# Patient Record
Sex: FEMALE | Race: WHITE | NOT HISPANIC OR LATINO | Employment: FULL TIME
[De-identification: names, ages, dates, MRNs, and addresses within clinical notes are randomized per-mention and may not be internally consistent; named-entity substitution may affect disease eponyms.]

---

## 2017-09-10 ENCOUNTER — HEALTH MAINTENANCE LETTER (OUTPATIENT)
Age: 37
End: 2017-09-10

## 2018-01-26 ENCOUNTER — TELEPHONE (OUTPATIENT)
Dept: FAMILY MEDICINE | Facility: CLINIC | Age: 38
End: 2018-01-26

## 2018-01-26 NOTE — TELEPHONE ENCOUNTER
Pt is past due for f/u pap smear.  Reminder letter was sent 06/20/17.  LMTC and schedule at [facility].  Left this writer's number in case of questions ([phone]).  If no reply and/or appt within 2 weeks (02/09/18) pt will be considered lost to pap tracking f/u.  Sharon Cummings,    Pap Tracking

## 2018-03-11 ENCOUNTER — HEALTH MAINTENANCE LETTER (OUTPATIENT)
Age: 38
End: 2018-03-11

## 2018-04-27 ENCOUNTER — OFFICE VISIT (OUTPATIENT)
Dept: FAMILY MEDICINE | Facility: CLINIC | Age: 38
End: 2018-04-27
Payer: COMMERCIAL

## 2018-04-27 VITALS
TEMPERATURE: 98.1 F | DIASTOLIC BLOOD PRESSURE: 84 MMHG | BODY MASS INDEX: 44.41 KG/M2 | OXYGEN SATURATION: 98 % | RESPIRATION RATE: 16 BRPM | HEART RATE: 78 BPM | SYSTOLIC BLOOD PRESSURE: 118 MMHG | HEIGHT: 68 IN | WEIGHT: 293 LBS

## 2018-04-27 DIAGNOSIS — E66.01 MORBID OBESITY DUE TO EXCESS CALORIES (H): Primary | ICD-10-CM

## 2018-04-27 DIAGNOSIS — M22.2X2 PATELLOFEMORAL PAIN SYNDROME OF LEFT KNEE: ICD-10-CM

## 2018-04-27 DIAGNOSIS — E28.2 PCOS (POLYCYSTIC OVARIAN SYNDROME): ICD-10-CM

## 2018-04-27 PROCEDURE — 99214 OFFICE O/P EST MOD 30 MIN: CPT | Performed by: FAMILY MEDICINE

## 2018-04-27 ASSESSMENT — ENCOUNTER SYMPTOMS
CONSTITUTIONAL NEGATIVE: 1
FALLS: 0
FOCAL WEAKNESS: 0
SENSORY CHANGE: 0

## 2018-04-27 NOTE — MR AVS SNAPSHOT
After Visit Summary   4/27/2018    Erika Reina    MRN: [id number]           Patient Information     Date Of Birth          1980        Visit Information        Provider Department      4/27/2018 7:40 AM Robert Garza MD [facility]        Today's Diagnoses     Morbid obesity due to excess calories (H)    -  1    PCOS (polycystic ovarian syndrome)        Patellofemoral pain syndrome of left knee           Follow-ups after your visit        Additional Services     BARIATRIC ADULT REFERRAL       Your provider has referred you to: [facility] [[email or web address]]: [facility] [phone]. [email or web address]    Please be aware that coverage of these services is subject to the terms and limitations of your health insurance plan.  Call member services at your health plan with any benefit or coverage questions.      Please bring the following with you to your appointment:      (1) List of current medications   (2) This referral request   (3) Any documents/labs given to you for this referral            [facility] PT, HAND, AND CHIROPRACTIC REFERRAL       **This order will print in the [facility] Scheduling Office**    Physical Therapy, Hand Therapy and Chiropractic Care are available through:    *[facility]  *[facility]  *[facility]    Call one number to schedule at any of the above locations: [phone].    Your provider has referred you to: Physical Therapy at [facility] or [facility]    Indication/Reason for Referral: Knee Pain  Onset of Illness: months  Therapy Orders: Evaluate and Treat  Special Programs: None  Special Request: None    Vandana Ross      Additional Comments for the Therapist or Chiropractor:     Please be aware that coverage of these services is subject to the terms and limitations of your health insurance plan.  Call member services at  "your health plan with any benefit or coverage questions.      Please bring the following to your appointment:    *Your personal calendar for scheduling future appointments  *Comfortable clothing                  Follow-up notes from your care team     Return in about 1 month (around 5/27/2018), or if symptoms worsen or fail to improve.      Who to contact     If you have questions or need follow up information about today's clinic visit or your schedule please contact [facility] directly at [phone].  Normal or non-critical lab and imaging results will be communicated to you by "[employer]hart, letter or phone within 4 business days after the clinic has received the results. If you do not hear from us within 7 days, please contact the clinic through [employer] or phone. If you have a critical or abnormal lab result, we will notify you by phone as soon as possible.  Submit refill requests through [employer] or call your pharmacy and they will forward the refill request to us. Please allow 3 business days for your refill to be completed.          Additional Information About Your Visit        [employer] Information     [employer] gives you secure access to your electronic health record. If you see a primary care provider, you can also send messages to your care team and make appointments. If you have questions, please call your primary care clinic.  If you do not have a primary care provider, please call [phone] and they will assist you.        Care EveryWhere ID     This is your Care EveryWhere ID. This could be used by other organizations to access your [place] medical records  [id number]        Your Vitals Were     Pulse Temperature Respirations Height Last Period Pulse Oximetry    78 98.1  F (36.7  C) (Oral) 16 5' 7.75\" (1.721 m) 04/25/2018 98%    Breastfeeding? BMI (Body Mass Index)                No 49.83 kg/m2           Blood Pressure from Last 3 Encounters:   04/27/18 118/84   12/19/16 112/80 "   11/21/16 116/64    Weight from Last 3 Encounters:   04/27/18 325 lb 4.8 oz (147.6 kg)   12/19/16 (!) 350 lb (158.8 kg)   11/21/16 (!) 345 lb (156.5 kg)              We Performed the Following     BARIATRIC ADULT REFERRAL     [facility] PT, HAND, AND CHIROPRACTIC REFERRAL          Today's Medication Changes          These changes are accurate as of 4/27/18  8:12 AM.  If you have any questions, ask your nurse or doctor.               Start taking these medicines.        Dose/Directions    diclofenac 1 % Gel topical gel   Commonly known as:  VOLTAREN   Used for:  Patellofemoral pain syndrome of left knee   Started by:  Robert Garza MD        Apply 4 grams to knees or 2 grams to hands four times daily using enclosed dosing card.   Quantity:  100 g   Refills:  1       metFORMIN 500 MG tablet   Commonly known as:  GLUCOPHAGE   Used for:  PCOS (polycystic ovarian syndrome)   Started by:  Robert Garza MD        Dose:  500 mg   Take 1 tablet (500 mg) by mouth 2 times daily (with meals)   Quantity:  180 tablet   Refills:  1            Where to get your medicines      These medications were sent to [facility]/pharmacy #[id number] - [place], [place] - [address]  [address] [facility] [address]     Phone:  [phone]     diclofenac 1 % Gel topical gel    metFORMIN 500 MG tablet                Primary Care Provider Office Phone # Fax #    Robert Garza [phone]       [address] [facility] [address]        Equal Access to Services     GODFREY GONZALEZ AH: Hadii christine ku hadasho Soomaali, waaxda luqadaha, qaybta kaalmada adeegyada, waxay jonathan pete. So [facility] [phone].    ATENCIÓN: Si habla edith, tiene a dodge disposición servicios gratuitos de asistencia lingüística. Llame al [phone].    We comply with applicable federal civil rights laws and [place] laws. We do not discriminate on the basis of race, color, national origin, age, disability, sex,  sexual orientation, or gender identity.            Thank you!     Thank you for choosing [facility]  for your care. Our goal is always to provide you with excellent care. Hearing back from our patients is one way we can continue to improve our services. Please take a few minutes to complete the written survey that you may receive in the mail after your visit with us. Thank you!             Your Updated Medication List - Protect others around you: Learn how to safely use, store and throw away your medicines at www.disposemymeds.org.          This list is accurate as of 4/27/18  8:12 AM.  Always use your most recent med list.                   Brand Name Dispense Instructions for use Diagnosis    diclofenac 1 % Gel topical gel    VOLTAREN    100 g    Apply 4 grams to knees or 2 grams to hands four times daily using enclosed dosing card.    Patellofemoral pain syndrome of left knee       metFORMIN 500 MG tablet    GLUCOPHAGE    180 tablet    Take 1 tablet (500 mg) by mouth 2 times daily (with meals)    PCOS (polycystic ovarian syndrome)

## 2018-04-27 NOTE — PROGRESS NOTES
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  Similar but not as bad as previous meniscus injury to R knee about 15 years ago (did wind up having surgery).  Pain keeps her awake at night, walking.  Worse after being in her feet, can throb when she sits down to rest.  Fully extended is uncomfortable, as is fully flexed.  Driving is very uncomfortable.  Maybe a bit of swelling - feels tight when she bends it.  No catching/locking.  No bruising.  \"Kind of\" feels unstable.  Did find Voltaren helpful.    Would like to restart metformin.  Never did follow up with weight clinic.    Review of Systems   Constitutional: Negative.    Musculoskeletal: Positive for joint pain. Negative for falls.   Neurological: Negative for sensory change and focal weakness.         Physical Exam   Constitutional: She is well-developed, well-nourished, and in no distress.   Musculoskeletal:        Left knee: She exhibits abnormal patellar mobility. She exhibits normal range of motion, no swelling, no LCL laxity, normal meniscus and no MCL laxity. No tenderness found. No medial joint line, no lateral joint line, no MCL and no LCL tenderness noted.   Skin: Skin is warm and dry.   Vitals " reviewed.    (E66.01) Morbid obesity due to excess calories (H)  (primary encounter diagnosis)  Comment: will re-refer  Plan: BARIATRIC ADULT REFERRAL            (E28.2) PCOS (polycystic ovarian syndrome)  Comment: renewing, can also help with weight loss  Plan: metFORMIN (GLUCOPHAGE) 500 MG tablet            (M22.2X2) Patellofemoral pain syndrome of left knee  Comment: lots of patella noise on exam, can also add ice  Plan: [facility] PT, HAND, AND CHIROPRACTIC REFERRAL,         diclofenac (VOLTAREN) 1 % GEL topical gel              RTC in 1m    Robert Garza MD

## 2018-04-30 ENCOUNTER — TELEPHONE (OUTPATIENT)
Dept: FAMILY MEDICINE | Facility: CLINIC | Age: 38
End: 2018-04-30

## 2018-04-30 NOTE — LETTER
May 4, 2018      Erika Reina  [address] [facility] [address]        To Whom It May Concern,      RE:  Erika Reina ( 1980)    BCBS ID: [id number]  Fax: [phone]    We are writing to appeal the decision on the prior authorization denial for the medication diclofenac (VOLTAREN) 1 % GEL topical gel.  Patient is unable to take any NSAIDS due to an allergic reaction.  She is being prescribed this medication for pain related to the diagnosis of Patellofemoral pain syndrome of left knee: (ICD-10 M22.2X2)  Patient has tried and failed other modalities such as ice, heat, tylenol and rest with no relief.  Please reconsider prior authorization approval.        Sincerely,        Robert Garza MD

## 2018-04-30 NOTE — TELEPHONE ENCOUNTER
Prior Authorization Retail Medication Request    Medication/Dose: diclofenac (VOLTAREN) 1 % GEL topical gel  ICD code (if different than what is on RX):    Previously Tried and Failed:  Ice, ibuprofen, Tylenol, naproxen   Rationale:  At exam, there was lots of patella noise.  They are going to try some PT as well.    Insurance Name:  unknown  Insurance ID:  [place]... Unknown  Phone: [phone]      Pharmacy Information (if different than what is on RX)  Name:    Phone:

## 2018-05-01 NOTE — TELEPHONE ENCOUNTER
PA Initiation    Medication: VOLTAREN 1% GEL  Insurance Company: [employer] - Phone [phone] Fax [phone]  Pharm[facility] the [id number] [place], [place] - [address]  Filling Pharmacy Phone: [phone]  Filling Pharmacy Fax:    Start Date: 5/1/2018

## 2018-05-01 NOTE — TELEPHONE ENCOUNTER
Request has been submitted via Count includes the [facility]. Waiting for questions to generate before completing PA.

## 2018-05-02 NOTE — TELEPHONE ENCOUNTER
diclofenac (VOLTAREN) 1 % GEL topical gel    Denied    Please send alternative.       BRAYAN Marie  May 2, 2018  10:02 AM

## 2018-05-02 NOTE — TELEPHONE ENCOUNTER
PA - pt has allergic reaction to oral NSAIDs, has been able to tolerate topical NSAIDs.    Robert Garza MD

## 2018-05-04 NOTE — TELEPHONE ENCOUNTER
Medication Appeal Initiation    We have initiated an appeal for the requested medication:  Medication: VOLTAREN 1% GEL - DENIED  Appeal Start Date:  5/4/2018  Insurance Company: [employer] - Phone [phone] Fax [phone]  Comments:  Appeal has been faxed to [facility] along with appeal letter.

## 2018-05-09 NOTE — TELEPHONE ENCOUNTER
Received fax from [facility]  Appeal for Diclofenac Sodium Topical Gel 1% was determined as not medically necessary  Coverage for gel is only covered for OA pain in joints susceptible to topical treatment such as feet, ankles, knees, hands, wrist, or elbows    Request reviewed by MD Board Certified in Emergency Medicine     Route to provider to review and advise    Tori Richardson RN Nurse Triage

## 2018-08-07 ENCOUNTER — OFFICE VISIT (OUTPATIENT)
Dept: FAMILY MEDICINE | Facility: CLINIC | Age: 38
End: 2018-08-07
Payer: COMMERCIAL

## 2018-08-07 VITALS
OXYGEN SATURATION: 97 % | TEMPERATURE: 98.7 F | HEIGHT: 68 IN | DIASTOLIC BLOOD PRESSURE: 72 MMHG | SYSTOLIC BLOOD PRESSURE: 122 MMHG | HEART RATE: 102 BPM | BODY MASS INDEX: 44.41 KG/M2 | WEIGHT: 293 LBS

## 2018-08-07 DIAGNOSIS — E66.01 MORBID OBESITY DUE TO EXCESS CALORIES (H): ICD-10-CM

## 2018-08-07 DIAGNOSIS — Z13.1 SCREENING FOR DIABETES MELLITUS: ICD-10-CM
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
## 2018-08-07 DIAGNOSIS — N92.0 EXCESSIVE AND FREQUENT MENSTRUATION: ICD-10-CM

## 2018-08-07 DIAGNOSIS — Z13.6 CARDIOVASCULAR SCREENING; LDL GOAL LESS THAN 160: ICD-10-CM

## 2018-08-07 DIAGNOSIS — S40.861A INSECT BITE OF RIGHT UPPER EXTREMITY, INITIAL ENCOUNTER: ICD-10-CM

## 2018-08-07 DIAGNOSIS — Z00.00 ROUTINE GENERAL MEDICAL EXAMINATION AT A HEALTH CARE FACILITY: Primary | ICD-10-CM

## 2018-08-07 DIAGNOSIS — D64.9 ANEMIA, UNSPECIFIED TYPE: ICD-10-CM

## 2018-08-07 DIAGNOSIS — Z12.4 SCREENING FOR MALIGNANT NEOPLASM OF CERVIX: ICD-10-CM

## 2018-08-07 LAB
ERYTHROCYTE [DISTWIDTH] IN BLOOD BY AUTOMATED COUNT: 14.1 % (ref 10–15)
HCT VFR BLD AUTO: 38.5 % (ref 35–47)
HGB BLD-MCNC: 12.8 G/DL (ref 11.7–15.7)
MCH RBC QN AUTO: 29.2 PG (ref 26.5–33)
MCHC RBC AUTO-ENTMCNC: 33.2 G/DL (ref 31.5–36.5)
MCV RBC AUTO: 88 FL (ref 78–100)
PLATELET # BLD AUTO: 355 10E9/L (ref 150–450)
RBC # BLD AUTO: 4.39 10E12/L (ref 3.8–5.2)
WBC # BLD AUTO: 9.8 10E9/L (ref 4–11)

## 2018-08-07 PROCEDURE — 85027 COMPLETE CBC AUTOMATED: CPT | Performed by: NURSE PRACTITIONER

## 2018-08-07 PROCEDURE — 36415 COLL VENOUS BLD VENIPUNCTURE: CPT | Performed by: NURSE PRACTITIONER

## 2018-08-07 PROCEDURE — 99395 PREV VISIT EST AGE 18-39: CPT | Performed by: NURSE PRACTITIONER

## 2018-08-07 RX ORDER — MULTIPLE VITAMINS W/ MINERALS TAB 9MG-400MCG
1 TAB ORAL DAILY
COMMUNITY
End: 2020-10-20

## 2018-08-07 NOTE — PATIENT INSTRUCTIONS
Follow up with GYN next week.      Preventive Health Recommendations  Female Ages 26 - 39  Yearly exam:   See your health care provider every year in order to    Review health changes.     Discuss preventive care.      Review your medicines if you your doctor has prescribed any.    Until age 30: Get a Pap test every three years (more often if you have had an abnormal result).    After age 30: Talk to your doctor about whether you should have a Pap test every 3 years or have a Pap test with HPV screening every 5 years.   You do not need a Pap test if your uterus was removed (hysterectomy) and you have not had cancer.  You should be tested each year for STDs (sexually transmitted diseases), if you're at risk.   Talk to your provider about how often to have your cholesterol checked.  If you are at risk for diabetes, you should have a diabetes test (fasting glucose).  Shots: Get a flu shot each year. Get a tetanus shot every 10 years.   Nutrition:     Eat at least 5 servings of fruits and vegetables each day.    Eat whole-grain bread, whole-wheat pasta and brown rice instead of white grains and rice.    Get adequate Calcium and Vitamin D.     Lifestyle    Exercise at least 150 minutes a week (30 minutes a day, 5 days of the week). This will help you control your weight and prevent disease.    Limit alcohol to one drink per day.    No smoking.     Wear sunscreen to prevent skin cancer.    See your dentist every six months for an exam and cleaning.

## 2018-08-07 NOTE — MR AVS SNAPSHOT
After Visit Summary   8/7/2018    Erika Reina    MRN: [id number]           Patient Information     Date Of Birth          1980        Visit Information        Provider Department      8/7/2018 1:20 PM Glenna Ham APRN [facility]        Today's Diagnoses     Excessive and frequent menstruation    -  1    Screening for malignant neoplasm of cervix        Anemia, unspecified type        Screening for diabetes mellitus        CARDIOVASCULAR SCREENING; LDL GOAL LESS THAN 160        Routine general medical examination at a health care facility          Care Instructions    Follow up with GYN next week.      Preventive Health Recommendations  Female Ages 26 - 39  Yearly exam:   See your health care provider every year in order to    Review health changes.     Discuss preventive care.      Review your medicines if you your doctor has prescribed any.    Until age 30: Get a Pap test every three years (more often if you have had an abnormal result).    After age 30: Talk to your doctor about whether you should have a Pap test every 3 years or have a Pap test with HPV screening every 5 years.   You do not need a Pap test if your uterus was removed (hysterectomy) and you have not had cancer.  You should be tested each year for STDs (sexually transmitted diseases), if you're at risk.   Talk to your provider about how often to have your cholesterol checked.  If you are at risk for diabetes, you should have a diabetes test (fasting glucose).  Shots: Get a flu shot each year. Get a tetanus shot every 10 years.   Nutrition:     Eat at least 5 servings of fruits and vegetables each day.    Eat whole-grain bread, whole-wheat pasta and brown rice instead of white grains and rice.    Get adequate Calcium and Vitamin D.     Lifestyle    Exercise at least 150 minutes a week (30 minutes a day, 5 days of the week). This will help you control your weight and prevent disease.    Limit  alcohol to one drink per day.    No smoking.     Wear sunscreen to prevent skin cancer.    See your dentist every six months for an exam and cleaning.            Follow-ups after your visit        Additional Services     OB/GYN REFERRAL       Your provider has referred you to:  [facility]: [facility] [phone]   [email or web address]      Please be aware that coverage of these services is subject to the terms and limitations of your health insurance plan.  Call member services at your health plan with any benefit or coverage questions.      Please bring the following with you to your appointment:    (1) Any X-Rays, CTs or MRIs which have been performed.  Contact the facility where they were done to arrange for  prior to your scheduled appointment.   (2) List of current medications   (3) This referral request   (4) Any documents/labs given to you for this referral                  Follow-up notes from your care team     Return in about 1 year (around 8/7/2019) for Physical Exam.      Your next 10 appointments already scheduled     Aug 16, 2018  3:00 PM CDT   [facility]    [address] [place] [address] [phone]           Bring a current list of meds and any records pertaining to this visit. For Physicals, please bring immunization records and any forms needing to be filled out. Please arrive 10 minutes early to complete paperwork.              Future tests that were ordered for you today     Open Future Orders        Priority Expected Expires Ordered    Lipid panel reflex to direct LDL Fasting Routine  8/7/2019 8/7/2018    Glucose Routine  8/7/2019 8/7/2018    CBC with platelets Routine  8/7/2019 8/7/2018            Who to contact     If you have questions or need follow up information about today's clinic visit or your schedule please contact  "[facility] directly at [phone].  Normal or non-critical lab and imaging results will be communicated to you by MyChart, letter or phone within 4 business days after the clinic has received the results. If you do not hear from us within 7 days, please contact the clinic through MyChart or phone. If you have a critical or abnormal lab result, we will notify you by phone as soon as possible.  Submit refill requests through [employer] or call your pharmacy and they will forward the refill request to us. Please allow 3 business days for your refill to be completed.          Additional Information About Your Visit        [employer] Information     [employer] gives you secure access to your electronic health record. If you see a primary care provider, you can also send messages to your care team and make appointments. If you have questions, please call your primary care clinic.  If you do not have a primary care provider, please call [phone] and they will assist you.        Care EveryWhere ID     This is your Care EveryWhere ID. This could be used by other organizations to access your [place] medical records  [id number]        Your Vitals Were     Pulse Temperature Height Last Period Pulse Oximetry BMI (Body Mass Index)    102 98.7  F (37.1  C) (Oral) 5' 8\" (1.727 m) 07/01/2018 97% 50.18 kg/m2       Blood Pressure from Last 3 Encounters:   08/07/18 122/72   04/27/18 118/84   12/19/16 112/80    Weight from Last 3 Encounters:   08/07/18 330 lb (149.7 kg)   04/27/18 325 lb 4.8 oz (147.6 kg)   12/19/16 (!) 350 lb (158.8 kg)              We Performed the Following     OB/GYN REFERRAL        Primary Care Provider Office Phone # Fax #    Robert Garza [phone]       [address]  [facility] [address]        Equal Access to Services     GODFREY GONZALEZ AH: Hadii aad ku hadasho Soomaali, waaxda luqadaha, qaybta kaalmada juliethyaantolin, rito malik . So [facility] " [phone].    ATENCIÓN: Si rickie sharma, tiene a dodge disposición servicios gratuitos de asistencia lingüística. Leann golden [phone].    We comply with applicable federal civil rights laws and [place] laws. We do not discriminate on the basis of race, color, national origin, age, disability, sex, sexual orientation, or gender identity.            Thank you!     Thank you for choosing [facility]  for your care. Our goal is always to provide you with excellent care. Hearing back from our patients is one way we can continue to improve our services. Please take a few minutes to complete the written survey that you may receive in the mail after your visit with us. Thank you!             Your Updated Medication List - Protect others around you: Learn how to safely use, store and throw away your medicines at www.disposemymeds.org.          This list is accurate as of 8/7/18  2:21 PM.  Always use your most recent med list.                   Brand Name Dispense Instructions for use Diagnosis    metFORMIN 500 MG tablet    GLUCOPHAGE    180 tablet    Take 1 tablet (500 mg) by mouth 2 times daily (with meals)    PCOS (polycystic ovarian syndrome)       Multi-vitamin Tabs tablet      Take 1 tablet by mouth daily        VITAMIN D (CHOLECALCIFEROL) PO      Take by mouth daily

## 2018-08-07 NOTE — PROGRESS NOTES
SUBJECTIVE:   CC: Erika Reina is an 37 year old woman who presents for preventive health visit.     Physical   Annual:     Getting at least 3 servings of Calcium per day:  Yes    Bi-annual eye exam:  Yes    Dental care twice a year:  Yes    Sleep apnea or symptoms of sleep apnea:  Daytime drowsiness    Diet:  Regular (no restrictions)    Frequency of exercise:  2-3 days/week    Duration of exercise:  15-30 minutes    Taking medications regularly:  Yes    Medication side effects:  Other    Additional concerns today:  YES (abdominal cramping x3 months )    Menstrual cycle: for the past month has had bleeding more days than not; almost every day.  Reports prior to the last month had some spotting almost every day.  She has noticed cramps daily for the past few months.  Hx of anemia in the past.  Still taking OTC iron daily.  Has been on metformin for PCOS.          Today's PHQ-2 Score:   PHQ-2 ( 1999 Pfizer) 8/7/2018   Q1: Little interest or pleasure in doing things 1   Q2: Feeling down, depressed or hopeless 1   PHQ-2 Score 2   Q1: Little interest or pleasure in doing things Several days   Q2: Feeling down, depressed or hopeless Several days   PHQ-2 Score 2     Abuse: Current or Past(Physical, Sexual or Emotional)- No  Do you feel safe in your environment - Yes    Social History   Substance Use Topics     Smoking status: Never Smoker     Smokeless tobacco: Never Used     Alcohol use 0.0 oz/week     0 Standard drinks or equivalent per week      Comment: rarely     Alcohol Use 8/7/2018   If you drink alcohol do you typically have greater than 3 drinks per day OR greater than 7 drinks per week? No   No flowsheet data found.    Reviewed orders with patient.  Reviewed health maintenance and updated orders accordingly - Yes  BP Readings from Last 3 Encounters:   08/07/18 122/72   04/27/18 118/84   12/19/16 112/80    Wt Readings from Last 3 Encounters:   08/07/18 330 lb (149.7 kg)   04/27/18 325 lb 4.8 oz (147.6  kg)   12/19/16 (!) 350 lb (158.8 kg)                  Current Outpatient Prescriptions   Medication Sig Dispense Refill     multivitamin, therapeutic with minerals (MULTI-VITAMIN) TABS tablet Take 1 tablet by mouth daily       VITAMIN D, CHOLECALCIFEROL, PO Take by mouth daily       metFORMIN (GLUCOPHAGE) 500 MG tablet Take 1 tablet (500 mg) by mouth 2 times daily (with meals) (Patient not taking: Reported on 8/7/2018) 180 tablet 1     Allergies   Allergen Reactions     Ibuprofen Swelling     Swelling of legs       Mammogram not appropriate for this patient based on age.    Pertinent mammograms are reviewed under the imaging tab.  History of abnormal Pap smear: YES - updated in Problem List and Health Maintenance accordingly  PAP / HPV Latest Ref Rng & Units 10/18/2016   PAP - LSIL(A)   HPV 16 DNA NEG Negative   HPV 18 DNA NEG Negative   OTHER HR HPV NEG Positive(A)     Reviewed and updated as needed this visit by clinical staff  Tobacco  Allergies  Meds  Med Hx  Surg Hx  Fam Hx  Soc Hx        Reviewed and updated as needed this visit by Provider        Past Medical History:   Diagnosis Date     Hyperlipidemia LDL goal <160 9/15/2011     Papanicolaou smear of cervix with low grade squamous intraepithelial lesion (LGSIL) 10/18/16    +HR HPV.      History reviewed. No pertinent surgical history.    Review of Systems  CONSTITUTIONAL: NEGATIVE for fever, chills, change in weight  INTEGUMENTARU/SKIN: NEGATIVE for worrisome rashes, moles or lesions  EYES: NEGATIVE for vision changes or irritation  ENT: NEGATIVE for ear, mouth and throat problems  RESP: NEGATIVE for significant cough or SOB  BREAST: NEGATIVE for masses, tenderness or discharge  CV: NEGATIVE for chest pain, palpitations or peripheral edema  GI: NEGATIVE for nausea, abdominal pain, heartburn, or change in bowel habits  : NEGATIVE for unusual urinary or vaginal symptoms. Periods are irregular.  MUSCULOSKELETAL: NEGATIVE for significant arthralgias or  "myalgia  NEURO: NEGATIVE for weakness, dizziness or paresthesias  ENDOCRINE: NEGATIVE for temperature intolerance, skin/hair changes  PSYCHIATRIC: NEGATIVE for changes in mood or affect     OBJECTIVE:   /72  Pulse 102  Temp 98.7  F (37.1  C) (Oral)  Ht 5' 8\" (1.727 m)  Wt 330 lb (149.7 kg)  LMP 07/01/2018  SpO2 97%  BMI 50.18 kg/m2  Physical Exam  GENERAL: healthy, alert and no distress  EYES: Eyes grossly normal to inspection, PERRL and conjunctivae and sclerae normal  HENT: ear canals and TM's normal, nose and mouth without ulcers or lesions  NECK: no adenopathy, no asymmetry, masses, or scars and thyroid normal to palpation  RESP: lungs clear to auscultation - no rales, rhonchi or wheezes  CV: regular rate and rhythm, normal S1 S2, no S3 or S4, no murmur, click or rub, no peripheral edema and peripheral pulses strong  ABDOMEN: soft, nontender, no hepatosplenomegaly, no masses and bowel sounds normal   (female): normal female external genitalia, normal urethral meatus , vaginal mucosa pink, moist, unable to visualize cervix/complete pap due to bleeding  MS: no gross musculoskeletal defects noted, no edema  SKIN: no suspicious lesions or rashes, 4 small erythematous wheals on R upper arm   NEURO: Normal strength and tone, mentation intact and speech normal  PSYCH: mentation appears normal, affect normal/bright    Diagnostic Test Results:  Results for orders placed or performed in visit on 08/07/18   CBC with platelets   Result Value Ref Range    WBC 9.8 4.0 - 11.0 10e9/L    RBC Count 4.39 3.8 - 5.2 10e12/L    Hemoglobin 12.8 11.7 - 15.7 g/dL    Hematocrit 38.5 35.0 - 47.0 %    MCV 88 78 - 100 fl    MCH 29.2 26.5 - 33.0 pg    MCHC 33.2 31.5 - 36.5 g/dL    RDW 14.1 10.0 - 15.0 %    Platelet Count 355 150 - 450 10e9/L         ASSESSMENT/PLAN:   1. Routine general medical examination at a health care facility  Normal exam.  Will have her see GYN for menorrhagia.      2. Excessive and frequent " "menstruation  - OB/GYN REFERRAL  - CBC with platelets; Future  - CBC with platelets    3. Screening for malignant neoplasm of cervix  Unable to do pap due to bleeding today.  Will refer to GYN.   - OB/GYN REFERRAL    4. Anemia, unspecified type  Hx of anemia.  Reports frequent menstrual bleeding.  CBC normal.    - CBC with platelets; Future  - CBC with platelets    5. Screening for diabetes mellitus  Not fasting today.   - Glucose; Future    6. CARDIOVASCULAR SCREENING; LDL GOAL LESS THAN 160  Not fasting today.   - Lipid panel reflex to direct LDL Fasting; Future    7. Morbid obesity due to excess calories (H)    8. Insect bite of right upper extremity, initial encounter  Appear to be bug bites.  OTC anti itch cream as needed.      COUNSELING:  Reviewed preventive health counseling, as reflected in patient instructions       Regular exercise       Healthy diet/nutrition    BP Readings from Last 1 Encounters:   08/07/18 122/72     Estimated body mass index is 50.18 kg/(m^2) as calculated from the following:    Height as of this encounter: 5' 8\" (1.727 m).    Weight as of this encounter: 330 lb (149.7 kg).    BP Screening:   Last 3 BP Readings:    BP Readings from Last 3 Encounters:   08/07/18 122/72   04/27/18 118/84   12/19/16 112/80       The following was recommended to the patient:  Re-screen BP within a year and recommended lifestyle modifications  Weight management plan: Discussed healthy diet and exercise guidelines and patient will follow up in 6 months in clinic to re-evaluate.     reports that she has never smoked. She has never used smokeless tobacco.      Counseling Resources:  ATP IV Guidelines  Pooled Cohorts Equation Calculator  Breast Cancer Risk Calculator  FRAX Risk Assessment  ICSI Preventive Guidelines  Dietary Guidelines for Americans, 2010  USDA's MyPlate  ASA Prophylaxis  Lung CA Screening    LEEROY Pal [facility]  Answers for HPI/ROS submitted by the patient " on 8/7/2018   PHQ-2 Score: 2

## 2018-08-16 ENCOUNTER — OFFICE VISIT (OUTPATIENT)
Dept: OBGYN | Facility: CLINIC | Age: 38
End: 2018-08-16
Payer: COMMERCIAL

## 2018-08-16 VITALS — BODY MASS INDEX: 50.18 KG/M2 | WEIGHT: 293 LBS | DIASTOLIC BLOOD PRESSURE: 82 MMHG | SYSTOLIC BLOOD PRESSURE: 122 MMHG

## 2018-08-16 DIAGNOSIS — Z12.4 SCREENING FOR CERVICAL CANCER: Primary | ICD-10-CM

## 2018-08-16 DIAGNOSIS — Z13.6 CARDIOVASCULAR SCREENING; LDL GOAL LESS THAN 160: ICD-10-CM

## 2018-08-16 DIAGNOSIS — Z11.51 SCREENING FOR HUMAN PAPILLOMAVIRUS: ICD-10-CM

## 2018-08-16 DIAGNOSIS — E28.2 PCOS (POLYCYSTIC OVARIAN SYNDROME): ICD-10-CM

## 2018-08-16 DIAGNOSIS — Z13.1 SCREENING FOR DIABETES MELLITUS: ICD-10-CM

## 2018-08-16 PROCEDURE — 80061 LIPID PANEL: CPT | Performed by: ADVANCED PRACTICE MIDWIFE

## 2018-08-16 PROCEDURE — 82947 ASSAY GLUCOSE BLOOD QUANT: CPT | Performed by: ADVANCED PRACTICE MIDWIFE

## 2018-08-16 PROCEDURE — G0145 SCR C/V CYTO,THINLAYER,RESCR: HCPCS | Performed by: ADVANCED PRACTICE MIDWIFE

## 2018-08-16 PROCEDURE — 87624 HPV HI-RISK TYP POOLED RSLT: CPT | Performed by: ADVANCED PRACTICE MIDWIFE

## 2018-08-16 PROCEDURE — 36415 COLL VENOUS BLD VENIPUNCTURE: CPT | Performed by: ADVANCED PRACTICE MIDWIFE

## 2018-08-16 PROCEDURE — 99203 OFFICE O/P NEW LOW 30 MIN: CPT | Performed by: ADVANCED PRACTICE MIDWIFE

## 2018-08-16 RX ORDER — DESOGESTREL AND ETHINYL ESTRADIOL 0.15-0.03
1 KIT ORAL DAILY
Qty: 28 TABLET | Refills: 11 | Status: SHIPPED | OUTPATIENT
Start: 2018-08-16 | End: 2018-11-20

## 2018-08-16 NOTE — NURSING NOTE
"Chief Complaint   Patient presents with     Abnormal Bleeding Problem     Heavy abnormal periods with spotting        Initial /82 (BP Location: Right arm, Patient Position: Sitting, Cuff Size: Adult Large)  Wt 330 lb (149.7 kg)  LMP  (LMP Unknown)  BMI 50.18 kg/m2 Estimated body mass index is 50.18 kg/(m^2) as calculated from the following:    Height as of 8/7/18: 5' 8\" (1.727 m).    Weight as of this encounter: 330 lb (149.7 kg).  BP completed using cuff size: large    No obstetric history on file.    The following HM Due: mammogram  pap smear      The following patient reported/Care Every where data was sent to:  P ABSTRACT QUALITY INITIATIVES [00110]      patient has appointment for today.  Ricco Knight CMA                 "

## 2018-08-16 NOTE — MR AVS SNAPSHOT
After Visit Summary   8/16/2018    Erika Reina    MRN: [id number]           Patient Information     Date Of Birth          1980        Visit Information        Provider Department      8/16/2018 3:00 PM Sandra Pineda APRN [facility]        Today's Diagnoses     Screening for cervical cancer    -  1    Screening for human papillomavirus        PCOS (polycystic ovarian syndrome)        CARDIOVASCULAR SCREENING; LDL GOAL LESS THAN 160        Screening for diabetes mellitus           Follow-ups after your visit        Follow-up notes from your care team     Return if symptoms worsen or fail to improve.      Future tests that were ordered for you today     Open Future Orders        Priority Expected Expires Ordered    US Pelvic Complete w Transvaginal Routine  8/16/2019 8/16/2018            Who to contact     If you have questions or need follow up information about today's clinic visit or your schedule please contact [facility] directly at [phone].  Normal or non-critical lab and imaging results will be communicated to you by [employer], letter or phone within 4 business days after the clinic has received the results. If you do not hear from us within 7 days, please contact the clinic through Acer[employer] or phone. If you have a critical or abnormal lab result, we will notify you by phone as soon as possible.  Submit refill requests through [employer] or call your pharmacy and they will forward the refill request to us. Please allow 3 business days for your refill to be completed.          Additional Information About Your Visit        MyChart Information     [employer] gives you secure access to your electronic health record. If you see a primary care provider, you can also send messages to your care team and make appointments. If you have questions, please call your primary care clinic.  If you do not have a primary care provider, please call  [phone] and they will assist you.        Care EveryWhere ID     This is your Care EveryWhere ID. This could be used by other organizations to access your [place] medical records  [id number]        Your Vitals Were     Last Period BMI (Body Mass Index)                (LMP Unknown) 50.18 kg/m2           Blood Pressure from Last 3 Encounters:   08/16/18 122/82   08/07/18 122/72   04/27/18 118/84    Weight from Last 3 Encounters:   08/16/18 330 lb (149.7 kg)   08/07/18 330 lb (149.7 kg)   04/27/18 325 lb 4.8 oz (147.6 kg)              We Performed the Following     Glucose     HPV High Risk Types DNA Cervical     Lipid panel reflex to direct LDL Fasting     PAP imaged thin layer screen          Today's Medication Changes          These changes are accurate as of 8/16/18  3:53 PM.  If you have any questions, ask your nurse or doctor.               Start taking these medicines.        Dose/Directions    desogestrel-ethinyl estradiol 0.15-30 MG-MCG per tablet   Commonly known as:  APRI   Used for:  PCOS (polycystic ovarian syndrome)   Started by:  Sandra Pineda APRN CNM        Dose:  1 tablet   Take 1 tablet by mouth daily   Quantity:  28 tablet   Refills:  11            Where to get your medicines      These medications were sent to [facility]/pharmacy #[id number] - [place], [place] - [address]  [facility]  [address] [address]     Phone:  [phone]     desogestrel-ethinyl estradiol 0.15-30 MG-MCG per tablet                Primary Care Provider Office Phone # Fax #    LEEROY [place] [phone]       [facility] [address] [facility] [address]        Equal Access to Services     SHANNAN GONZALEZ AH: Hadii christine bartletto Sogeo, waaxda luqadaha, qaybta kaalmada adeegyada, rito pete. So [facility] [phone].    ATENCIÓN: Si habla español, tiene a dodge disposición servicios gratuitos de asistencia lingüística. Llame al  [phone].    We comply with applicable federal civil rights laws and [place] laws. We do not discriminate on the basis of race, color, national origin, age, disability, sex, sexual orientation, or gender identity.            Thank you!     Thank you for choosing [facility]  for your care. Our goal is always to provide you with excellent care. Hearing back from our patients is one way we can continue to improve our services. Please take a few minutes to complete the written survey that you may receive in the mail after your visit with us. Thank you!             Your Updated Medication List - Protect others around you: Learn how to safely use, store and throw away your medicines at www.disposemymeds.org.          This list is accurate as of 8/16/18  3:53 PM.  Always use your most recent med list.                   Brand Name Dispense Instructions for use Diagnosis    desogestrel-ethinyl estradiol 0.15-30 MG-MCG per tablet    APRI    28 tablet    Take 1 tablet by mouth daily    PCOS (polycystic ovarian syndrome)       metFORMIN 500 MG tablet    GLUCOPHAGE    180 tablet    Take 1 tablet (500 mg) by mouth 2 times daily (with meals)    PCOS (polycystic ovarian syndrome)       Multi-vitamin Tabs tablet      Take 1 tablet by mouth daily        VITAMIN D (CHOLECALCIFEROL) PO      Take by mouth daily

## 2018-08-17 LAB
CHOLEST SERPL-MCNC: 165 MG/DL
GLUCOSE SERPL-MCNC: 77 MG/DL (ref 70–99)
HDLC SERPL-MCNC: 69 MG/DL
LDLC SERPL CALC-MCNC: 79 MG/DL
NONHDLC SERPL-MCNC: 96 MG/DL
TRIGL SERPL-MCNC: 84 MG/DL

## 2018-08-21 LAB
COPATH REPORT: NORMAL
PAP: NORMAL

## 2018-08-22 LAB
FINAL DIAGNOSIS: NORMAL
HPV HR 12 DNA CVX QL NAA+PROBE: NEGATIVE
HPV16 DNA SPEC QL NAA+PROBE: NEGATIVE
HPV18 DNA SPEC QL NAA+PROBE: NEGATIVE
SPECIMEN DESCRIPTION: NORMAL
SPECIMEN SOURCE CVX/VAG CYTO: NORMAL

## 2018-09-07 ENCOUNTER — HOSPITAL ENCOUNTER (OUTPATIENT)
Dept: ULTRASOUND IMAGING | Facility: CLINIC | Age: 38
Discharge: HOME | End: 2018-09-07
Attending: SURGERY | Admitting: SURGERY
Payer: COMMERCIAL

## 2018-09-07 ENCOUNTER — APPOINTMENT (OUTPATIENT)
Dept: VASCULAR SURGERY | Facility: CLINIC | Age: 38
End: 2018-09-07
Payer: COMMERCIAL

## 2018-09-07 DIAGNOSIS — I82.409 DVT (DEEP VENOUS THROMBOSIS) (H): ICD-10-CM

## 2018-09-07 DIAGNOSIS — I80.01 SUPERFICIAL PHLEBITIS OF LEG, RIGHT: ICD-10-CM

## 2018-09-07 PROCEDURE — 99207 ZZC VEINSOLUTIONS FREE SCREENING: CPT | Performed by: SURGERY

## 2018-09-07 PROCEDURE — 93970 EXTREMITY STUDY: CPT

## 2018-09-13 ENCOUNTER — RADIANT APPOINTMENT (OUTPATIENT)
Dept: ULTRASOUND IMAGING | Facility: CLINIC | Age: 38
End: 2018-09-13
Attending: ADVANCED PRACTICE MIDWIFE
Payer: COMMERCIAL

## 2018-09-13 DIAGNOSIS — E28.2 PCOS (POLYCYSTIC OVARIAN SYNDROME): ICD-10-CM

## 2018-09-13 PROCEDURE — 76830 TRANSVAGINAL US NON-OB: CPT | Performed by: FAMILY MEDICINE

## 2018-09-13 PROCEDURE — 76856 US EXAM PELVIC COMPLETE: CPT | Performed by: FAMILY MEDICINE

## 2018-09-28 ENCOUNTER — HOSPITAL ENCOUNTER (EMERGENCY)
Facility: CLINIC | Age: 38
Discharge: HOME | End: 2018-09-28
Attending: PHYSICIAN ASSISTANT | Admitting: PHYSICIAN ASSISTANT
Payer: COMMERCIAL

## 2018-09-28 ENCOUNTER — NURSE TRIAGE (OUTPATIENT)
Dept: NURSING | Facility: CLINIC | Age: 38
End: 2018-09-28

## 2018-09-28 ENCOUNTER — APPOINTMENT (OUTPATIENT)
Dept: CT IMAGING | Facility: CLINIC | Age: 38
End: 2018-09-28
Attending: PHYSICIAN ASSISTANT
Payer: COMMERCIAL

## 2018-09-28 VITALS
DIASTOLIC BLOOD PRESSURE: 77 MMHG | OXYGEN SATURATION: 98 % | BODY MASS INDEX: 44.41 KG/M2 | WEIGHT: 293 LBS | RESPIRATION RATE: 18 BRPM | HEART RATE: 99 BPM | HEIGHT: 68 IN | SYSTOLIC BLOOD PRESSURE: 127 MMHG | TEMPERATURE: 97.3 F

## 2018-09-28 DIAGNOSIS — R10.84 ABDOMINAL PAIN, GENERALIZED: ICD-10-CM

## 2018-09-28 LAB
ALBUMIN SERPL-MCNC: 3.2 G/DL (ref 3.4–5)
ALBUMIN UR-MCNC: 30 MG/DL
ALP SERPL-CCNC: 65 U/L (ref 40–150)
ALT SERPL W P-5'-P-CCNC: 24 U/L (ref 0–50)
ANION GAP SERPL CALCULATED.3IONS-SCNC: 9 MMOL/L (ref 3–14)
APPEARANCE UR: ABNORMAL
AST SERPL W P-5'-P-CCNC: 15 U/L (ref 0–45)
BACTERIA #/AREA URNS HPF: ABNORMAL /HPF
BASOPHILS # BLD AUTO: 0.1 10E9/L (ref 0–0.2)
BASOPHILS NFR BLD AUTO: 0.4 %
BILIRUB SERPL-MCNC: 1.1 MG/DL (ref 0.2–1.3)
BILIRUB UR QL STRIP: NEGATIVE
BUN SERPL-MCNC: 10 MG/DL (ref 7–30)
CALCIUM SERPL-MCNC: 9.2 MG/DL (ref 8.5–10.1)
CHLORIDE SERPL-SCNC: 103 MMOL/L (ref 94–109)
CO2 SERPL-SCNC: 27 MMOL/L (ref 20–32)
COLOR UR AUTO: ABNORMAL
CREAT SERPL-MCNC: 0.78 MG/DL (ref 0.52–1.04)
DIFFERENTIAL METHOD BLD: ABNORMAL
EOSINOPHIL # BLD AUTO: 0.2 10E9/L (ref 0–0.7)
EOSINOPHIL NFR BLD AUTO: 1.2 %
ERYTHROCYTE [DISTWIDTH] IN BLOOD BY AUTOMATED COUNT: 14.4 % (ref 10–15)
GFR SERPL CREATININE-BSD FRML MDRD: 84 ML/MIN/1.7M2
GLUCOSE SERPL-MCNC: 84 MG/DL (ref 70–99)
GLUCOSE UR STRIP-MCNC: NEGATIVE MG/DL
HCG UR QL: NEGATIVE
HCT VFR BLD AUTO: 43.1 % (ref 35–47)
HGB BLD-MCNC: 13.7 G/DL (ref 11.7–15.7)
HGB UR QL STRIP: ABNORMAL
IMM GRANULOCYTES # BLD: 0.1 10E9/L (ref 0–0.4)
IMM GRANULOCYTES NFR BLD: 0.5 %
KETONES UR STRIP-MCNC: NEGATIVE MG/DL
LEUKOCYTE ESTERASE UR QL STRIP: ABNORMAL
LIPASE SERPL-CCNC: 69 U/L (ref 73–393)
LYMPHOCYTES # BLD AUTO: 1.6 10E9/L (ref 0.8–5.3)
LYMPHOCYTES NFR BLD AUTO: 10.5 %
MCH RBC QN AUTO: 28.7 PG (ref 26.5–33)
MCHC RBC AUTO-ENTMCNC: 31.8 G/DL (ref 31.5–36.5)
MCV RBC AUTO: 90 FL (ref 78–100)
MONOCYTES # BLD AUTO: 1 10E9/L (ref 0–1.3)
MONOCYTES NFR BLD AUTO: 6.3 %
MUCOUS THREADS #/AREA URNS LPF: PRESENT /LPF
NEUTROPHILS # BLD AUTO: 12.4 10E9/L (ref 1.6–8.3)
NEUTROPHILS NFR BLD AUTO: 81.1 %
NITRATE UR QL: NEGATIVE
NRBC # BLD AUTO: 0 10*3/UL
NRBC BLD AUTO-RTO: 0 /100
PH UR STRIP: 5 PH (ref 5–7)
PLATELET # BLD AUTO: 317 10E9/L (ref 150–450)
POTASSIUM SERPL-SCNC: 3.6 MMOL/L (ref 3.4–5.3)
PROT SERPL-MCNC: 7.5 G/DL (ref 6.8–8.8)
RBC # BLD AUTO: 4.78 10E12/L (ref 3.8–5.2)
RBC #/AREA URNS AUTO: 2 /HPF (ref 0–2)
SODIUM SERPL-SCNC: 139 MMOL/L (ref 133–144)
SOURCE: ABNORMAL
SP GR UR STRIP: 1.03 (ref 1–1.03)
SQUAMOUS #/AREA URNS AUTO: 9 /HPF (ref 0–1)
UROBILINOGEN UR STRIP-MCNC: 0 MG/DL (ref 0–2)
WBC # BLD AUTO: 15.3 10E9/L (ref 4–11)
WBC #/AREA URNS AUTO: 18 /HPF (ref 0–5)

## 2018-09-28 PROCEDURE — 96374 THER/PROPH/DIAG INJ IV PUSH: CPT | Mod: 59

## 2018-09-28 PROCEDURE — 25000128 H RX IP 250 OP 636: Performed by: PHYSICIAN ASSISTANT

## 2018-09-28 PROCEDURE — 99285 EMERGENCY DEPT VISIT HI MDM: CPT | Mod: 25

## 2018-09-28 PROCEDURE — 80053 COMPREHEN METABOLIC PANEL: CPT | Performed by: PHYSICIAN ASSISTANT

## 2018-09-28 PROCEDURE — 83690 ASSAY OF LIPASE: CPT | Performed by: PHYSICIAN ASSISTANT

## 2018-09-28 PROCEDURE — 85025 COMPLETE CBC W/AUTO DIFF WBC: CPT | Performed by: PHYSICIAN ASSISTANT

## 2018-09-28 PROCEDURE — 81001 URINALYSIS AUTO W/SCOPE: CPT | Performed by: PHYSICIAN ASSISTANT

## 2018-09-28 PROCEDURE — 81025 URINE PREGNANCY TEST: CPT | Performed by: PHYSICIAN ASSISTANT

## 2018-09-28 PROCEDURE — 74177 CT ABD & PELVIS W/CONTRAST: CPT

## 2018-09-28 RX ORDER — HYDROMORPHONE HYDROCHLORIDE 1 MG/ML
0.5 INJECTION, SOLUTION INTRAMUSCULAR; INTRAVENOUS; SUBCUTANEOUS ONCE
Status: COMPLETED | OUTPATIENT
Start: 2018-09-28 | End: 2018-09-28

## 2018-09-28 RX ORDER — IOPAMIDOL 755 MG/ML
500 INJECTION, SOLUTION INTRAVASCULAR ONCE
Status: COMPLETED | OUTPATIENT
Start: 2018-09-28 | End: 2018-09-28

## 2018-09-28 RX ADMIN — IOPAMIDOL 100 ML: 755 INJECTION, SOLUTION INTRAVENOUS at 16:05

## 2018-09-28 RX ADMIN — SODIUM CHLORIDE 65 ML: 9 INJECTION, SOLUTION INTRAVENOUS at 16:05

## 2018-09-28 RX ADMIN — Medication 0.5 MG: at 15:22

## 2018-09-28 ASSESSMENT — ENCOUNTER SYMPTOMS
CHILLS: 0
VOMITING: 0
ABDOMINAL PAIN: 1
NAUSEA: 0
APPETITE CHANGE: 1
DIARRHEA: 0
DIFFICULTY URINATING: 0
FEVER: 0
CONSTIPATION: 0
FLANK PAIN: 0
HEMATURIA: 0
DYSURIA: 0

## 2018-09-28 NOTE — TELEPHONE ENCOUNTER
Erika is having diffuse abdominal pain.  It started last night. She thinks she may have had a fever last night. I instructed she be seen in an ER now.  She stated understanding and agreement. She'll find someone to drive her.    Reason for Disposition    [1] SEVERE pain (e.g., excruciating) AND [2] present > 1 hour    Additional Information    Negative: Shock suspected (e.g., cold/pale/clammy skin, too weak to stand, low BP, rapid pulse)    Negative: Difficult to awaken or acting confused  (e.g., disoriented, slurred speech)    Negative: Passed out (i.e., lost consciousness, collapsed and was not responding)    Negative: Sounds like a life-threatening emergency to the triager    Protocols used: ABDOMINAL PAIN - FEMALE-ADULT-  Julianna CLARKE RN [facility]

## 2018-09-28 NOTE — ED NOTES
Pt provided with discharge paperwork and educated on recommended follow-up with PCP. Pt educated on how to manage pain at home and when to seek medical attention. Pt voiced understanding and denied any questions at discharge. Ambulated to lobby with family member

## 2018-09-28 NOTE — ED TRIAGE NOTES
38-year-old female presents to the ER with complaints of abd pain. Pt states it started again yesterday and got bad throughout the night. Pt states she has been working with her regular MD and has been having testing done but the pain has never been this bad.

## 2018-09-28 NOTE — ED AVS SNAPSHOT
[facility] Emergency Department    [address]    [facility] [address]    Phone:  [phone]    Fax:  [phone]                                       Erika Reina   MRN: [id number]    Department:  [facility] Emergency Department   Date of Visit:  9/28/2018           Patient Information     Date Of Birth          1980        Your diagnoses for this visit were:     Abdominal pain, generalized        You were seen by Sulema Obrien PA-C.      Follow-up Information     Schedule an appointment as soon as possible for a visit with Glenna Ham APRN CNP.    Specialty:  Nurse Practitioner - Family    Contact information:    [facility]  [id number]  [facility] [address]  [phone]          Follow up with [facility] Emergency Department.    Specialty:  EMERGENCY MEDICINE    Why:  If symptoms worsen    Contact information:    [address]  [facility] [id number] [phone]        Discharge Instructions       Discharge Instructions  Abdominal Pain    Abdominal pain (belly pain) can be caused by many things. Your evaluation today does not show the exact cause for your pain. Your provider today has decided that it is unlikely your pain is due to a life threatening problem, or a problem requiring surgery or hospital admission. Sometimes those problems cannot be found right away, so it is very important that you follow up as directed.  Sometimes only the changes which occur over time allow the cause of your pain to be found.    Generally, every Emergency Department visit should have a follow-up clinic visit with either a primary or a specialty clinic/provider. Please follow-up as instructed by your emergency provider today. With abdominal pain, we often recommend very close follow-up, such as the following day.    ADULTS:  Return to the Emergency Department right away if:      You get an oral temperature  above 102oF or as directed by your provider.    You have blood in your stools. This may be bright red or appear as black, tarry stools.      You keep vomiting (throwing up) or cannot drink liquids.    You see blood when you vomit.     You cannot have a bowel movement or you cannot pass gas.    Your stomach gets bloated or bigger.    Your skin or the whites of your eyes look yellow.    You faint.    You have bloody, frequent or painful urination (peeing).    You have new symptoms or anything that worries you.    CHILDREN:  Return to the Emergency Department right away if your child has any of the above-listed symptoms or the following:      Pushes your hand away or screams/cries when his/her belly is touched.    You notice your child is very fussy or weak.    Your child is very tired and is too tired to eat or drink.    Your child is dehydrated.  Signs of dehydration can be:  o Significant change in the amount of wet diapers/urine.  o Your infant or child starts to have dry mouth and lips, or no saliva (spit) or tears.    PREGNANT WOMEN:  Return to the Emergency Department right away if you have any of the above-listed symptoms or the following:      You have bleeding, leaking fluid or passing tissue from the vagina.    You have worse pain or cramping, or pain in your shoulder or back.    You have vomiting that will not stop.    You have a temperature of 100oF or more.    Your baby is not moving as much as usual.    You faint.    You get a bad headache with or without eye problems and abdominal pain.    You have a seizure.    You have unusual discharge from your vagina and abdominal pain.    Abdominal pain is pretty common during pregnancy.  Your pain may or may not be related to your pregnancy. You should follow-up closely with your OB provider so they can evaluate you and your baby.  Until you follow-up with your regular provider, do the following:       Avoid sex and do not put anything in your vagina.    Drink  "clear fluids.    Only take medications approved by your provider.    MORE INFORMATION:    Appendicitis:  A possible cause of abdominal pain in any person who still has their appendix is acute appendicitis. Appendicitis is often hard to diagnose.  Testing does not always rule out early appendicitis or other causes of abdominal pain. Close follow-up with your provider and re-evaluations may be needed to figure out the reason for your abdominal pain.    Follow-up:  It is very important that you make an appointment with your clinic and go to the appointment.  If you do not follow-up with your primary provider, it may result in missing an important development which could result in permanent injury or disability and/or lasting pain.  If there is any problem keeping your appointment, call your provider or return to the Emergency Department.    Medications:  Take your medications as directed by your provider today.  Before using over-the-counter medications, ask your provider and make sure to take the medications as directed.  If you have any questions about medications, ask your provider.    Diet:  Resume your normal diet as much as possible, but do not eat fried, fatty or spicy foods while you have pain.  Do not drink alcohol or have caffeine.  Do not smoke tobacco.    Probiotics: If you have been given an antibiotic, you may want to also take a probiotic pill or eat yogurt with live cultures. Probiotics have \"good bacteria\" to help your intestines stay healthy. Studies have shown that probiotics help prevent diarrhea (loose stools) and other intestine problems (including C. diff infection) when you take antibiotics. You can buy these without a prescription in the pharmacy section of the store.     If you were given a prescription for medicine here today, be sure to read all of the information (including the package insert) that comes with your prescription.  This will include important information about the medicine, " its side effects, and any warnings that you need to know about.  The pharmacist who fills the prescription can provide more information and answer questions you may have about the medicine.  If you have questions or concerns that the pharmacist cannot address, please call or return to the Emergency Department.       Remember that you can always come back to the Emergency Department if you are not able to see your regular provider in the amount of time listed above, if you get any new symptoms, or if there is anything that worries you.      24 Hour Appointment Hotline       To make an appointment at any [facility], call [phone] ([phone]). If you don't have a family doctor or clinic, we will help you find one. [facility] clinics are conveniently located to serve the needs of you and your family.             Review of your medicines      Our records show that you are taking the medicines listed below. If these are incorrect, please call your family doctor or clinic.        Dose / Directions Last dose taken    desogestrel-ethinyl estradiol 0.15-30 MG-MCG per tablet   Commonly known as:  APRI   Dose:  1 tablet   Quantity:  28 tablet        Take 1 tablet by mouth daily   Refills:  11        metFORMIN 500 MG tablet   Commonly known as:  GLUCOPHAGE   Dose:  500 mg   Quantity:  180 tablet        Take 1 tablet (500 mg) by mouth 2 times daily (with meals)   Refills:  1        Multi-vitamin Tabs tablet   Dose:  1 tablet        Take 1 tablet by mouth daily   Refills:  0        VITAMIN D (CHOLECALCIFEROL) PO        Take by mouth daily   Refills:  0                Procedures and tests performed during your visit     Abd/pelvis CT,  IV  contrast only TRAUMA / AAA    CBC with platelets differential    Comprehensive metabolic panel    HCG qualitative urine (UPT)    Lipase    UA with Microscopic      Orders Needing Specimen Collection     None      Pending Results     No orders found from 9/26/2018 to 9/29/2018.             Pending Culture Results     No orders found from 9/26/2018 to 9/29/2018.            Pending Results Instructions     If you had any lab results that were not finalized at the time of your Discharge, you can call the ED Lab Result RN at [phone]. You will be contacted by this team for any positive Lab results or changes in treatment. The nurses are available 7 days a week from 10A to 6:30P.  You can leave a message 24 hours per day and they will return your call.        Test Results From Your Hospital Stay        9/28/2018  2:50 PM      Component Results     Component Value Ref Range & Units Status    Color Urine Joseline  Final    Appearance Urine Slightly Cloudy  Final    Glucose Urine Negative NEG^Negative mg/dL Final    Bilirubin Urine Negative NEG^Negative Final    Ketones Urine Negative NEG^Negative mg/dL Final    Specific Gravity Urine 1.027 1.003 - 1.035 Final    Blood Urine Moderate (A) NEG^Negative Final    pH Urine 5.0 5.0 - 7.0 pH Final    Protein Albumin Urine 30 (A) NEG^Negative mg/dL Final    Urobilinogen mg/dL 0.0 0.0 - 2.0 mg/dL Final    Nitrite Urine Negative NEG^Negative Final    Leukocyte Esterase Urine Moderate (A) NEG^Negative Final    Source Midstream Urine  Final    WBC Urine 18 (H) 0 - 5 /HPF Final    RBC Urine 2 0 - 2 /HPF Final    Bacteria Urine Few (A) NEG^Negative /HPF Final    Squamous Epithelial /HPF Urine 9 (H) 0 - 1 /HPF Final    Mucous Urine Present (A) NEG^Negative /LPF Final         9/28/2018  3:29 PM      Component Results     Component Value Ref Range & Units Status    HCG Qual Urine Negative NEG^Negative Final    This test is for screening purposes.  Results should be interpreted along with   the clinical picture.  Confirmation testing is available if warranted by   ordering NIF181, HCG Quantitative Pregnancy.           9/28/2018  2:26 PM      Component Results     Component Value Ref Range & Units Status    WBC 15.3 (H) 4.0 - 11.0 10e9/L Final    RBC Count 4.78 3.8 - 5.2  10e12/L Final    Hemoglobin 13.7 11.7 - 15.7 g/dL Final    Hematocrit 43.1 35.0 - 47.0 % Final    MCV 90 78 - 100 fl Final    MCH 28.7 26.5 - 33.0 pg Final    MCHC 31.8 31.5 - 36.5 g/dL Final    RDW 14.4 10.0 - 15.0 % Final    Platelet Count 317 150 - 450 10e9/L Final    Diff Method Automated Method  Final    % Neutrophils 81.1 % Final    % Lymphocytes 10.5 % Final    % Monocytes 6.3 % Final    % Eosinophils 1.2 % Final    % Basophils 0.4 % Final    % Immature Granulocytes 0.5 % Final    Nucleated RBCs 0 0 /100 Final    Absolute Neutrophil 12.4 (H) 1.6 - 8.3 10e9/L Final    Absolute Lymphocytes 1.6 0.8 - 5.3 10e9/L Final    Absolute Monocytes 1.0 0.0 - 1.3 10e9/L Final    Absolute Eosinophils 0.2 0.0 - 0.7 10e9/L Final    Absolute Basophils 0.1 0.0 - 0.2 10e9/L Final    Abs Immature Granulocytes 0.1 0 - 0.4 10e9/L Final    Absolute Nucleated RBC 0.0  Final         9/28/2018  2:52 PM      Component Results     Component Value Ref Range & Units Status    Sodium 139 133 - 144 mmol/L Final    Potassium 3.6 3.4 - 5.3 mmol/L Final    Chloride 103 94 - 109 mmol/L Final    Carbon Dioxide 27 20 - 32 mmol/L Final    Anion Gap 9 3 - 14 mmol/L Final    Glucose 84 70 - 99 mg/dL Final    Urea Nitrogen 10 7 - 30 mg/dL Final    Creatinine 0.78 0.52 - 1.04 mg/dL Final    GFR Estimate 84 >60 mL/min/1.7m2 Final    Non  GFR Calc    GFR Estimate If Black >90 >60 mL/min/1.7m2 Final    African American GFR Calc    Calcium 9.2 8.5 - 10.1 mg/dL Final    Bilirubin Total 1.1 0.2 - 1.3 mg/dL Final    Albumin 3.2 (L) 3.4 - 5.0 g/dL Final    Protein Total 7.5 6.8 - 8.8 g/dL Final    Alkaline Phosphatase 65 40 - 150 U/L Final    ALT 24 0 - 50 U/L Final    AST 15 0 - 45 U/L Final         9/28/2018  2:52 PM      Component Results     Component Value Ref Range & Units Status    Lipase 69 (L) 73 - 393 U/L Final         9/28/2018  4:25 PM      Narrative     CT ABDOMEN AND PELVIS WITH CONTRAST 9/28/2018 4:12 PM     HISTORY: Abdominal  pain, leukocytosis.     CONTRAST DOSE:  100mL Isovue-370.    Radiation dose for this scan was reduced using automated exposure  control, adjustment of the mA and/or kV according to patient size, or  iterative reconstruction technique.    FINDINGS:  The liver, spleen, adrenal glands, pancreas, and  gallbladder appear within normal limits. Bilateral renal calyces  appear prominent suggesting possible bilateral ureteropelvic junction  obstruction. There is no evidence of bowel obstruction. No pericolonic  inflammatory stranding. No free peritoneal fluid or air. Pelvic  contents appear within normal limits.        Impression     IMPRESSION:  1. Bilateral renal calyceal prominence or hydronephrosis raising the  possibility of bilateral ureteropelvic junction obstruction. The  ureters appear of normal caliber and no calcified stones are visible.  2. No other CT evidence of an acute inflammatory process in the  abdomen or pelvis.    STACY GUEVARA MD                Clinical Quality Measure: Blood Pressure Screening     Your blood pressure was checked while you were in the emergency department today. The last reading we obtained was  BP: 127/77 . Please read the guidelines below about what these numbers mean and what you should do about them.  If your systolic blood pressure (the top number) is less than 120 and your diastolic blood pressure (the bottom number) is less than 80, then your blood pressure is normal. There is nothing more that you need to do about it.  If your systolic blood pressure (the top number) is 120-139 or your diastolic blood pressure (the bottom number) is 80-89, your blood pressure may be higher than it should be. You should have your blood pressure rechecked within a year by a primary care provider.  If your systolic blood pressure (the top number) is 140 or greater or your diastolic blood pressure (the bottom number) is 90 or greater, you may have high blood pressure. High blood pressure is  treatable, but if left untreated over time it can put you at risk for heart attack, stroke, or kidney failure. You should have your blood pressure rechecked by a primary care provider within the next 4 weeks.  If your provider in the emergency department today gave you specific instructions to follow-up with your doctor or provider even sooner than that, you should follow that instruction and not wait for up to 4 weeks for your follow-up visit.        Thank you for choosing [facility]       Thank you for choosing [facility] for your care. Our goal is always to provide you with excellent care. Hearing back from our patients is one way we can continue to improve our services. Please take a few minutes to complete the written survey that you may receive in the mail after you visit with us. Thank you!        [facility] Information     [facility] gives you secure access to your electronic health record. If you see a primary care provider, you can also send messages to your care team and make appointments. If you have questions, please call your primary care clinic.  If you do not have a primary care provider, please call [phone] and they will assist you.        Care EveryWhere ID     This is your Care EveryWhere ID. This could be used by other organizations to access your [facility] medical records  [id number]        Equal Access to Services     GODFREY GONZALEZ : Milady Rosales, sacha fry, mikaela jay, rito pete. So [facility] [phone].    ATENCIÓN: Si habla español, tiene a dodge disposición servicios gratuitos de asistencia lingüística. Leann al [phone].    We comply with applicable federal civil rights laws and [place] laws. We do not discriminate on the basis of race, color, national origin, age, disability, sex, sexual orientation, or gender identity.            After Visit Summary       This is your record. Keep this with you and show to your community  pharmacist(s) and doctor(s) at your next visit.

## 2018-09-28 NOTE — ED AVS SNAPSHOT
[facility] Emergency Department    [address]    [facility] [address]    Phone:  [phone]    Fax:  [phone]                                       Erika Reina   MRN: [id number]    Department:  [facility] Emergency Department   Date of Visit:  9/28/2018           After Visit Summary Signature Page     I have received my discharge instructions, and my questions have been answered. I have discussed any challenges I see with this plan with the nurse or doctor.    ..........................................................................................................................................  Patient/Patient Representative Signature      ..........................................................................................................................................  Patient Representative Print Name and Relationship to Patient    ..................................................               ................................................  Date                                   Time    ..........................................................................................................................................  Reviewed by Signature/Title    ...................................................              ..............................................  Date                                               Time          22EPIC Rev 08/18

## 2018-09-28 NOTE — ED PROVIDER NOTES
History     Chief Complaint:  Abdominal Pain      HPI   Erika Reina is a 38 year old female who presents with abdominal pain. Patient reports that yesterday she developed lower abdominal pain that initially felt like bad period cramps. She took a nap and woke up a few hours later and noted the abdominal pain had spread and her entire abdomen was painful. This has continued since and has progressively worsened. She reports that she has irregular periods and is being followed by ob/gyn. Had a pelvic ultrasound recently that showed a thickened endometrium and she is scheduling an endometrial biopsy in the near future. She reports that she has never had pain this severe or diffuse in the past. She reports decreased appetite, but no nausea or vomiting. She reports normal bowel movements. No fevers. Denies any urinary symptoms.   Of note, she was recently diagnosed with superficial thrombophlebitis and started on aspirin.    Denies any prior abdominal surgeries.     Allergies:  Ibuprofen    Medications:      desogestrel-ethinyl estradiol (APRI) 0.15-30 MG-MCG per tablet   metFORMIN (GLUCOPHAGE) 500 MG tablet   multivitamin, therapeutic with minerals (MULTI-VITAMIN) TABS tablet   VITAMIN D, CHOLECALCIFEROL, PO       Past Medical History:    PCOS    Past Surgical History:    History reviewed. No pertinent surgical history.    Family History:    Family History   Problem Relation Age of Onset     Diabetes Mother      Diabetes Father      Cancer - colorectal Maternal Grandmother      HEART DISEASE Paternal Grandmother        Social History:  Marital Status:  Single [1]  Social History   Substance Use Topics     Smoking status: Never Smoker     Smokeless tobacco: Never Used     Alcohol use 0.0 oz/week     0 Standard drinks or equivalent per week      Comment: rarely   PCP: Glenna Ham  Presents with mother.     Review of Systems   Constitutional: Positive for appetite change. Negative for chills and fever.  "  Gastrointestinal: Positive for abdominal pain. Negative for constipation, diarrhea, nausea and vomiting.   Genitourinary: Positive for enuresis and vaginal bleeding. Negative for difficulty urinating, dysuria, flank pain, hematuria and urgency.   All other systems reviewed and are negative.        Physical Exam   First Vitals:  BP: 140/70  Pulse: 99  Temp: 97.3  F (36.3  C)  Resp: 18  Height: 172.7 cm (5' 8\")  Weight: 145.2 kg (320 lb)  SpO2: 96 %      Physical Exam  Constitutional: appears uncomfortable. Non-toxic appearing.  Head: No external signs of trauma noted to head or face.   Eyes: Pupils are equal, round, and reactive to light. Conjunctiva normal.  ENT: MMM.  Normal voice.   Cardiovascular: Normal rate, regular rhythm, and intact distal pulses.    Respiratory: Effort normal. No respiratory distress. Lungs clear to auscultation bilaterally.   GI: Soft. Diffusely tender, no focal areas of more severe tenderness. There is no rebound or guarding. No CVA tenderness.    Musculoskeletal: No deformities appreciated. Normal ROM. No edema noted.  Neurological: Alert and Oriented x 3. Speech normal. Moves all extremities equally.   Psychiatric: Appropriate mood, affect, and behavior.   Skin: Skin is warm and dry. no rash.        Emergency Department Course     Imaging:  Abd/pelvis CT,  IV  contrast only TRAUMA / AAA   Final Result   IMPRESSION:   1. Bilateral renal calyceal prominence or hydronephrosis raising the   possibility of bilateral ureteropelvic junction obstruction. The   ureters appear of normal caliber and no calcified stones are visible.   2. No other CT evidence of an acute inflammatory process in the   abdomen or pelvis.      STACY GUEVARA MD          Laboratory:  Labs Ordered and Resulted from Time of ED Arrival Up to the Time of Departure from the ED   ROUTINE UA WITH MICROSCOPIC - Abnormal; Notable for the following:        Result Value    Blood Urine Moderate (*)     Protein Albumin Urine 30 (*) "     Leukocyte Esterase Urine Moderate (*)     WBC Urine 18 (*)     Bacteria Urine Few (*)     Squamous Epithelial /HPF Urine 9 (*)     Mucous Urine Present (*)     All other components within normal limits   CBC WITH PLATELETS DIFFERENTIAL - Abnormal; Notable for the following:     WBC 15.3 (*)     Absolute Neutrophil 12.4 (*)     All other components within normal limits   COMPREHENSIVE METABOLIC PANEL - Abnormal; Notable for the following:     Albumin 3.2 (*)     All other components within normal limits   LIPASE - Abnormal; Notable for the following:     Lipase 69 (*)     All other components within normal limits   HCG QUALITATIVE URINE     Interventions:  Medications   HYDROmorphone (PF) (DILAUDID) injection 0.5 mg (0.5 mg Intravenous Given 9/28/18 1522)   0.9% sodium chloride BOLUS (0 mLs Intravenous Stopped 9/28/18 1610)   iopamidol (ISOVUE-370) solution 500 mL (100 mLs Intravenous Given 9/28/18 1605)       Emergency Department Course:  Past medical records, nursing notes, and vitals reviewed.  I performed an exam of the patient and obtained history, as documented above.  IV access obtained. Labs and UA sent.      The patient was sent for a CT abd/pelvis while in the ED with results above.    I rechecked the patient. Findings and plan explained to the patient and mother.   Patient was discharged home.      Impression & Plan      Medical Decision Making:  Erika Reina presents with abdominal pain.  The differential diagnosis is broad and includes:  Appendicitis, cholecystitis, peptic ulcer disease, diverticulitis, bowel obstruction, ischemia, pancreatitis, UTI, enteritis/colitis, amongst others. The clinical exam today is non-specific. The laboratory testing has returned normal. UA is not convincing of infection given no urinary symptoms. She had a recent pelvic ultrasound that showed prominent endometrium, but was otherwise unremarkable. CT abd/pelvis is noted to be normal other than bilateral renal  calyceal prominence, which is nonspecific as there is no evidence of stone or obstruction and she has normal renal function. History is not consistent with stone or urinary obstruction either.  The pain has improved some with interventions in the ED.  The exact etiology of the pain is not clear at this time, but no life threatening cause has been identified today. It could be related to her PCOS and abnormal vaginal bleeding for which she is already followed by OB/gyn and has follow up for endometrial biopsy. She will be discharged, and was warned that persistent or worsening symptoms should prompt re-examination (ED if necessary) in 8-12 hours.      Diagnosis:    ICD-10-CM    1. Abdominal pain, generalized R10.84        Disposition:  discharged to home    Sulema Obrien  9/28/2018   [facility] EMERGENCY DEPARTMENT       Sulema Obrien PA-C  09/28/18 1748

## 2018-10-01 ENCOUNTER — MYC MEDICAL ADVICE (OUTPATIENT)
Dept: FAMILY MEDICINE | Facility: CLINIC | Age: 38
End: 2018-10-01

## 2018-11-19 ENCOUNTER — TELEPHONE (OUTPATIENT)
Dept: OBGYN | Facility: CLINIC | Age: 38
End: 2018-11-19

## 2018-11-19 NOTE — TELEPHONE ENCOUNTER
Pt called back. She says she wont do the colp if it's not needed. She will still meet with Dr Wilde to discuss some general pelvic pain she has been having. Looks like she had an US on 9/13 and an EMB was recommended after that. Told her it was a possibility Dr Wilde would want to do that tomorrow.      Chuyita Gonzalez RN

## 2018-11-19 NOTE — TELEPHONE ENCOUNTER
Patient is on Dr. Mclain schedule 11/20 for a colposcopy. In reviewing patients chart, she does not need a colp. Her pap in August was NIL with neg HPV. Left Message for patient to call clinic back to discuss. Does she have other concerns she would like to address with AB? If not she can cancel the appointment.     Jaye eRdd, CMA

## 2018-11-20 ENCOUNTER — OFFICE VISIT (OUTPATIENT)
Dept: OBGYN | Facility: CLINIC | Age: 38
End: 2018-11-20
Payer: COMMERCIAL

## 2018-11-20 VITALS — DIASTOLIC BLOOD PRESSURE: 78 MMHG | BODY MASS INDEX: 51.94 KG/M2 | WEIGHT: 293 LBS | SYSTOLIC BLOOD PRESSURE: 124 MMHG

## 2018-11-20 DIAGNOSIS — R93.89 THICKENED ENDOMETRIUM: ICD-10-CM

## 2018-11-20 DIAGNOSIS — R10.2 PELVIC PAIN IN FEMALE: ICD-10-CM

## 2018-11-20 DIAGNOSIS — C54.1 ENDOMETRIAL ADENOCARCINOMA (H): ICD-10-CM

## 2018-11-20 DIAGNOSIS — N91.4 SECONDARY OLIGOMENORRHEA: Primary | ICD-10-CM

## 2018-11-20 PROCEDURE — 58100 BIOPSY OF UTERUS LINING: CPT | Performed by: OBSTETRICS & GYNECOLOGY

## 2018-11-20 PROCEDURE — 87591 N.GONORRHOEAE DNA AMP PROB: CPT | Performed by: OBSTETRICS & GYNECOLOGY

## 2018-11-20 PROCEDURE — 87491 CHLMYD TRACH DNA AMP PROBE: CPT | Performed by: OBSTETRICS & GYNECOLOGY

## 2018-11-20 PROCEDURE — 88305 TISSUE EXAM BY PATHOLOGIST: CPT | Performed by: OBSTETRICS & GYNECOLOGY

## 2018-11-20 PROCEDURE — 99214 OFFICE O/P EST MOD 30 MIN: CPT | Mod: 25 | Performed by: OBSTETRICS & GYNECOLOGY

## 2018-11-20 PROCEDURE — 88342 IMHCHEM/IMCYTCHM 1ST ANTB: CPT | Performed by: OBSTETRICS & GYNECOLOGY

## 2018-11-20 RX ORDER — MEDROXYPROGESTERONE ACETATE 10 MG
10 TABLET ORAL DAILY
Qty: 10 TABLET | Refills: 0 | Status: SHIPPED | OUTPATIENT
Start: 2018-11-20 | End: 2018-11-30

## 2018-11-20 NOTE — PROGRESS NOTES
SUBJECTIVE:                                                   Erika Reina is a 38 year old  female who presents to clinic today for evaluation of abdominal pain.  Pain has been fairly constant for the past 2 months but is significantly better now with expectant management. She has additional pain when her bladder is quite full. Voids every 2-3 hours throughout the day. Patient's last menstrual period was 2018. Menses are irregular, pain does not seem to increase with menses. She describes mild menstrual cramps different from her ongoing pain. No associated with eating, positions, activity.  + scant coarse hair growth on chin, she does remove this.   No acne.     Meeting with bariatric surgeons in the near future for weight loss management.     Recent normal fasting glucose and lipid panel.     Endometrial Cancer Risk Factors:  HRT: no  Obesity: Body mass index is 51.94 kg/(m^2).   Nulliparity:    Family history: negative   Personal history of breast cancer: negative     ROS:  Const: ongoing weight gain, no fever, chills  : no dysuria, hematuria, urinary frequency, urgency, hesitancy, +pelvic pain with full bladder.  GI: no constipation, diarrhea, abdominal pain  Breast: no nipple discharge, skin changes, lumps    History of abnormal Pap smear: Yes: see below  Lab Results   Component Value Date    PAP NIL, HPV negative 2018    PAP LSIL 10/18/2016     TVUS 18      Indications for ultrasound: Hx - PCOS      LMP: 01 Aug 2018    Hormones: none      Measurements:  Uterus: 7.3 x 4.9 x 5.5 cm.     Position is anteverted.  Contour is smth/reg.      Endo cav: 26.3 mm         Irregular and Prominent  Cervix: Wnl      Right ovary: 2.5 x 2.0 x 1.9 cm.   Wnl  Left ovary: 2.4 x 1.9 x 1.6 cm.  Wnl      Cul de sac: no free fluid      ===================================  Complete pelvic ultrasound using realtime   transabdominal and transvaginal scanning  Bladder appears normal         Normal  uterus     Normal bilateral ovaries      Endometrium noted to be prominent   Recommend endometrial sampling to rule out endometrial neoplasm, hyperplasia, or polyp    Patient Active Problem List   Diagnosis     Hyperlipidemia LDL goal <160     Plantar fasciitis     Oligomenorrhea     Morbid obesity due to excess calories (H)     PCOS (polycystic ovarian syndrome)     Papanicolaou smear of cervix with low grade squamous intraepithelial lesion (LGSIL)     Microcytic anemia     History reviewed. No pertinent surgical history.   Social History   Substance Use Topics     Smoking status: Never Smoker     Smokeless tobacco: Never Used     Alcohol use 0.0 oz/week     0 Standard drinks or equivalent per week      Comment: rarely      Problem (# of Occurrences) Relation (Name,Age of Onset)    Cancer - colorectal (1) Maternal Grandmother    Diabetes (2) Mother, Father    HEART DISEASE (1) Paternal Grandmother              Current Outpatient Prescriptions on File Prior to Visit:  metFORMIN (GLUCOPHAGE) 500 MG tablet TAKE 1 TABLET (500 MG) BY MOUTH 2 TIMES DAILY (WITH MEALS)   multivitamin, therapeutic with minerals (MULTI-VITAMIN) TABS tablet Take 1 tablet by mouth daily   VITAMIN D, CHOLECALCIFEROL, PO Take by mouth daily     No current facility-administered medications on file prior to visit.   Allergies   Allergen Reactions     Ibuprofen Swelling     Swelling of legs       Problem list and histories reviewed and adjusted as indicated.     OBJECTIVE:   /78 (BP Location: Right arm, Patient Position: Chair, Cuff Size: Adult Large)  Wt (!) 341 lb 9.6 oz (154.9 kg)  LMP 08/01/2018  BMI 51.94 kg/m2  Const: Healthy appearing female, no acute distress, comfortable  Breast Exam: No visible masses or suspicious skin changes.  No discrete or dominant masses to palpation.  No axillary lymphadenopathy.  Abd: soft, diffuse mild tenderness in epigastric region and throughout upper abdomen. No masses, organomegaly, no tenderness  over the bladder  Skin: No suspicious lesions or rashes  Psychiatric: mentation appears normal and affect bright  : External genitalia normal well-estrogenized, healthy tissue.  No obvious excoriations, lesions, or rashes. Bartholins, urethra, skeins normal.  Normal pink vaginal mucosa.  SSE: Normal cervix, normal physiologic discharge.   Bimanual: No CMT, small mobile uterus, exam limited by body habitus. No adnexal masses or tenderness appreciated.     Procedure:      Endometrial biopsy:  Consent was obtained. A speculum was inserted into the vagina and the cervix visualized. The cervix was swabbed with betadine x3. An endometrial biopsy pipelle was then introduced through the cervical os into the uterus, the uterus sounded to 7cm. The plunger was then deployed with good suction. The pipelle was rotated to collect a thorough sample. Sample was moderate. The cervix was found to be hemostatic and the speculum was removed. The patient tolerated the procedure without difficulty.        ASSESSMENT/PLAN:                                                    Erika Reina is a 38 year old female  with abdominal pain, oligomenorrhea, obesity, and thickened and irregular endometrial stripe. Concern for PCOS vs structural abnormality vs hyperplasia or malignancy. Abdominal pain does not appear to be gyn related as it is not cyclic in nature nor located in the pelvis/lower abdomen.     1. Secondary oligomenorrhea  Reviewed uterine protection with provoked withdrawal bleed every 3 months vs mirena IUD or other form of hormonal contraception. Patient will consider IUD and discuss at follow up.   - medroxyPROGESTERone (PROVERA) 10 MG tablet; Take 1 tablet (10 mg) by mouth daily for 10 days Expect a period 4-5 days after stopping the pills  Dispense: 10 tablet; Refill: 0    2. Thickened endometrium  - reviewed risk factors for hyperplasia and malignancy. Plan endometrial biopsy and endometrial protection as above.   -  Surgical pathology exam    3. Pelvic pain in female  - concern for interstitial cystitis given pain with full bladder. Plan 1 month of Loratadine.   - Chlamydia trachomatis PCR  - Neisseria gonorrhoeae PCR     Return to clinic following provera withdrawal for ongoing evaluation of AUB and pelvic pain.     Alla Wilde MD   Obstetrics and Gynecology

## 2018-11-20 NOTE — MR AVS SNAPSHOT
After Visit Summary   11/20/2018    Erika Reina    MRN: [id number]           Patient Information     Date Of Birth          1980        Visit Information        Provider Department      11/20/2018 1:30 PM Alla Wilde MD [facility]        Care Instructions    Endometrial biopsy results in 5 days    Consider mirena IUD to manage periods, pain, and protect uterine lining.    Daily claritin (loratidine) for bladder pain          Follow-ups after your visit        Who to contact     If you have questions or need follow up information about today's clinic visit or your schedule please contact [facility] directly at [phone].  Normal or non-critical lab and imaging results will be communicated to you by MyChart, letter or phone within 4 business days after the clinic has received the results. If you do not hear from us within 7 days, please contact the clinic through Zanghart or phone. If you have a critical or abnormal lab result, we will notify you by phone as soon as possible.  Submit refill requests through 3Touch or call your pharmacy and they will forward the refill request to us. Please allow 3 business days for your refill to be completed.          Additional Information About Your Visit        MyChart Information     3Touch gives you secure access to your electronic health record. If you see a primary care provider, you can also send messages to your care team and make appointments. If you have questions, please call your primary care clinic.  If you do not have a primary care provider, please call [phone] and they will assist you.        Care EveryWhere ID     This is your Care EveryWhere ID. This could be used by other organizations to access your [place] medical records  [id number]        Your Vitals Were     Last Period BMI (Body Mass Index)                08/01/2018 51.94 kg/m2           Blood Pressure from Last 3 Encounters:    11/20/18 124/78   09/28/18 127/77   08/16/18 122/82    Weight from Last 3 Encounters:   11/20/18 (!) 341 lb 9.6 oz (154.9 kg)   09/28/18 320 lb (145.2 kg)   08/16/18 330 lb (149.7 kg)              Today, you had the following     No orders found for display       Primary Care Provider Office Phone # Fax #    Glenna Nascimento LEEROY [facility] [phone]       [facility] [address]  [facility] [address]        Equal Access to Services     SHANNAN GONZALEZ : Hadii aad ku hadasho Soomaali, waaxda luqadaha, qaybta kaalmada adeegyada, rito castillo hayzayra malik . So [facility] [phone].    ATENCIÓN: Si habla español, tiene a dodge disposición servicios gratuitos de asistencia lingüística. Llame al [phone].    We comply with applicable federal civil rights laws and [place] laws. We do not discriminate on the basis of race, color, national origin, age, disability, sex, sexual orientation, or gender identity.            Thank you!     Thank you for choosing [facility]  for your care. Our goal is always to provide you with excellent care. Hearing back from our patients is one way we can continue to improve our services. Please take a few minutes to complete the written survey that you may receive in the mail after your visit with us. Thank you!             Your Updated Medication List - Protect others around you: Learn how to safely use, store and throw away your medicines at www.disposemymeds.org.          This list is accurate as of 11/20/18  2:03 PM.  Always use your most recent med list.                   Brand Name Dispense Instructions for use Diagnosis    metFORMIN 500 MG tablet    GLUCOPHAGE    180 tablet    TAKE 1 TABLET (500 MG) BY MOUTH 2 TIMES DAILY (WITH MEALS)    PCOS (polycystic ovarian syndrome)       Multi-vitamin Tabs tablet      Take 1 tablet by mouth daily        VITAMIN D (CHOLECALCIFEROL) PO      Take by mouth daily

## 2018-11-20 NOTE — NURSING NOTE
"Chief Complaint   Patient presents with     Consult     pelvic pain and cramping. Patient reports pain off and on for the past several months.        Initial /78 (BP Location: Right arm, Patient Position: Chair, Cuff Size: Adult Large)  Wt (!) 341 lb 9.6 oz (154.9 kg)  LMP 2018  BMI 51.94 kg/m2 Estimated body mass index is 51.94 kg/(m^2) as calculated from the following:    Height as of 18: 5' 8\" (1.727 m).    Weight as of this encounter: 341 lb 9.6 oz (154.9 kg).  BP completed using cuff size: large    Questioned patient about current smoking habits.  Pt. has never smoked.          Jaye Redd CMA             "

## 2018-11-20 NOTE — PATIENT INSTRUCTIONS
Endometrial biopsy results in 5 days    Consider mirena IUD to manage periods, pain, and protect uterine lining.    Daily claritin (loratidine) for bladder pain

## 2018-11-21 LAB
C TRACH DNA SPEC QL NAA+PROBE: NEGATIVE
N GONORRHOEA DNA SPEC QL NAA+PROBE: NEGATIVE
SPECIMEN SOURCE: NORMAL
SPECIMEN SOURCE: NORMAL

## 2018-11-23 NOTE — PROGRESS NOTES
Left VM to return call to discuss results. I have not conveyed these notes to the patient. Please page me if the patient calls back.     Alla Wilde MD    Addendum:    Spoke with patient this afternoon and did share her diagnosis of grade I endometrial carcinoma with her. She is understandably shocked and upset. She would like a referral placed for Gyn/Oncology consultation. I did tell her the recommendation will likely be total hysterectomy with possible lymph node sampling.     Referral placed.     Alla Wilde MD

## 2018-11-26 ENCOUNTER — MYC MEDICAL ADVICE (OUTPATIENT)
Dept: OBGYN | Facility: CLINIC | Age: 38
End: 2018-11-26

## 2018-11-26 LAB — COPATH REPORT: NORMAL

## 2018-11-28 NOTE — TELEPHONE ENCOUNTER
RECORDS STATUS - ALL OTHER DIAGNOSIS      RECORDS RECEIVED FROM: [facility]   DATE RECEIVED: 11/28/18   NOTES STATUS DETAILS   OFFICE NOTE from referring provider Complete Epic   OFFICE NOTE from medical oncologist     DISCHARGE SUMMARY from hospital     DISCHARGE REPORT from the ER     OPERATIVE REPORT     MEDICATION LIST     CLINICAL TRIAL TREATMENTS TO DATE     LABS     PATHOLOGY REPORTS MSI testing was done Epic   ANYTHING RELATED TO DIAGNOSIS     GENONOMIC TESTING     TYPE:     IMAGING (NEED IMAGES & REPORT)     CT SCANS Complete PACS   MRI     MAMMO     ULTRASOUND Complete PACS   PET

## 2018-12-04 ENCOUNTER — PRE VISIT (OUTPATIENT)
Dept: ONCOLOGY | Facility: CLINIC | Age: 38
End: 2018-12-04

## 2018-12-04 ENCOUNTER — HOSPITAL ENCOUNTER (OUTPATIENT)
Facility: CLINIC | Age: 38
Setting detail: SPECIMEN
Discharge: HOME | End: 2018-12-04
Attending: OBSTETRICS & GYNECOLOGY | Admitting: OBSTETRICS & GYNECOLOGY
Payer: COMMERCIAL

## 2018-12-04 ENCOUNTER — ONCOLOGY VISIT (OUTPATIENT)
Dept: ONCOLOGY | Facility: CLINIC | Age: 38
End: 2018-12-04
Attending: OBSTETRICS & GYNECOLOGY
Payer: COMMERCIAL

## 2018-12-04 VITALS
RESPIRATION RATE: 16 BRPM | SYSTOLIC BLOOD PRESSURE: 142 MMHG | BODY MASS INDEX: 44.41 KG/M2 | WEIGHT: 293 LBS | DIASTOLIC BLOOD PRESSURE: 97 MMHG | OXYGEN SATURATION: 98 % | HEART RATE: 97 BPM | HEIGHT: 68 IN | TEMPERATURE: 99.6 F

## 2018-12-04 DIAGNOSIS — C54.1 ENDOMETRIAL CANCER (H): Primary | ICD-10-CM

## 2018-12-04 LAB
ABO + RH BLD: NORMAL
ABO + RH BLD: NORMAL
ALBUMIN SERPL-MCNC: 3.6 G/DL (ref 3.4–5)
ALP SERPL-CCNC: 69 U/L (ref 40–150)
ALT SERPL W P-5'-P-CCNC: 22 U/L (ref 0–50)
ANION GAP SERPL CALCULATED.3IONS-SCNC: 4 MMOL/L (ref 3–14)
AST SERPL W P-5'-P-CCNC: 11 U/L (ref 0–45)
BASOPHILS # BLD AUTO: 0.1 10E9/L (ref 0–0.2)
BASOPHILS NFR BLD AUTO: 0.9 %
BILIRUB SERPL-MCNC: 0.2 MG/DL (ref 0.2–1.3)
BLD GP AB SCN SERPL QL: NORMAL
BLOOD BANK CMNT PATIENT-IMP: NORMAL
BUN SERPL-MCNC: 12 MG/DL (ref 7–30)
CALCIUM SERPL-MCNC: 9.2 MG/DL (ref 8.5–10.1)
CHLORIDE SERPL-SCNC: 106 MMOL/L (ref 94–109)
CO2 SERPL-SCNC: 30 MMOL/L (ref 20–32)
CREAT SERPL-MCNC: 0.67 MG/DL (ref 0.52–1.04)
DIFFERENTIAL METHOD BLD: NORMAL
EOSINOPHIL # BLD AUTO: 0.3 10E9/L (ref 0–0.7)
EOSINOPHIL NFR BLD AUTO: 3.1 %
ERYTHROCYTE [DISTWIDTH] IN BLOOD BY AUTOMATED COUNT: 14.6 % (ref 10–15)
GFR SERPL CREATININE-BSD FRML MDRD: >90 ML/MIN/1.7M2
GLUCOSE SERPL-MCNC: 83 MG/DL (ref 70–99)
HCT VFR BLD AUTO: 42.3 % (ref 35–47)
HGB BLD-MCNC: 13.5 G/DL (ref 11.7–15.7)
IMM GRANULOCYTES # BLD: 0.1 10E9/L (ref 0–0.4)
IMM GRANULOCYTES NFR BLD: 0.5 %
LYMPHOCYTES # BLD AUTO: 2 10E9/L (ref 0.8–5.3)
LYMPHOCYTES NFR BLD AUTO: 18.4 %
MCH RBC QN AUTO: 29.2 PG (ref 26.5–33)
MCHC RBC AUTO-ENTMCNC: 31.9 G/DL (ref 31.5–36.5)
MCV RBC AUTO: 92 FL (ref 78–100)
MONOCYTES # BLD AUTO: 0.6 10E9/L (ref 0–1.3)
MONOCYTES NFR BLD AUTO: 5.4 %
NEUTROPHILS # BLD AUTO: 7.7 10E9/L (ref 1.6–8.3)
NEUTROPHILS NFR BLD AUTO: 71.7 %
NRBC # BLD AUTO: 0 10*3/UL
NRBC BLD AUTO-RTO: 0 /100
PLATELET # BLD AUTO: 379 10E9/L (ref 150–450)
POTASSIUM SERPL-SCNC: 4 MMOL/L (ref 3.4–5.3)
PROT SERPL-MCNC: 7.9 G/DL (ref 6.8–8.8)
RBC # BLD AUTO: 4.62 10E12/L (ref 3.8–5.2)
SODIUM SERPL-SCNC: 140 MMOL/L (ref 133–144)
SPECIMEN EXP DATE BLD: NORMAL
WBC # BLD AUTO: 10.8 10E9/L (ref 4–11)

## 2018-12-04 PROCEDURE — 85025 COMPLETE CBC W/AUTO DIFF WBC: CPT | Performed by: OBSTETRICS & GYNECOLOGY

## 2018-12-04 PROCEDURE — 80053 COMPREHEN METABOLIC PANEL: CPT | Performed by: OBSTETRICS & GYNECOLOGY

## 2018-12-04 PROCEDURE — 86900 BLOOD TYPING SEROLOGIC ABO: CPT | Performed by: OBSTETRICS & GYNECOLOGY

## 2018-12-04 PROCEDURE — G0463 HOSPITAL OUTPT CLINIC VISIT: HCPCS

## 2018-12-04 PROCEDURE — 86901 BLOOD TYPING SEROLOGIC RH(D): CPT | Performed by: OBSTETRICS & GYNECOLOGY

## 2018-12-04 PROCEDURE — 86850 RBC ANTIBODY SCREEN: CPT | Performed by: OBSTETRICS & GYNECOLOGY

## 2018-12-04 PROCEDURE — 99205 OFFICE O/P NEW HI 60 MIN: CPT | Performed by: OBSTETRICS & GYNECOLOGY

## 2018-12-04 RX ORDER — CEFAZOLIN SODIUM 1 G
1 VIAL (EA) INJECTION SEE ADMIN INSTRUCTIONS
Status: CANCELLED | OUTPATIENT
Start: 2018-12-04

## 2018-12-04 RX ORDER — PHENAZOPYRIDINE HYDROCHLORIDE 100 MG/1
200 TABLET, FILM COATED ORAL ONCE
Status: CANCELLED | OUTPATIENT
Start: 2018-12-04 | End: 2018-12-04

## 2018-12-04 RX ORDER — HEPARIN SODIUM 10000 [USP'U]/ML
5000 INJECTION, SOLUTION INTRAVENOUS; SUBCUTANEOUS
Status: CANCELLED | OUTPATIENT
Start: 2018-12-04

## 2018-12-04 ASSESSMENT — PAIN SCALES - GENERAL: PAINLEVEL: NO PAIN (0)

## 2018-12-04 NOTE — PATIENT INSTRUCTIONS
You have been scheduled for surgery on: 12/13    Diagnosis:  Endometrial cancer    The surgical procedure is: Robotic removal of uterus, cervix, both ovaries and fallopian tubes, sentinel lymph node dissection, possible lymph node dissection, possible open, cystoscopy    Anticipated length of surgery: 1-3 hrs.  You will be in the recovery room for approximately 2-3 hrs after surgery.  Your family will not be able to see you until after you leave the recovery room.    Length of hospital stay:  This is an outpatient, extended stay surgery.  You will be discharged same day if you meet criteria for discharge.  If you have a larger surgical incision, you will need to be in the hospital 2-3 days.  ______________________________________________________________________    Preparation for Surgery:    To Schedule   1.  Labs:  CBC, CMP, T/S, orders placed, please perform today  - done SBP     2.  Post-operative exam with me 1-2 weeks following surgery      Postoperative Restrictions:  No heavy lifting >20lbs for six weeks, nothing in the vagina (no tampons, no intercourse, no douching) for eight weeks.     Postoperative visit:  Return to clinic 1-2 weeks after surgery for post operative visit.-Ok'd per Dr. Evans to go 3-4 wks. Scheduled for 1/2/19. Angeles BENOIT  AVS printed and given to PtEvaristo OH.      Please contact the clinic with any questions or concerns.    Cleopatra Evans MD  Gynecologic Oncology  [facility]

## 2018-12-04 NOTE — NURSING NOTE
"Oncology Rooming Note    December 4, 2018 1:03 PM   Erika Reina is a 38 year old female who presents for:    Chief Complaint   Patient presents with     Oncology Clinic Visit     New patient Consult     Initial Vitals: BP (!) 142/97  Pulse 97  Temp 99.6  F (37.6  C) (Tympanic)  Resp 16  Ht 1.727 m (5' 8\")  Wt (!) 154 kg (339 lb 9.6 oz)  SpO2 98%  BMI 51.64 kg/m2 Estimated body mass index is 51.64 kg/(m^2) as calculated from the following:    Height as of this encounter: 1.727 m (5' 8\").    Weight as of this encounter: 154 kg (339 lb 9.6 oz). Body surface area is 2.72 meters squared.  No Pain (0) Comment: Data Unavailable   No LMP recorded.  Allergies reviewed: Yes  Medications reviewed: Yes    Medications: Medication refills not needed today.  Pharmacy name entered into Brain[facility]: [id number] - [place], [place] - [address]  [address]    Clinical concerns: New Patient Consult     8 minutes for nursing intake (face to face time)     Dulce Garrett CMA            DISCHARGE PLAN:  Next appointments: See patient instruction section  Departure Mode: Ambulatory  Accompanied by: sister and mother  0 minutes for nursing discharge (face to face time)   Dulce Garrett CMA      "

## 2018-12-04 NOTE — PROGRESS NOTES
Consult Notes on Referred Patient        Dr. Alla Wilde MD  [address] [place] [address]       RE: Erika Reina  : 1980  OBEY: 2018    Dear Dr. Alla Wilde:    I had the pleasure of seeing your patient Erika Reina here at the Gynecologic Cancer Clinic at the [facility] on 2018.  As you know she is a very pleasant 38 year old woman with a recent diagnosis of grade 1 endometrial adenocarcinoma.  Given these findings she was subsequently sent to the Gynecologic Cancer Clinic for new patient consultation.  She is accompanied today by her mother and sister.    She notes she has had abnormal bleeding since her very first menses with very irregular cycles.  Recently, however she has noted a major change in the amount of bleeding with a significant increase.  Due to this she was placed on Metformin without much improvement in her bleeding and thus had the following work up.    18:  US Pelvis:  Uterus measures 7.3 x 4.9 x 5.5 cm, EMS 26.3 mm, normal ovaries    18:  EMB:  Grade 1 endometrial adenocarcinoma, MMR intact      Review of Systems:  Systemic           no weight changes; no fever; no chills; no night sweats; no appetite changes  Skin           no rashes, or lesions  Eye           no irritation; no changes in vision  Dilcia-Laryngeal           no dysphagia; no hoarseness   Pulmonary    no cough; no shortness of breath  Cardiovascular    no chest pain; no palpitations  Gastrointestinal    no diarrhea; no constipation; no abdominal pain; no changes in bowel  habits; no blood in stool  Genitourinary   no urinary frequency; no urinary urgency; no dysuria; no pain; no abnormal vaginal discharge; + abnormal vaginal bleeding  Breast    no breast discharge; no breast changes; no breast pain  Musculoskeletal    no myalgias; no arthralgias; no back pain  Psychiatric           no depressed mood; no anxiety    Hematologic            no tender lymph nodes; no  noticeable swellings or lumps   Endocrine    no hot flashes; no heat/cold intolerance         Neurological   no tremor; no numbness and tingling; no headaches; no difficulty sleeping    Obstetrics and Gynecology History:  G0  Had always thought she would have children but this diagnosis has made her consider that in depth.  She currently has no partner.      Past Medical History:  Past Medical History:   Diagnosis Date     Endometrial adenocarcinoma (H)      Hyperlipidemia LDL goal <160 9/15/2011       Past Surgical History:  Past Surgical History:   Procedure Laterality Date     ARTHROPLASTY KNEE         Health Maintenance:  Last Pap Smear: 8/16/18              Result: negative, HPV negative  She has not had a history of abnormal Pap smears.    Last Mammogram: N/A    Last Colonoscopy: N/A      Current Medications:   has a current medication list which includes the following prescription(s): metformin, multivitamin w/minerals, and cholecalciferol.     Allergies:   Ibuprofen      Social History:  Social History     Social History     Marital status: Single     Spouse name: N/A     Number of children: N/A     Years of education: N/A     Occupational History     Not on file.     Social History Main Topics     Smoking status: Never Smoker     Smokeless tobacco: Never Used     Alcohol use 0.0 oz/week     0 Standard drinks or equivalent per week      Comment: rarely     Drug use: No     Sexual activity: Yes     Partners: Male     Birth control/ protection: Condom     Other Topics Concern     Parent/Sibling W/ Cabg, Mi Or Angioplasty Before 65f 55m? No     Social History Narrative       Lives with mother, father and sister, feels safe at home.  Works as .  Enjoys crafts, spending time with friends.  Does not have an advanced directive on file and would like her mother to be her POA.  Full code.    Family History:   The patient's family history is significant for.  Family History   Problem Relation Age of  "Onset     Diabetes Mother      Diabetes Father      Cancer - colorectal Maternal Grandmother      Heart Disease Paternal Grandmother          Physical Exam:   BP (!) 142/97  Pulse 97  Temp 99.6  F (37.6  C) (Tympanic)  Resp 16  Ht 1.727 m (5' 8\")  Wt (!) 154 kg (339 lb 9.6 oz)  SpO2 98%  BMI 51.64 kg/m2  Body mass index is 51.64 kg/(m^2).    General Appearance: healthy and alert, no distress     HEENT:  no thyromegaly, no palpable nodules or masses        Cardiovascular: regular rate and rhythm, no gallops, rubs or murmurs     Respiratory: lungs clear, no rales, rhonchi or wheezes, normal diaphragmatic excursion    Musculoskeletal: extremities non tender and without edema    Skin: no lesions or rashes     Neurological: normal gait, no gross defects     Psychiatric: appropriate mood and affect                               Hematological: normal cervical, supraclavicular and inguinal lymph nodes     Gastrointestinal:       abdomen soft, non-tender, non-distended, no organomegaly or masses    Genitourinary: External genitalia and urethral meatus appears normal.  Vagina is smooth without nodularity or masses.  Cervix appears normal and without lesions.  Bimanual exam reveal no masses, nodularity or fullness.  Recto-vaginal exam confirms these findings.      Assessment:    Erika Reina is a 38 year old woman with a new diagnosis of grade 1 endometrial adenocarcinoma.     A total of 60 minutes was spent with the patient, >50% of which were spent in counseling the patient and/or treatment planning.      Plan:     1.)    Grade 1 endometrial adenocarcinoma.  Discussed natural history and progression of disease.  Discussed standard surgical staging procedure.  We also discussed in depth fertility sparing options given her age and nulliparity including different hormonal options of PO progesterone or Mirena IUD.  We discussed the role of MRI in evaluating for myometrial invasion prior to proceeding with a " fertility sparing approach.  We discussed that while this is not standard of care, it has been shown to be successful in several studies but that there is a potential risk for progression of disease while she is attempting progesterone therapy.  We also discussed the need for close follow up and repeat biopsies to ensure she is responding appropriately to the progestins.  We also discussed the eventual recommendation would be for hysterectomy following completion of childbearing.  We discussed the possible role of egg harvesting for potential use in the future with a surrogate.  Discussed the small possibility of adjuvant post-operative therapy.  She and her family took some time to discuss these options and decided that she would like to proceed with definitive surgical management.  We thus discussed the role of sentinel lymph nodes and that if a sentinel lymph node could not be identified that frozen section results would be used to guide need for lymphadenectomy.  Risks, benefits, indications and alternatives discussed with the patient.  All her questions were answered and consents were signed for robotic removal of uterus, cervix, both ovaries and fallopian tubes, sentinel lymph node dissection, possible lymph node dissection, possible open, cystoscopy on 12/13.       2.) Genetic risk factors were assessed and the patient does not meet the qualifications for a referral.      3.) Labs and/or tests ordered include:  CBC, CMP, T/S.     4.) Health maintenance issues addressed today include pt is up to date.    5.) Pre-op teaching was completed today.        Thank you for allowing us to participate in the care of your patient.         Sincerely,    Cleopatra Evans MD  Gynecologic Oncology  [facility]       ZULMA CARBAJAL

## 2018-12-04 NOTE — MR AVS SNAPSHOT
After Visit Summary   12/4/2018    Erika Reina    MRN: [id number]           Patient Information     Date Of Birth          1980        Visit Information        Provider Department      12/4/2018 1:00 PM Cleopatra Altamirano MD [facility]        Today's Diagnoses     Endometrial cancer (H)    -  1      Care Instructions    You have been scheduled for surgery on: 12/13    Diagnosis:  Endometrial cancer    The surgical procedure is: Robotic removal of uterus, cervix, both ovaries and fallopian tubes, sentinel lymph node dissection, possible lymph node dissection, possible open, cystoscopy    Anticipated length of surgery: 1-3 hrs.  You will be in the recovery room for approximately 2-3 hrs after surgery.  Your family will not be able to see you until after you leave the recovery room.    Length of hospital stay:  This is an outpatient, extended stay surgery.  You will be discharged same day if you meet criteria for discharge.  If you have a larger surgical incision, you will need to be in the hospital 2-3 days.  ______________________________________________________________________    Preparation for Surgery:    To Schedule   1.  Labs:  CBC, CMP, T/S, orders placed, please perform today  - done SBP     2.  Post-operative exam with me 1-2 weeks following surgery      Postoperative Restrictions:  No heavy lifting >20lbs for six weeks, nothing in the vagina (no tampons, no intercourse, no douching) for eight weeks.     Postoperative visit:  Return to clinic 1-2 weeks after surgery for post operative visit.      Please contact the clinic with any questions or concerns.    Cleopatra Evans MD  Gynecologic Oncology  [facility]               Follow-ups after your visit        Your next 10 appointments already scheduled     Jan 02, 2019 10:00 AM CST   Return Visit with Cleopatra Giles MD   [facility]  "Hospital)    [facility] [place]  [address]  [place]  [facility] [id number] [phone]              Who to contact     If you have questions or need follow up information about today's clinic visit or your schedule please contact [facility] directly at [phone].  Normal or non-critical lab and imaging results will be communicated to you by MyChart, letter or phone within 4 business days after the clinic has received the results. If you do not hear from us within 7 days, please contact the clinic through [employer] or phone. If you have a critical or abnormal lab result, we will notify you by phone as soon as possible.  Submit refill requests through [employer] or call your pharmacy and they will forward the refill request to us. Please allow 3 business days for your refill to be completed.          Additional Information About Your Visit        [employer] Information     [employer] gives you secure access to your electronic health record. If you see a primary care provider, you can also send messages to your care team and make appointments. If you have questions, please call your primary care clinic.  If you do not have a primary care provider, please call [phone] and they will assist you.        Care EveryWhere ID     This is your Care EveryWhere ID. This could be used by other organizations to access your [place] medical records  [id number]        Your Vitals Were     Pulse Temperature Respirations Height Pulse Oximetry BMI (Body Mass Index)    97 99.6  F (37.6  C) (Tympanic) 16 1.727 m (5' 8\") 98% 51.64 kg/m2       Blood Pressure from Last 3 Encounters:   12/04/18 (!) 142/97   11/20/18 124/78   09/28/18 127/77    Weight from Last 3 Encounters:   12/04/18 (!) 154 kg (339 lb 9.6 oz)   11/20/18 (!) 154.9 kg (341 lb 9.6 oz)   09/28/18 145.2 kg (320 lb)              We Performed the Following     ABO/Rh Type and Screen     CBC with platelets differential     " Comprehensive metabolic panel     Anita-Operative Worksheet (Blank)        Primary Care Provider Office Phone # Fax #    Glenna Ham, LEEROY ELIZABETH [phone]       [facility] [address]  [facility] [address]        Equal Access to Services     GODFREY GONZALEZ : Hadii aad ku hadasho Soomaali, waaxda luqadaha, qaybta kaalmada adeegyada, waxay idiin hayaan adeeg kharash lawagner pete. So [facility] [phone].    ATENCIÓN: Si habla español, tiene a dodge disposición servicios gratuitos de asistencia lingüística. Llame al [phone].    We comply with applicable federal civil rights laws and [place] laws. We do not discriminate on the basis of race, color, national origin, age, disability, sex, sexual orientation, or gender identity.            Thank you!     Thank you for choosing [facility]  for your care. Our goal is always to provide you with excellent care. Hearing back from our patients is one way we can continue to improve our services. Please take a few minutes to complete the written survey that you may receive in the mail after your visit with us. Thank you!             Your Updated Medication List - Protect others around you: Learn how to safely use, store and throw away your medicines at www.disposemymeds.org.          This list is accurate as of 12/4/18  3:38 PM.  Always use your most recent med list.                   Brand Name Dispense Instructions for use Diagnosis    metFORMIN 500 MG tablet    GLUCOPHAGE    180 tablet    TAKE 1 TABLET (500 MG) BY MOUTH 2 TIMES DAILY (WITH MEALS)    PCOS (polycystic ovarian syndrome)       Multi-vitamin tablet      Take 1 tablet by mouth daily        VITAMIN D (CHOLECALCIFEROL) PO      Take by mouth daily

## 2018-12-04 NOTE — LETTER
2018         RE: Erika Reina  [address]  [facility] [address]        Dear Colleague,    Thank you for referring your patient, Erika Reina, to the [facility]. Please see a copy of my visit note below.    Consult Notes on Referred Patient        Dr. Alla Wilde MD  [address] [place] [address]       RE: Erika Buckll  : 1980  OBEY: 2018    Dear Dr. Alla Wilde:    I had the pleasure of seeing your patient Erika Reina here at the Gynecologic Cancer Clinic at the [facility] on 2018.  As you know she is a very pleasant 38 year old woman with a recent diagnosis of grade 1 endometrial adenocarcinoma.  Given these findings she was subsequently sent to the Gynecologic Cancer Clinic for new patient consultation.  She is accompanied today by her mother and sister.    She notes she has had abnormal bleeding since her very first menses with very irregular cycles.  Recently, however she has noted a major change in the amount of bleeding with a significant increase.  Due to this she was placed on Metformin without much improvement in her bleeding and thus had the following work up.    18:  US Pelvis:  Uterus measures 7.3 x 4.9 x 5.5 cm, EMS 26.3 mm, normal ovaries    18:  EMB:  Grade 1 endometrial adenocarcinoma, MMR intact      Review of Systems:  Systemic           no weight changes; no fever; no chills; no night sweats; no appetite changes  Skin           no rashes, or lesions  Eye           no irritation; no changes in vision  Dilcia-Laryngeal           no dysphagia; no hoarseness   Pulmonary    no cough; no shortness of breath  Cardiovascular    no chest pain; no palpitations  Gastrointestinal    no diarrhea; no constipation; no abdominal pain; no changes in bowel  habits; no blood in stool  Genitourinary   no urinary frequency; no urinary urgency; no dysuria; no pain; no abnormal vaginal  discharge; + abnormal vaginal bleeding  Breast    no breast discharge; no breast changes; no breast pain  Musculoskeletal    no myalgias; no arthralgias; no back pain  Psychiatric           no depressed mood; no anxiety    Hematologic            no tender lymph nodes; no noticeable swellings or lumps   Endocrine    no hot flashes; no heat/cold intolerance         Neurological   no tremor; no numbness and tingling; no headaches; no difficulty sleeping    Obstetrics and Gynecology History:  G0  Had always thought she would have children but this diagnosis has made her consider that in depth.  She currently has no partner.      Past Medical History:  Past Medical History:   Diagnosis Date     Endometrial adenocarcinoma (H)      Hyperlipidemia LDL goal <160 9/15/2011       Past Surgical History:  Past Surgical History:   Procedure Laterality Date     ARTHROPLASTY KNEE         Health Maintenance:  Last Pap Smear: 8/16/18              Result: negative, HPV negative  She has not had a history of abnormal Pap smears.    Last Mammogram: N/A    Last Colonoscopy: N/A      Current Medications:   has a current medication list which includes the following prescription(s): metformin, multivitamin w/minerals, and cholecalciferol.     Allergies:   Ibuprofen      Social History:  Social History     Social History     Marital status: Single     Spouse name: N/A     Number of children: N/A     Years of education: N/A     Occupational History     Not on file.     Social History Main Topics     Smoking status: Never Smoker     Smokeless tobacco: Never Used     Alcohol use 0.0 oz/week     0 Standard drinks or equivalent per week      Comment: rarely     Drug use: No     Sexual activity: Yes     Partners: Male     Birth control/ protection: Condom     Other Topics Concern     Parent/Sibling W/ Cabg, Mi Or Angioplasty Before 65f 55m? No     Social History Narrative       Lives with mother, father and sister, feels safe at home.  Works as  ".  Enjoys crafts, spending time with friends.  Does not have an advanced directive on file and would like her mother to be her POA.  Full code.    Family History:   The patient's family history is significant for.  Family History   Problem Relation Age of Onset     Diabetes Mother      Diabetes Father      Cancer - colorectal Maternal Grandmother      Heart Disease Paternal Grandmother          Physical Exam:   BP (!) 142/97  Pulse 97  Temp 99.6  F (37.6  C) (Tympanic)  Resp 16  Ht 1.727 m (5' 8\")  Wt (!) 154 kg (339 lb 9.6 oz)  SpO2 98%  BMI 51.64 kg/m2  Body mass index is 51.64 kg/(m^2).    General Appearance: healthy and alert, no distress     HEENT:  no thyromegaly, no palpable nodules or masses        Cardiovascular: regular rate and rhythm, no gallops, rubs or murmurs     Respiratory: lungs clear, no rales, rhonchi or wheezes, normal diaphragmatic excursion    Musculoskeletal: extremities non tender and without edema    Skin: no lesions or rashes     Neurological: normal gait, no gross defects     Psychiatric: appropriate mood and affect                               Hematological: normal cervical, supraclavicular and inguinal lymph nodes     Gastrointestinal:       abdomen soft, non-tender, non-distended, no organomegaly or masses    Genitourinary: External genitalia and urethral meatus appears normal.  Vagina is smooth without nodularity or masses.  Cervix appears normal and without lesions.  Bimanual exam reveal no masses, nodularity or fullness.  Recto-vaginal exam confirms these findings.      Assessment:    Erika Reina is a 38 year old woman with a new diagnosis of grade 1 endometrial adenocarcinoma.     A total of 60 minutes was spent with the patient, >50% of which were spent in counseling the patient and/or treatment planning.      Plan:     1.)    Grade 1 endometrial adenocarcinoma.  Discussed natural history and progression of disease.  Discussed standard surgical staging " procedure.   We also discussed in depth fertility sparing options given her age and nulliparity including different hormonal options of PO progesterone or Mirena IUD.  We discussed the role of MRI in evaluating for myometrial invasion prior to proceeding with a fertility sparing approach.  We discussed that while this is not standard of care, it has been shown to be successful in several studies but that there is a potential risk for progression of disease while she is attempting progesterone therapy.  We also discussed the need for close follow up and repeat biopsies to ensure she is responding appropriately to the progestins.  We also discussed the eventual recommendation would be for hysterectomy following completion of childbearing.  We discussed the possible role of egg harvesting for potential use in the future with a surrogate.  Discussed the small possibility of adjuvant post-operative therapy.   She and her family took some time to discuss these options and decided that she would like to proceed with definitive surgical management.  We thus discussed the role of sentinel lymph nodes and that if a sentinel lymph node could not be identified that frozen section results would be used to guide need for lymphadenectomy.  Risks, benefits, indications and alternatives discussed with the patient.  All her questions were answered and consents were signed for robotic removal of uterus, cervix, both ovaries and fallopian tubes, sentinel lymph node dissection, possible lymph node dissection, possible open, cystoscopy on 12/13.       2.) Genetic risk factors were assessed and the patient does not meet the qualifications for a referral.      3.) Labs and/or tests ordered include:  CBC, CMP, T/S.     4.) Health maintenance issues addressed today include pt is up to date.    5.) Pre-op teaching was completed today.        Thank you for allowing us to participate in the care of your patient.         Sincerely,    Cleopatra  MD Cristina  Gynecologic Oncology  [facility]       ZULMA CARBAJAL         Again, thank you for allowing me to participate in the care of your patient.        Sincerely,        Chan Evans MD

## 2018-12-05 ENCOUNTER — TELEPHONE (OUTPATIENT)
Dept: ONCOLOGY | Facility: CLINIC | Age: 38
End: 2018-12-05

## 2018-12-05 NOTE — TELEPHONE ENCOUNTER
Patient is scheduled for surgery with       Spoke or left message with:  Left message for patient of time and date of procedure as well as 5:30 a.m. Arrival time and where to go. Left name and number for return call.    Date of Surgery: 12/13/18 @ 11:50 a.m.    Location: [facility]    Informed patient they will need an adult      Pre-op with surgeon (if applicable):     H&P: Scheduled with     Additional imaging/appointments:     Surgery packet:     Additional comments:

## 2018-12-12 ENCOUNTER — ANESTHESIA EVENT (OUTPATIENT)
Dept: SURGERY | Facility: CLINIC | Age: 38
End: 2018-12-12
Payer: COMMERCIAL

## 2018-12-13 ENCOUNTER — HOSPITAL ENCOUNTER (OUTPATIENT)
Facility: CLINIC | Age: 38
Discharge: HOME | End: 2018-12-13
Attending: OBSTETRICS & GYNECOLOGY | Admitting: OBSTETRICS & GYNECOLOGY
Payer: COMMERCIAL

## 2018-12-13 ENCOUNTER — SURGERY (OUTPATIENT)
Age: 38
End: 2018-12-13
Payer: COMMERCIAL

## 2018-12-13 ENCOUNTER — ANESTHESIA (OUTPATIENT)
Dept: SURGERY | Facility: CLINIC | Age: 38
End: 2018-12-13
Payer: COMMERCIAL

## 2018-12-13 VITALS
HEIGHT: 69 IN | SYSTOLIC BLOOD PRESSURE: 117 MMHG | OXYGEN SATURATION: 100 % | DIASTOLIC BLOOD PRESSURE: 77 MMHG | TEMPERATURE: 98 F | WEIGHT: 293 LBS | RESPIRATION RATE: 12 BRPM | HEART RATE: 97 BPM | BODY MASS INDEX: 43.4 KG/M2

## 2018-12-13 DIAGNOSIS — Z90.710 S/P HYSTERECTOMY: Primary | ICD-10-CM

## 2018-12-13 DIAGNOSIS — C54.1 ENDOMETRIAL CANCER (H): ICD-10-CM

## 2018-12-13 LAB
GLUCOSE BLDC GLUCOMTR-MCNC: 84 MG/DL (ref 70–99)
HCG UR QL: NEGATIVE

## 2018-12-13 PROCEDURE — 37000009 ZZH ANESTHESIA TECHNICAL FEE, EACH ADDTL 15 MIN: Performed by: OBSTETRICS & GYNECOLOGY

## 2018-12-13 PROCEDURE — 88307 TISSUE EXAM BY PATHOLOGIST: CPT | Performed by: OBSTETRICS & GYNECOLOGY

## 2018-12-13 PROCEDURE — 71000013 ZZH RECOVERY PHASE 1 LEVEL 1 EA ADDTL HR: Performed by: OBSTETRICS & GYNECOLOGY

## 2018-12-13 PROCEDURE — 25000125 ZZHC RX 250: Performed by: OBSTETRICS & GYNECOLOGY

## 2018-12-13 PROCEDURE — 25000128 H RX IP 250 OP 636: Performed by: NURSE ANESTHETIST, CERTIFIED REGISTERED

## 2018-12-13 PROCEDURE — 00000155 ZZHCL STATISTIC H-CELL BLOCK W/STAIN: Performed by: OBSTETRICS & GYNECOLOGY

## 2018-12-13 PROCEDURE — 38571 LAPAROSCOPY LYMPHADENECTOMY: CPT | Mod: GC | Performed by: OBSTETRICS & GYNECOLOGY

## 2018-12-13 PROCEDURE — 25000125 ZZHC RX 250: Performed by: NURSE ANESTHETIST, CERTIFIED REGISTERED

## 2018-12-13 PROCEDURE — 58571 TLH W/T/O 250 G OR LESS: CPT | Mod: GC | Performed by: OBSTETRICS & GYNECOLOGY

## 2018-12-13 PROCEDURE — 40000170 ZZH STATISTIC PRE-PROCEDURE ASSESSMENT II: Performed by: OBSTETRICS & GYNECOLOGY

## 2018-12-13 PROCEDURE — 25000566 ZZH SEVOFLURANE, EA 15 MIN: Performed by: OBSTETRICS & GYNECOLOGY

## 2018-12-13 PROCEDURE — 71000027 ZZH RECOVERY PHASE 2 EACH 15 MINS: Performed by: OBSTETRICS & GYNECOLOGY

## 2018-12-13 PROCEDURE — 88112 CYTOPATH CELL ENHANCE TECH: CPT | Performed by: OBSTETRICS & GYNECOLOGY

## 2018-12-13 PROCEDURE — 82962 GLUCOSE BLOOD TEST: CPT

## 2018-12-13 PROCEDURE — 27210794 ZZH OR GENERAL SUPPLY STERILE: Performed by: OBSTETRICS & GYNECOLOGY

## 2018-12-13 PROCEDURE — 36000086 ZZH SURGERY LEVEL 8 1ST 30 MIN UMMC: Performed by: OBSTETRICS & GYNECOLOGY

## 2018-12-13 PROCEDURE — 88309 TISSUE EXAM BY PATHOLOGIST: CPT | Performed by: OBSTETRICS & GYNECOLOGY

## 2018-12-13 PROCEDURE — 25000128 H RX IP 250 OP 636: Performed by: ANESTHESIOLOGY

## 2018-12-13 PROCEDURE — 88305 TISSUE EXAM BY PATHOLOGIST: CPT | Performed by: OBSTETRICS & GYNECOLOGY

## 2018-12-13 PROCEDURE — 81025 URINE PREGNANCY TEST: CPT | Performed by: OBSTETRICS & GYNECOLOGY

## 2018-12-13 PROCEDURE — 25000132 ZZH RX MED GY IP 250 OP 250 PS 637: Performed by: STUDENT IN AN ORGANIZED HEALTH CARE EDUCATION/TRAINING PROGRAM

## 2018-12-13 PROCEDURE — 71000012 ZZH RECOVERY PHASE 1 LEVEL 1 FIRST HR: Performed by: OBSTETRICS & GYNECOLOGY

## 2018-12-13 PROCEDURE — 37000008 ZZH ANESTHESIA TECHNICAL FEE, 1ST 30 MIN: Performed by: OBSTETRICS & GYNECOLOGY

## 2018-12-13 PROCEDURE — 25000132 ZZH RX MED GY IP 250 OP 250 PS 637: Performed by: OBSTETRICS & GYNECOLOGY

## 2018-12-13 PROCEDURE — 25000128 H RX IP 250 OP 636: Performed by: OBSTETRICS & GYNECOLOGY

## 2018-12-13 PROCEDURE — 36000088 ZZH SURGERY LEVEL 8 EA 15 ADDTL MIN - UMMC: Performed by: OBSTETRICS & GYNECOLOGY

## 2018-12-13 RX ORDER — HEPARIN SODIUM 5000 [USP'U]/.5ML
5000 INJECTION, SOLUTION INTRAVENOUS; SUBCUTANEOUS
Status: COMPLETED | OUTPATIENT
Start: 2018-12-13 | End: 2018-12-13

## 2018-12-13 RX ORDER — SODIUM CHLORIDE, SODIUM LACTATE, POTASSIUM CHLORIDE, CALCIUM CHLORIDE 600; 310; 30; 20 MG/100ML; MG/100ML; MG/100ML; MG/100ML
INJECTION, SOLUTION INTRAVENOUS CONTINUOUS
Status: DISCONTINUED | OUTPATIENT
Start: 2018-12-13 | End: 2018-12-13

## 2018-12-13 RX ORDER — FENTANYL CITRATE 50 UG/ML
25-50 INJECTION, SOLUTION INTRAMUSCULAR; INTRAVENOUS
Status: DISCONTINUED | OUTPATIENT
Start: 2018-12-13 | End: 2018-12-13

## 2018-12-13 RX ORDER — ALBUTEROL SULFATE 0.83 MG/ML
2.5 SOLUTION RESPIRATORY (INHALATION) EVERY 4 HOURS PRN
Status: DISCONTINUED | OUTPATIENT
Start: 2018-12-13 | End: 2018-12-13 | Stop reason: HOSPADM

## 2018-12-13 RX ORDER — ONDANSETRON 2 MG/ML
INJECTION INTRAMUSCULAR; INTRAVENOUS PRN
Status: DISCONTINUED | OUTPATIENT
Start: 2018-12-13 | End: 2018-12-13

## 2018-12-13 RX ORDER — FENTANYL CITRATE 50 UG/ML
25-50 INJECTION, SOLUTION INTRAMUSCULAR; INTRAVENOUS
Status: DISCONTINUED | OUTPATIENT
Start: 2018-12-13 | End: 2018-12-13 | Stop reason: HOSPADM

## 2018-12-13 RX ORDER — HYDRALAZINE HYDROCHLORIDE 20 MG/ML
2.5-5 INJECTION INTRAMUSCULAR; INTRAVENOUS EVERY 10 MIN PRN
Status: DISCONTINUED | OUTPATIENT
Start: 2018-12-13 | End: 2018-12-13 | Stop reason: HOSPADM

## 2018-12-13 RX ORDER — ONDANSETRON 2 MG/ML
4 INJECTION INTRAMUSCULAR; INTRAVENOUS EVERY 30 MIN PRN
Status: DISCONTINUED | OUTPATIENT
Start: 2018-12-13 | End: 2018-12-13 | Stop reason: HOSPADM

## 2018-12-13 RX ORDER — OXYCODONE HYDROCHLORIDE 5 MG/1
5 TABLET ORAL
Status: COMPLETED | OUTPATIENT
Start: 2018-12-13 | End: 2018-12-13

## 2018-12-13 RX ORDER — NALOXONE HYDROCHLORIDE 0.4 MG/ML
.1-.4 INJECTION, SOLUTION INTRAMUSCULAR; INTRAVENOUS; SUBCUTANEOUS
Status: CANCELLED | OUTPATIENT
Start: 2018-12-13 | End: 2018-12-14

## 2018-12-13 RX ORDER — METOPROLOL TARTRATE 1 MG/ML
1-2 INJECTION, SOLUTION INTRAVENOUS EVERY 5 MIN PRN
Status: DISCONTINUED | OUTPATIENT
Start: 2018-12-13 | End: 2018-12-13

## 2018-12-13 RX ORDER — ONDANSETRON 2 MG/ML
4 INJECTION INTRAMUSCULAR; INTRAVENOUS EVERY 30 MIN PRN
Status: DISCONTINUED | OUTPATIENT
Start: 2018-12-13 | End: 2018-12-13

## 2018-12-13 RX ORDER — GLYCOPYRROLATE 0.2 MG/ML
INJECTION, SOLUTION INTRAMUSCULAR; INTRAVENOUS PRN
Status: DISCONTINUED | OUTPATIENT
Start: 2018-12-13 | End: 2018-12-13

## 2018-12-13 RX ORDER — CEFAZOLIN SODIUM IN 0.9 % NACL 3 G/100 ML
3 INTRAVENOUS SOLUTION, PIGGYBACK (ML) INTRAVENOUS
Status: COMPLETED | OUTPATIENT
Start: 2018-12-13 | End: 2018-12-13

## 2018-12-13 RX ORDER — HYDROMORPHONE HYDROCHLORIDE 1 MG/ML
.3-.5 INJECTION, SOLUTION INTRAMUSCULAR; INTRAVENOUS; SUBCUTANEOUS EVERY 5 MIN PRN
Status: DISCONTINUED | OUTPATIENT
Start: 2018-12-13 | End: 2018-12-13

## 2018-12-13 RX ORDER — FENTANYL CITRATE 50 UG/ML
INJECTION, SOLUTION INTRAMUSCULAR; INTRAVENOUS PRN
Status: DISCONTINUED | OUTPATIENT
Start: 2018-12-13 | End: 2018-12-13

## 2018-12-13 RX ORDER — PROPOFOL 10 MG/ML
INJECTION, EMULSION INTRAVENOUS PRN
Status: DISCONTINUED | OUTPATIENT
Start: 2018-12-13 | End: 2018-12-13

## 2018-12-13 RX ORDER — HYDRALAZINE HYDROCHLORIDE 20 MG/ML
2.5-5 INJECTION INTRAMUSCULAR; INTRAVENOUS EVERY 10 MIN PRN
Status: DISCONTINUED | OUTPATIENT
Start: 2018-12-13 | End: 2018-12-13

## 2018-12-13 RX ORDER — ONDANSETRON 4 MG/1
4 TABLET, ORALLY DISINTEGRATING ORAL EVERY 30 MIN PRN
Status: DISCONTINUED | OUTPATIENT
Start: 2018-12-13 | End: 2018-12-13 | Stop reason: HOSPADM

## 2018-12-13 RX ORDER — ACETAMINOPHEN 325 MG/1
650 TABLET ORAL EVERY 4 HOURS PRN
Qty: 50 TABLET | Refills: 0 | Status: SHIPPED | OUTPATIENT
Start: 2018-12-13 | End: 2019-01-12

## 2018-12-13 RX ORDER — INDOCYANINE GREEN AND WATER 25 MG
KIT INJECTION PRN
Status: DISCONTINUED | OUTPATIENT
Start: 2018-12-13 | End: 2018-12-13 | Stop reason: HOSPADM

## 2018-12-13 RX ORDER — SODIUM CHLORIDE, SODIUM LACTATE, POTASSIUM CHLORIDE, CALCIUM CHLORIDE 600; 310; 30; 20 MG/100ML; MG/100ML; MG/100ML; MG/100ML
INJECTION, SOLUTION INTRAVENOUS CONTINUOUS
Status: DISCONTINUED | OUTPATIENT
Start: 2018-12-13 | End: 2018-12-13 | Stop reason: HOSPADM

## 2018-12-13 RX ORDER — LIDOCAINE HYDROCHLORIDE 20 MG/ML
INJECTION, SOLUTION INFILTRATION; PERINEURAL PRN
Status: DISCONTINUED | OUTPATIENT
Start: 2018-12-13 | End: 2018-12-13

## 2018-12-13 RX ORDER — SODIUM CHLORIDE, SODIUM LACTATE, POTASSIUM CHLORIDE, CALCIUM CHLORIDE 600; 310; 30; 20 MG/100ML; MG/100ML; MG/100ML; MG/100ML
INJECTION, SOLUTION INTRAVENOUS CONTINUOUS PRN
Status: DISCONTINUED | OUTPATIENT
Start: 2018-12-13 | End: 2018-12-13

## 2018-12-13 RX ORDER — DEXAMETHASONE SODIUM PHOSPHATE 10 MG/ML
INJECTION, SOLUTION INTRAMUSCULAR; INTRAVENOUS PRN
Status: DISCONTINUED | OUTPATIENT
Start: 2018-12-13 | End: 2018-12-13

## 2018-12-13 RX ORDER — EPHEDRINE SULFATE 50 MG/ML
INJECTION, SOLUTION INTRAMUSCULAR; INTRAVENOUS; SUBCUTANEOUS PRN
Status: DISCONTINUED | OUTPATIENT
Start: 2018-12-13 | End: 2018-12-13

## 2018-12-13 RX ORDER — ONDANSETRON 4 MG/1
4 TABLET, ORALLY DISINTEGRATING ORAL EVERY 30 MIN PRN
Status: DISCONTINUED | OUTPATIENT
Start: 2018-12-13 | End: 2018-12-13

## 2018-12-13 RX ORDER — OXYCODONE HYDROCHLORIDE 5 MG/1
5-10 TABLET ORAL EVERY 4 HOURS PRN
Qty: 16 TABLET | Refills: 0 | Status: SHIPPED | OUTPATIENT
Start: 2018-12-13 | End: 2018-12-17

## 2018-12-13 RX ORDER — KETAMINE HYDROCHLORIDE 10 MG/ML
10 INJECTION, SOLUTION INTRAMUSCULAR; INTRAVENOUS ONCE
Status: DISCONTINUED | OUTPATIENT
Start: 2018-12-13 | End: 2018-12-13 | Stop reason: HOSPADM

## 2018-12-13 RX ORDER — METOPROLOL TARTRATE 1 MG/ML
1-2 INJECTION, SOLUTION INTRAVENOUS EVERY 5 MIN PRN
Status: DISCONTINUED | OUTPATIENT
Start: 2018-12-13 | End: 2018-12-13 | Stop reason: HOSPADM

## 2018-12-13 RX ORDER — CEFAZOLIN SODIUM 1 G/3ML
1 INJECTION, POWDER, FOR SOLUTION INTRAMUSCULAR; INTRAVENOUS SEE ADMIN INSTRUCTIONS
Status: DISCONTINUED | OUTPATIENT
Start: 2018-12-13 | End: 2018-12-13 | Stop reason: HOSPADM

## 2018-12-13 RX ORDER — AMOXICILLIN 250 MG
1-2 CAPSULE ORAL 2 TIMES DAILY
Qty: 120 TABLET | Refills: 0 | Status: SHIPPED | OUTPATIENT
Start: 2018-12-13 | End: 2019-01-02

## 2018-12-13 RX ORDER — NALOXONE HYDROCHLORIDE 0.4 MG/ML
.1-.4 INJECTION, SOLUTION INTRAMUSCULAR; INTRAVENOUS; SUBCUTANEOUS
Status: DISCONTINUED | OUTPATIENT
Start: 2018-12-13 | End: 2018-12-13 | Stop reason: HOSPADM

## 2018-12-13 RX ORDER — HYDROMORPHONE HYDROCHLORIDE 1 MG/ML
.3-.5 INJECTION, SOLUTION INTRAMUSCULAR; INTRAVENOUS; SUBCUTANEOUS EVERY 5 MIN PRN
Status: DISCONTINUED | OUTPATIENT
Start: 2018-12-13 | End: 2018-12-13 | Stop reason: HOSPADM

## 2018-12-13 RX ORDER — ACETAMINOPHEN 325 MG/1
650 TABLET ORAL
Status: DISCONTINUED | OUTPATIENT
Start: 2018-12-13 | End: 2018-12-13 | Stop reason: HOSPADM

## 2018-12-13 RX ORDER — PHENAZOPYRIDINE HYDROCHLORIDE 200 MG/1
200 TABLET, FILM COATED ORAL ONCE
Status: COMPLETED | OUTPATIENT
Start: 2018-12-13 | End: 2018-12-13

## 2018-12-13 RX ADMIN — INDOCYANINE GREEN 7.5 MG: KIT INTRAVENOUS at 12:31

## 2018-12-13 RX ADMIN — PHENAZOPYRIDINE HYDROCHLORIDE 200 MG: 200 TABLET, FILM COATED ORAL at 10:21

## 2018-12-13 RX ADMIN — Medication 0.3 MG: at 14:10

## 2018-12-13 RX ADMIN — Medication 0.4 MG: at 14:18

## 2018-12-13 RX ADMIN — FENTANYL CITRATE 50 MCG: 50 INJECTION, SOLUTION INTRAMUSCULAR; INTRAVENOUS at 13:31

## 2018-12-13 RX ADMIN — FENTANYL CITRATE 50 MCG: 50 INJECTION INTRAMUSCULAR; INTRAVENOUS at 13:52

## 2018-12-13 RX ADMIN — HEPARIN SODIUM 5000 UNITS: 1000 INJECTION, SOLUTION INTRAVENOUS; SUBCUTANEOUS at 10:21

## 2018-12-13 RX ADMIN — SODIUM CHLORIDE, POTASSIUM CHLORIDE, SODIUM LACTATE AND CALCIUM CHLORIDE: 600; 310; 30; 20 INJECTION, SOLUTION INTRAVENOUS at 11:22

## 2018-12-13 RX ADMIN — ACETAMINOPHEN 650 MG: 325 TABLET, FILM COATED ORAL at 16:23

## 2018-12-13 RX ADMIN — Medication 3 G: at 11:45

## 2018-12-13 RX ADMIN — GLYCOPYRROLATE 0.2 MG: 0.2 INJECTION, SOLUTION INTRAMUSCULAR; INTRAVENOUS at 12:01

## 2018-12-13 RX ADMIN — FENTANYL CITRATE 100 MCG: 50 INJECTION, SOLUTION INTRAMUSCULAR; INTRAVENOUS at 11:32

## 2018-12-13 RX ADMIN — Medication 5 MG: at 12:14

## 2018-12-13 RX ADMIN — DEXAMETHASONE SODIUM PHOSPHATE 8 MG: 10 INJECTION, SOLUTION INTRAMUSCULAR; INTRAVENOUS at 12:30

## 2018-12-13 RX ADMIN — Medication 0.5 MG: at 15:13

## 2018-12-13 RX ADMIN — LIDOCAINE HYDROCHLORIDE 80 MG: 20 INJECTION, SOLUTION INFILTRATION; PERINEURAL at 11:32

## 2018-12-13 RX ADMIN — SUGAMMADEX 200 MG: 100 INJECTION, SOLUTION INTRAVENOUS at 13:21

## 2018-12-13 RX ADMIN — ONDANSETRON 4 MG: 2 INJECTION INTRAMUSCULAR; INTRAVENOUS at 13:18

## 2018-12-13 RX ADMIN — ROCURONIUM BROMIDE 70 MG: 10 INJECTION INTRAVENOUS at 11:34

## 2018-12-13 RX ADMIN — MIDAZOLAM 2 MG: 1 INJECTION INTRAMUSCULAR; INTRAVENOUS at 11:22

## 2018-12-13 RX ADMIN — Medication 0.5 MG: at 14:46

## 2018-12-13 RX ADMIN — FENTANYL CITRATE 50 MCG: 50 INJECTION, SOLUTION INTRAMUSCULAR; INTRAVENOUS at 13:35

## 2018-12-13 RX ADMIN — ROCURONIUM BROMIDE 20 MG: 10 INJECTION INTRAVENOUS at 12:35

## 2018-12-13 RX ADMIN — FENTANYL CITRATE 50 MCG: 50 INJECTION INTRAMUSCULAR; INTRAVENOUS at 14:04

## 2018-12-13 RX ADMIN — SODIUM CHLORIDE, POTASSIUM CHLORIDE, SODIUM LACTATE AND CALCIUM CHLORIDE: 600; 310; 30; 20 INJECTION, SOLUTION INTRAVENOUS at 13:31

## 2018-12-13 RX ADMIN — ROCURONIUM BROMIDE 20 MG: 10 INJECTION INTRAVENOUS at 13:09

## 2018-12-13 RX ADMIN — OXYCODONE HYDROCHLORIDE 5 MG: 5 TABLET ORAL at 16:24

## 2018-12-13 RX ADMIN — PROPOFOL 160 MG: 10 INJECTION, EMULSION INTRAVENOUS at 11:32

## 2018-12-13 RX ADMIN — ONDANSETRON HYDROCHLORIDE 4 MG: 2 INJECTION, SOLUTION INTRAMUSCULAR; INTRAVENOUS at 13:56

## 2018-12-13 ASSESSMENT — MIFFLIN-ST. JEOR: SCORE: 2273.38

## 2018-12-13 NOTE — OP NOTE
Gynecologic Oncology Operative Report    12/13/2018  Erika Reina  [id number]    PREOPERATIVE DIAGNOSIS: grade 1 endometrial adenocarcinoma    POSTOPERATIVE DIAGNOSIS: Same, final pathology pending    PROCEDURES: Robotic hysterectomy, bilateral salpingo-oophorectomy, bilateral pelvic sentinel lymph node dissection, cystoscopy    SURGEON: Cleopatra Evans MD     ASSISTANTS:  Tamie Cabral MD, PGY-3.     ANESTHESIA: General endotracheal    ESTIMATED BLOOD LOSS: 50 cc     IV FLUIDS: 1000 ml crystalloid    URINE OUTPUT: 60 ml clear urine     INDICATIONS: Erika Reina is a 38 year old who presented with abnormal uterine bleeding.  She underwent a pelvic US which revealed a dramatically thickened EMS.  She thus underwent an EMB which revealed a grade 1 endometrial adenocarcinoma with MMR intact.  We discussed fertility sparing options, however she wanted definitive therapy.  Following a thorough discussion of the risks, benefits, indications and alternative she consented to the above procedure.    FINDINGS: On EUA the patient had normal external genitalia and a normal sized, mobile uterus.  On laparoscopy there were filmy adhesions from the bilateral adnexa and uterus to the cul-de-sac.  The uterus was grossly normal in appearance.  The adnexa were grossly normal in appearance with the exception of excess adipose tissue.  There were sentinel lymph nodes identified bilaterally.  On the right the sentinel was in the obturator space just at the bifurcation of the iliac veins.  On the left the sentinel was in the obturator space at the mid-portion of the iliac vein.  The remainder of the abdominal and pelvic surveys was unremarkable.  On cystoscopy there was no evidence of bladder injury or foreign body and there was brisk efflux noted from the bilateral ureteral orifices.    SPECIMENS:   1.  Pelvic washings  2.  Uterus, cervix, bilateral ovaries and fallopian tubes  3.  Bilateral pelvic sentinel lymph  nodes    COMPLICATIONS: None.     CONDITION: Stable to PACU.     PROCEDURE:   Consent was reviewed with the patient in the preoperative setting and confirmed. She received prophylactic antibiotics. In addition, she received heparin for venous thrombosis prevention. The patient was transferred to the operating room and placed in dorsal supine position. General anesthetic was obtained in the usual manner without noted difficulties. The patient was then positioned onto Will stirrups and an exam under anesthesia was performed with findings as described above.  The patient was prepped and draped for the above-mentioned procedure and Barraza catheter was then placed under sterile techniques.  Timeout was called at which point the patient's name, procedure and operative site was confirmed by the operative team. Initially, the instruments for the vaginal manipulator were positioned, a speculum was placed in the vagina. The anterior lip of the cervix was grasped, the uterus sounded to 6 cm and cervix was serially dilated. The cervix was then injected with 1 ml of ICG dye at both the 3 and 9 o'clock positions.  The [employer] vaginal manipulator was then inserted and the vaginal balloon was then inserted to obtain intraabdominal pressure.     Attention was then turned to the upper abdomen. Initially, an incision was made approximately 3 cm above the umbilicus and the Veress needle introduced through this stab incision. The abdomen was insufflated with an opening pressure of 4 mmHg. The Veress needle was removed. Sites for the da Aldo laparoscopic ports were demarcated, total of 5, initiating with this midline incision above the umbilicus and positioned in a semicircular fashion around the upper abdomen. The initial midline 8 mm port was inserted without difficulties, the left 2 lateral 8 mm da Aldo ports and the right 5 mm and an 8 mm all under direct visualization without any injury noted to underlying structures. At this point,  the patient was placed into steep Trendelenburg. The pelvis was inspected as well as the upper abdomen with findings as noted above. Pelvic washings were obtained and sent for cytology. Bowels were packed up into the upper abdomen with gentle traction. At this point, the da Aldo was docked onto the ports, all appropriate instruments were inserted.     Next, we performed the sentinel pelvic lymph node dissection. At this point, peritoneum overlying the right external iliac artery was incised. The borders of the pelvic lymph node dissection included cephalad bifurcation of the common iliac artery, caudad to the circumflex iliac vein, medially the ureter and lateral to genitofemoral nerve and these spaces were opened.  Green lymph channels were easily identified leading to the sentinel lymph node which was carefully dissected out ensuring to preserve the underlying obturator nerve as well as the ureter.  The sentinel lymph node was placed into an EndoCatch bag and marked for retrieval later.  The same was carried out on the left.  The spaces of the pelvic lymph node dissection were opened and identified.  Again, green lymph channels were easily identified leading to the sentinel lymph node which was again carefully dissected out while ensuring the safety of the underlying ureter and obturator nerve.  This was placed into a separate EndoCatch bag and marked for retrieval later.      Attention was then turned to the pelvis. The round ligaments were identified bilaterally. They were divided using cautery and the retroperitoneal space was entered by dissecting the peritoneum lateral and cephalad to the IP ligaments. The ureters were identified and a defect was made underneath the IP ligament and above the ureter. The IP ligament was serially cauterized and then divided sharply. The rest of the peritoneum was skeletonized down to the level of the uterine arteries which were then cauterized and divided.  The bladder flap  was created using cautery down to just below the level of the uterine manipulator cup. The rest of the parametrial tissue was dissected off of the uterus serially cauterized and divided sharply. A colpotomy was made on the anterior side and carried around to the posterior side of the uterine manipulator cup using the electrocautery. The uterus was removed through the vagina and sent for pathology followed by the two EndoCatch bags with the sentinel lymph nodes which were also removed and sent for pathology.      The vaginal cuff was then closed using a V-Lock suture with excellent reapproximation and good hemostasis.   Next, we performed cystoscopy using 30-degree angled scope and noted normal bladder mucosa, no evidence of foreign body and brisk efflux from the bilateral ureteral orifices. At this point, the vaginal balloon was removed from the vagina.  All laparoscopic instruments and ports were now removed and CO2 allowed to escape from the abdomen. Skin was reapproximated with 4-0 Vicryl sutures and Dermabond applied. The patient tolerated the procedure well and was taken to the recovery room in stable condition.    Sponge, lap and needle counts were reported as correct x2 and instrument count was correct x1.     Cleopatra Evans MD  Gynecologic Oncology  [facility]

## 2018-12-13 NOTE — OR NURSING
Writer noted that there is a discharge Rx for oxycodone (per electronic AVS). Andrewr has not seen Rx in PACU. writer spoke with gyn onc resident, who stated she will find the Rx and will let writer know where it is. Await call back from gyn onc.

## 2018-12-13 NOTE — PROGRESS NOTES
Gynecologic Oncology Postoperative Check Note  12/13/2018    S: Ambulating without dizziness. Eating and drinking without nausea/vomiting. Voided without difficulty. Feeling ready for discharge.     O:  Vitals:    12/13/18 1515 12/13/18 1530 12/13/18 1545 12/13/18 1601   BP: 117/75 120/76 124/82 121/75   BP Location:    Left arm   Pulse:       Resp: 14 14 14    Temp:  98.1  F (36.7  C)  98  F (36.7  C)   TempSrc:  Temporal  Oral   SpO2: 99% 97% 96% 100%   Weight:       Height:         Gen: alert and oriented, resting in a chair   Cardio: RRR  Resp: Breathing comfortably on room air  Abdomen: soft, nontender, obese, nondistended  Incision: 5 port sites covered with dermabond, right mid incision with some dried blood     A/P: 38 year old POD#0 s/p DA TLH, BSO, sentinel LND, cysto. Doing well. Ok to discharge to home. Discussed post-op cares and restrictions. Follow-up scheduled.    Tamie Cabral MD  Ob/Gyn, PGY3  Pager [phone]

## 2018-12-13 NOTE — ANESTHESIA POSTPROCEDURE EVALUATION
Anesthesia POST Procedure Evaluation    Patient: Erika Reina   MRN:     [id number] Gender:   female   Age:    38 year old :      1980        Preoperative Diagnosis: Endometrial Cancer    Procedure(s):  DaVinci Assisted Removal Of Uterus, Cervix, Both Ovaries And Fallopian Tubes, Bilateral [place] Lymph Node Dissection  Cystoscopy   Postop Comments: No value filed.       Anesthesia Type:  General    Reportable Event: NO     PAIN: Uncomplicated   Sign Out status: Comfortable, Well controlled pain     PONV: No PONV   Sign Out status:  No Nausea or Vomiting     Neuro/Psych: Uneventful perioperative course   Sign Out Status: Preoperative baseline; Age appropriate mentation     Airway/Resp.: Uneventful perioperative course   Sign Out Status: Non labored breathing, age appropriate RR; Resp. Status within EXPECTED Parameters     CV: Uneventful perioperative course   Sign Out status: Appropriate BP and perfusion indices; Appropriate HR/Rhythm     Disposition:   Sign Out in:  PACU  Disposition:  Floor  Recovery Course: Uneventful  Follow-Up: Not required           Last Anesthesia Record Vitals:  CRNA VITALS  2018 1301 - 2018 1401      2018             NIBP:  121/62    Pulse:  77    SpO2:  100 %    EKG:  Sinus rhythm          Last PACU/Preop Vitals:  Vitals:    18 0932 18 1345 18 1400   BP: 133/83 123/72    Pulse: 97     Resp:  16   Temp: 37.1  C (98.7  F) 36.9  C (98.4  F) 36.1  C (97  F)   SpO2: 97% 100% 99%         Electronically Signed By: Jean-Paul Vasquez MD, 2018, 2:41 PM

## 2018-12-13 NOTE — DISCHARGE INSTRUCTIONS
Same-Day Surgery   Adult Discharge Orders & Instructions     For 24 hours after surgery:  1. Get plenty of rest.  A responsible adult must stay with you for at least 24 hours after you leave the hospital.   2. Pain medication can slow your reflexes. Do not drive or use heavy equipment.  If you have weakness or tingling, don't drive or use heavy equipment until this feeling goes away.  3. Mixing alcohol and pain medication can cause dizziness and slow your breathing. It can even be fatal. Do not drink alcohol while taking pain medication.  4. Avoid strenuous or risky activities.  Ask for help when climbing stairs.   5. You may feel lightheaded.  If so, sit for a few minutes before standing.  Have someone help you get up.   6. If you have nausea (feel sick to your stomach), drink only clear liquids such as apple juice, ginger ale, broth or 7-Up.  Rest may also help.  Be sure to drink enough fluids.  Move to a regular diet as you feel able. Take pain medications with a small amount of solid food, such as toast or crackers, to avoid nausea.   7. A slight fever is normal. Call the doctor if your fever is over 100 F (37.7 C) (taken under the tongue) or lasts longer than 24 hours.  8. You may have a dry mouth, muscle aches, trouble sleeping or a sore throat.  These symptoms should go away after 24 hours.  9. Do not make important or legal decisions.   Pain Management:      1. Take pain medication (if prescribed) for pain as directed by your physician.        2. WARNING: If the pain medication you have been prescribed contains Tylenol  (acetaminophen), DO NOT take additional doses of Tylenol (acetaminophen).     Call your doctor for any of the followin.  Signs of infection (fever, growing tenderness at the surgery site, severe pain, a large amount of drainage or bleeding, foul-smelling drainage, redness, swelling).    2.  It has been over 8 to 10 hours since surgery and you are still not able to urinate (pee).    3.   Headache for over 24 hours.      To contact a doctor, call __Dr. Fragoso @ [phone] (answering service clinic)_ or:      [phone] and ask for the Resident On Call for:          _________OB/GYN_________________________________ (answered 24 hours a day)      Emergency Department:  [facility]: [phone]  [facility]: [phone]               Rev. 10/2014

## 2018-12-13 NOTE — ANESTHESIA PREPROCEDURE EVALUATION
Anesthesia Pre-Procedure Evaluation    Patient: Erika Reina   MRN:     [id number] Gender:   female   Age:    38 year old :      1980        Preoperative Diagnosis: Endometrial Cancer    Procedure(s):  DaVinci Assisted Removal Of Uterus, Cervix, Both Ovaries And Fallopian Tubes, [place] Lymph Node Dissection, Possible Lymph Node Dissection  Possible Open Removal Of Uterus, Cervix, Both Ovaries And Fallopian Tubes, [place] Lymph Node Dissection, Possible Lymph Node Dissection  Cystoscopy     Past Medical History:   Diagnosis Date     Endometrial adenocarcinoma (H)      Hyperlipidemia LDL goal <160 9/15/2011      Past Surgical History:   Procedure Laterality Date     ARTHROPLASTY KNEE                     PHYSICAL EXAM:   Mental Status/Neuro: A/A/O   Airway: Facies: Feasible  Mallampati: Not Assessed  Mouth/Opening: Not Assessed  TM distance: Not Assessed  Neck ROM: Not Assessed   Respiratory: Auscultation: CTAB     Resp. Rate: Normal     Resp. Effort: Normal      CV: Rhythm: Regular  Rate: Age appropriate  Heart: Normal Sounds   Comments:      Dental: Normal                  Lab Results   Component Value Date    WBC 10.8 2018    HGB 13.5 2018    HCT 42.3 2018     2018     2018    POTASSIUM 4.0 2018    CHLORIDE 106 2018    CO2 30 2018    BUN 12 2018    CR 0.67 2018    GLC 83 2018    JESSE 9.2 2018    ALBUMIN 3.6 2018    PROTTOTAL 7.9 2018    ALT 22 2018    AST 11 2018    ALKPHOS 69 2018    BILITOTAL 0.2 2018    LIPASE 69 (L) 2018    TSH 3.02 10/18/2016    HCG Negative 2018       Preop Vitals  BP Readings from Last 3 Encounters:   18 133/83   18 (!) 142/97   18 124/78    Pulse Readings from Last 3 Encounters:   18 97   18 97   18 99      Resp Readings from Last 3 Encounters:   18 12   18 16   18 18    SpO2 Readings from  "Last 3 Encounters:   12/13/18 97%   12/04/18 98%   09/28/18 98%      Temp Readings from Last 1 Encounters:   12/13/18 37.1  C (98.7  F) (Oral)    Ht Readings from Last 1 Encounters:   12/13/18 1.753 m (5' 9\")      Wt Readings from Last 1 Encounters:   12/13/18 (!) 152.4 kg (335 lb 15.7 oz)    Estimated body mass index is 49.62 kg/m  as calculated from the following:    Height as of this encounter: 1.753 m (5' 9\").    Weight as of this encounter: 152.4 kg (335 lb 15.7 oz).     LDA:  Peripheral IV 12/13/18 Right Hand (Active)   Site Assessment WDL 12/13/2018 10:11 AM   Line Status Saline locked 12/13/2018 10:11 AM   Phlebitis Scale 0-->no symptoms 12/13/2018 10:11 AM   Infiltration Scale 0 12/13/2018 10:11 AM   Number of days: 0            Assessment:   ASA SCORE: 2    NPO Status: > 6 hours since completed Solid Foods   Documentation: H&P complete; Preop Testing complete; Consents complete   Proceeding: Proceed without further delay  Tobacco Use:  Active user of Tobacco     Plan:   Anes. Type:  General   Pre-Induction: Midazolam IV; Acetaminophen PO   Induction:  IV (Standard); Propofol; Rocuronium   Airway: Oral ETT   Access/Monitoring: PIV; 2nd PIV   Maintenance: Balanced   Emergence: Procedure Site   Logistics: Same Day Surgery     Postop Pain/Sedation Strategy:  Standard-Options: Opioids PRN     PONV Management:  Adult Risk Factors: Female, Postop Opioids  Prevention: Ondansetron; Dexamethasone     CONSENT: Direct conversation   Plan and risks discussed with: Patient   Blood Products: Consented (ALL Blood Products)                         Jean-Paul Vasquez MD  "

## 2018-12-13 NOTE — BRIEF OP NOTE
[facility] Gyn Onc  Brief Operative note     Patient Name: Erika Reina  MRN: [id number]  Surgery date: 12/13/2018     Surgeon: Cleopatra Evans MD  Assistant: Tamie Cabral MD, PGY3    Pre operative diagnosis:   - Grade 1 endometrial adenocarcinoma  - Morbid obesity    Post operative diagnosis:   - Same as above    Procedure: Da Aldo assisted total laparoscopic hysterectomy, bilateral salpingo-oophorectomy, bilateral sentinel pelvic lymph node dissection, cystoscopy    Anesthesia: GETA  IVF: 1000 mL crystalloid  UOP: 60 mL, clear   EBL: 50 mL   Drains: none    Indications: Erika Reina is a 38 year old who was found to have grade 1 endometrial adenocarcinoma on recent endometrial biopsy. Fertility sparing options including progesterone treatment, egg banking, and hysterectomy with or without bilateral salpingo-oophorectomy were discussed. The patient opted for surgical management with bilateral salpingo-oophorectomy. Discussed risks of the procedure including bleeding, infection, injury to surrounding organs, anesthesia.  She agreed and consented to the procedure.     Complications: none apparent     Findings: Small, mobile uterus on EUA. On laparoscopy, no injury from entry noted. No evidence of metastatic disease in survey of the abdomen. Filmy adhesions of the bowel to uterus. Otherwise normal appearing uterus, fallopian tubes, and ovaries. [place] lymph nodes identified and removed bilaterally. Cystoscopy with bilateral ureteral efflux and no injury to the bladder.     Specimen:    ID Type Source Tests Collected by Time Destination   1 : pelvic washings Washings Pelvis CYTOLOGY NON GYN Cleopatra Altamirano MD 12/13/2018 12:17 PM    A : right pelvic sentinel lymph nodes Tissue Lymph Node, [place] SURGICAL PATHOLOGY EXAM Cleopatra Altamirano MD 12/13/2018 12:26 PM    B : left pelvic sentinal lymph nodes Tissue Lymph Node, [place] SURGICAL PATHOLOGY EXAM Cleopatra Altamirano MD 12/13/2018  12:41 PM    C : uterus, cervix, bilateral ovaries and fallopian tubes Tissue Uterus, Cervix and Bilateral Fallopian Tubes SURGICAL PATHOLOGY EXAM Cleopatra Altamirano MD 12/13/2018 12:45 PM      Tamie Cabral MD  Ob/Gyn, PGY3  Pager [phone]

## 2018-12-14 LAB — COPATH REPORT: NORMAL

## 2018-12-17 ENCOUNTER — TELEPHONE (OUTPATIENT)
Dept: ONCOLOGY | Facility: CLINIC | Age: 38
End: 2018-12-17

## 2018-12-17 DIAGNOSIS — Z90.710 S/P HYSTERECTOMY: ICD-10-CM

## 2018-12-17 RX ORDER — OXYCODONE HYDROCHLORIDE 5 MG/1
5-10 TABLET ORAL EVERY 4 HOURS PRN
Qty: 20 TABLET | Refills: 0 | Status: SHIPPED | OUTPATIENT
Start: 2018-12-17 | End: 2019-01-02

## 2018-12-17 NOTE — TELEPHONE ENCOUNTER
Pending Prescriptions:                       Disp   Refills    oxyCODONE (ROXICODONE) 5 MG tablet        20 tab*0            Sig: Take 1-2 tablets (5-10 mg) by mouth every 4 hours           as needed for moderate to severe pain          Last Written Prescription Date:  12/13/18  Last Fill Quantity: 16,   # refills: 0  Last Office Visit: 12/4/18  Future Office visit:    Next 5 appointments (look out 90 days)    Jan 02, 2019 10:00 AM CST  Return Visit with Cleopatra Giles MD  [facility] ([facility]  [address]  [facility] [address]  [phone]           Routing refill request to provider for review.  Mary Henley RN, BSN, OCN

## 2018-12-17 NOTE — TELEPHONE ENCOUNTER
Received message back from Dr Evans and pt may have refill for oxycodone as written on 12/13/18 for 20 tabs.  ANTHONY Franklin will sign prescription in absence of Dr Evans in the clinic.  Pt called and instructed as noted and asked to send prescription to [facility] pharmacy in our clinic building.  Pt instructed to call back with further questions or concerns and pt verbalized understanding.    Mary Henley RN, BSN, OCN

## 2018-12-17 NOTE — TELEPHONE ENCOUNTER
Received call from pt requesting refill of oxycodone post op.  S/p Robotic hysterectomy, BSO, bilateral pelvic sentinel LND on 12/13/18 with Dr Evans.  Pt states overall she is doing well but still notes constant abdominal achiness and asking if she can have another prescription of oxycodone.  Pt states she takes about 3 tabs/day and alternates with tylenol but tylenol does not give much relief and pt allergic to ibuprofen.  Eating and drinking well and had normal BM.  Pt denies s/s infection or fevers.  Pt informed that writer will notify Dr Evans regarding refill and further recommendations and pt aware of plan.    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  Pt may return to work sooner if able to receive lighter duty.    Mary Henley RN, BSN, OCN

## 2018-12-19 LAB — COPATH REPORT: NORMAL

## 2018-12-21 ENCOUNTER — TELEPHONE (OUTPATIENT)
Dept: ONCOLOGY | Facility: CLINIC | Age: 38
End: 2018-12-21

## 2018-12-21 NOTE — TELEPHONE ENCOUNTER
Received call from pt who states she has small amount of yellow, thick, odorous drainage from umbilicus.  Pt denies fevers and states drainage does not even require use of band aid.    Pt s/p Robotic hysterectomy, bso on 12/13/18 with Dr Evans.  Dr Evans notified and recommended pt keep area clean and may use neosporin to area 2-3 times/day prn.  Pt called back and instructed as noted, may clean area with mild soap and water, apply neosporin and cover with band aid.  Pt verbalized understanding of plan and instructed to call back with further quesitons or worsening symptoms.    Mary Henley, RN, BSN, OCN

## 2018-12-24 ENCOUNTER — CARE COORDINATION (OUTPATIENT)
Dept: ONCOLOGY | Facility: CLINIC | Age: 38
End: 2018-12-24

## 2018-12-24 NOTE — PROGRESS NOTES
FMLA forms completed and signed per Dr Evans.  Faxed to [employer] at [phone].  Short term disability form also completed and faxed to [email or web address] at [phone].    Mary Henley RN, BSN, OCN

## 2019-01-02 ENCOUNTER — ONCOLOGY VISIT (OUTPATIENT)
Dept: ONCOLOGY | Facility: CLINIC | Age: 39
End: 2019-01-02
Attending: OBSTETRICS & GYNECOLOGY
Payer: COMMERCIAL

## 2019-01-02 VITALS
DIASTOLIC BLOOD PRESSURE: 83 MMHG | HEART RATE: 99 BPM | RESPIRATION RATE: 18 BRPM | BODY MASS INDEX: 43.4 KG/M2 | SYSTOLIC BLOOD PRESSURE: 145 MMHG | OXYGEN SATURATION: 98 % | TEMPERATURE: 97.5 F | HEIGHT: 69 IN | WEIGHT: 293 LBS

## 2019-01-02 DIAGNOSIS — C54.1 ENDOMETRIAL CANCER (H): Primary | ICD-10-CM

## 2019-01-02 PROCEDURE — 99024 POSTOP FOLLOW-UP VISIT: CPT | Performed by: OBSTETRICS & GYNECOLOGY

## 2019-01-02 PROCEDURE — G0463 HOSPITAL OUTPT CLINIC VISIT: HCPCS

## 2019-01-02 ASSESSMENT — MIFFLIN-ST. JEOR: SCORE: 2291.61

## 2019-01-02 ASSESSMENT — PAIN SCALES - GENERAL: PAINLEVEL: NO PAIN (1)

## 2019-01-02 NOTE — LETTER
2019         RE: Erika Reina  [address]  [facility] [address]        Dear Colleague,    Thank you for referring your patient, Erika Reina, to the [facility]. Please see a copy of my visit note below.    Consult Notes on Referred Patient        Dr. Alla Wilde MD  [address] [place] [address]       RE: Erika Reina  : 1980  OBEY: 20    HPI:  Erika Reina is 38 year old with stage 1A, grade 1 endometrial adenocarcinoma. She is accompanied today by her mother and sister.  She is doing well post-operatively.  She is eating and drinking well.  Normal bowel and bladder function.  Minimal pain.  She has had some mild vaginal bleeding.  Denies issues with her ports with the exception that the left sided one is slightly more painful.  She has not noted any menopausal symptoms of hot flashes or night sweats.    Cancer Course:    She notes she has had abnormal bleeding since her very first menses with very irregular cycles.  Recently, however she has noted a major change in the amount of bleeding with a significant increase.  Due to this she was placed on Metformin without much improvement in her bleeding and thus had the following work up.    18:  US Pelvis:  Uterus measures 7.3 x 4.9 x 5.5 cm, EMS 26.3 mm, normal ovaries    18:  EMB:  Grade 1 endometrial adenocarcinoma, MMR intact    18:  Robotic hysterectomy, bilateral salpingo-oophorectomy, bilateral pelvic sentinel lymph node dissection, cystoscopy   Pathology:  Stage 1A, grade 1 endometrial adenocarcinoma, tumor size 4.9 cm, no myometrial invasion, no LVSI, 0/11 sentinel LN    Review of Systems:  Systemic           no weight changes; no fever; no chills; no night sweats; no appetite changes; + fatigue  Skin           no rashes, or lesions  Eye           no irritation; no changes in vision  Dilcia-Laryngeal           no dysphagia; no hoarseness   Pulmonary     no cough; no shortness of breath  Cardiovascular    no chest pain; no palpitations  Gastrointestinal    no diarrhea; no constipation; + mild abdominal pain; no changes in bowel  habits; no blood in stool  Genitourinary   no urinary frequency; no urinary urgency; no dysuria; no pain; + abnormal vaginal discharge; no abnormal vaginal bleeding  Breast    no breast discharge; no breast changes; no breast pain  Musculoskeletal    no myalgias; no arthralgias; no back pain  Psychiatric           no depressed mood; no anxiety    Hematologic            no tender lymph nodes; no noticeable swellings or lumps   Endocrine    no hot flashes; no heat/cold intolerance         Neurological   no tremor; + numbness and tingling in right thigh-improving; no headaches; no difficulty sleeping    Obstetrics and Gynecology History:  G0  Had always thought she would have children but this diagnosis has made her consider that in depth.  She currently has no partner.      Past Medical History:  Past Medical History:   Diagnosis Date     Endometrial cancer (H) 12/19/2018     Hyperlipidemia LDL goal <160 9/15/2011       Past Surgical History:  Past Surgical History:   Procedure Laterality Date     ARTHROPLASTY KNEE       CYSTOSCOPY N/A 12/13/2018    Procedure: Cystoscopy;  Surgeon: Cleopatra Altamirano MD;  Location:  OR     DAVINCI HYSTERECTOMY TOTAL, BILATERAL SALPINGO-OOPHORECTOMY, NODE DISSECTION, COMBINED N/A 12/13/2018    Procedure: DaVinci Assisted Removal Of Uterus, Cervix, Both Ovaries And Fallopian Tubes, Bilateral [place] Lymph Node Dissection;  Surgeon: Cleopatra Altamirano MD;  Location:  OR       Health Maintenance:  Last Pap Smear: No need for further pap smear exams  She has not had a history of abnormal Pap smears.    Last Mammogram: N/A    Last Colonoscopy: N/A      Current Medications:   has a current medication list which includes the following prescription(s): acetaminophen, multivitamin w/minerals, and  "cholecalciferol.     Allergies:   Ibuprofen      Social History:  Social History     Socioeconomic History     Marital status: Single     Spouse name: Not on file     Number of children: Not on file     Years of education: Not on file     Highest education level: Not on file   Social Needs     Financial resource strain: Not on file     Food insecurity - worry: Not on file     Food insecurity - inability: Not on file     Transportation needs - medical: Not on file     Transportation needs - non-medical: Not on file   Occupational History     Not on file   Tobacco Use     Smoking status: Never Smoker     Smokeless tobacco: Never Used   Substance and Sexual Activity     Alcohol use: Yes     Alcohol/week: 0.0 oz     Comment: rarely     Drug use: No     Sexual activity: Yes     Partners: Male     Birth control/protection: Condom   Other Topics Concern     Parent/sibling w/ CABG, MI or angioplasty before 65F 55M? No   Social History Narrative     Not on file       Lives with mother, father and sister, feels safe at home.  Works as .  Enjoys crafts, spending time with friends.  Does not have an advanced directive on file and would like her mother to be her POA.  Full code.    Family History:   The patient's family history is significant for.  Family History   Problem Relation Age of Onset     Diabetes Mother      Diabetes Father      Cancer - colorectal Maternal Grandmother      Heart Disease Paternal Grandmother          Physical Exam:   /83   Pulse 99   Temp 97.5  F (36.4  C) (Oral)   Resp 18   Ht 1.753 m (5' 9\")   Wt (!) 154.2 kg (340 lb)   SpO2 98%   BMI 50.21 kg/m     Body mass index is 50.21 kg/m .    General Appearance: healthy and alert, no distress     Gastrointestinal:       abdomen soft, non-tender, non-distended, no organomegaly or masses, port sites without erythema/induration or drainage    Genitourinary: External genitalia and urethral meatus appears normal.  Vagina with a well " healing vaginal cuff      Assessment:    Erika Reina is a 38 year old woman with a diagnosis of stage 1A, grade 1 endometrial adenocarcinoma.     A total of 30 minutes was spent with the patient, >50% of which were spent in counseling the patient and/or treatment planning, this was separate from the 5 minutes spent on post-operative cares.      Plan:     1.)    Stage 1A, grade 1 endometrial adenocarcinoma.  Pathology was reviewed and she was provided with a copy of her results.  Discussed no need for further therapy.  Discussed signs and symptoms of recurrence and to call if these should occur.  Discussed role of surveillance with history and physical exam and that labs/imaging would only be done based on symptoms but not routinely.  Discussed no need for further pap smear testing.  Discussed visits every 6 months for up to 5 years (12/23) then annually thereafter.  She will return in 6 months.    2.) Menopausal symptoms -She denies any at this point.  We discussed that there are several options for treatment if these should occur.  I have asked her to call if symptoms develop.     3.) Genetic risk factors were assessed and the patient does not meet the qualifications for a referral.      4.) Labs and/or tests ordered include:  None     5.) Health maintenance issues addressed today include pt is up to date.          Thank you for allowing us to participate in the care of your patient.         Sincerely,    Cleopatra Evans MD  Gynecologic Oncology  [facility]       ZULMA CARBAJAL         Again, thank you for allowing me to participate in the care of your patient.        Sincerely,        Chan Evans MD

## 2019-01-02 NOTE — NURSING NOTE
"Oncology Rooming Note    January 2, 2019 9:57 AM   Erika Reina is a 38 year old female who presents for:    Chief Complaint   Patient presents with     Oncology Clinic Visit     Endometrial cancer     Initial Vitals: /83   Pulse 99   Temp 97.5  F (36.4  C) (Oral)   Resp 18   Ht 1.753 m (5' 9\")   Wt (!) 154.2 kg (340 lb)   SpO2 98%   BMI 50.21 kg/m   Estimated body mass index is 50.21 kg/m  as calculated from the following:    Height as of this encounter: 1.753 m (5' 9\").    Weight as of this encounter: 154.2 kg (340 lb). Body surface area is 2.74 meters squared.  No Pain (1) Comment: Data Unavailable   No LMP recorded.  Allergies reviewed: Yes  Medications reviewed: Yes    Medications: Medication refills not needed today.  Pharmacy name entered into [facility]: [id number] [id number]  [address]    Clinical concerns: f/u     8 minutes for nursing intake (face to face time)     Liseth Birmingham CMA            DISCHARGE PLAN:  Next appointments: See patient instruction section  Departure Mode: Ambulatory  Accompanied by: sister and mother  0 minutes for nursing discharge (face to face time)   Liseth Birmingham CMA      "

## 2019-01-23 ENCOUNTER — TELEPHONE (OUTPATIENT)
Dept: ONCOLOGY | Facility: CLINIC | Age: 39
End: 2019-01-23

## 2019-01-23 NOTE — TELEPHONE ENCOUNTER
Patient calling as an FYI stating she is sending some paperwork to be filled out.    Dulce Garrett CMA on 1/23/2019 at 11:40 AM

## 2019-01-24 ENCOUNTER — TELEPHONE (OUTPATIENT)
Dept: ONCOLOGY | Facility: CLINIC | Age: 39
End: 2019-01-24

## 2019-01-24 NOTE — TELEPHONE ENCOUNTER
Call from pt who states she is returning to work 1/27/19 full time.  Pt's lifting restrictions met today, 6 weeks post op.  Forms completed and signed per Edna Manzo NP and faxed to Leave  with [facility] at [phone]. Pt states she is doing well and no concerns at this time and aware forms will be faxed today.    Mary Henley RN, BSN, OCN

## 2019-04-22 PROBLEM — C54.1 ENDOMETRIAL CANCER (H): Status: ACTIVE | Noted: 2018-12-19

## 2019-06-10 NOTE — TELEPHONE ENCOUNTER
PRIOR AUTHORIZATION DENIED    Medication: VOLTAREN 1% GEL - DENIED    Denial Date: 5/1/2018    Denial Rational: PT NEEDS TO HAVE A DX OF OSTEOARTHRITIS.      Appeal Information: PLEASE SEE ABOVE       <-- Click to add NO significant Past Surgical History

## 2019-07-08 ENCOUNTER — MYC MEDICAL ADVICE (OUTPATIENT)
Dept: FAMILY MEDICINE | Facility: CLINIC | Age: 39
End: 2019-07-08

## 2019-07-08 DIAGNOSIS — E66.01 MORBID OBESITY DUE TO EXCESS CALORIES (H): Primary | ICD-10-CM

## 2019-07-11 ENCOUNTER — ONCOLOGY VISIT (OUTPATIENT)
Dept: ONCOLOGY | Facility: CLINIC | Age: 39
End: 2019-07-11
Attending: OBSTETRICS & GYNECOLOGY
Payer: COMMERCIAL

## 2019-07-11 VITALS
DIASTOLIC BLOOD PRESSURE: 91 MMHG | SYSTOLIC BLOOD PRESSURE: 141 MMHG | TEMPERATURE: 98.7 F | HEIGHT: 69 IN | BODY MASS INDEX: 43.4 KG/M2 | RESPIRATION RATE: 16 BRPM | WEIGHT: 293 LBS | HEART RATE: 79 BPM | OXYGEN SATURATION: 96 %

## 2019-07-11 DIAGNOSIS — Z85.42 HISTORY OF ENDOMETRIAL CANCER: Primary | ICD-10-CM

## 2019-07-11 DIAGNOSIS — R10.32 LLQ ABDOMINAL PAIN: ICD-10-CM

## 2019-07-11 DIAGNOSIS — R19.00 PALPABLE ABDOMINAL MASS: ICD-10-CM

## 2019-07-11 PROCEDURE — 99213 OFFICE O/P EST LOW 20 MIN: CPT | Performed by: NURSE PRACTITIONER

## 2019-07-11 PROCEDURE — G0463 HOSPITAL OUTPT CLINIC VISIT: HCPCS

## 2019-07-11 ASSESSMENT — MIFFLIN-ST. JEOR: SCORE: 2365.09

## 2019-07-11 ASSESSMENT — PAIN SCALES - GENERAL: PAINLEVEL: NO PAIN (0)

## 2019-07-11 NOTE — LETTER
2019         RE: Erika Reina  [address]  [facility] [address]        Dear Colleague,    Thank you for referring your patient, Erika Reina, to the [facility]. Please see a copy of my visit note below.    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 She completed treatment with hysterectomy and BSO on 18. She presents today for a six month surveillance visit.    HPI: Erika comes to the clinic unaccompanied. She has been feeling fair without gynecologic concerns. She is sexually active without concern. She continues to have LLQ discomfort inferior to her LLQ port site since her surgery 2018- most noticeable when she touches it or puts pressure on it, feels like a bruised area. Stable over time. No fevers, chills, nausea/vomiting, changes in bowels/bladder, vaginal bleeding, dyspareunia, edema, masses in groin. Continues with fatigue but states she thinks this is due to her weight. Able to work 12+ hour days on her feet, fatigue resolves with rest. Plans to see the weight management clinic, has last six pounds in the past three weeks on purpose. Denies hot flashes, night sweats, or vaginal dryness. She is up to date on visits with her PCP.    Oncology History:  She notes she has had abnormal bleeding since her very first menses with very irregular cycles.   In  she noted a major change in the amount of bleeding with a significant increase.  Due to this she was placed on metformin without much improvement in her bleeding and thus had the following work up:     18:  US Pelvis:  Uterus measures 7.3 x 4.9 x 5.5 cm, EMS 26.3 mm, normal ovaries     18:  EMB:  Grade 1 endometrial adenocarcinoma, MMR intact     18:  Robotic  hysterectomy, bilateral salpingo-oophorectomy, bilateral pelvic sentinel lymph node dissection, cystoscopy                 Pathology:  Stage 1A, grade 1 endometrial adenocarcinoma, tumor size 4.9 cm, no myometrial invasion, no LVSI, 0/11 sentinel LN       Past Medical History:   Diagnosis Date     Endometrial cancer (H) 12/19/2018     Hyperlipidemia LDL goal <160 9/15/2011       Past Surgical History:   Procedure Laterality Date     ARTHROPLASTY KNEE       CYSTOSCOPY N/A 12/13/2018    Procedure: Cystoscopy;  Surgeon: Cleopatra Altamirano MD;  Location: [facility] OR     DAVINCI HYSTERECTOMY TOTAL, BILATERAL SALPINGO-OOPHORECTOMY, NODE DISSECTION, COMBINED N/A 12/13/2018    Procedure: DaVinci Assisted Removal Of Uterus, Cervix, Both Ovaries And Fallopian Tubes, Bilateral [place] Lymph Node Dissection;  Surgeon: Cleopatra Altamirano MD;  Location:  OR       Social History     Socioeconomic History     Marital status: Single     Spouse name: Not on file     Number of children: Not on file     Years of education: Not on file     Highest education level: Not on file   Occupational History     Not on file   Social Needs     Financial resource strain: Not on file     Food insecurity:     Worry: Not on file     Inability: Not on file     Transportation needs:     Medical: Not on file     Non-medical: Not on file   Tobacco Use     Smoking status: Never Smoker     Smokeless tobacco: Never Used   Substance and Sexual Activity     Alcohol use: Yes     Alcohol/week: 0.0 oz     Comment: rarely     Drug use: No     Sexual activity: Yes     Partners: Male     Birth control/protection: Condom   Lifestyle     Physical activity:     Days per week: Not on file     Minutes per session: Not on file     Stress: Not on file   Relationships     Social connections:     Talks on phone: Not on file     Gets together: Not on file     Attends L[employer] service: Not on file     Active member of club or organization: Not on file     Attends  "meetings of clubs or organizations: Not on file     Relationship status: Not on file     Intimate partner violence:     Fear of current or ex partner: Not on file     Emotionally abused: Not on file     Physically abused: Not on file     Forced sexual activity: Not on file   Other Topics Concern     Parent/sibling w/ CABG, MI or angioplasty before 65F 55M? No   Social History Narrative     Not on file       Family History   Problem Relation Age of Onset     Diabetes Mother      Diabetes Father      Cancer - colorectal Maternal Grandmother      Heart Disease Paternal Grandmother        Current Outpatient Medications   Medication     multivitamin, therapeutic with minerals (MULTI-VITAMIN) TABS tablet     VITAMIN D, CHOLECALCIFEROL, PO     No current facility-administered medications for this visit.           Allergies   Allergen Reactions     Ibuprofen Swelling     Swelling of legs       Review of Systems  10 point ROS negative other than the symptoms noted above in the HPI.    OBJECTIVE:    Physical Exam:    BP (!) 141/91   Pulse 79   Temp 98.7  F (37.1  C) (Tympanic)   Resp 16   Ht 1.753 m (5' 9\")   Wt (!) 161.6 kg (356 lb 3.2 oz)   SpO2 96%   BMI 52.60 kg/m       CONSTITUTIONAL: Alert non-toxic appearing female in no acute distress  HEAD: Normocephalic, atraumatic  NECK: Neck supple without lymphadenopathy  RESPIRATORY: Lungs clear to auscultation, no increased work of breathing noted  CV: Regular rate and rhythm, S1S2, no clicks, murmurs, rubs, or gallops; bilateral lower extremities without edema, dorsalis pedis pulses 2+ bilaterally  GASTROINTESTINAL: Normoactive bowel sounds x4 quadrants, abdomen obese, soft, non-distended, no organomegaly noted; tenderness to palpation with a small 1-2cm fluctuant and mobile mass roughly 3-4cm inferior to her LLQ port scar  GENITOURINARY: External genitalia and urethral meatus pink without lesions, masses, or excoriation. Vagina pink and well rugated without masses or " lesions. Vaginal cuff without nodularity, masses, or lesions. Cervix surgically absent. Bimanual exam reveals no masses or fullness- somewhat limited by body habitus. Rectovaginal exam confirms these findings.  LYMPHATIC: Cervical, supraclavicular, and inguinal nodes without lymphadenopathy  MUSCULOSKELETAL: Moves all extremities, no obvious muscle wasting  NEUROLOGIC: No gross deficits, normal gait  SKIN: Appropriate color for race, warm and dry, no rashes or lesions to unclothed skin  PSYCHIATRIC: Pleasant and interactive, affect bright, makes appropriate eye contact, thought process linear    Assessment/Plan:  1) Stage IA grade 1 endometrial endometrioid adenocarcinoma: Completed treatment with surgery. Continues to have LLQ pain inferior to her port site, palpable mobile mass- I have a very low suspicion for disease recurrence but question fluid collection vs scar tissue vs lipoma. Obtain CT abdomen/pelvis for further evaluation as this continues to bothersome on a daily basis for her. Pending CT results, will continue surveillance every six months x2 years (through 12/2020) then annually thereafter with pelvic/rectal exam per recommendations by Kat et al. (2017). Reviewed signs and symptoms  of recurrence including but not limited to bleeding from vagina, bladder, or rectum, bloating, early satiety, swelling in the lower extremities, abdominal or lower back pain, changes in bowel or bladder patterns, shortness of breath, increased fatigue, unexplained weight loss, and night sweats. Reviewed recommendations from [facility] not to perform surveillance pap smears in women diagnosed with endometrial cancer as this does not improve detection of local recurrence. Reviewed signs and symptoms for when she should contact the clinic or seek additional care. Patient to contact the clinic with any questions or concerns in the interim. Survivorship care plan/treatment summary printed off and reviewed with patient.  2) Genetic  testing: Risk factors have been assessed and the patient does not meet qualifications for genetic counseling based on NCCN guidelines- MMR intact.   3) Data: CT AP with contrast due to LLQ discomfort and small palpable mass  4) Health maintenance issues discussed today include to follow up with PCP for co-morbid conditions and non-gynecologic issues. Commended on weight loss and for plans to see the weight management clinic.  5) Patient verbalized understanding of and agreement with plan.    A total of 20 minutes of face to face time spent with patient, over 50% of which was spent in counseling and coordination of care.    LEEROY Cason, FNP-C  Division of Gynecologic Oncology  [facility]  Pager: [phone]              Again, thank you for allowing me to participate in the care of your patient.        Sincerely,        LEEROY Cason CNP

## 2019-07-11 NOTE — NURSING NOTE
"Oncology Rooming Note    July 11, 2019 1:17 PM   Erika Reina is a 38 year old female who presents for:    Chief Complaint   Patient presents with     Oncology Clinic Visit     Endometrial cancer     Initial Vitals: BP (!) 141/91   Pulse 79   Temp 98.7  F (37.1  C) (Tympanic)   Resp 16   Ht 1.753 m (5' 9\")   Wt (!) 161.6 kg (356 lb 3.2 oz)   SpO2 96%   BMI 52.60 kg/m   Estimated body mass index is 52.6 kg/m  as calculated from the following:    Height as of this encounter: 1.753 m (5' 9\").    Weight as of this encounter: 161.6 kg (356 lb 3.2 oz). Body surface area is 2.8 meters squared.  No Pain (0) Comment: Data Unavailable   No LMP recorded.  Allergies reviewed: Yes  Medications reviewed: Yes    Medications: Medication refills not needed today.  Pharmacy name entered into [facility]: [id number] - [place], [place] - [address]    Clinical concerns: Follow Up       Dulce Garrett CMA              "

## 2019-07-19 ENCOUNTER — DOCUMENTATION ONLY (OUTPATIENT)
Dept: SURGERY | Facility: CLINIC | Age: 39
End: 2019-07-19

## 2019-07-19 ENCOUNTER — HOSPITAL ENCOUNTER (OUTPATIENT)
Dept: CT IMAGING | Facility: CLINIC | Age: 39
Discharge: HOME | End: 2019-07-19
Attending: NURSE PRACTITIONER | Admitting: NURSE PRACTITIONER
Payer: COMMERCIAL

## 2019-07-19 DIAGNOSIS — R19.00 PALPABLE ABDOMINAL MASS: ICD-10-CM

## 2019-07-19 DIAGNOSIS — R10.32 LLQ ABDOMINAL PAIN: ICD-10-CM

## 2019-07-19 DIAGNOSIS — Z85.42 HISTORY OF ENDOMETRIAL CANCER: ICD-10-CM

## 2019-07-19 PROCEDURE — 25000128 H RX IP 250 OP 636: Performed by: RADIOLOGY

## 2019-07-19 PROCEDURE — 74177 CT ABD & PELVIS W/CONTRAST: CPT

## 2019-07-19 RX ORDER — IOPAMIDOL 755 MG/ML
500 INJECTION, SOLUTION INTRAVASCULAR ONCE
Status: COMPLETED | OUTPATIENT
Start: 2019-07-19 | End: 2019-07-19

## 2019-07-19 RX ADMIN — IOPAMIDOL 100 ML: 755 INJECTION, SOLUTION INTRAVENOUS at 11:25

## 2019-07-19 RX ADMIN — SODIUM CHLORIDE 55 ML: 9 INJECTION, SOLUTION INTRAVENOUS at 11:25

## 2019-07-19 NOTE — PROGRESS NOTES
"Pt attended  surgical weight loss seminar.  Chart was reviewed.     1/2/2019 Oncology visit in [facility] \"She was dx with Stage 1A, grade 1 endometrial adenocarcinoma.  Hysterectomy performed 12/13/2018.   No need for further therapy.   F/U with oncology every 6 mo for 5 years and then annually.\"    Pt can proceed with bariatric initial evaluation.  "

## 2019-07-22 ENCOUNTER — TELEPHONE (OUTPATIENT)
Dept: ONCOLOGY | Facility: CLINIC | Age: 39
End: 2019-07-22

## 2019-07-22 DIAGNOSIS — R93.429 ABNORMAL CT SCAN, KIDNEY: Primary | ICD-10-CM

## 2019-07-22 NOTE — TELEPHONE ENCOUNTER
Called Ana to discuss CT results, no answer, left message that I called.  LEEROY Cason, FNP-C  Gynecologic Oncology  [facility]  Pager: [phone]

## 2019-07-29 ENCOUNTER — TELEPHONE (OUTPATIENT)
Dept: ONCOLOGY | Facility: CLINIC | Age: 39
End: 2019-07-29

## 2019-07-29 ENCOUNTER — HOSPITAL ENCOUNTER (OUTPATIENT)
Dept: ULTRASOUND IMAGING | Facility: CLINIC | Age: 39
Discharge: HOME | End: 2019-07-29
Attending: NURSE PRACTITIONER | Admitting: NURSE PRACTITIONER
Payer: COMMERCIAL

## 2019-07-29 ENCOUNTER — ONCOLOGY VISIT (OUTPATIENT)
Dept: ONCOLOGY | Facility: CLINIC | Age: 39
End: 2019-07-29
Attending: NURSE PRACTITIONER
Payer: COMMERCIAL

## 2019-07-29 DIAGNOSIS — R93.429 ABNORMAL CT SCAN, KIDNEY: ICD-10-CM

## 2019-07-29 DIAGNOSIS — N13.30 HYDRONEPHROSIS, UNSPECIFIED HYDRONEPHROSIS TYPE: Primary | ICD-10-CM

## 2019-07-29 LAB
ALBUMIN SERPL-MCNC: 3.4 G/DL (ref 3.4–5)
ALBUMIN UR-MCNC: NEGATIVE MG/DL
ALP SERPL-CCNC: 77 U/L (ref 40–150)
ALT SERPL W P-5'-P-CCNC: 34 U/L (ref 0–50)
ANION GAP SERPL CALCULATED.3IONS-SCNC: 4 MMOL/L (ref 3–14)
APPEARANCE UR: CLEAR
AST SERPL W P-5'-P-CCNC: 19 U/L (ref 0–45)
BILIRUB SERPL-MCNC: 0.4 MG/DL (ref 0.2–1.3)
BILIRUB UR QL STRIP: NEGATIVE
BUN SERPL-MCNC: 14 MG/DL (ref 7–30)
CALCIUM SERPL-MCNC: 9.2 MG/DL (ref 8.5–10.1)
CHLORIDE SERPL-SCNC: 107 MMOL/L (ref 94–109)
CO2 SERPL-SCNC: 28 MMOL/L (ref 20–32)
COLOR UR AUTO: ABNORMAL
CREAT SERPL-MCNC: 0.73 MG/DL (ref 0.52–1.04)
GFR SERPL CREATININE-BSD FRML MDRD: >90 ML/MIN/{1.73_M2}
GLUCOSE SERPL-MCNC: 83 MG/DL (ref 70–99)
GLUCOSE UR STRIP-MCNC: NEGATIVE MG/DL
HGB UR QL STRIP: NEGATIVE
KETONES UR STRIP-MCNC: NEGATIVE MG/DL
LEUKOCYTE ESTERASE UR QL STRIP: NEGATIVE
MUCOUS THREADS #/AREA URNS LPF: PRESENT /LPF
NITRATE UR QL: NEGATIVE
PH UR STRIP: 5.5 PH (ref 5–7)
POTASSIUM SERPL-SCNC: 4.4 MMOL/L (ref 3.4–5.3)
PROT SERPL-MCNC: 7.4 G/DL (ref 6.8–8.8)
RBC #/AREA URNS AUTO: 0 /HPF (ref 0–2)
SODIUM SERPL-SCNC: 139 MMOL/L (ref 133–144)
SOURCE: ABNORMAL
SP GR UR STRIP: 1.02 (ref 1–1.03)
SQUAMOUS #/AREA URNS AUTO: 5 /HPF (ref 0–1)
UROBILINOGEN UR STRIP-MCNC: NORMAL MG/DL (ref 0–2)
WBC #/AREA URNS AUTO: 1 /HPF (ref 0–5)

## 2019-07-29 PROCEDURE — 81001 URINALYSIS AUTO W/SCOPE: CPT | Performed by: NURSE PRACTITIONER

## 2019-07-29 PROCEDURE — 80053 COMPREHEN METABOLIC PANEL: CPT | Performed by: NURSE PRACTITIONER

## 2019-07-29 PROCEDURE — 36415 COLL VENOUS BLD VENIPUNCTURE: CPT

## 2019-07-29 PROCEDURE — 76770 US EXAM ABDO BACK WALL COMP: CPT

## 2019-07-29 NOTE — PROGRESS NOTES
Medical Assistant Note:  Erika Reina presents today for lab draw and urine collection.    Patient seen by provider today: No.   present during visit today: Not Applicable.    Concerns: No Concerns.    Procedure:  Lab draw site: left antecub, Needle type: butterfly, Gauge: 23.    Post Assessment:  Labs drawn without difficulty: Yes.    Discharge Plan:  Departure Mode: Ambulatory.    Face to Face Time: 15.    Liseth Birmingham, CMA

## 2019-07-29 NOTE — TELEPHONE ENCOUNTER
Called pt to discuss results. No answer, left VM that I called with # for call back. Will try again later today.  LEEROY Cason, FNP-C  Gynecologic Oncology  [facility]  Pager: [phone]

## 2019-07-29 NOTE — TELEPHONE ENCOUNTER
Called Ana with results- CMP and UA WNL but renal US demonstrates mild hydronephrosis and possible UPJ stricture. Referral to urology placed. Ana verbalized understanding of and agreement with plan.  LEEROY Cason, FNP-C  Gynecologic Oncology  [facility]  Pager: [phone]

## 2019-07-31 ENCOUNTER — OFFICE VISIT (OUTPATIENT)
Dept: SURGERY | Facility: CLINIC | Age: 39
End: 2019-07-31
Payer: COMMERCIAL

## 2019-07-31 VITALS
BODY MASS INDEX: 44.41 KG/M2 | OXYGEN SATURATION: 99 % | HEIGHT: 68 IN | HEART RATE: 83 BPM | DIASTOLIC BLOOD PRESSURE: 86 MMHG | SYSTOLIC BLOOD PRESSURE: 130 MMHG | WEIGHT: 293 LBS

## 2019-07-31 DIAGNOSIS — M25.561 CHRONIC PAIN OF BOTH KNEES: ICD-10-CM

## 2019-07-31 DIAGNOSIS — Z13.0 SCREENING FOR IRON DEFICIENCY ANEMIA: ICD-10-CM

## 2019-07-31 DIAGNOSIS — M25.562 CHRONIC PAIN OF BOTH KNEES: ICD-10-CM

## 2019-07-31 DIAGNOSIS — E66.01 MORBID OBESITY WITH BODY MASS INDEX (BMI) OF 50.0 TO 59.9 IN ADULT (H): Primary | ICD-10-CM

## 2019-07-31 DIAGNOSIS — R60.0 LOWER EXTREMITY EDEMA: ICD-10-CM

## 2019-07-31 DIAGNOSIS — K76.0 FATTY LIVER: ICD-10-CM

## 2019-07-31 DIAGNOSIS — G89.29 CHRONIC PAIN OF BOTH KNEES: ICD-10-CM

## 2019-07-31 DIAGNOSIS — C54.1 ENDOMETRIAL CANCER (H): ICD-10-CM

## 2019-07-31 DIAGNOSIS — Z13.228 SCREENING FOR ENDOCRINE, NUTRITIONAL, METABOLIC AND IMMUNITY DISORDER: ICD-10-CM

## 2019-07-31 DIAGNOSIS — G47.9 SLEEP DISTURBANCE: ICD-10-CM

## 2019-07-31 DIAGNOSIS — Z13.0 SCREENING FOR ENDOCRINE, NUTRITIONAL, METABOLIC AND IMMUNITY DISORDER: ICD-10-CM

## 2019-07-31 DIAGNOSIS — E28.2 PCOS (POLYCYSTIC OVARIAN SYNDROME): ICD-10-CM

## 2019-07-31 DIAGNOSIS — Z13.21 SCREENING FOR ENDOCRINE, NUTRITIONAL, METABOLIC AND IMMUNITY DISORDER: ICD-10-CM

## 2019-07-31 DIAGNOSIS — M54.5 CHRONIC LOW BACK PAIN, UNSPECIFIED BACK PAIN LATERALITY, WITH SCIATICA PRESENCE UNSPECIFIED: ICD-10-CM

## 2019-07-31 DIAGNOSIS — Z13.29 SCREENING FOR ENDOCRINE, NUTRITIONAL, METABOLIC AND IMMUNITY DISORDER: ICD-10-CM

## 2019-07-31 DIAGNOSIS — G89.29 CHRONIC LOW BACK PAIN, UNSPECIFIED BACK PAIN LATERALITY, WITH SCIATICA PRESENCE UNSPECIFIED: ICD-10-CM

## 2019-07-31 DIAGNOSIS — E66.01 MORBID OBESITY DUE TO EXCESS CALORIES (H): ICD-10-CM

## 2019-07-31 DIAGNOSIS — M72.2 PLANTAR FASCIITIS: ICD-10-CM

## 2019-07-31 PROCEDURE — 97802 MEDICAL NUTRITION INDIV IN: CPT | Performed by: DIETITIAN, REGISTERED

## 2019-07-31 PROCEDURE — 99243 OFF/OP CNSLTJ NEW/EST LOW 30: CPT | Performed by: PHYSICIAN ASSISTANT

## 2019-07-31 ASSESSMENT — MIFFLIN-ST. JEOR: SCORE: 2329.48

## 2019-07-31 NOTE — LETTER
Erika Reina  July 31, 2019        Bariatric Task List      Lose 10 lbs prior to surgery.  Goal Weight: 343.6 lbs  Have preoperative laboratory tests drawn.     Psychological Evaluation with MMPI and clearance for weight loss surgery.    Achieve clearance from dietitian to see surgeon.    Sleep study    Bariatric Support Group    Clearance/Support letter from oncologist

## 2019-07-31 NOTE — PROGRESS NOTES
"New Bariatric Nutrition Consultation Note    Reason For Visit: Nutrition Assessment    Erika Reina is a 38 year old presenting today for new bariatric nutrition consult.  Pt is interested in undecided procedure.  Patient is accompanied by self.    Support System Reviewed With Patient 7/23/2019   Who do you have in your support network that can be available to help you for the first 2 weeks after surgery? mom, dad, sister   Who can you count on for support throughout your weight loss surgery journey? mom, dad, sister close by. lots of friends and co-workers, and long distance friends and family       ANTHROPOMETRICS:  Estimated body mass index is 54.56 kg/m  as calculated from the following:    Height as of an earlier encounter on 7/31/19: 1.715 m (5' 7.5\").    Weight as of an earlier encounter on 7/31/19: 160.4 kg (353 lb 9.6 oz).    Required weight loss goal pre-op: 10 lbs from initial consult weight (goal weight 343.6 lbs or less before surgery)       7/23/2019   I have tried the following methods to lose weight Watching portions or calories, Exercise, Pre packaged meals ex: Nutrisystem, OTC Medications       Weight Loss Questions Reviewed With Patient 7/23/2019   How long have you been overweight? 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 If Im home its better like Chicken, potatoes, salad and a veggie is my favorite. zac mcdowell, " pork, beef etc. Time varies with work    Describe what you typically consume as snacks (typical or most recent): yogurt, fruit, popcorn, granola, hard boiled egg, cheese, veggies-2X per day   How many ounces of water, or other low calorie drinks, do you drink daily (8 oz=1 glass)? 32 oz   How many ounces of caffeine (coffee, tea, pop) do you drink daily (8 oz=1 glass)? 16 oz-coffee with cream or sugar/ occasional 5 hour energy shots   How many ounces of milk do you drink daily (8 oz=1 glass) 8 oz   Please indicate the type of milk: skim   How often do you drink alcohol? Monthly or less   If you do drink alcohol, how many drinks might you have in a day? (one drink = 5 oz. wine, 1 can/bottle of beer, 1 shot liquor) 1 or 2       Eating Habits 7/23/2019   Do you have any dietary restrictions? No   Do you currently binge eat (eat a large amount of food in a short time)? Yes-not a true binge is skips (rare) a meal will over eat   Are you an emotional eater? No   Do you get up to eat after falling asleep? No   What foods do you crave? sweet and salty, chocolate, filling       ADDITIONAL INFORMATION:  Currently pt is using Isagenix protein drinks. Works retail as a manager. Timing of meals can be challenging. Parents live with patient. Mom does a lot of the cooking. Patient know ~ 3 people who have had weight loss surgery. Recently pt has lost ~ 10 pounds by cutting back on fast food, pop, junk food.    Dining Out History Reviewed With Patient 7/23/2019   How often do you dine out? A couple of times a week.   Where do you dine out? (select all that apply) sit-down restaurants, fast food chains   What types of food do you order when you dine out? a variety. In the past couple months been making an effort to order salads or grilled food and healthier choices       Physical Activity Reviewed With Patient 7/23/2019   How often do you exercise? 1 to 2 times per week   What is the duration of your exercise (in minutes)? 30  Minutes   What types of exercise do you do? walking, swimming   What keeps you from being more active?  Pain, Lack of Time, Too tired       NUTRITION DIAGNOSIS:  Obesity r/t long history of self-monitoring deficit and excessive energy intake aeb BMI >30 kg/m2.    INTERVENTION:  Intervention Provided/Education Provided on post-op diet guidelines, vitamins/minerals essential post-operatively, GI anatomy of bariatric surgeries, ways to help prepare for post-op diet guidelines pre-operatively, portion/calorie-control.  Provided pt with list of goals RD contact information.      Questions Reviewed With Patient 7/23/2019   How ready are you to make changes regarding your weight? Number 1 = Not ready at all to make changes up to 10 = very ready. 10   How confident are you that you can change? 1 = Not confident that you will be successful making changes up to 10 = very confident. 10       Patient Understanding: good  Expected Compliance: good    GOALS:  Increase exercise to 3X per week (gym) for 30 minutes  Take 10-15 minutes for each meal  Start: 5000 international unit(s) vitamin D, 500 mg calcium 3X per day or 600 mg 2X per day  Discussed switching to a MVI that meets clinic guidelines when out of current product    Follow-Up:   Recommend 3-4 follow up visits to assist with lifestyle changes or per insurance (pt to verify).      Time spent with patient: 50 minutes.    Rancho Isaac RD, LD  [facility]  [phone]

## 2019-07-31 NOTE — PROGRESS NOTES
"New Bariatric Surgery Consultation Note    2019    RE: Erika Reina  MR#: [id number]  : 1980      Referring provider:       2019   Who referred you? Glenna Ham       Chief Complaint/Reason for visit: evaluation for possible weight loss surgery    Dear Jitendra, Glenna Nascimento, LEEROY CNP (General),    I had the pleasure of seeing your patient, Erika Reina, to evaluate her obesity and consider her for possible weight loss surgery. As you know, Erika Reina is 38 year old.  She has a height of 5' 7.5\", a weight of 353 lbs 9.6 oz, and calculated Body mass index is 54.56 kg/m .        HISTORY OF PRESENT ILLNESS:  Weight Loss History Reviewed with Patient 2019   How long have you been overweight? Since early childhood   What is the most that you have ever weighed? 368   What is the most weight you have lost? 40   I have tried the following methods to lose weight Watching portions or calories, Exercise, Pre packaged meals ex: Nutrisystem, OTC Medications   I have tried the following weight loss medications? (Check all that apply) None       CO-MORBIDITIES OF OBESITY INCLUDE:     2019   I have the following co-morbidities associated with obesity: Polycystic Ovarian Syndrome, Fatty Liver, Weight Bearing Joint Pain       PAST MEDICAL HISTORY:  Past Medical History:   Diagnosis Date     Endometrial cancer (H) 2018     Hyperlipidemia LDL goal <160 9/15/2011       She was dx with \"Stage 1A, grade 1 endometrial adenocarcinoma.  Hysterectomy performed 2018.  No need for further therapy.  F/U with oncology every 6 mo for 5 years and then annually.\"  Just had a follow up 19.  See in EPIC    PAST SURGICAL HISTORY:  Past Surgical History:   Procedure Laterality Date     ARTHROPLASTY KNEE       CYSTOSCOPY N/A 2018    Procedure: Cystoscopy;  Surgeon: Cleopatra Altamirano MD;  Location:  OR     [facility] HYSTERECTOMY TOTAL, BILATERAL SALPINGO-OOPHORECTOMY, " NODE DISSECTION, COMBINED N/A 12/13/2018    Procedure: DaVinci Assisted Removal Of Uterus, Cervix, Both Ovaries And Fallopian Tubes, Bilateral [place] Lymph Node Dissection;  Surgeon: Cleopatra Altamirano MD;  Location: [facility] OR     No personal or family history of blood clots or anesthesia concerns      FAMILY HISTORY:   Family History   Problem Relation Age of Onset     Diabetes Mother      Obesity Mother      Hypertension Mother      Diabetes Father      Hypertension Father      Cancer - colorectal Maternal Grandmother      Heart Disease Paternal Grandmother      Obesity Brother        SOCIAL HISTORY:   Social History Questions Reviewed With Patient 7/23/2019   Which best describes your employment status (select all that apply) I work full-time   If you work, 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 No   Who do you have in your support network that can be available to help you for the first 2 weeks after surgery? mom, dad, sister   Who can you count on for support throughout your weight loss surgery journey? mom, dad, sister close by. lots of friends and co-workers, and long distance friends and family   Can you afford 3 meals a day?  Yes   Can you afford 50-60 dollars a month for vitamins? Yes       HABITS:     7/23/2019   How often do you drink alcohol? Monthly or less   If you do drink alcohol, how many drinks might you have in a day? (one drink = 5 oz. wine, 1 can/bottle of beer, 1 shot liquor) 1 or 2   Have you or are you currently using street drugs or prescription strength medication for which you do not have a prescription for? No   Do you have a history of chemical dependency (alcohol or drug abuse)? No       PSYCHOLOGICAL HISTORY:   Psychological History Reviewed With Patient 7/23/2019   Have you ever attempted suicide? Never.   Have you had thoughts of suicide in the past year? No   Have you ever been  "hospitalized for mental illness or a suicide attempt? Never.   Do you have a history of chronic pain? No   Have you ever been diagnosed with fibromyalgia? No   Are you currently seeing a therapist or counselor?  No   Are you currently seeing a psychiatrist? No       ROS:     7/23/2019   Skin:  Leg swelling, Varicose veins   HEENT: None of these   Musculoskeletal: Joint Pain, Back pain, Swelling of legs   Cardiovascular: None of the above   Pulmonary: Snoring   Gastrointestinal: Heartburn   Genitourinary: None of the above   Hematological: None of the above   Neurological: None of the above   Female only: None of the above       EATING BEHAVIORS:     7/23/2019   Have you or anyone else thought that you had an eating disorder? No   Do you currently binge eat (eat a large amount of food in a short time)? Yes   Are you an emotional eater? No   Do you get up to eat after falling asleep? No     When skips meal will eat a lot more later    EXERCISE:     7/23/2019   How often do you exercise? 1 to 2 times per week   What is the duration of your exercise (in minutes)? 30 Minutes   What types of exercise do you do? walking, swimming   What keeps you from being more active?  Pain, Lack of Time, Too tired       MEDICATIONS:  Current Outpatient Medications   Medication Sig Dispense Refill     multivitamin, therapeutic with minerals (MULTI-VITAMIN) TABS tablet Take 1 tablet by mouth daily       VITAMIN D, CHOLECALCIFEROL, PO Take by mouth daily         ALLERGIES:  Allergies   Allergen Reactions     Ibuprofen Swelling     Swelling of legs       LABS/IMAGING/MEDICAL RECORDS REVIEW: CMP    PHYSICAL EXAM:  /86   Pulse 83   Ht 5' 7.5\" (1.715 m)   Wt (!) 353 lb 9.6 oz (160.4 kg)   SpO2 99%   BMI 54.56 kg/m      GENERAL:  Good development and normal affect in no acute distress. Patient is alert and orientated x 3 and answers all questions appropriately.  HEENT: Head is normocephalic, nontraumatic. Pupils equal and round " without conjunctival injection. Neck is supple without lymphadenopathy, thyroidmegaly, or mass. Trachea midline. Dentition WNL. No missing teeth  CARDIOVASCULAR:  Regular rate and rhythm without murmurs, rubs, or gallops.  RESPIRATORY: Lungs are clear to auscultation bilaterally with good breath sounds.  GASTROINTESTINAL: Abdomen is obese, nondistended, soft, nontender, without organomegaly or masses. No bruits.  LOWER EXTREMITIES: No LE edema bilaterally, no cyanosis, ulceration, or chronic venous stasis noted.  MUSCULOSKELETAL:  Moves all 4 extremities equal and strong. Has a normal gait.   NEUROLOGIC: Cranial nerves II-XII grossly intact.  SKIN: No intertriginous irritation or rash.       In summary, Erika Reina has Morbid obesity and a Body mass index is 54.56 kg/m .  and the comorbidities stated above. She completed an informational seminar and is a candidate for the laparoscopic gastric sleeve.  She will have to complete the following pre-requisites:    Lose 10 lbs prior to surgery.  Goal Weight: 343.6 lbs  Have preoperative laboratory tests drawn.   Psychological Evaluation with MMPI and clearance for weight loss surgery.  Achieve clearance from dietitian to see surgeon.  Sleep study  Bariatric Support Group  Clearance/Support letter from oncologist  Results of evaluation of kidneys     DISCUSS BIRTH CONTROL AT NEXT VISIT    Today in the office we discussed patients medical history. Preoperative, perioperative, and postoperative processes, management, and follow up were addressed.  She will return next month to discuss surgeries and risks and benefits of both the gastric sleeve and gastric bypass.  All questions were answered.  A goal sheet and support group handout were given to the patient.        Sincerely,       RICK Benjamin spent a total of 50 minutes face to face with the patient during today's office visit. Over 50% of this time was spent counseling the patient and/or coordinating  care.

## 2019-08-16 DIAGNOSIS — Z13.29 SCREENING FOR ENDOCRINE, NUTRITIONAL, METABOLIC AND IMMUNITY DISORDER: ICD-10-CM

## 2019-08-16 DIAGNOSIS — Z13.0 SCREENING FOR ENDOCRINE, NUTRITIONAL, METABOLIC AND IMMUNITY DISORDER: ICD-10-CM

## 2019-08-16 DIAGNOSIS — Z13.228 SCREENING FOR ENDOCRINE, NUTRITIONAL, METABOLIC AND IMMUNITY DISORDER: ICD-10-CM

## 2019-08-16 DIAGNOSIS — Z13.21 SCREENING FOR ENDOCRINE, NUTRITIONAL, METABOLIC AND IMMUNITY DISORDER: ICD-10-CM

## 2019-08-16 DIAGNOSIS — Z13.0 SCREENING FOR IRON DEFICIENCY ANEMIA: ICD-10-CM

## 2019-08-16 DIAGNOSIS — E66.01 MORBID OBESITY WITH BODY MASS INDEX (BMI) OF 50.0 TO 59.9 IN ADULT (H): ICD-10-CM

## 2019-08-16 LAB
ERYTHROCYTE [DISTWIDTH] IN BLOOD BY AUTOMATED COUNT: 14 % (ref 10–15)
HBA1C MFR BLD: 5 % (ref 0–5.6)
HCT VFR BLD AUTO: 41.1 % (ref 35–47)
HGB BLD-MCNC: 13 G/DL (ref 11.7–15.7)
MCH RBC QN AUTO: 28.3 PG (ref 26.5–33)
MCHC RBC AUTO-ENTMCNC: 31.6 G/DL (ref 31.5–36.5)
MCV RBC AUTO: 89 FL (ref 78–100)
PLATELET # BLD AUTO: 301 10E9/L (ref 150–450)
RBC # BLD AUTO: 4.6 10E12/L (ref 3.8–5.2)
WBC # BLD AUTO: 8.2 10E9/L (ref 4–11)

## 2019-08-16 PROCEDURE — 36415 COLL VENOUS BLD VENIPUNCTURE: CPT | Performed by: PHYSICIAN ASSISTANT

## 2019-08-16 PROCEDURE — 83036 HEMOGLOBIN GLYCOSYLATED A1C: CPT | Performed by: PHYSICIAN ASSISTANT

## 2019-08-16 PROCEDURE — 82306 VITAMIN D 25 HYDROXY: CPT | Performed by: PHYSICIAN ASSISTANT

## 2019-08-16 PROCEDURE — 84443 ASSAY THYROID STIM HORMONE: CPT | Performed by: PHYSICIAN ASSISTANT

## 2019-08-16 PROCEDURE — 85027 COMPLETE CBC AUTOMATED: CPT | Performed by: PHYSICIAN ASSISTANT

## 2019-08-17 LAB — TSH SERPL DL<=0.005 MIU/L-ACNC: 2.39 MU/L (ref 0.4–4)

## 2019-08-19 LAB — DEPRECATED CALCIDIOL+CALCIFEROL SERPL-MC: 44 UG/L (ref 20–75)

## 2019-08-22 DIAGNOSIS — N13.30 HYDRONEPHROSIS: Primary | ICD-10-CM

## 2019-08-26 ENCOUNTER — OFFICE VISIT (OUTPATIENT)
Dept: SURGERY | Facility: CLINIC | Age: 39
End: 2019-08-26
Payer: COMMERCIAL

## 2019-08-26 VITALS — BODY MASS INDEX: 44.41 KG/M2 | WEIGHT: 293 LBS | HEIGHT: 68 IN

## 2019-08-26 DIAGNOSIS — E66.01 MORBID OBESITY DUE TO EXCESS CALORIES (H): ICD-10-CM

## 2019-08-26 PROCEDURE — 97803 MED NUTRITION INDIV SUBSEQ: CPT | Performed by: DIETITIAN, REGISTERED

## 2019-08-26 ASSESSMENT — MIFFLIN-ST. JEOR: SCORE: 2317.23

## 2019-08-26 NOTE — PROGRESS NOTES
"PRE SURGICAL WEIGHT LOSS NUTRITION APPOINTMENT    Erika Reina  1980  female  [id number]  38 year old    ASSESSMENT    Desired Surgical Procedure: undecided (leaning towards sleeve)    REASON FOR VISIT:  Erika Reina is a 38 year old year old female presents today for a pre-surgical weight loss follow-up appointment. Patient accompanied by self.    DIAGNOSIS:  Weight Status Obesity Grade III BMI >40    ANTHROPOMETRICS:  Height: 171.5 cm (5' 7.5\")  Initial Weight: 353.6 lbs     Weight last visit: 350.9 lbs    Current weight: (!) 159.2 kg (350 lb 14.4 oz)  BMI: 54.15 kg/(m^2).    VITAMINS AND MINERALS:  1 Multivitamin with Minerals ([place] Complete - HS)  500 mg Calcium with Vitamin D (AM, lunch)  5000 International units Vitamin D    NUTRITION HISTORY:  Breakfast: [8am] Isagenix Shake   Lunch: [2-3pm] turkey sandwich  Isagenix Shake  leftovers  Supper: [7-9pm] meat (chicken or turkey) + potato + vegetable + salad   Snacks: [mid-morning] applesauce or yogurt  [afternoon] carrots  [evenings] baked chips, something crunchy or sweet  Fluids Consumed: Water (100oz), coffee (8oz; sugar substitute and skim milk, milk (skim, 8oz), iced tea or lemonade (very rare), EtOH (~1x/wk)  Eating slower: Yes  Chewing foods thoroughly: No  Take 20-30 minutes to consume each meal: No  Fluids and meals separate by at least 30 minutes: No  Carbonation: none  Caffeine: yes  Additional Information: Pt more mindful of eating habits this past month; working on eating slowly despite short breaks at work.  Attempted to increase exercise but struggling with joint pain. Discussed chair/seated exercises.     PHYSICAL ACTIVITY:  Type: walking  Frequency: (inconsistent)    DIAGNOSIS:  Previous Nutrition Diagnosis: Obesity related to long history of self- monitoring deficit and excessive energy intake evidenced by BMI of 54.5 kg/m2  No change, modified below    Previous goals:   Increase exercise to 3X per week (gym) for 30 " minutes - not met  Take 10-15 minutes for each meal - met  Start: 5000 international unit(s) vitamin D, 500 mg calcium 3X per day or 600 mg 2X per day - partially met  Discussed switching to a MVI that meets clinic guidelines when out of current product - met    Current Nutrition Diagnosis: Obesity related to long history of self-monitoring deficit and excessive energy intake as evidenced by BMI of 54.15 kg/m2.    INTERVENTION:  Nutrition Prescription: Recommended energy/nutrient modification.    GOALS:  Add in third dose of calcium at dinnertime  Exercise for 30 minutes, 3x/week - explore chair exercises  Separate fluids and meals by 30 minutes    Intervention:  - Discussed progress towards previous goals.  - Reinforced importance of making behavior changes in preparation for bariatric surgery.   - Assessed learning needs and learning preferences       NUTRITION MONITORING AND EVALUATION:  Anticipated compliance: good  Patient demonstrated good understanding.     Follow up: Continue to monitor patient closely regarding weight loss and diet.  # of visits needed: 2-3  Cleared by RD: No     TIME SPENT WITH PATIENT: 25 minutes    Nhi Obrien RD, LD  Clinical Dietitian

## 2019-08-27 DIAGNOSIS — N13.30 HYDRONEPHROSIS: Primary | ICD-10-CM

## 2019-08-28 ENCOUNTER — OFFICE VISIT (OUTPATIENT)
Dept: UROLOGY | Facility: CLINIC | Age: 39
End: 2019-08-28
Attending: NURSE PRACTITIONER
Payer: COMMERCIAL

## 2019-08-28 VITALS
OXYGEN SATURATION: 90 % | BODY MASS INDEX: 43.4 KG/M2 | HEIGHT: 69 IN | HEART RATE: 52 BPM | WEIGHT: 293 LBS | DIASTOLIC BLOOD PRESSURE: 82 MMHG | SYSTOLIC BLOOD PRESSURE: 136 MMHG

## 2019-08-28 DIAGNOSIS — N13.30 HYDRONEPHROSIS, UNSPECIFIED HYDRONEPHROSIS TYPE: ICD-10-CM

## 2019-08-28 LAB
ALBUMIN UR-MCNC: NEGATIVE MG/DL
APPEARANCE UR: CLEAR
BILIRUB UR QL STRIP: NEGATIVE
COLOR UR AUTO: YELLOW
GLUCOSE UR STRIP-MCNC: NEGATIVE MG/DL
HGB UR QL STRIP: NEGATIVE
KETONES UR STRIP-MCNC: NEGATIVE MG/DL
LEUKOCYTE ESTERASE UR QL STRIP: NEGATIVE
NITRATE UR QL: NEGATIVE
PH UR STRIP: 5 PH (ref 5–7)
SOURCE: NORMAL
SP GR UR STRIP: 1.02 (ref 1–1.03)
UROBILINOGEN UR STRIP-ACNC: 0.2 EU/DL (ref 0.2–1)

## 2019-08-28 PROCEDURE — 81003 URINALYSIS AUTO W/O SCOPE: CPT | Performed by: UROLOGY

## 2019-08-28 PROCEDURE — 99243 OFF/OP CNSLTJ NEW/EST LOW 30: CPT | Performed by: UROLOGY

## 2019-08-28 ASSESSMENT — MIFFLIN-ST. JEOR: SCORE: 2336.97

## 2019-08-28 ASSESSMENT — PAIN SCALES - GENERAL: PAINLEVEL: NO PAIN (0)

## 2019-08-28 NOTE — Clinical Note
Hi Ms. Manzo and Ms. Jitendra,I saw Ana today in consultation for her bilateral hydronephrosis.  We reviewed her images and labs.  Her hydronephrosis is very mild, and is asymptomatic with normal kidney function.  This will not require any additional follow-up or imaging.Thanks, Vito Mark M.D.Cell: [phone]

## 2019-08-28 NOTE — NURSING NOTE
Chief Complaint   Patient presents with     Clinic Care Coordination - Follow-up     Pt here for hydronephrosis     Evelyn Becerra CMA

## 2019-08-28 NOTE — LETTER
8/28/2019       RE: Erika Reina  [address]  [place] [facility] [place] [facility] [address]     Dear Colleague,    Thank you for referring your patient, Erika Reina, to the [facility]. Please see a copy of my visit note below.    Urology Consult History and Physical  [facility]  Name: Erika Reina    MRN: [id number]   YOB: 1980       We were asked to see Erika Reina at the request of Dr. Manzo for evaluation and treatment of bilateral hydronephrosis.        Chief Complaint:   Bilateral hydronephrosis    History is obtained from the patient            History of Present Illness:   Erika Reina is a 38 year old female who is being seen for evaluation of bilateral hydronephrosis.  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  She is status post a hysterectomy on December 13, 2018.  She had been having some left abdominal pain near her port site which is been tender to the touch.  CT scan was obtained which demonstrated mild bilateral hydronephrosis and a renal ultrasound was obtained which confirmed mild bilateral hydronephrosis.  She was referred for this finding.  She denies history of UTIs, pyelonephritis, hematuria, or renal dysfunction.  She did have a history of kidney stones about 10 years ago which did not require surgical intervention.    (4 or >)  Location: Bilateral kidneys  Quality: mild bilateral hydronephrosis   Severity: Mild and asymptomatic  Duration: Noted on CT back in 09/2018           Past Medical History:     Past Medical History:   Diagnosis Date     Endometrial cancer (H) 12/19/2018     Hyperlipidemia LDL goal <160 9/15/2011            Past Surgical History:     Past Surgical History:   Procedure Laterality Date     ARTHROPLASTY KNEE       CYSTOSCOPY N/A 12/13/2018    Procedure: Cystoscopy;  Surgeon: Cleopatra Altamirano MD;   "Location: [facility] OR     [facility] HYSTERECTOMY TOTAL, BILATERAL SALPINGO-OOPHORECTOMY, NODE DISSECTION, COMBINED N/A 12/13/2018    Procedure: DaVinci Assisted Removal Of Uterus, Cervix, Both Ovaries And Fallopian Tubes, Bilateral [place] Lymph Node Dissection;  Surgeon: Cleopatra Altamirano MD;  Location: [facility] OR            Social History:     Social History     Tobacco Use     Smoking status: Never Smoker     Smokeless tobacco: Never Used   Substance Use Topics     Alcohol use: Yes     Alcohol/week: 0.0 oz     Comment: rarely.  Once monthly 1-2 drinks       History   Smoking Status     Never Smoker   Smokeless Tobacco     Never Used            Family History:     Family History   Problem Relation Age of Onset     Diabetes Mother      Obesity Mother      Hypertension Mother      Diabetes Father      Hypertension Father      Cancer - colorectal Maternal Grandmother      Heart Disease Paternal Grandmother      Obesity Brother               Allergies:     Allergies   Allergen Reactions     Ibuprofen Swelling     Swelling of legs            Medications:     Current Outpatient Medications   Medication Sig     multivitamin, therapeutic with minerals (MULTI-VITAMIN) TABS tablet Take 1 tablet by mouth daily     VITAMIN D, CHOLECALCIFEROL, PO Take by mouth daily     No current facility-administered medications for this visit.              Review of Systems:     Skin: negative  Eyes: negative  Ears/Nose/Throat: negative  Respiratory: No shortness of breath, dyspnea on exertion, cough, or hemoptysis  Cardiovascular: negative  Gastrointestinal: negative  Genitourinary: as above  Musculoskeletal: negative  Neurologic: negative  Psychiatric: negative  Hematologic/Lymphatic/Immunologic: as above  Endocrine: negative          Physical Exam:     Patient Vitals for the past 24 hrs:   BP Pulse SpO2 Height Weight   08/28/19 0830 136/82 52 90 % 1.753 m (5' 9\") (!) 158.8 kg (350 lb)     Body mass index is 51.69 kg/m .     General: " age-appropriate appearing female in NAD  HEENT: Head AT/NC, EOMI, CN Grossly intact  Lungs: no respiratory distress, or pursed lip breathing  Heart: No obvious jugular venous distension present  Back: no bony midline tenderness, no CVAT bilaterally.  Abdomen: soft, obesely-distended, non-tender. No organomegaly  : No costovertebral tenderness bilaterally   Lymph: no palpable inguinal lymphadenopathy.  LE: no edema.   Musculoskeltal: extremities normal, no peripheral edema  Skin: no suspicious lesions or rashes  Neuro: Alert, oriented, speech and mentation normal;  moving all 4 extremities equally.  Psych: affect and mood normal          Data:   All laboratory data reviewed:    UA RESULTS:  Recent Labs   Lab Test 08/28/19  0828 07/29/19  0926   COLOR Yellow Light Yellow   APPEARANCE Clear Clear   URINEGLC Negative Negative   URINEBILI Negative Negative   URINEKETONE Negative Negative   SG 1.025 1.025   UBLD Negative Negative   URINEPH 5.0 5.5   PROTEIN Negative Negative   UROBILINOGEN 0.2  --    NITRITE Negative Negative   LEUKEST Negative Negative   RBCU  --  0   WBCU  --  1      Lab Results   Component Value Date    CR 0.73 07/29/2019        All pertinent imaging reviewed:    All imaging studies reviewed by me.  I personally reviewed these imaging films.    I reviewed the images with the patient.  A formal report from radiology will follow.    CT ABD/PEL 7/19/19:  FINDINGS:  Visualized lung bases are unremarkable.     Diffuse hepatic steatosis is present. No intrahepatic or extrahepatic  biliary duct dilatation is present. The gallbladder is unremarkable.     The spleen, adrenal glands and pancreas are unremarkable.     Kidneys enhance symmetrically. Unchanged prominence of the bilateral  renal calyces.     The stomach is mildly distended with fluid and air. Small enlarged  bowel loops are nondilated. Colonic diverticulosis without evidence of  diverticulitis. The appendix is normal.     Abdominal aorta and IVC  are of normal course and caliber.  Subcentimeter retroperitoneal lymph nodes are unchanged and not  pathologically enlarged. No mesenteric lymphadenopathy is seen.     Urinary bladder is underdistended. The uterus and ovaries are  surgically absent. No pelvic lymphadenopathy seen.     No suspicious osseous lesions are seen.                                                                      IMPRESSION:  1.  Diffuse hepatic steatosis.  2.  Unchanged bilateral renal calyceal prominence which may suggest  bilateral ureteropelvic junction obstruction.      RENAL U/S 7/29/19:  FINDINGS: The kidneys are normal in echogenicity with apparent mild  bilateral hydronephrosis. No significant interval change since prior  CT. Right kidney measures 11.5 x 4.7 x 7.3 cm and the left measures  11.6 x 3.8 x 5.7 cm. No significant renal cortical thinning is  appreciated. No renal cyst or mass is evident. No definite renal  calculi are appreciated. Bladder is partly decompressed, but otherwise  unremarkable. Incidental fatty liver infiltration as previously  described.                                                                      IMPRESSION: Mild bilateral hydronephrosis, right greater than left,  without obvious stone or mass. No change since prior CT. UPJ  strictures are possible.         Impression and Plan:   Impression:   38 year old female with incidentally found bilateral mild hydronephrosis       Plan:   Bilateral mild hydronephrosis  -We reviewed her CT images and ultrasound images which do demonstrate very mild bilateral hydronephrosis.  We discussed that since this is asymptomatic, with no history of infections, stones, or hematuria, or pain, there is no indication for further work-up or intervention at this time.  Her kidney function is excellent and this is likely just an anatomic abnormality which will require no further work-up or follow-up.  -Follow-up PRN     Thank you for the kind consultation.    Time spent:  30 minutes of which >50% was spent counseling.    Vito Mark MD   Urology  [facility]  [facility] Phone: [phone]  [facility] Phone: [phone]

## 2019-08-28 NOTE — PROGRESS NOTES
Urology Consult History and Physical  [facility]  Name: Erika Reina    MRN: [id number]   YOB: 1980       We were asked to see Erika Reina at the request of Dr. Manzo for evaluation and treatment of bilateral hydronephrosis.        Chief Complaint:   Bilateral hydronephrosis    History is obtained from the patient            History of Present Illness:   Erika Reina is a 38 year old female who is being seen for evaluation of bilateral hydronephrosis.  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  She is status post a hysterectomy on December 13, 2018.  She had been having some left abdominal pain near her port site which is been tender to the touch.  CT scan was obtained which demonstrated mild bilateral hydronephrosis and a renal ultrasound was obtained which confirmed mild bilateral hydronephrosis.  She was referred for this finding.  She denies history of UTIs, pyelonephritis, hematuria, or renal dysfunction.  She did have a history of kidney stones about 10 years ago which did not require surgical intervention.    (4 or >)  Location: Bilateral kidneys  Quality: mild bilateral hydronephrosis   Severity: Mild and asymptomatic  Duration: Noted on CT back in 09/2018           Past Medical History:     Past Medical History:   Diagnosis Date     Endometrial cancer (H) 12/19/2018     Hyperlipidemia LDL goal <160 9/15/2011            Past Surgical History:     Past Surgical History:   Procedure Laterality Date     ARTHROPLASTY KNEE       CYSTOSCOPY N/A 12/13/2018    Procedure: Cystoscopy;  Surgeon: Cleopatra Altamirano MD;  Location: [facility] OR     DAVINCI HYSTERECTOMY TOTAL, BILATERAL SALPINGO-OOPHORECTOMY, NODE DISSECTION, COMBINED N/A 12/13/2018    Procedure: DaVinci Assisted Removal Of Uterus, Cervix, Both Ovaries And Fallopian Tubes, Bilateral Haslett Lymph Node Dissection;  Surgeon: Cleopatra Altamirano MD;  Location: [facility] OR             "Social History:     Social History     Tobacco Use     Smoking status: Never Smoker     Smokeless tobacco: Never Used   Substance Use Topics     Alcohol use: Yes     Alcohol/week: 0.0 oz     Comment: rarely.  Once monthly 1-2 drinks       History   Smoking Status     Never Smoker   Smokeless Tobacco     Never Used            Family History:     Family History   Problem Relation Age of Onset     Diabetes Mother      Obesity Mother      Hypertension Mother      Diabetes Father      Hypertension Father      Cancer - colorectal Maternal Grandmother      Heart Disease Paternal Grandmother      Obesity Brother               Allergies:     Allergies   Allergen Reactions     Ibuprofen Swelling     Swelling of legs            Medications:     Current Outpatient Medications   Medication Sig     multivitamin, therapeutic with minerals (MULTI-VITAMIN) TABS tablet Take 1 tablet by mouth daily     VITAMIN D, CHOLECALCIFEROL, PO Take by mouth daily     No current facility-administered medications for this visit.              Review of Systems:     Skin: negative  Eyes: negative  Ears/Nose/Throat: negative  Respiratory: No shortness of breath, dyspnea on exertion, cough, or hemoptysis  Cardiovascular: negative  Gastrointestinal: negative  Genitourinary: as above  Musculoskeletal: negative  Neurologic: negative  Psychiatric: negative  Hematologic/Lymphatic/Immunologic: as above  Endocrine: negative          Physical Exam:     Patient Vitals for the past 24 hrs:   BP Pulse SpO2 Height Weight   08/28/19 0830 136/82 52 90 % 1.753 m (5' 9\") (!) 158.8 kg (350 lb)     Body mass index is 51.69 kg/m .     General: age-appropriate appearing female in NAD  HEENT: Head AT/NC, EOMI, CN Grossly intact  Lungs: no respiratory distress, or pursed lip breathing  Heart: No obvious jugular venous distension present  Back: no bony midline tenderness, no CVAT bilaterally.  Abdomen: soft, obesely-distended, non-tender. No organomegaly  : No " costovertebral tenderness bilaterally   Lymph: no palpable inguinal lymphadenopathy.  LE: no edema.   Musculoskeltal: extremities normal, no peripheral edema  Skin: no suspicious lesions or rashes  Neuro: Alert, oriented, speech and mentation normal;  moving all 4 extremities equally.  Psych: affect and mood normal          Data:   All laboratory data reviewed:    UA RESULTS:  Recent Labs   Lab Test 08/28/19  0828 07/29/19  0926   COLOR Yellow Light Yellow   APPEARANCE Clear Clear   URINEGLC Negative Negative   URINEBILI Negative Negative   URINEKETONE Negative Negative   SG 1.025 1.025   UBLD Negative Negative   URINEPH 5.0 5.5   PROTEIN Negative Negative   UROBILINOGEN 0.2  --    NITRITE Negative Negative   LEUKEST Negative Negative   RBCU  --  0   WBCU  --  1      Lab Results   Component Value Date    CR 0.73 07/29/2019        All pertinent imaging reviewed:    All imaging studies reviewed by me.  I personally reviewed these imaging films.    I reviewed the images with the patient.  A formal report from radiology will follow.    CT ABD/PEL 7/19/19:  FINDINGS:  Visualized lung bases are unremarkable.     Diffuse hepatic steatosis is present. No intrahepatic or extrahepatic  biliary duct dilatation is present. The gallbladder is unremarkable.     The spleen, adrenal glands and pancreas are unremarkable.     Kidneys enhance symmetrically. Unchanged prominence of the bilateral  renal calyces.     The stomach is mildly distended with fluid and air. Small enlarged  bowel loops are nondilated. Colonic diverticulosis without evidence of  diverticulitis. The appendix is normal.     Abdominal aorta and IVC are of normal course and caliber.  Subcentimeter retroperitoneal lymph nodes are unchanged and not  pathologically enlarged. No mesenteric lymphadenopathy is seen.     Urinary bladder is underdistended. The uterus and ovaries are  surgically absent. No pelvic lymphadenopathy seen.     No suspicious osseous lesions are  seen.                                                                      IMPRESSION:  1.  Diffuse hepatic steatosis.  2.  Unchanged bilateral renal calyceal prominence which may suggest  bilateral ureteropelvic junction obstruction.      RENAL U/S 7/29/19:  FINDINGS: The kidneys are normal in echogenicity with apparent mild  bilateral hydronephrosis. No significant interval change since prior  CT. Right kidney measures 11.5 x 4.7 x 7.3 cm and the left measures  11.6 x 3.8 x 5.7 cm. No significant renal cortical thinning is  appreciated. No renal cyst or mass is evident. No definite renal  calculi are appreciated. Bladder is partly decompressed, but otherwise  unremarkable. Incidental fatty liver infiltration as previously  described.                                                                      IMPRESSION: Mild bilateral hydronephrosis, right greater than left,  without obvious stone or mass. No change since prior CT. UPJ  strictures are possible.         Impression and Plan:   Impression:   38 year old female with incidentally found bilateral mild hydronephrosis       Plan:   Bilateral mild hydronephrosis  -We reviewed her CT images and ultrasound images which do demonstrate very mild bilateral hydronephrosis.  We discussed that since this is asymptomatic, with no history of infections, stones, or hematuria, or pain, there is no indication for further work-up or intervention at this time.  Her kidney function is excellent and this is likely just an anatomic abnormality which will require no further work-up or follow-up.  -Follow-up PRN     Thank you for the kind consultation.    Time spent: 30 minutes of which >50% was spent counseling.    Vito Mark MD   Urology  [facility]  [facility] Phone: [phone]  [facility] Phone: [phone]

## 2019-09-04 ENCOUNTER — PATIENT OUTREACH (OUTPATIENT)
Dept: ONCOLOGY | Facility: CLINIC | Age: 39
End: 2019-09-04

## 2019-09-04 NOTE — LETTER
September 5, 2019    RE:  Erika Reina                              [id number] [facility] [id number]    [facility]  RICK Flores PA-C      Dear MsEvaristo Mckinnon,    This letter is in regards to your request for clearance for bariatric surgery for Ms. Erika Reina.  Ms Reina has been under my care since December 4, 2018 when seen in consultation for Endometrial Cancer.  She underwent Robotic hysterectomy, bilateral salpingo-oophorectomy, bilateral pelvic sentinel lymph node dissection on 12/13/2018 and final diagnosis of Stage 1A Grade 1 Endometrial Endometrioid Adenocarcinoma.      Ms. Reina's cancer is currently in remission and is seen in the clinic every 6 months for surveillance visits. Ms. Reina is cleared and stable to proceed with bariatric surgery from an oncology standpoint.    Please let us know if you have further questions.    Sincerely,        Dr Cleopatra Evans MD  Department of Gynecologic Oncology  [facility]

## 2019-09-04 NOTE — PROGRESS NOTES
Pt dropped off letter to the clinic requesting Bariatric Letter of Clearance from Oncologist. Discussed request with Dr Evans and received recommendations per MD and letter completed. Pt called and left detailed message on mobile number stating letter completed and will be faxed as requested.  Pt instructed to call back with further questions.    Mary Henley RN, BSN, OCN

## 2019-09-18 ENCOUNTER — OFFICE VISIT (OUTPATIENT)
Dept: SURGERY | Facility: CLINIC | Age: 39
End: 2019-09-18
Payer: COMMERCIAL

## 2019-09-18 VITALS — BODY MASS INDEX: 44.41 KG/M2 | WEIGHT: 293 LBS | HEIGHT: 68 IN

## 2019-09-18 VITALS
BODY MASS INDEX: 43.4 KG/M2 | WEIGHT: 293 LBS | HEIGHT: 69 IN | SYSTOLIC BLOOD PRESSURE: 138 MMHG | DIASTOLIC BLOOD PRESSURE: 84 MMHG | OXYGEN SATURATION: 97 % | HEART RATE: 86 BPM

## 2019-09-18 DIAGNOSIS — Z71.89 ENCOUNTER FOR PRE-BARIATRIC SURGERY COUNSELING AND EDUCATION: ICD-10-CM

## 2019-09-18 DIAGNOSIS — M72.2 PLANTAR FASCIITIS: ICD-10-CM

## 2019-09-18 DIAGNOSIS — E66.01 MORBID OBESITY WITH BODY MASS INDEX OF 50 OR HIGHER (H): Primary | ICD-10-CM

## 2019-09-18 DIAGNOSIS — E78.5 HYPERLIPIDEMIA LDL GOAL <160: ICD-10-CM

## 2019-09-18 PROCEDURE — 99214 OFFICE O/P EST MOD 30 MIN: CPT | Performed by: PHYSICIAN ASSISTANT

## 2019-09-18 PROCEDURE — 97803 MED NUTRITION INDIV SUBSEQ: CPT | Performed by: DIETITIAN, REGISTERED

## 2019-09-18 ASSESSMENT — MIFFLIN-ST. JEOR
SCORE: 2331.07
SCORE: 2331.07

## 2019-09-18 NOTE — PROGRESS NOTES
"PRE SURGICAL WEIGHT LOSS NUTRITION APPOINTMENT    Erika Reina  1980  female  [id number]  38 year old    ASSESSMENT    Desired Surgical Procedure: gastric sleeve (however still somewhat undecided)    REASON FOR VISIT:  Erika Reina is a 38 year old year old female presents today for a pre-surgical weight loss follow-up appointment. Patient accompanied by self.    DIAGNOSIS:  Weight Status Obesity Grade III BMI >40    ANTHROPOMETRICS:  Height: 175.3 cm (5' 9\")  Initial Weight: 353.6 lbs     Weight last visit: 350.9 lbs    Current weight: (!) 158.2 kg (348 lb 11.2 oz)  BMI: Body mass index is 53.81 kg/m .    VITAMINS AND MINERALS:  1 Multivitamin with Minerals ([employer] Complete - HS)  600 mg Calcium with Vitamin D (AM, lunch)  5000 International units Vitamin D    NUTRITION HISTORY:  Breakfast: protein shake   Lunch: turkey sandwich + vegetables + baked chips   Supper: spaghetti  meat + potato + vegetable  Snacks: vegetables or fruit, popcorn, nuts    Fluids Consumed: Water (100oz), coffee (every other day), milk (skim)   Eating slower: Yes  Chewing foods thoroughly: Yes  Take 20-30 minutes to consume each meal: Yes  Fluids and meals separate by at least 30 minutes: Yes/Usually     Carbonation: none  Caffeine: reducing  Additional Information: Pt continues to make progress toward goals. Upcoming trip to [place]; discussed eating while on vacation/restaurants.    PHYSICAL ACTIVITY:  Type: chair exercises, swimming at gym, situps   Frequency: 2-3 (days per week)  Duration: 20-25(minutes)     DIAGNOSIS:  Previous Nutrition Diagnosis: Obesity related to long history of self- monitoring deficit and excessive energy intake evidenced by BMI of 54.15 kg/m2  No change, modified below    Previous goals:   Add in third dose of calcium at dinnertime - n/a (switched to 600mg)  Exercise for 30 minutes, 3x/week - explore chair exercises - improving  Separate fluids and meals by 30 minutes - improving    Current " Nutrition Diagnosis: Obesity related to long history of self-monitoring deficit and excessive energy intake as evidenced by BMI of 53.81 kg/m2.    INTERVENTION:  Nutrition Prescription: Recommended energy/nutrient modification.    GOALS:  Exercise for 30 minutes, 3x/week  Aim for 7h of sleep  Consistently separate fluids and meals by 30 minutes    Intervention:  - Discussed progress towards previous goals.  - Reinforced importance of making behavior changes in preparation for bariatric surgery.   - Assessed learning needs and learning preferences       NUTRITION MONITORING AND EVALUATION:  Anticipated compliance: good  Patient demonstrated good understanding.     Follow up: Continue to monitor patient closely regarding weight loss and diet.  # of visits needed: 1 (or more, pending insurance verification)  Cleared by RD: No     TIME SPENT WITH PATIENT: 20 minutes    Nhi Obrien RD, LD  Clinical Dietitian

## 2019-09-18 NOTE — PROGRESS NOTES
"PreOp Bariatric Surgery Note     2019       RE: Erika Reina  MR#: [id number]  : 1980      Patient in today for continued bariatric education after her initial visit on 2019.  Her task list items were reviewed:      Lose 10 lbs prior to surgery.  Goal Weight: 343.6 lbs - Today 348 lbs  Have preoperative laboratory tests drawn. - Completed  Psychological Evaluation with MMPI and clearance for weight loss surgery.- Scheduled next month  Achieve clearance from dietitian to see surgeon.  Sleep study - scheduled  Bariatric Support Group - Completed  Clearance/Support letter from oncologist - Completed.  Dated 2019 in [facility]  Results of evaluation of kidneys - Completed in Epic 2019  DISCUSS BIRTH CONTROL AT NEXT VISIT - Had hysterectomy      /84   Pulse 86   Ht 5' 9\" (1.753 m)   Wt (!) 348 lb 11.2 oz (158.2 kg)   SpO2 97%   BMI 51.49 kg/m      General:  NAD  Today in the office we discussed gastric bypass and gastric sleeve surgeries.  Preoperative, perioperative, and postoperative processes, management, and follow up were addressed.  Risks and benefits were outlined including the risk of death, PE, DVT, ulcer, N/V, stricture, hernia, wound infection, weight regain, and vitamin deficiencies. I emphasized exercise and activity along with appropriate food choice as the main foundation for weight loss with surgery providing surgical reinforcement of this.  A goal sheet and support group handout were given to the patient. Patient contract was signed.     Once the patient has completed their task list and there are no further recommendations, they will see the surgeon for consultation for bariatric surgery.  All questions were answered. Patient verbalizes understanding of the process to surgery and expectations for the postoperative period including the need for lifelong lifestyle changes, vitamin supplementation, and laboratory monitoring.    Swati Hoffman MS, PA-C    This " was a 25 minute visit with greater than 50% spent on counseling.

## 2019-10-07 ENCOUNTER — OFFICE VISIT (OUTPATIENT)
Dept: PSYCHOLOGY | Facility: CLINIC | Age: 39
End: 2019-10-07
Payer: COMMERCIAL

## 2019-10-07 DIAGNOSIS — Z03.89 NO DIAGNOSIS ON AXIS I: Primary | ICD-10-CM

## 2019-10-07 PROCEDURE — 90791 PSYCH DIAGNOSTIC EVALUATION: CPT | Performed by: PSYCHOLOGIST

## 2019-10-07 SDOH — HEALTH STABILITY: MENTAL HEALTH: HOW OFTEN DO YOU HAVE 6 OR MORE DRINKS ON ONE OCCASION?: NEVER

## 2019-10-07 ASSESSMENT — ANXIETY QUESTIONNAIRES
6. BECOMING EASILY ANNOYED OR IRRITABLE: MORE THAN HALF THE DAYS
2. NOT BEING ABLE TO STOP OR CONTROL WORRYING: NOT AT ALL
GAD7 TOTAL SCORE: 2
IF YOU CHECKED OFF ANY PROBLEMS ON THIS QUESTIONNAIRE, HOW DIFFICULT HAVE THESE PROBLEMS MADE IT FOR YOU TO DO YOUR WORK, TAKE CARE OF THINGS AT HOME, OR GET ALONG WITH OTHER PEOPLE: SOMEWHAT DIFFICULT
3. WORRYING TOO MUCH ABOUT DIFFERENT THINGS: NOT AT ALL
7. FEELING AFRAID AS IF SOMETHING AWFUL MIGHT HAPPEN: NOT AT ALL
5. BEING SO RESTLESS THAT IT IS HARD TO SIT STILL: NOT AT ALL
1. FEELING NERVOUS, ANXIOUS, OR ON EDGE: NOT AT ALL

## 2019-10-07 ASSESSMENT — COLUMBIA-SUICIDE SEVERITY RATING SCALE - C-SSRS
1. IN THE PAST MONTH, HAVE YOU WISHED YOU WERE DEAD OR WISHED YOU COULD GO TO SLEEP AND NOT WAKE UP?: NO
1. IN THE PAST MONTH, HAVE YOU WISHED YOU WERE DEAD OR WISHED YOU COULD GO TO SLEEP AND NOT WAKE UP?: NO

## 2019-10-07 ASSESSMENT — PATIENT HEALTH QUESTIONNAIRE - PHQ9
5. POOR APPETITE OR OVEREATING: NOT AT ALL
SUM OF ALL RESPONSES TO PHQ QUESTIONS 1-9: 10

## 2019-10-07 NOTE — PROGRESS NOTES
"                                                                                         Adult Bariatric Pre-Surgical Psychological Assessment - Structured Interview      CLIENT'S NAME: Erika Reina  MRN:   [id number]  :   1980  ACCT. NUMBER: [id number]  DATE OF SERVICE: 10/07/19      Identifying Information:  Client is a 39 year old, , female, single with an undefined relationship. Client was referred for an evaluation by the [facility] Weight Loss Surgery Team. Client is currently employed full time and reports she is able to function appropriately at work. Client attended the session alone.       Client's Statement of Presenting Concern:  Client is presenting for a psychological assessment as a routine part of the process for pursuing weight loss surgery. The client reported difficulty losing weight. She stated that she has lost 20 pounds in the last 5 months and has maintained a 15 pound weight loss. The client reported needing direction to assist with weight loss and is hopeful that the [facility] weight loss clinic will help her attain a healthy weight.        History of Presenting Concern:  Client reported that she has \"always been overweight.\" The client reported that she recalls being overweight since elementary school, but that the weight has increased over the last 7-10 years. The client reported that many people in her family are overweight and that several of them also gained more weight in their 30s.   Client reports they have attempted to lose weight in the past through Weight Watchers, Isagenix, Herbalife, and Atkins. The most weight loss has been with Isagenix and that is a weight loss of 20 pounds. The client has kept 15 pounds off for 5 months.  The client reports they believe the surgery will benefit them by helping her to have more energy, not having to rent a scooter when at [employer], fitting on airplanes, more energy at work, and hopeful for less leg pain.  " "Client has attempted to resolve these concerns in the past through diet, exercise, and herbs. Client reports that other professional(s) are involved in providing support / services. The client is currently receiving services through the [facility] weight loss clinic.       Social History:  Client reported she grew up in [place], [place]. They were the fourth born of four children. This is an intact family and parents remain . Client reported that her childhood was \"happy and good.\" Client described her current relationships with family of origin as supportive with frequent communication. The client lives with her sister and both parents. The client helps her family with finances.     Client reported a history of one committed relationship. Client has been in a relationship  for one year; however, the relationship is not disclosed to loved ones at this time. Client reported having no children. Client identified extensive stable and meaningful social connections. Client reported that she has not been involved with the legal system.  Client's highest education level was some college. Client did not identify any learning problems.There are no ethnic, cultural or Catholic factors that may be relevant for the assessment process.  Client identified her preferred language to be English. Client reported she does not need the assistance of an  or other support involved in therapy. Modifications will not be used to assist communication in therapy. Client did not serve in the .     Client reports family history includes Cancer - colorectal in her maternal grandmother; Diabetes in her father and mother; Heart Disease in her paternal grandmother; Hypertension in her father and mother; Obesity in her brother and mother.    Mental Health History:  Client reported the following biological family members or relatives with mental health issues: Brother experienced Bipolar Disorder. Client has not received " mental health services in the past. Hospitalizations: None.  Client is not currently receiving any mental health services.      Chemical Health History:  Client reported no family history of chemical health issues. Client has not received chemical dependency treatment in the past. Client is not currently receiving any chemical dependency treatment. Client reports no problems as a result of their drinking / drug use.      Client Reports:  Client reports using alcohol 1 times per month and has 2 mixed drinks and hard ciders at a time. Patient first started drinking at age 22.  Patient reported date of last use was within the last week.  Patient reports heaviest use is current use. The client denies every drinking more than two drinks a month on average.  Client denies using tobacco.  Client denies using marijuana.  Client reports using caffeine 2 times per day and drinks 1 at a time. Patient started using caffeine at age 26.  Client denies using street drugs.  Client denies the non-medical use of prescription or over the counter drugs.    CAGE: None of the patient's responses to the CAGE screening were positive / Negative CAGE score   Based on the negative Cage-Aid score and clinical interview there  are not indications of drug or alcohol abuse.    Discussed the general effects of drugs and alcohol on health and well-being. Therapist gave client printed information about the effects of chemical use on her health and well being.  We also discussed the specific risks of alcohol use following bariatric surgery, including issues related to cross-addiction.       Significant Losses / Trauma / Abuse / Neglect Issues:  There are no indications or report of: significant losses, trauma, abuse or neglect.    Issues of possible neglect are not present.      Medical Issues:  Client has had a physical exam to rule out medical causes for current symptoms. Date of last physical exam was within the past year. Client was encouraged to  follow up with PCP if symptoms were to develop. The client has a [place] [facility] Primary Care Provider, who is named Glenna Ham.. The client reports not having a psychiatrist. Client reports the following current medical concerns: high blood pressure, obesity, and history of endometrial cancer. The client reports the presence of chronic or episodic pain in the form of leg, knee, and feet pain. The pain level is moderate and has a frequency of daily.. There are significant nutritional concerns. The client has morbid obesity.     Client reports current meds as:   Current Outpatient Medications   Medication Sig     multivitamin, therapeutic with minerals (MULTI-VITAMIN) TABS tablet Take 1 tablet by mouth daily     VITAMIN D, CHOLECALCIFEROL, PO Take by mouth daily     No current facility-administered medications for this visit.        Client Allergies:  Allergies   Allergen Reactions     Ibuprofen Swelling     Swelling of legs       Medical History:  Past Medical History:   Diagnosis Date     Endometrial cancer (H) 12/19/2018     Hyperlipidemia LDL goal <160 9/15/2011         Medication Adherence:  N/A - Client does not have prescribed psychiatric medications.    Client was provided recommendation to follow-up with prescribing physician.    Mental Status Assessment:  Appearance:   Appropriate   Eye Contact:   Good   Psychomotor Behavior: Normal   Attitude:   Cooperative   Orientation:   All  Speech   Rate / Production: Normal    Volume:  Normal   Mood:    Normal  Affect:    Appropriate   Thought Content:  Clear   Thought Form:  Coherent  Logical   Insight:    Good       Review of Symptoms:  Depression: Sleep Energy Concentration Psychomotor slowing or agitation  Madie:  No symptoms  Psychosis: No symptoms  Anxiety: No symptoms  Panic:  No symptoms  Post Traumatic Stress Disorder: No symptoms  Obsessive Compulsive Disorder: No symptoms  Eating Disorder: No symptoms  Oppositional Defiant Disorder: No symptoms  ADD /  ADHD: No symptoms  Conduct Disorder: No symptoms        Safety Issues and Plan for Safety and Risk Management:  Client denies a history of suicidal ideation, suicide attempts, self-injurious behavior, homicidal ideation, homicidal behavior and and other safety concerns    Client denies current fears or concerns for personal safety.  Client denies current or recent suicidal ideation or behaviors.  Client denies current or recent homicidal ideation or behaviors.  Client denies current or recent self injurious behavior or ideation.  Client denies other safety concerns.  Client reports there are no firearms in the house.  Recommended that patient call 911 or go to the local ED should there be a change in any of these risk factors.      Patient's Strengths and Limitations:  Client identified the following strengths or resources that will help her succeed in the assessment process: commitment to health and well being, friends / good social support, family support and positive work environment. Client identified the following supports: family and friends. Things that may interfere with the clients success in the assessment process include:none noted.    Diagnostic Criteria:  Absent      Functional Status:  Client's symptoms are causing reduced functional status in the following areas: Activities of Daily Living - difficulty completing chores in the home due to physical state. Often needs to rest.       DSM5 Diagnoses: (Sustained by DSM5 Criteria Listed Above)  Diagnoses: Other No Diagnosis Z03.89  Psychosocial & Contextual Factors: Health Concerns  WHODAS 2.0 (12 item) = 21           Attendance Agreement:  Client has signed Attendance Agreement:Yes      Preliminary Treatment Plan:    Client will return for follow-up session next month.  Client will continue with scheduled weight loss surgery clinic appointments.  she has writer's contact information and is aware that she may contact writer in the meantime as needed.       The client reports no currently identified Baptism, ethnic or cultural issues relevant to the assessment process.     services are not indicated.    Modifications to assist communication are not indicated.    The concerns identified by the client will be addressed in the assessment process.    Initial Treatment will focus on: Follow through with goals for bariatric surgery.    As a preliminary treatment goal, client continue to follow through with the goals identified by her weight loss team..    The focus of initial interventions will be to restart her exercise routine.    Collaboration / coordination with other professionals is not indicated at this time. The final report and recommendations will be routed to her care team.    Referral to another professional/service is not indicated at this time..    A Release of Information is not needed at this time.    Report to child / adult protection services was NA.    Patient will have open access to their mental health medical record.    Ade Tang PsyD LP  10/7/2019

## 2019-10-08 ASSESSMENT — ANXIETY QUESTIONNAIRES: GAD7 TOTAL SCORE: 2

## 2019-10-14 ENCOUNTER — OFFICE VISIT (OUTPATIENT)
Dept: PSYCHOLOGY | Facility: CLINIC | Age: 39
End: 2019-10-14
Payer: COMMERCIAL

## 2019-10-14 ENCOUNTER — DOCUMENTATION ONLY (OUTPATIENT)
Dept: PSYCHOLOGY | Facility: CLINIC | Age: 39
End: 2019-10-14
Payer: COMMERCIAL

## 2019-10-14 DIAGNOSIS — F51.01 PRIMARY INSOMNIA: Primary | ICD-10-CM

## 2019-10-14 PROCEDURE — 96130 PSYCL TST EVAL PHYS/QHP 1ST: CPT | Mod: 59 | Performed by: PSYCHOLOGIST

## 2019-10-14 PROCEDURE — 90834 PSYTX W PT 45 MINUTES: CPT | Performed by: PSYCHOLOGIST

## 2019-10-14 NOTE — PROGRESS NOTES
Summary of MMPI-2 Results     Client completed the [place] Multiphasic Personality Inventory-2 (MMPI-2), a self-report personality inventory, as a  part of the psychological assessment required for pursuing weight loss surgery.  Validity scales indicate that the client responded in an open and consistent manner, resulting in a valid profile.  Her responses yielded a profile that is indicative of someone who experiences symptoms of anxiety, depression, and somatic complaints. The client responded similarly to people who often reported depression, worries, and somatic complaints. They may also have low opinions of themselves and do not feel they are liked or are important. Moreover, they may feel unattractive, awkward, clumsy, useless, and a burden. Moreover, they may have low self-confidence and find it difficult to accept compliments. People who responded similarly to the client often report symptoms of anxiety, tension, somatic problems, sleep difficulties, worries, and poor concentration.  Her MMPI test results were mostly consistent with her report upon direct interview.     Assessment of eating disorder symptoms indicates that client has not met criteria for anorexia nervosa or bulimia nervosa.  Client  reported she has not used laxatives, has not purged, and has not used excessive exercise as a means of weight control.  Client does not meet criteria for binge eating disorder.  Client reported she does not eat until uncomfortably full.  she reported she does not feel disgusted, depressed, or guilty after eating.      Client's score on the PHQ-9, a brief self-report measure of depressive symptoms, was 10, indicating no depressive symptomatology. The client's responses were mostly due to difficulty sleeping, low energy, and difficulty with concentration. Client's score on the RUPERTO-7, a brief self-report measure of anxiety symptoms, was 2, indicating no anxiety symptomatology.      Ade Tang PsyD LP  10/14/2019

## 2019-10-14 NOTE — PROGRESS NOTES
Progress Note    Patient Name: Erika Reina  Date: 10/14/2019          Service Type: Individual  Video Visit: No     Session Start Time: 9:30AM  Session End Time: 10:20AM     Session Length: 50 minutes    Session #: 2    Attendees: Client attended alone       DATA  Interactive Complexity: No  Crisis: No       Progress Since Last Session (Related to Symptoms / Goals / Homework):   Symptoms: No change the client reports difficulty re-starting her exercise goal with her nutritionist.     Homework: Not previously assigned      Episode of Care Goals: Minimal progress - PREPARATION (Decided to change - considering how); Intervened by negotiating a change plan and determining options / strategies for behavior change, identifying triggers, exploring social supports, and working towards setting a date to begin behavior change     Current / Ongoing Stressors and Concerns:   Health Concerns   Busy Schedule with little free time   Difficulty with insomnia and energy     Treatment Objective(s) Addressed in This Session:   Completed questions about weight management history and concerns and benefits of bariatric surgery.     The client reported that in her 30s, she noticed that she became less physically able than she was previously. She reported some difficulty with household chores, exercising, walking long distances, and fitting into seats and seatbelts on plans and rides.     The client reported that she has had the most success with Isagenix and pre-portioned meals. She stated that she typically has a shake for breakfast, a portioned sized snack such as apple sauce, a sandwich or salad for lunch, a pre-portioned snack in the afternoon, a somewhat healthy dinner with her family, and an evening snack of chocolate or pretzels. The client was encouraged to decrease her evening snacking.     The client reported that she is not sure which type of surgery she would like at this  time, but she plans to discuss the pros and cons with her weight loss team.     The client reported that she understands the risks of surgery to be not coming out of anesthesia, bleeding out, and blood clots.     The client reported that lifestyle changes that she needs to make include decreasing portion sizes, discontinuing liquids with meals, exercising, continuing with follow-up visits, and attending support groups. She reported that she is most concerned about what to order when she goes out to a restaurant.     The client stated that her parents are both home and live with her; therefore, they will be present to help her after surgery. She stated that she has told her closest friends, sister, parents, and partner about the surgery and that they all responded positively.     The client sited her work demands, lack of free time, and choosing the right foods as her current stressors. She reported that venting to friends and listening to music in her car are her coping skills.     The client's current goals with her nutritionist are increasing exercise to 30 minutes three times a week, sleeping 7 hours a night, and no liquids with meals. The client was encouraged to exercise from home, because she has not found time to go to the gym in the last month. She reported that she is finding it difficult to get back into her exercise routine; hence, we talked about creating small achievable goals.      Intervention:   CBT: positive reinforcement  Emotion Focused Therapy: emotion checking  Motivational Interviewing: open ended questions        ASSESSMENT: Current Emotional / Mental Status (status of significant symptoms):   Risk status (Self / Other harm or suicidal ideation)   Patient denies current fears or concerns for personal safety.   Patient denies current or recent suicidal ideation or behaviors.   Patientdenies current or recent homicidal ideation or behaviors.   Patient denies current or recent self injurious  behavior or ideation.   Patient denies other safety concerns.   Patient Patient reports there has been no change in risk factors since their last session.     PatientPatient reports there has been no change in protective factors since their last session.     Recommended that patient call 911 or go to the local ED should there be a change in any of these risk factors.     Appearance:   Appropriate    Eye Contact:   Good    Psychomotor Behavior: Normal    Attitude:   Cooperative    Orientation:   All   Speech    Rate / Production: Normal     Volume:  Normal    Mood:    Normal   Affect:    Appropriate    Thought Content:  Clear    Thought Form:  Coherent  Logical    Insight:    Good      Medication Review:   No current psychiatric medications prescribed     Medication Compliance:   NA     Changes in Health Issues:   None reported     Chemical Use Review:   Substance Use: Chemical use reviewed, no active concerns identified      Tobacco Use: No current tobacco use.      Diagnosis:  1. Primary insomnia      Insomnia Disorder Diagnostic Criteria:  A. A predominant complaint of dissatisfaction with sleep quantity or quality, associated with one (or more) of the following symptoms:  Difficulty initiating sleep. (In children, this may manifest as difficulty initiating sleep without caregiver intervention.) YES  Difficulty maintaining sleep, characterized by frequent awakenings or problems returning to sleep after awakenings. (In children, this may manifest as difficulty returning to sleep without caregiver intervention.)  Early-morning awakening with inability to return to sleep.    B. The sleep disturbance causes clinically significant distress or impairment in social, occupational, educational, academic, behavioral, or other important areas of functioning. YES    C. The sleep difficulty occurs at least 3 nights per week.YES  D. The sleep difficulty is present for at least 3 months.YES  E. The sleep difficulty occurs  despite adequate opportunity for sleep.YES  F. The insomnia is not better explained by and does not occur exclusively during the course of another sleep-wake disorder (e.g., narcolepsy, a breathing-related sleep disorder, a circadian rhythm sleep-wake disorder, a parasomnia).YES  G. The insomnia is not attributable to the physiological effects of a substance (e.g., a drug of abuse, a medication).YES  H. Coexisting mental disorders and medical conditions do not adequately explain the predominant complaint of insomnia.YES    Persistent     Collateral Reports Completed:   Not Applicable    PLAN: (Patient Tasks / Therapist Tasks / Other)  The client plans to drinks three glasses of water before snacking when she feels tired and when she is working. 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     I plan to see the client for her scheduled follow-up appointment to discuss her progress and any struggles with her goals.     Ade Tang PsyD LP 10/14/2019

## 2019-10-15 ENCOUNTER — OFFICE VISIT (OUTPATIENT)
Dept: SLEEP MEDICINE | Facility: CLINIC | Age: 39
End: 2019-10-15
Attending: PHYSICIAN ASSISTANT
Payer: COMMERCIAL

## 2019-10-15 VITALS
SYSTOLIC BLOOD PRESSURE: 130 MMHG | HEIGHT: 68 IN | HEART RATE: 79 BPM | RESPIRATION RATE: 20 BRPM | DIASTOLIC BLOOD PRESSURE: 85 MMHG | BODY MASS INDEX: 44.41 KG/M2 | OXYGEN SATURATION: 97 % | WEIGHT: 293 LBS

## 2019-10-15 DIAGNOSIS — E66.01 MORBID OBESITY WITH BODY MASS INDEX (BMI) OF 50.0 TO 59.9 IN ADULT (H): ICD-10-CM

## 2019-10-15 DIAGNOSIS — R51.9 SLEEP RELATED HEADACHES: ICD-10-CM

## 2019-10-15 DIAGNOSIS — R06.83 SNORING: Primary | ICD-10-CM

## 2019-10-15 PROCEDURE — 99215 OFFICE O/P EST HI 40 MIN: CPT | Performed by: PHYSICIAN ASSISTANT

## 2019-10-15 RX ORDER — MELATONIN 2.5 MG
5 TABLET,CHEWABLE ORAL
COMMUNITY
Start: 2019-10-15 | End: 2020-10-02

## 2019-10-15 ASSESSMENT — MIFFLIN-ST. JEOR: SCORE: 2317

## 2019-10-15 NOTE — NURSING NOTE
"/85   Pulse 79   Resp 20   Ht 1.727 m (5' 8\")   Wt (!) 158.8 kg (350 lb 3.2 oz)   SpO2 97%   BMI 53.25 kg/m      ESS-14  Neck-41cm  DME-None  Med Rec complete    Kalyn Zapien, Medical Assistant 10/15/2019 10:03 AM    "

## 2019-10-15 NOTE — PROGRESS NOTES
Sleep Consultation:    Date on this visit: 10/15/2019    Erika Reina is sent by Swati Hoffman for a sleep consultation regarding ENRICO.    Primary Physician: Glenna Ham     Erika Reina presents for evaluation of sleep apnea in preparation for weight loss surgery. She has felt tired all of the time in the last 2 years. She has had trouble sleeping since about high school. Her medical history is significant for PCOS, endometrial cancer (s/p hysterectomy with bilateral salpingo-oophorectomy), anemia.     Erika goes to sleep at 11:00 PM to midnight during the week. She wakes up at 7:00 AM to 10 AM, depending on her work schedule. She does not feel better by sleeping more. She uses an alarm. She often wakes naturally at 7-8 AM and may toss and turn until she decides to get out of bed. She falls asleep in 30-45 minutes with melatonin, 90 minutes without melatonin.  Erika has difficulty falling asleep and, to a lesser extent, staying asleep.  She takes melatonin gummies. She has been on that for a month and that has been helpful. She wakes up 3-4 times a night for 5-10 minutes before falling back to sleep.  Erika wakes up to change positions or go to the bathroom.  On weekends, Erika goes to sleep at 11:00 PM to midnight.  She wakes up at 8:00 AM without an alarm. She falls asleep in 30-45 minutes. Her sleep quality is the same on days off. Patient gets an average of 6-7 hours of sleep per night.     Patient does use electronics in bed (15-20 minutes) and watch TV in bed (up to an hour) and does not read in bed. Her mind wanders at night, not worrying.    Erika does do shift work.  She works day/evening shifts.  She works at "[employer]. She my start as early as 9 AM and may work as late as about 9:45 PM. She works more evening shifts but it varies daily. She sometimes works 12 hour shifts. She works 5-6 days per week. She has another part-time job 3 times per week (T-Th  afternoons and Saturday mornings). She lives with her parents and sister.    Erika does snore but is not sure how often. Her snoring is loud. Patient does not have a regular bed partner. Her sister can occasionally hear her snoring through the wall. There is no report of gasping, snorting or witnessed apneas.  Patient sleeps on her back and side. She wakes with a headache 1-2 times per week (over the last few months, lasts 1-2 hours, forehead, has tried reducing caffeine without much benefit, none at other times of day). She has had CO2 levels as high as 30 on metabolic panel. She has occasional snort arousals, denies morning dry mouth and morning confusion. She has frequent pain in her legs which makes her have an urge to move. The symptoms are constant throughout the day. She had anemia before her hysterectomy and the leg symptoms were not worse at that time. Erika has occasional sleep talking (once, said chicken tenders) and denies any bruxism, sleep walking, dream enactment, sleep paralysis, cataplexy and hypnogogic/hypnopompic hallucinations.    Erika has claustrophobia, reflux at night, heartburn and anxiety, denies difficulty breathing through her nose and depression.      Erika has gained 20-30 pounds in the last 5 years.  Patient describes themself as a night person.  She would prefer to go to sleep at 1:30 AM and wake up at 8:30 AM.  Patient's [place] Sleepiness score 14/24 consistent with moderate daytime sleepiness.  She is often tired throughout the day but gets better energy at night. Her energy may be worse 3:30-4 PM.    Erika naps 0-1 times per week for 30 minutes, feels refreshed after naps. 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 She does not doze at work.  She denies dozing while driving. She does sometimes feel sleepy while driving, mostly in the afternoon.  Patient was counseled on the importance of driving while alert, to pull over if drowsy, or nap before  getting into the vehicle if sleepy.  She uses 1-2 cups/day of coffee, 1 5-hr energy drinks/day. Last caffeine intake is usually before 3 PM.      Allergies:    Allergies   Allergen Reactions     Ibuprofen Swelling     Swelling of legs       Medications:    Current Outpatient Medications   Medication Sig Dispense Refill     multivitamin, therapeutic with minerals (MULTI-VITAMIN) TABS tablet Take 1 tablet by mouth daily       VITAMIN D, CHOLECALCIFEROL, PO Take by mouth daily         Problem List:  Patient Active Problem List    Diagnosis Date Noted     Endometrial cancer (H) 12/19/2018     Priority: Medium     SCP data entered.  Give at 7/11/19 apt.       Microcytic anemia 11/21/2016     Priority: Medium     Plantar fasciitis 10/18/2016     Priority: Medium     Morbid obesity due to excess calories (H) 10/18/2016     Priority: Medium     Hyperlipidemia LDL goal <160 09/15/2011     Priority: Medium        Past Medical/Surgical History:  Past Medical History:   Diagnosis Date     Endometrial cancer (H) 12/19/2018     Hyperlipidemia LDL goal <160 9/15/2011     Past Surgical History:   Procedure Laterality Date     ARTHROPLASTY KNEE       CYSTOSCOPY N/A 12/13/2018    Procedure: Cystoscopy;  Surgeon: Cleopatra Altamirano MD;  Location:  OR     DAVINCI HYSTERECTOMY TOTAL, BILATERAL SALPINGO-OOPHORECTOMY, NODE DISSECTION, COMBINED N/A 12/13/2018    Procedure: DaVinci Assisted Removal Of Uterus, Cervix, Both Ovaries And Fallopian Tubes, Bilateral [place] Lymph Node Dissection;  Surgeon: Cleopatra Altamirano MD;  Location:  OR       Social History:  Social History     Socioeconomic History     Marital status: Single     Spouse name: Not on file     Number of children: Not on file     Years of education: Not on file     Highest education level: Not on file   Occupational History     Not on file   Social Needs     Financial resource strain: Not on file     Food insecurity:     Worry: Not on file     Inability:  Not on file     Transportation needs:     Medical: Not on file     Non-medical: Not on file   Tobacco Use     Smoking status: Never Smoker     Smokeless tobacco: Never Used   Substance and Sexual Activity     Alcohol use: Yes     Alcohol/week: 0.0 standard drinks     Binge frequency: Never     Comment: rarely.  Once monthly 1-2 drinks     Drug use: No     Sexual activity: Yes     Partners: Male     Birth control/protection: Condom   Lifestyle     Physical activity:     Days per week: Not on file     Minutes per session: Not on file     Stress: Not on file   Relationships     Social connections:     Talks on phone: Not on file     Gets together: Not on file     Attends Rastafarian service: Not on file     Active member of club or organization: Not on file     Attends meetings of clubs or organizations: Not on file     Relationship status: Not on file     Intimate partner violence:     Fear of current or ex partner: Not on file     Emotionally abused: Not on file     Physically abused: Not on file     Forced sexual activity: Not on file   Other Topics Concern     Parent/sibling w/ CABG, MI or angioplasty before 65F 55M? No   Social History Narrative     Not on file       Family History:  Family History   Problem Relation Age of Onset     Diabetes Mother      Obesity Mother      Hypertension Mother      Diabetes Father      Hypertension Father      Cancer - colorectal Maternal Grandmother      Heart Disease Paternal Grandmother      Obesity Brother        Review of Systems:  A complete review of systems reviewed by me is negative with the exeption of what has been mentioned in the history of present illness.  CONSTITUTIONAL: NEGATIVE for weight gain/loss, fever, chills, sweats or night sweats.  CONSTITUTIONAL:  POSITIVE for  drug allergies ibuprofen  EYES: NEGATIVE for blind spots, double vision.  EYES:  POSITIVE for changes in vision  ENT: NEGATIVE for ear pain, sore throat, sinus pain, post-nasal drip, runny nose,  "bloody nose  CARDIAC: NEGATIVE for fast heartbeats or fluttering in chest, chest pain or pressure, breathlessness when lying flat, swollen legs or swollen feet.  CARDIAC:  POSITIVE for  Elevated blood pressure  NEUROLOGIC: POSITIVE headaches, weakness or numbness in the arms or legs.  DERMATOLOGIC: NEGATIVE for rashes, new moles or change in mole(s)  PULMONARY: NEGATIVE SOB at rest, SOB with activity, dry cough, productive cough, coughing up blood, wheezing or whistling when breathing.    GASTROINTESTINAL: NEGATIVE for nausea or vomitting, loose or watery stools, fat or grease in stools, constipation, abdominal pain, bowel movements black in color or blood noted.  GENITOURINARY: NEGATIVE for pain during urination, blood in urine, irregular menstrual periods.  GENITOURINARY:  POSITIVE for  urinating more frequently than usual  MUSCULOSKELETAL:  POSITIVE for  muscle pain, bone or joint pain and swollen joints  ENDOCRINE: NEGATIVE for increased thirst, diabetes.  ENDOCRINE:  POSITIVE for  increased urination  LYMPHATIC: NEGATIVE for swollen lymph nodes, lumps or bumps in the breasts or nipple discharge.  MENTAL HEALTH: POSITIVE for anxiety    Physical Examination:  Vitals: /85   Pulse 79   Resp 20   Ht 1.727 m (5' 8\")   Wt (!) 158.8 kg (350 lb 3.2 oz)   SpO2 97%   BMI 53.25 kg/m      Neck Cir (cm): 41 cm    [place] Total Score 10/15/2019   Total score - [place] 14       SIXTO Total Score: 17 (10/15/19 0958)    GENERAL APPEARANCE: healthy, alert, no distress and cooperative  EYES: Eyes grossly normal to inspection, PERRL, conjunctivae and sclerae normal and lids and lashes normal  HENT: nose and mouth without ulcers or lesions, oral mucous membranes moist and oropharynx clear  NECK: no adenopathy, no asymmetry, masses, or scars, thyroid normal to palpation and trachea midline and normal to palpation  RESP: lungs clear to auscultation - no rales, rhonchi or wheezes  CV: regular rates and rhythm, normal S1 S2, " no S3 or S4, no murmur, click or rub and no irregular beats  LYMPHATICS: no cervical adenopathy  MS: extremities normal- no gross deformities noted  NEURO: Normal strength and tone, mentation intact, speech normal and cranial nerves 2-12 intact  Mallampati Class: I.  Tonsillar Stage: 1  hidden by pillars.    Impression/Plan:    (R06.83) Snoring  (primary encounter diagnosis), (Z68.43) BMI 50.0-59.9, adult (H), (R51) Sleep related headaches  Comment: Ana presents for evaluation of sleep disordered breathing at the request of her weight loss provider in preparation for bariatric surgery. She does snore. Her snoring can be loud enough to be heard through the wall at times. She does not have a bed partner so does not know how often she snores. She has not been observed to have pauses in breathing. She has had low daytime energy for the last couple of years. Her ESS is 14/24. She will doze off if sedentary at home, but denies dozing at work. She sometimes feels sleepy while driving, but denies head nodding. Her BMI is 53, neck circumference > 40 cm (41 cm). She has headaches upon awakening 1-2 times per week. Negative risk factors include; no observed apnea, no HTN, age <50 (39), female gender. There is risk for hypoventilation given her BMI and morning headaches. However, her SpO2 is 97% today. Her CO2 has been as high as 30 on metabolic panel.  Plan: Comprehensive Sleep Study        Split night PSG with CPAP/BiPAP titration if indicated.        Literature provided regarding sleep apnea.      She will follow up with me in approximately two weeks after her sleep study has been competed to review the results and discuss plan of care.       Polysomnography reviewed.  Obstructive sleep apnea reviewed.  Complications of untreated sleep apnea were reviewed.  45 minutes was spent during this visit, over 50% in counseling and coordination of care.   Bennett Goltz, PA-C    CC: Swati Hoffman

## 2019-10-18 ENCOUNTER — OFFICE VISIT (OUTPATIENT)
Dept: SURGERY | Facility: CLINIC | Age: 39
End: 2019-10-18
Payer: COMMERCIAL

## 2019-10-18 VITALS — WEIGHT: 293 LBS | BODY MASS INDEX: 52.12 KG/M2

## 2019-10-18 DIAGNOSIS — E66.01 MORBID OBESITY DUE TO EXCESS CALORIES (H): ICD-10-CM

## 2019-10-18 PROCEDURE — 97803 MED NUTRITION INDIV SUBSEQ: CPT | Performed by: DIETITIAN, REGISTERED

## 2019-10-18 NOTE — PROGRESS NOTES
"PRE SURGICAL WEIGHT LOSS NUTRITION APPOINTMENT    Erika Reina  1980  female  [id number]  39 year old    ASSESSMENT    Desired Surgical Procedure: undecided     REASON FOR VISIT:  Erika Reina is a 39 year old year old female presents today for a pre-surgical weight loss follow-up appointment. Patient accompanied by self.    DIAGNOSIS:  Weight Status Obesity Grade III BMI >40    ANTHROPOMETRICS:  Height: 5'9\"  Initial Weight: 353.6  Weight last visit: 348 lbs    Current weight: 342.8 lbs   BMI: 52.1 Kg/(m^2)    VITAMINS AND MINERALS:  1 Multivitamin with Minerals (Pillow's Complete - HS)  600 mg Calcium with Vitamin D (AM, lunch)  5000 International units Vitamin D    NUTRITION HISTORY:  Breakfast: protein shake or scrambled eggs (8:00 AM)  Snack: yogurt   Lunch: turkey sandwich + vegetables + baked chips (1:00-3:00)  Snack: apple   Supper: spaghetti  meat + potato + vegetable or chicken/turkey or beef stew (8:00)  Snacks: vegetables or fruit, popcorn, nuts    Fluids Consumed: Water (100oz), coffee, milk (skim)   Eating slower: Yes  Chewing foods thoroughly: Yes  Take 20-30 minutes to consume each meal: Yes  Fluids and meals separate by at least 30 minutes: No  Carbonation: none  Caffeine: coffee 1 per day   Additional Information: Patient was traveling for 2 weeks and tried to stay focused on behavior changes for surgery.  Patient will be having sleep study in November.  Explained if needs sleep device, she will need to wait for surgery longer.  Patient checked insurance and states does not have structured weight loss.    PHYSICAL ACTIVITY:  Type: none     DIAGNOSIS:  Previous Nutrition Diagnosis: Obesity related to long history of self- monitoring deficit and excessive energy intake evidenced by BMI of 53.8 kg/m2  No change, modified below    Previous goals:   Exercise for 30 minutes, 3x/week-not  Met   Aim for 7h of sleep-improving   Consistently separate fluids and meals by 30 " minutes-improving     Current Nutrition Diagnosis: Obesity related to long history of self-monitoring deficit and excessive energy intake as evidenced by BMI of 52.1 kg/m2.    INTERVENTION:  Nutrition Prescription: Recommended energy/nutrient modification.    GOALS:  Eliminate caffeine  Exercise 3 times per week for 30 minutes  Avoid fluids with meals    Intervention:  - Discussed progress towards previous goals.  - Reinforced importance of making behavior changes in preparation for bariatric surgery.   - Assessed learning needs and learning preferences     NUTRITION MONITORING AND EVALUATION:  Anticipated compliance: fair-good  Patient demonstrated good understanding.     Follow up: Continue to monitor patient closely regarding weight loss and diet.  # of visits needed: 1  Cleared by RD: No     TIME SPENT WITH PATIENT: 25 minutes

## 2019-11-04 ENCOUNTER — OFFICE VISIT (OUTPATIENT)
Dept: PSYCHOLOGY | Facility: CLINIC | Age: 39
End: 2019-11-04
Payer: COMMERCIAL

## 2019-11-04 DIAGNOSIS — F51.01 PRIMARY INSOMNIA: ICD-10-CM

## 2019-11-04 DIAGNOSIS — E66.01 MORBID OBESITY WITH BODY MASS INDEX OF 50 OR HIGHER (H): Primary | ICD-10-CM

## 2019-11-04 PROCEDURE — 96152 HC HEALTH AND BEHAVIOR INTERVENTION, INDIVIDUAL, EACH 15 MINUTES: CPT | Performed by: PSYCHOLOGIST

## 2019-11-04 ASSESSMENT — ANXIETY QUESTIONNAIRES
3. WORRYING TOO MUCH ABOUT DIFFERENT THINGS: NOT AT ALL
IF YOU CHECKED OFF ANY PROBLEMS ON THIS QUESTIONNAIRE, HOW DIFFICULT HAVE THESE PROBLEMS MADE IT FOR YOU TO DO YOUR WORK, TAKE CARE OF THINGS AT HOME, OR GET ALONG WITH OTHER PEOPLE: NOT DIFFICULT AT ALL
5. BEING SO RESTLESS THAT IT IS HARD TO SIT STILL: NOT AT ALL
GAD7 TOTAL SCORE: 0
7. FEELING AFRAID AS IF SOMETHING AWFUL MIGHT HAPPEN: NOT AT ALL
2. NOT BEING ABLE TO STOP OR CONTROL WORRYING: NOT AT ALL
1. FEELING NERVOUS, ANXIOUS, OR ON EDGE: NOT AT ALL
6. BECOMING EASILY ANNOYED OR IRRITABLE: NOT AT ALL

## 2019-11-04 ASSESSMENT — PATIENT HEALTH QUESTIONNAIRE - PHQ9
5. POOR APPETITE OR OVEREATING: NOT AT ALL
SUM OF ALL RESPONSES TO PHQ QUESTIONS 1-9: 3

## 2019-11-04 NOTE — PROGRESS NOTES
"Progress Note    Client Name: Erika Renia Date: 11/4/2019          Service Type: Individual      Session Start Time: 2:09PM  Session End Time: 2:50PM      Session Length: 41 minutes     Session #: 3     Attendees: Client attended alone       DATA       Progress Since Last Session (Related to Symptoms / Goals / Homework):   Symptoms: Improved    Homework: Good Progress      Current / Ongoing Stressors and Concerns:   Health Concerns   Lethargy      Treatment Objective(s) Addressed in This Session:       Completed One Month Follow Up post pre-Surgical Psychological Assessment     Intervention:   One month follow-up post psychological assessment.  Reviewed client's  progress with homework over the past month.  The client was proud to report that she has completed her weight loss goal. She reported that she is doing well with drinking water and choosing vegetables to snack on when she is tired at work. The client attempted to attend another weight loss support group, but was unable to. She plans to be scheduled off from work for the next meeting. The client reported that she is choosing to eat vegetables or apples with peanut butter when she is volunteering as opposed to eating sandwiches or fast food. The client reported that she is making progress with getting back into her exercise routine; however, she continues to struggle with finding time to go to the gym. The client was encouraged to ask three people in her life if they would consider being a workout partner to her.    Discussed changes that may occur in relationships following weight loss surgery  and how to manage these changes.   The client reported that she does not foresee people in her life trying to encourage her to \"cheat\" on her diet, but that she could see her sister \"food policing\" her. Additionally, she is starting to see a shift in her social group, in which people are choosing healthier diets. Hence, she thinks that she will still be " "included in socializing even when she cannot drink alcohol or eat fried food.    Reviewed results of the MMPI-2 evaluation.  Client's questions were answered. The client reported that she was surprised to hear that there were responses indicating \"low opinions of themselves and do not feel they are liked or are important. Moreover, they may feel unattractive, awkward, clumsy, useless, and a burden.\" When this writer read some of the client's answers to explain those responses, she indicated that she might not have meant the answers that she provided.     Interventions  CBT: positive reinforcement  Emotion Focused Therapy: emotion checking  Motivational Interviewing: open ended questions            Suggested to ask three people to be a work out partner, attend another weight loss support group meeting, and continue with healthier food options when volunteering.       The patient has struggled with non-compliance with medical treatments and encouragement to lose weight in the past; therefore, there was a discussion about how she can follow through with the goals and tasks from her Weight Loss team with [facility].    Goals:   Client will work on the following homework assignment:  ask three people to be a work out partner, attend another weight loss support group meeting, and continue with healthier food options when volunteering.        ASSESSMENT: Current Emotional / Mental Status (status of significant symptoms):   Risk status (Self / Other harm or suicidal ideation)   Client denies current fears or concerns for personal safety.   Client denies current or recent suicidal ideation or behaviors.   Client denies current or recent homicidal ideation or behaviors.   Client denies current or recent self injurious behavior or ideation.   Client denies other safety concerns.   Recommended that patient call 911 or go to the local ED should there be a change in any of these risk factors.     Appearance:   Appropriate "    Eye Contact:   Good    Psychomotor Behavior: Normal    Attitude:   Cooperative    Orientation:   All   Speech    Rate / Production: Normal     Volume:  Normal    Mood:    Normal   Affect:    Appropriate    Thought Content:  Clear    Thought Form:  Coherent  Logical    Insight:    Good   The client apologized for arriving 7 minutes late and stated that she had several red lights on her drive.      Medication Review:   No current psychiatric medications prescribed     Medication Compliance:   NA     Changes in Health Issues:   None reported     Chemical Use Review:   Substance Use: Chemical use reviewed, no active concerns identified The client has stopped all caffeine use.     Tobacco Use: No current tobacco use.       Collateral Reports Completed:   Routed note to Care Team Member(s)    Summary and Final Recommendation:    To summarize the 3 primary conditions being evaluated in the psychological assessment:     1. Client is prepared to comply with ongoing aftercare and lifestyle  changes after surgery--condition satisfied     Client has made some meaningful initial changes to their eating and activity habits.  Client reported an understanding of the need for lifestyle changes to eating and activity habits.  Client reported they are willing to focus on making lifestyle changes to eating and activity habits post surgery.   She reported that she is doing well with drinking water and choosing vegetables to snack on when she is tired at work. The client reported that she is choosing to eat vegetables or apples with peanut butter when she is volunteering as opposed to eating sandwiches or fast food. The client reported that she is making progress with getting back into her exercise routine; however, she continues to struggle with finding time to go to the gym. The client was encouraged to ask three people in her life if they would consider being a workout partner to her.       2. Client is emotionally stable to  proceed with surgery--condition satisfied     The client denied a history of mental health symptoms. She stated that she has not received a mental health diagnosis in the past. The client struggles with insomnia and lethargy that appears to be due to lack of sleep. Hence, her elevated score on the PHQ-9 does not appear to be indicative of depression.      3. Client is cognitively capable of understanding the risks of the  procedure--condition satisfied     Client has been able to demonstrate that she understands the risks of surgery (compared to the risks of not having surgery).  The client reported that she understands the risks of surgery to be not coming out of anesthesia, bleeding out, and blood clots.      With respect to remaining risk factors, the client would like to proceed with the bariatric surgery process.     PLAN: (Client Tasks / Therapist Tasks / Other)    Client has completed psychological assessment required for bariatric surgery  Complete report will be forwarded to the weight loss surgery clinic for review.      Recommend standard post-surgical follow up, with sessions 1 and 3 months postsurgery, to assess client's functioning and adjustment post-surgical lifestyle.      Client was encouraged to attend a weight loss surgery support group, starting before surgery and continuing afterwards, for additional accountability and support.    Client was provided with writer's contact information and is aware that she may contact writer sooner as needed.      Ade Tang PsyD LP  11/4/2019

## 2019-11-04 NOTE — Clinical Note
Biju Longoria. The client has satisfied all three criteria. I will route her final report to you within the next two weeks. Please let me know if you have any questions. Ade

## 2019-11-05 ASSESSMENT — ANXIETY QUESTIONNAIRES: GAD7 TOTAL SCORE: 0

## 2019-11-06 ENCOUNTER — THERAPY VISIT (OUTPATIENT)
Dept: SLEEP MEDICINE | Facility: CLINIC | Age: 39
End: 2019-11-06
Payer: COMMERCIAL

## 2019-11-06 ENCOUNTER — DOCUMENTATION ONLY (OUTPATIENT)
Dept: PSYCHOLOGY | Facility: CLINIC | Age: 39
End: 2019-11-06
Payer: COMMERCIAL

## 2019-11-06 DIAGNOSIS — G47.33 OSA (OBSTRUCTIVE SLEEP APNEA): Primary | ICD-10-CM

## 2019-11-06 DIAGNOSIS — R51.9 SLEEP RELATED HEADACHES: ICD-10-CM

## 2019-11-06 DIAGNOSIS — E66.01 MORBID OBESITY WITH BODY MASS INDEX (BMI) OF 50.0 TO 59.9 IN ADULT (H): ICD-10-CM

## 2019-11-06 DIAGNOSIS — E66.01 MORBID OBESITY WITH BODY MASS INDEX OF 50 OR HIGHER (H): Primary | ICD-10-CM

## 2019-11-06 DIAGNOSIS — R06.83 SNORING: ICD-10-CM

## 2019-11-06 PROCEDURE — 99207 C NO CHARGE LOS: CPT | Mod: 59 | Performed by: PSYCHOLOGIST

## 2019-11-06 PROCEDURE — 95811 POLYSOM 6/>YRS CPAP 4/> PARM: CPT | Performed by: PSYCHIATRY & NEUROLOGY

## 2019-11-06 NOTE — PROGRESS NOTES
"                                                                                           Adult Bariatric Pre-Surgical Psychological Assessment         CLIENT'S NAME:       Erika Reina  MRN:                           [id number]  :                           1980  ACCT. NUMBER:       [id number]  DATE OF SERVICE:  10/07/19, 10/14/2019, 2019, 2019         Identifying Information:  Client is a 39 year old, , female, single with an undefined relationship. Client was referred for an evaluation by the [facility] Weight Loss Surgery Team. Client is currently employed full time and reports she is able to function appropriately at work. Client attended the session alone.         Client's Statement of Presenting Concern:  Client is presenting for a psychological assessment as a routine part of the process for pursuing weight loss surgery. The client reported difficulty losing weight. She stated that she has lost 20 pounds in the last 5 months and has maintained a 15 pound weight loss. The client reported needing direction to assist with weight loss and is hopeful that the [facility] weight loss clinic will help her attain a healthy weight.         History of Presenting Concern:  Client reported that she has \"always been overweight.\" The client reported that she recalls being overweight since elementary school, but that the weight has increased over the last 7-10 years. The client reported that many people in her family are overweight and that several of them also gained more weight in their 30s.   Client reports they have attempted to lose weight in the past through Weight Watchers, Isagenix, Herbalife, and Atkins. The most weight loss has been with Isagenix and that is a weight loss of 20 pounds. The client has kept 15 pounds off for 5 months.  The client reports they believe the surgery will benefit them by helping her to have more energy, not having to rent a scooter when at [employer], fitting on airplanes, more energy at work, and hopeful for less leg pain.  Client has attempted to resolve these concerns in the past through diet, exercise, and herbs. Client reports that other professional(s) are involved in providing support / services. The client is currently receiving services through the [facility] weight loss clinic.      Since starting at the weight loss surgery clinic, the client has made some important initial changes to her eating and drinking habits: She has eliminated caffeine, increased her water intake, chooses vegetables instead of preferred foods for snacks, and eats fewer carbohydrates. The client is making progress in incorporating exercise into her weekly routine. The client reports that she has achieved her weight loss goal requirement.       The client denies a history of mental health.     Social History:  Client reported she grew up in [place], [place]. They were the fourth born of four children. This is an intact family and parents remain . Client reported that her childhood was \"happy and good.\" Client described her current relationships with family of origin as supportive with frequent communication. The client lives with her sister and both parents. The client helps her family with finances.      Client reported a history of one committed relationship. Client has been in a relationship  for one year; however, the relationship is not disclosed to loved ones at this time. Client reported having no children. Client identified extensive stable and meaningful social connections. Client reported that she has not been involved with the legal system.  Client's highest education level was some college. Client did not identify any learning problems.There are no ethnic, cultural or Yazidi factors that may be relevant for the assessment process.  Client identified her preferred language to be English. Client reported she does not need the assistance of an  or " other support involved in therapy. Modifications will not be used to assist communication in therapy. Client did not serve in the .      Client reports family history includes Cancer - colorectal in her maternal grandmother; Diabetes in her father and mother; Heart Disease in her paternal grandmother; Hypertension in her father and mother; Obesity in her brother and mother.     Mental Health History:  Client reported the following biological family members or relatives with mental health issues: Brother experienced Bipolar Disorder. Client has not received mental health services in the past. Hospitalizations: None.  Client is not currently receiving any mental health services.        Chemical Health History:  Client reported no family history of chemical health issues. Client has not received chemical dependency treatment in the past. Client is not currently receiving any chemical dependency treatment. Client reports no problems as a result of their drinking / drug use.        Client Reports:  Client reports using alcohol 1 times per month and has 2 mixed drinks and hard ciders at a time. Patient first started drinking at age 22.  Patient reported date of last use was within the last week.  Patient reports heaviest use is current use. The client denies every drinking more than two drinks a month on average.  Client denies using tobacco.  Client denies using marijuana.  Client denies using caffeine.   Client denies using street drugs.  Client denies the non-medical use of prescription or over the counter drugs.     CAGE: None of the patient's responses to the CAGE screening were positive / Negative CAGE score   Based on the negative Cage-Aid score and clinical interview there  are not indications of drug or alcohol abuse.     Discussed the general effects of drugs and alcohol on health and well-being. Therapist gave client printed information about the effects of chemical use on her health and well being.  We  also discussed the specific risks of alcohol use following bariatric surgery, including issues related to cross-addiction.         Significant Losses / Trauma / Abuse / Neglect Issues:  There are no indications or report of: significant losses, trauma, abuse or neglect.     Issues of possible neglect are not present.        Medical Issues:  Client has had a physical exam to rule out medical causes for current symptoms. Date of last physical exam was within the past year. Client was encouraged to follow up with PCP if symptoms were to develop. The client has a [facility] Provider, who is named Glenna Ham.. The client reports not having a psychiatrist. Review of medical record indicates the following current medical concerns: mycrotic anemia, hyperlipidemia, plantar fascitis, obesity, and history of endometrial cancer. The client reports the presence of chronic or episodic pain in the form of leg, knee, and feet pain. The pain level is moderate and has a frequency of daily.. There are significant nutritional concerns. The client has morbid obesity.      Client reports current meds as:        Current Outpatient Medications   Medication Sig     multivitamin, therapeutic with minerals (MULTI-VITAMIN) TABS tablet Take 1 tablet by mouth daily     VITAMIN D, CHOLECALCIFEROL, PO Take by mouth daily      No current facility-administered medications for this visit.          Client Allergies:        Allergies   Allergen Reactions     Ibuprofen Swelling       Swelling of legs         Medical History:  Past Medical History   Past Medical History:   Diagnosis Date     Endometrial cancer (H) 12/19/2018     Hyperlipidemia LDL goal <160 9/15/2011               Medication Adherence:  N/A - Client does not have prescribed psychiatric medications.     Client was provided recommendation to follow-up with prescribing physician.     Mental Status Assessment:  Appearance:                            Appropriate   Eye  Contact:                           Good   Psychomotor Behavior:          Normal   Attitude:                                   Cooperative   Orientation:                             All  Speech              Rate / Production:       Normal               Volume:                       Normal   Mood:                                      Normal  Affect:                                      Appropriate   Thought Content:                    Clear   Thought Form:                        Coherent  Logical   Insight:                                     Good         Review of Symptoms:  Depression:     Sleep Energy Concentration Psychomotor slowing or agitation  Madie:             No symptoms  Psychosis:       No symptoms  Anxiety:           No symptoms  Panic:              No symptoms  Post Traumatic Stress Disorder:        No symptoms  Obsessive Compulsive Disorder:       No symptoms  Eating Disorder:          No symptoms  Oppositional Defiant Disorder:           No symptoms  ADD / ADHD:  No symptoms  Conduct Disorder:       No symptoms           Safety Issues and Plan for Safety and Risk Management:  Client denies a history of suicidal ideation, suicide attempts, self-injurious behavior, homicidal ideation, homicidal behavior and and other safety concerns     Client denies current fears or concerns for personal safety.  Client denies current or recent suicidal ideation or behaviors.  Client denies current or recent homicidal ideation or behaviors.  Client denies current or recent self injurious behavior or ideation.  Client denies other safety concerns.  Client reports there are no firearms in the house.  Recommended that patient call 911 or go to the local ED should there be a change in any of these risk factors.    Patient's Strengths and Limitations:  Client identified the following strengths or resources that will help her succeed in the assessment process: commitment to health and well being, friends / good social  support, family support and positive work environment. Client identified the following supports: family and friends. Things that may interfere with the clients success in the assessment process include:none noted.      Psychological Testing:     Client completed the [place] Multiphasic Personality Inventory-2 (MMPI-2), a self-report personality inventory, as a  part of the psychological assessment required for pursuing weight loss surgery.  Validity scales indicate that the client responded in an open and consistent manner, resulting in a valid profile.  Her responses yielded a profile that is indicative of someone who experiences symptoms of anxiety, depression, and somatic complaints. The client responded similarly to people who often reported depression, worries, and somatic complaints. They may also have low opinions of themselves and do not feel they are liked or are important. Moreover, they may feel unattractive, awkward, clumsy, useless, and a burden. Moreover, they may have low self-confidence and find it difficult to accept compliments. People who responded similarly to the client often report symptoms of anxiety, tension, somatic problems, sleep difficulties, worries, and poor concentration.  Her MMPI test results were mostly consistent with her report upon direct interview.      Assessment of eating disorder symptoms indicates that client has not met criteria for anorexia nervosa or bulimia nervosa.  Client  reported she has not used laxatives, has not purged, and has not used excessive exercise as a means of weight control.  Client does not meet criteria for binge eating disorder.  Client reported she does not eat until uncomfortably full.  she reported she does not feel disgusted, depressed, or guilty after eating.       Client's score on the PHQ-9, a brief self-report measure of depressive symptoms, ranged from 10 to 3, indicating no depressive symptomatology. The client's responses were mostly due  to difficulty sleeping, low energy, and difficulty with concentration. Client's score on the RUPERTO-7, a brief self-report measure of anxiety symptoms, was 2, indicating no anxiety symptomatology.           Diagnostic Criteria:  Insomnia Disorder Diagnostic Criteria:  A. A predominant complaint of dissatisfaction with sleep quantity or quality, associated with one (or more) of the following symptoms:  Difficulty initiating sleep. (In children, this may manifest as difficulty initiating sleep without caregiver intervention.) YES  Difficulty maintaining sleep, characterized by frequent awakenings or problems returning to sleep after awakenings. (In children, this may manifest as difficulty returning to sleep without caregiver intervention.)  Early-morning awakening with inability to return to sleep.     B. The sleep disturbance causes clinically significant distress or impairment in social, occupational, educational, academic, behavioral, or other important areas of functioning. YES     C. The sleep difficulty occurs at least 3 nights per week.YES  D. The sleep difficulty is present for at least 3 months.YES  E. The sleep difficulty occurs despite adequate opportunity for sleep.YES  F. The insomnia is not better explained by and does not occur exclusively during the course of another sleep-wake disorder (e.g., narcolepsy, a breathing-related sleep disorder, a circadian rhythm sleep-wake disorder, a parasomnia).YES  G. The insomnia is not attributable to the physiological effects of a substance (e.g., a drug of abuse, a medication).YES  H. Coexisting mental disorders and medical conditions do not adequately explain the predominant complaint of insomnia.YES     Persistent            Functional Status:  Client's symptoms are causing reduced functional status in the following areas: Activities of Daily Living - difficulty completing chores in the home due to physical state. Often needs to rest.         DSM5 Diagnoses:  (Sustained by DSM5 Criteria Listed Above)  Diagnoses:  780.52 (G47.00) Primary Insomnia, Morbid obesity with body mass index of 50 or higher       Psychosocial & Contextual Factors: Health Concerns  WHODAS 2.0 (12 item) = 21             Attendance Agreement:  Client has signed Attendance Agreement:Yes        Preliminary Treatment Plan:     Client was encouraged to schedule 1 month and 3 months post-op follow-up appointments. Client will continue with scheduled weight loss surgery clinic appointments.  she has writer's contact information and is aware that she may contact writer in the meantime as needed.       Agreed that the client's homework is to drink 3 glasses of water prior to snacking at work and to choose healthier meals when volunteering.     The client reports no currently identified Sikh, ethnic or cultural issues relevant to the assessment process.      services are not indicated.     Modifications to assist communication are not indicated.     The concerns identified by the client will be addressed in the assessment process.     Initial Treatment will focus on: Follow through with goals for bariatric surgery.     As a preliminary treatment goal, client continue to follow through with the goals identified by her weight loss team..     The focus of initial interventions will be to restart her exercise routine.     Collaboration / coordination with other professionals is not indicated at this time. The final report and recommendations will be routed to her care team.     Referral to another professional/service is not indicated at this time..     A Release of Information is not needed at this time.     Report to child / adult protection services was NA.     Patient will have open access to their mental health medical record.    Follow-Up Session 11/4/2019   One month follow-up post psychological assessment.  Reviewed client's    progress with homework over the past month.  The client was  "proud to report that she has completed her weight loss goal. She reported that she is doing well with drinking water and choosing vegetables to snack on when she is tired at work. The client attempted to attend another weight loss support group, but was unable to. She plans to be scheduled off from work for the next meeting. The client reported that she is choosing to eat vegetables or apples with peanut butter when she is volunteering as opposed to eating sandwiches or fast food. The client reported that she is making progress with getting back into her exercise routine; however, she continues to struggle with finding time to go to the gym. The client was encouraged to ask three people in her life if they would consider being a workout partner to her.               Discussed changes that may occur in relationships following weight loss surgery  and how to manage these changes.   The client reported that she does not foresee people in her life trying to encourage her to \"cheat\" on her diet, but that she could see her sister \"food policing\" her. Additionally, she is starting to see a shift in her social group, in which people are choosing healthier diets. Hence, she thinks that she will still be included in socializing even when she cannot drink alcohol or eat fried food.               Reviewed results of the MMPI-2 evaluation.  Client's questions were answered. The client reported that she was surprised to hear that there were responses indicating \"low opinions of themselves and do not feel they are liked or are important. Moreover, they may feel unattractive, awkward, clumsy, useless, and a burden.\" When this writer read some of the client's answers to explain those responses, she indicated that she might not have meant the answers that she provided.     INTERVENTIONS:     CBT: positive reinforcement  Emotion Focused Therapy: emotion checking  Motivational Interviewing: open ended questions        Summary and Final " Recommendation:   From a psychological sandpoint, Client is cleared to continue in the process for pursuing bariatric surgery.     To summarize the 3 primary conditions being evaluated in the psychological assessment:                 1. Client is prepared to comply with ongoing aftercare and lifestyle    changes after surgery--condition satisfied                 Client has made some meaningful initial changes to their eating and activity habits.  Client reported an understanding of the need for lifestyle changes to eating and activity habits.  Client reported they are willing to focus on making lifestyle changes to eating and activity habits post surgery.   She reported that she is doing well with drinking water and choosing vegetables to snack on when she is tired at work. The client reported that she is choosing to eat vegetables or apples with peanut butter when she is volunteering as opposed to eating sandwiches or fast food. The client reported that she is making progress with getting back into her exercise routine; however, she continues to struggle with finding time to go to the gym. The client was encouraged to ask three people in her life if they would consider being a workout partner to her.                  2. Client is emotionally stable to proceed with surgery--condition satisfied                  The client denied a history of mental health symptoms. She stated that she has not received a mental health diagnosis in the past. The client struggles with insomnia and lethargy that appears to be due to lack of sleep. Hence, her elevated score on the PHQ-9 does not appear to be indicative of depression.                  3. Client is cognitively capable of understanding the risks of the         procedure--condition satisfied                 Client has been able to demonstrate that she understands the risks of surgery (compared to the risks of not having surgery).  The client reported that she understands the  risks of surgery to be not coming out of anesthesia, bleeding out, and blood clots.                   With respect to remaining risk factors, the client would like to proceed with the bariatric surgery process.      PLAN: (Client Tasks / Therapist Tasks / Other)     Client has completed psychological assessment required for bariatric surgery  Complete report will be forwarded to the weight loss surgery clinic for review.       Recommend standard post-surgical follow up, with sessions 1 and 3 months postsurgery, to assess client's functioning and adjustment post-surgical lifestyle.       Client was encouraged to attend a weight loss surgery support group, starting before surgery and continuing afterwards, for additional accountability and support.     Client was provided with writer's contact information and is aware that she may contact writer sooner as needed.            Ade Tang PsyD LP  11/20/2019

## 2019-11-08 ENCOUNTER — HEALTH MAINTENANCE LETTER (OUTPATIENT)
Age: 39
End: 2019-11-08

## 2019-11-13 LAB — SLPCOMP: NORMAL

## 2019-11-13 NOTE — PROGRESS NOTES
I called patient and left message regarding her urgent findings and gave her the option of starting CPAP right away.     Order is in.

## 2019-11-13 NOTE — PROGRESS NOTES
" SLEEP STUDY INTERPRETATION  SPLIT NIGHT STUDY      Patient: BROCK LEÓN  YOB: 1980  Study Date: 11/6/2019  MRN: [id number]  Referring Provider: Yasmin Longoria PA-C  Ordering Provider: Bennett Goltz, PA-C    Indications for Polysomnography: The patient is a 39 y old Female who is 5' 8\" and weighs 350.0 lbs. Her BMI is 53.7, Borden sleepiness scale 14.0 and neck circumference is 41.0 cm. Relevant medical history includes snoring, witnessed apneas. A diagnostic polysomnogram was performed to evaluate for sleep apnea. After 130.0 minutes of sleep time the patient exhibited sufficient respiratory events qualifying her for a CPAP trial which was then initiated.    Polysomnogram Data: A full night polysomnogram recorded the standard physiologic parameters including EEG, EOG, EMG, ECG, nasal and oral airflow. Respiratory parameters of chest and abdominal movements were recorded with respiratory inductance plethysmography. Oxygen saturation was recorded by pulse oximetry.  Hypopnea scoring rule used: 1B 4%    Diagnostic PSG  Sleep Architecture: Sleep fragmentation  The total recording time of the polysomnogram was 209.4 minutes. The total sleep time was 130.0 minutes. Sleep latency was 48.9 minutes. REM latency was 107.0 minutes. Arousal index was 49.8 arousals per hour. Sleep efficiency was 62.1%. Wake after sleep onset was 27.0 minutes. The patient spent 12.3% of total sleep time in Stage N1, 59.2% in Stage N2, 10.0% in Stage N3, and 18.5% in REM. Time in REM supine was 24.0 minutes.    Respiration: Severe ENRICO     Events ? The polysomnogram revealed a presence of 14 obstructive, - central, and - mixed apneas resulting in an apnea index of 6.5 events per hour. There were 71 obstructive hypopneas and - central hypopneas resulting in an obstructive hypopnea index of 32.8 and central hypopnea index of - events per hour. The combined apnea/hypopnea index was 39.2 events per hour (central apnea/hypopnea " index was - events per hour).  The REM AHI was 82.5 events per hour. The supine AHI was 48.1 events per hour. The RERA index was 8.3 events per hour. The RDI was 47.5 events per hour.    Snoring - was reported as moderate.    Respiratory rate and pattern - was notable for normal respiratory rate and pattern.    Sustained Sleep Associated Hypoventilation - Transcutaneous carbon dioxide monitoring was used, however significant hypoventilation was not present with a maximum change from 24.0 to 32.7 mmHg and 0 minutes at or greater than 55 mmHg.    Sleep Associated Hypoxemia - (Greater than 5 minutes O2 sat at or below 88%) was not present. Baseline oxygen saturation was 94.2%. Lowest oxygen saturation was 81.5%. Time spent less than or equal to 88% was 2.8 minutes. Time spent less than or equal to 89% was 4.1 minutes.     Treatment PSG  Sleep Architecture: Decreased sleep fragmentation  At 02:56:08 AM the patient was placed on PAP treatment and was titrated at pressures ranging from CPAP 5 cmH2O up to CPAP 6 cmH2O. The total recording time of the treatment portion of the study was 271.8 minutes. The total sleep time was 191.5 minutes. During the treatment portion of the study the sleep latency was 29.0 minutes. REM latency was 56.0 minutes. Arousal index was 22.2 arousals per hour. Sleep efficiency was 70.4%. Wake after sleep onset was 51.0 minutes. The patient spent 6.5% of total sleep time in Stage N1, 50.7% in Stage N2, 21.9% in Stage N3, and 20.9% in REM. Time in REM supine was 40.0 minutes.     Respiration: Sleep disordered breathing noted on the baseline portion of the study was nearly fully resolved with CPAP therapy.  Of note the patient did have REM supine during this portion of the study.      The final pressure was CPAP 6 cmH2O with an AHI of 0 events per hour. Time in REM supine on final pressure was 40.0 minutes.     Movement Activity: The patient had elevated periodic limb movements (PLMs) during the  treatment portion of the study. The majority of these were not associated with cortical arousal.    Periodic Limb Movements  o During the diagnostic portion of the study, there were 15 PLMs recorded. The PLM index was 6.9 movements per hour. The PLM Arousal Index was 0.5 per hour.  o During the treatment portion of the study, there were 83 PLMs recorded. The PLM index was 26.0 movements per hour. The PLM Arousal Index was 8.5 per hour.    REM EMG Activity - Excessive muscle activity was not present.    Nocturnal Behavior - Abnormal sleep related behaviors were not noted.    Bruxism - None apparent.    Cardiac Summary: Sinus, intermittent tachycardia  During the diagnostic portion of the study, the average pulse rate was 84.0 bpm. The minimum pulse rate was 66.9 bpm while the maximum pulse rate was 106.0 bpm.    During the treatment portion of the study, the average pulse rate was 77.0 bpm. The minimum pulse rate was 58.8 bpm while the maximum pulse rate was 94.4 bpm.     Assessment:     Severe ENRICO     Sleep disordered breathing noted on the baseline portion of the study was nearly fully resolved with CPAP therapy.  Of note the patient did have REM supine during this portion of the study.      Recommendations:    Treatment of ENRICO with Auto?titrating PAP therapy with a range of 5 cmH2O to 15 cmH2O. Recommend clinical follow up with sleep management team, including review of compliance measures.    Advice regarding the risks of drowsy driving.    Suggest optimizing sleep schedule and avoiding sleep deprivation.    Weight management     Treatment of PLMs (dopaminergic agents or delta-2 ligands) should be targeted at patients who either have wakeful motor restlessness or those in whom there is a high clinical suspicion of periodic limb movement disorder and not for elevated PLMs alone    Diagnostic Codes: G47.33, G47.9      Fran Reyna MD 11-

## 2019-11-15 ENCOUNTER — OFFICE VISIT (OUTPATIENT)
Dept: SURGERY | Facility: CLINIC | Age: 39
End: 2019-11-15
Payer: COMMERCIAL

## 2019-11-15 VITALS — WEIGHT: 293 LBS | BODY MASS INDEX: 52 KG/M2

## 2019-11-15 DIAGNOSIS — E66.01 MORBID OBESITY DUE TO EXCESS CALORIES (H): ICD-10-CM

## 2019-11-15 PROCEDURE — 97803 MED NUTRITION INDIV SUBSEQ: CPT | Performed by: DIETITIAN, REGISTERED

## 2019-11-15 NOTE — PROGRESS NOTES
"PRE SURGICAL WEIGHT LOSS NUTRITION APPOINTMENT     Erika Reina  1980  female  [id number]  39 year old     ASSESSMENT     Desired Surgical Procedure: undecided      REASON FOR VISIT:  Erika Reina is a 39 year old year old female presents today for a pre-surgical weight loss follow-up appointment. Patient accompanied by self.     DIAGNOSIS:  Weight Status Obesity Grade III BMI >40     ANTHROPOMETRICS:  Height: 5'9\"  Initial Weight: 353.6  Weight last visit: 348 lbs    Current weight: 342 lbs   BMI: 52 Kg/(m^2)     VITAMINS AND MINERALS:  1 Multivitamin with Minerals ([place] Complete - HS)  600 mg Calcium with Vitamin D (AM, lunch)  5000 International units Vitamin D     NUTRITION HISTORY:  Breakfast: protein shake or scrambled eggs or yogurt  (8:00 AM)  Snack: granola bar or yogurt or applesauce   Lunch: turkey sandwich on wrap + vegetables + baked chips (1:00-3:00)  Snack: apple   Supper: spaghetti  meat + potato + vegetable or chicken/turkey or beef stew (8:00)  Snacks: apple with peanut butter   Fluids Consumed: Water (100oz), milk (skim)   Eating slower: Yes  Chewing foods thoroughly: Yes  Take 20-30 minutes to consume each meal: Yes  Fluids and meals separate by at least 30 minutes: No  Carbonation: none  Caffeine: coffee 1 per day   Additional Information: Patient states she has been sick for the past 4 days but otherwise has been more consistent with exercise.  Patient has sleep study and will be needing device.  Explained since she will need sleep device, she will need to wait for surgery longer.  Patient understands will need to wait.     PHYSICAL ACTIVITY:  Type: swimming 2 times per week      DIAGNOSIS:  Previous Nutrition Diagnosis: Obesity related to long history of self- monitoring deficit and excessive energy intake evidenced by BMI of 52.1 kg/m2  No change, modified below     Previous goals:  Eliminate caffeine-met  Exercise 3 times per week for 30 minutes-improving   Avoid " fluids with meals-improving     Current Nutrition Diagnosis: Obesity related to long history of self-monitoring deficit and excessive energy intake as evidenced by BMI of 52 kg/m2.     INTERVENTION:  Nutrition Prescription: Recommended energy/nutrient modification.     GOALS:  Continue following pre surgery diet guidelines  Exercise 3 times per week for 30 minutes     Intervention:  - Discussed progress towards previous goals.  - Reinforced importance of making behavior changes in preparation for bariatric surgery.   - Assessed learning needs and learning preferences     NUTRITION MONITORING AND EVALUATION:  Anticipated compliance: fair-good  Patient demonstrated good understanding.      Follow up: Continue to monitor patient closely regarding weight loss and diet.  # of visits needed: 0 required (Patient will be seen every 2 months until cleared by sleep)  Cleared by RD: Yes     TIME SPENT WITH PATIENT: 15 minutes    Marcos Quiroz, RD, LD  [facility] Outpatient Dietitian  [phone] (office phone)

## 2019-11-19 ENCOUNTER — OFFICE VISIT (OUTPATIENT)
Dept: URGENT CARE | Facility: URGENT CARE | Age: 39
End: 2019-11-19
Payer: COMMERCIAL

## 2019-11-19 ENCOUNTER — TELEPHONE (OUTPATIENT)
Dept: NURSING | Facility: CLINIC | Age: 39
End: 2019-11-19

## 2019-11-19 VITALS
WEIGHT: 293 LBS | SYSTOLIC BLOOD PRESSURE: 118 MMHG | OXYGEN SATURATION: 99 % | DIASTOLIC BLOOD PRESSURE: 74 MMHG | HEART RATE: 89 BPM | BODY MASS INDEX: 51.97 KG/M2 | TEMPERATURE: 97.2 F

## 2019-11-19 DIAGNOSIS — J01.10 ACUTE FRONTAL SINUSITIS, RECURRENCE NOT SPECIFIED: Primary | ICD-10-CM

## 2019-11-19 PROCEDURE — 99213 OFFICE O/P EST LOW 20 MIN: CPT | Performed by: PHYSICIAN ASSISTANT

## 2019-11-19 NOTE — PROGRESS NOTES
SUBJECTIVE:   Erika Reina is a 39 year old female presenting with a chief complaint of runny nose, stuffy nose, facial pain/pressure and headache.  Onset of symptoms was 1 weeks ago, and then developed and 1 days ago.   Course of illness is worsening over the last day has developed pain in her maxillary sinus  Severity moderate  Current and Associated symptoms: as above  Treatment measures tried include Tylenol/Ibuprofen, Decongestants and OTC Cough med.  Predisposing factors include None.    Past Medical History:   Diagnosis Date     Endometrial cancer (H) 12/19/2018     Hyperlipidemia LDL goal <160 9/15/2011     Current Outpatient Medications   Medication Sig Dispense Refill     Melatonin Gummies 2.5 MG CHEW Take 5 mg by mouth nightly as needed (insomnia)       multivitamin, therapeutic with minerals (MULTI-VITAMIN) TABS tablet Take 1 tablet by mouth daily       VITAMIN D, CHOLECALCIFEROL, PO Take by mouth daily       Social History     Tobacco Use     Smoking status: Never Smoker     Smokeless tobacco: Never Used   Substance Use Topics     Alcohol use: Yes     Alcohol/week: 0.0 standard drinks     Binge frequency: Never     Comment: rarely.  Once monthly 1-2 drinks       ROS:  Review of systems negative except as stated above.    OBJECTIVE:  /74   Pulse 89   Temp 97.2  F (36.2  C) (Tympanic)   Wt (!) 155 kg (341 lb 12.8 oz)   SpO2 99%   BMI 51.97 kg/m    GENERAL APPEARANCE: healthy, alert and no distress  EYES: EOMI,  PERRL, conjunctiva clear  HENT: TM's normal bilaterally, frontal sinus tenderness  and maxillary sinus tenderness   NECK: supple, nontender, no lymphadenopathy  RESP: lungs clear to auscultation - no rales, rhonchi or wheezes  CV: regular rates and rhythm, normal S1 S2, no murmur noted  NEURO: Normal strength and tone, sensory exam grossly normal,  normal speech and mentation  SKIN: no suspicious lesions or rashes    ASSESSMENT / PLAN:  1. Acute frontal sinusitis, recurrence not  specified  Encouraged supportive cares, fluids and rest   Humidified air at night  If symptoms fail to improve with abx in 2 days, add on flonase  - amoxicillin-clavulanate (AUGMENTIN) 875-125 MG tablet; Take 1 tablet by mouth 2 times daily for 10 days  Dispense: 20 tablet; Refill: 0    Diagnosis and treatment plan was reviewed with patient and/or family.   We went over any labs or imaging. Discussed worsening symptoms or little to no relief despite treatment plan to follow-up with PCP or return to clinic.  Patient verbalizes understanding. All questions were addressed and answered.   Candy Packer PA-C

## 2019-11-20 NOTE — TELEPHONE ENCOUNTER
Seen in UC today and diagnosed with sinus infection, started augmentin. This evening found out a nephew has strep and patient is asking if the antibiotic she is taking would cover for strep if it would turn out she has it. Contact clinic tomorrow to ask this question. Verbalizes understanding and agrees with plan.  Caroline Cote RN  [facility]

## 2019-12-03 ENCOUNTER — OFFICE VISIT (OUTPATIENT)
Dept: SLEEP MEDICINE | Facility: CLINIC | Age: 39
End: 2019-12-03
Payer: COMMERCIAL

## 2019-12-03 VITALS
RESPIRATION RATE: 20 BRPM | HEIGHT: 68 IN | SYSTOLIC BLOOD PRESSURE: 129 MMHG | OXYGEN SATURATION: 97 % | BODY MASS INDEX: 44.41 KG/M2 | DIASTOLIC BLOOD PRESSURE: 81 MMHG | HEART RATE: 88 BPM | WEIGHT: 293 LBS

## 2019-12-03 DIAGNOSIS — G47.33 OSA (OBSTRUCTIVE SLEEP APNEA): Primary | ICD-10-CM

## 2019-12-03 PROCEDURE — 99213 OFFICE O/P EST LOW 20 MIN: CPT | Performed by: PHYSICIAN ASSISTANT

## 2019-12-03 ASSESSMENT — MIFFLIN-ST. JEOR: SCORE: 2275.27

## 2019-12-03 NOTE — PATIENT INSTRUCTIONS
You will be provided with an auto-titrating CPAP with a pressure range of 5-15 cm with heated humidity to limit nasal congestion. Adjust the heat level on humidifier to find a setting that prevents dry nose but does not cause condensation in the hose or mask. Use distilled water in the humidifier.  The CPAP has a ramp function that starts the pressure lower than your prescribed pressure and gradually increases it over a number of minutes.  This may make it easier to fall asleep.    Try to use the CPAP every-night, all night (minimum of 4 hours). Many insurances require that we prove you are using the CPAP at least 4 hours on at least 70% of nights over a 30 day period. We have 90 days to meet those criteria.            Discussed weight management and the impact of weight gain on sleep apnea.  Let me know if you snore or feel the pressure is too high.    You can get new supplies (mask, hose and filter) for your CPAP every 3-6 months, covered by insurance. You do not need to get supplies that often, but they are available if you would like them.  You may exchange the mask once within the first month if you feel the initial mask does not fit well.  Contact your medical equipment provider for equipment issues.  Please let me know if you have any return of snoring, daytime sleepiness or poor sleep quality. We will want to make sure your CPAP is adequately treating your apnea.  There is a website called CPAP.com that has accessories that may make CPAP use easier. Please visit it at your convenience.  Our phone number is [phone].    Follow-up 2 month.  Bring your CPAP machine with you to the follow up appointment.      Here are the ranges based off your height and current weight.    Body mass index is 51.85 kg/m .        Underweight = <18.5  Normal weight = 18.5-24.9   Overweight = 25-29.9   Obesity = BMI of 30 or greater       Your BMI is Body mass index is 51.85 kg/m .  Weight management is a personal decision.  If  you are interested in exploring weight loss strategies, the following discussion covers the approaches that may be successful. Body mass index (BMI) is one way to tell whether you are at a healthy weight, overweight, or obese. It measures your weight in relation to your height.  A BMI of 18.5 to 24.9 is in the healthy range. A person with a BMI of 25 to 29.9 is considered overweight, and someone with a BMI of 30 or greater is considered obese. More than two-thirds of American adults are considered overweight or obese.  Being overweight or obese increases the risk for further weight gain. Excess weight may lead to heart disease and diabetes.  Creating and following plans for healthy eating and physical activity may help you improve your health.  Weight control is part of healthy lifestyle and includes exercise, emotional health, and healthy eating habits. Careful eating habits lifelong are the mainstay of weight control. Though there are significant health benefits from weight loss, long-term weight loss with diet alone may be very difficult to achieve- studies show long-term success with dietary management in less than 10% of people. Attaining a healthy weight may be especially difficult to achieve in those with severe obesity. In some cases, medications, devices and surgical management might be considered.  What can you do?  If you are overweight or obese and are interested in methods for weight loss, you should discuss this with your provider.     Consider reducing daily calorie intake by 500 calories.     Keep a food journal.     Avoiding skipping meals, consider cutting portions instead.    Diet combined with exercise helps maintain muscle while optimizing fat loss. Strength training is particularly important for building and maintaining muscle mass. Exercise helps reduce stress, increase energy, and improves fitness. Increasing exercise without diet control, however, may not burn enough calories to loose  weight.       Start walking three days a week 10-20 minutes at a time    Work towards walking thirty minutes five days a week     Eventually, increase the speed of your walking for 1-2 minutes at time    In addition, we recommend that you review healthy lifestyles and methods for weight loss available through the National Institutes of Health patient information sites:  http://win.niddk.nih.gov/publications/index.htm    And look into health and wellness programs that may be available through your health insurance provider, employer, local community center, or danie club.    Weight management plan: Specific weight management program called [facility] discussed   Your blood pressure was checked while you were in clinic today.  Please read the guidelines below about what these numbers mean and what you should do about them.  Your systolic blood pressure is the top number.  This is the pressure when the heart is pumping.  Your diastolic blood pressure is the bottom number.  This is the pressure in between beats.  If your systolic blood pressure is less than 120 and your diastolic blood pressure is less than 80, then your blood pressure is normal. There is nothing more that you need to do about it  If your systolic blood pressure is 120-139 or your diastolic blood pressure is 80-89, your blood pressure may be higher than it should be.  You should have your blood pressure re-checked within a year by a primary care provider.  If your systolic blood pressure is 140 or greater or your diastolic blood pressure is 90 or greater, you may have high blood pressure.  High blood pressure is treatable, but if left untreated over time it can put you at risk for heart attack, stroke, or kidney failure.  You should have your blood pressure re-checked by a primary care provider within the next four weeks.

## 2019-12-03 NOTE — NURSING NOTE
"/81   Pulse 88   Resp 20   Ht 1.727 m (5' 8\")   Wt (!) 154.7 kg (341 lb)   SpO2 97%   BMI 51.85 kg/m      Neck 40cm/15.75inches    DME-none    ESS- 8  [id number]    Med Rec-complete    Kalyn Zapien, Medical Assistant 12/3/2019 2:22 PM      "

## 2019-12-03 NOTE — PROGRESS NOTES
Sleep Follow-Up Visit:    Date on this visit: 12/3/2019    Erika Reina comes in today for follow-up of her sleep study done on 11/6/2019. S/He was initially seen at the [facility] for evaluation of sleep apnea in preparation for weight loss surgery. She has felt tired all of the time in the last 2 years. She has had trouble sleeping since about high school. Her medical history is significant for PCOS, endometrial cancer (s/p hysterectomy with bilateral salpingo-oophorectomy), anemia.     Sleep latency 48.9 minutes with melatonin.  REM achieved.   REM latency 107 minutes.  Sleep efficiency 62.1%. Total sleep time 130 minutes.    Sleep architecture:  Stage 1, 12.3% (5%), stage 2, 59.2% (45-55%), stage 3, 10% (15-20%), stage REM, 18.5% (20-25%).  AHI was 39.2/hr (0% central), with desaturations down to 81%. S/He spent 4.1 minutes below 90% SpO2 and 2.8 minutes below 89% SpO2. RDI 47.5/hr.  REM RDI 82.5/hr, consistent with severe REM ENRICO.  Supine RDI 53.4/hr, consistent with severe SUPINE ENRICO. Her non-supine RDI was 33.8/hr, AHI 18.5/hr. Periodic Limb Movement Index 6.9/hour, 0.5/hr associated with arousals.       CPAP titration:  Sleep latency 29 minutes.  REM latency 56 minutes.  Sleep efficiency 70.4%. Total sleep time 191.5 minutes. Sleep architecture:  Stage 1, 6.5% (5%), stage 2, 50.7% (45-55%), stage 3, 21.9% (15-20%), stage REM, 20.9% (20-25%).  CPAP was titrated to 6 cm with elimination of apneas, hypopneas and desaturations. Supine REM was noted at this pressure. These findings were reviewed with the patient.  It was a very bad night of sleep for her. The bed was uncomfortable.    Past medical/surgical history, family history, social history, medications and allergies were reviewed.      Problem List:  Patient Active Problem List    Diagnosis Date Noted     Endometrial cancer (H) 11/20/2018     Priority: Medium     SCP data entered.  Give at 7/11/19 apt.       Microcytic anemia  11/21/2016     Priority: Medium     Plantar fasciitis 10/18/2016     Priority: Medium     Morbid obesity due to excess calories (H) 10/18/2016     Priority: Medium     Hyperlipidemia LDL goal <160 09/15/2011     Priority: Medium        Impression/Plan:    (G47.33) ENRICO (obstructive sleep apnea)  (primary encounter diagnosis)  Comment: Ana's sleep study showed severe ENRICO, AHI 39/hr. She did not have hypoventilation. She responded very well to CPAP 6 cm.  Plan: Order placed for auto CPAP 5-15 cm heated humidifier and compliance meter. The patient was informed of the mask exchange policy and the compliance goals. They were also informed that they would be followed by the sleep therapy management team during the first month of CPAP use.       She will follow up with me in about 2 month(s).     Fifteen minutes spent with patient, all of which were spent face-to-face counseling, consulting, coordinating plan of care.      Bennett Goltz, PA-C    CC: Glenna Ham

## 2019-12-09 ENCOUNTER — DOCUMENTATION ONLY (OUTPATIENT)
Dept: SLEEP MEDICINE | Facility: CLINIC | Age: 39
End: 2019-12-09
Payer: COMMERCIAL

## 2019-12-09 DIAGNOSIS — G47.33 OSA (OBSTRUCTIVE SLEEP APNEA): Primary | ICD-10-CM

## 2019-12-09 NOTE — PROGRESS NOTES
Patient was offered choice of vendor and chose [facility].  Patient Erika Reina was set up at [place] on December 9, 2019. Patient received a Resmed AirSense 10 Auto. Pressures were set at 5-15 cm H2O.   Patient s ramp is 5 cm H2O for Auto and FLEX/EPR is EPR, 2.  Patient received a Resmed Mask name: [id number]  Pillow mask size For Her, Small, heated tubing and heated humidifier.  Patient is enrolled in the STM Program and does not need to meet compliance. Patient has a follow up on 2/4/20 with Bennett Goltz, PA-C. MEGAN, DALY

## 2019-12-12 ENCOUNTER — DOCUMENTATION ONLY (OUTPATIENT)
Dept: SLEEP MEDICINE | Facility: CLINIC | Age: 39
End: 2019-12-12

## 2019-12-12 NOTE — PROGRESS NOTES
3 DAY STM VISIT    Diagnostic AHI: 39.2   PSG    Patient contacted for 3 day STM visit  Message left for patient to return call    Replacement device: No  STM ordered by provider: Yes     Device type: Auto-CPAP  PAP settings from order::  CPAP min 5 cm  H20       CPAP max 15 cm  H20        Mask type:    Nasal Pillows     Device settings from machine      Min CPAP 5.0            Max CPAP 15.0           Assessment: Nightly usage over four hours.  Action plan: Patient to have 14 day STM visit. Patient has a follow up visit scheduled:   yes within 31-90 days of set up.    Total time spent on accessing, reviewing and interpreting remote patient PAP therapy data:   10 minutes      Total time spent with direct patient communication :   1  minutes

## 2019-12-26 ENCOUNTER — DOCUMENTATION ONLY (OUTPATIENT)
Dept: SLEEP MEDICINE | Facility: CLINIC | Age: 39
End: 2019-12-26

## 2019-12-26 NOTE — PROGRESS NOTES
14  DAY STM VISIT    Diagnostic AHI: 39.2   PSG    Message left for patient to return call     Assessment: Pt meeting objective benchmarks.       Action plan: waiting for patient to return call.  and pt to have 30 day STM visit.      Device type: Auto-CPAP    PAP settings: CPAP min 5.0 cm  H20       CPAP max 15.0 cm  H20             95th% pressure 10.7 cm  H20     Mask type:  Nasal Pillows    Objective measures: 14 day rolling measures      Compliance  100 %      Leak  6.56 lpm  last  upload      AHI 0.51   last  upload      Average number of minutes 361      Objective measure goal  Compliance   Goal >70%  Leak   Goal < 24 lpm  AHI  Goal < 5  Usage  Goal >240      Total time spent on accessing, reviewing and interpreting remote patient PAP therapy data:   10 minutes      Total time spent with direct patient communication :   1 minutes

## 2019-12-27 ENCOUNTER — OFFICE VISIT (OUTPATIENT)
Dept: FAMILY MEDICINE | Facility: CLINIC | Age: 39
End: 2019-12-27
Payer: COMMERCIAL

## 2019-12-27 VITALS
DIASTOLIC BLOOD PRESSURE: 76 MMHG | WEIGHT: 293 LBS | TEMPERATURE: 98 F | OXYGEN SATURATION: 100 % | BODY MASS INDEX: 53.37 KG/M2 | HEART RATE: 98 BPM | SYSTOLIC BLOOD PRESSURE: 110 MMHG

## 2019-12-27 DIAGNOSIS — J02.9 SORE THROAT: ICD-10-CM

## 2019-12-27 DIAGNOSIS — R07.0 THROAT PAIN: Primary | ICD-10-CM

## 2019-12-27 LAB
DEPRECATED S PYO AG THROAT QL EIA: NORMAL
HETEROPH AB SER QL: NEGATIVE
SPECIMEN SOURCE: NORMAL

## 2019-12-27 PROCEDURE — 36415 COLL VENOUS BLD VENIPUNCTURE: CPT | Performed by: NURSE PRACTITIONER

## 2019-12-27 PROCEDURE — 99213 OFFICE O/P EST LOW 20 MIN: CPT | Performed by: NURSE PRACTITIONER

## 2019-12-27 PROCEDURE — 87081 CULTURE SCREEN ONLY: CPT | Performed by: NURSE PRACTITIONER

## 2019-12-27 PROCEDURE — 86308 HETEROPHILE ANTIBODY SCREEN: CPT | Performed by: NURSE PRACTITIONER

## 2019-12-27 PROCEDURE — 87880 STREP A ASSAY W/OPTIC: CPT | Performed by: NURSE PRACTITIONER

## 2019-12-27 NOTE — PROGRESS NOTES
SUBJECTIVE:   Erika Reina is a 39 year old female presenting with a chief complaint of sore throat.  Onset of symptoms was 1 month(s) ago.  Course of illness is waxing and waning.    Severity moderate  Current and Associated symptoms: none  Treatment measures tried include None tried.  Predisposing factors include was treated for strep and sinusitis a month ago. All symptoms but the sore throat went away. Has some fatigue, no headache or rash.     Past Medical History:   Diagnosis Date     Endometrial cancer (H) 12/19/2018     Hyperlipidemia LDL goal <160 9/15/2011     Current Outpatient Medications   Medication Sig Dispense Refill     Melatonin Gummies 2.5 MG CHEW Take 5 mg by mouth nightly as needed (insomnia)       multivitamin, therapeutic with minerals (MULTI-VITAMIN) TABS tablet Take 1 tablet by mouth daily       VITAMIN D, CHOLECALCIFEROL, PO Take by mouth daily       Social History     Tobacco Use     Smoking status: Never Smoker     Smokeless tobacco: Never Used   Substance Use Topics     Alcohol use: Yes     Alcohol/week: 0.0 standard drinks     Binge frequency: Never     Comment: rarely.  Once monthly 1-2 drinks       ROS:  Review of systems negative except as stated above.    OBJECTIVE:  /76 (BP Location: Right arm, Patient Position: Sitting, Cuff Size: Adult Large)   Pulse 98   Temp 98  F (36.7  C) (Oral)   Wt (!) 159.2 kg (351 lb)   SpO2 100%   BMI 53.37 kg/m    GENERAL APPEARANCE: healthy, alert and no distress  EYES: EOMI,  PERRL, conjunctiva clear  HENT: ear canals and TM's normal.  Nose and mouth without ulcers, erythema. Small tonsil stone right tonsil.   NECK: supple, nontender, no lymphadenopathy  RESP: lungs clear to auscultation - no rales, rhonchi or wheezes  CV: regular rates and rhythm, normal S1 S2, no murmur noted  ABDOMEN:  soft, nontender, no HSM or masses and bowel sounds normal  NEURO: Normal strength and tone, sensory exam grossly normal,  normal speech and  mentation  SKIN: no suspicious lesions or rashes    ASSESSMENT:  Viral pharyngitis  Results for orders placed or performed in visit on 12/27/19   Mononucleosis screen     Status: None   Result Value Ref Range    Mononucleosis Screen Negative NEG^Negative   Rapid strep screen     Status: None   Result Value Ref Range    Specimen Description Throat     Rapid Strep A Screen       NEGATIVE: No Group A streptococcal antigen detected by immunoassay, await culture report.         PLAN:  Tylenol, Fluids, Rest and Saline gargles  Will call if culture is positive.   See orders in Epic

## 2019-12-27 NOTE — PATIENT INSTRUCTIONS
Patient Education     Self-Care for Sore Throats    Sore throats happen for many reasons, such as colds, allergies, and infections caused by viruses or bacteria. In any case, your throat becomes red and sore. Your goal for self-care is to reduce your discomfort while giving your throat a chance to heal.  Moisten and soothe your throat  Tips include the following:    Try a sip of water first thing after waking up.    Keep your throat moist by drinking 6 or more glasses of clear liquids every day.    Run a cool-air humidifier in your room overnight.    Avoid cigarette smoke.     Suck on throat lozenges, cough drops, hard candy, ice chips, or frozen fruit-juice bars. Use the sugar-free versions if your diet or medical condition requires them.  Gargle to ease irritation  Gargling every hour or 2 can ease irritation. Try gargling with 1 of these solutions:    1/4 teaspoon of salt in 1/2 cup of warm water    An over-the-counter anesthetic gargle  Use medicine for more relief  Over-the-counter medicine can reduce sore throat symptoms. Ask your pharmacist if you have questions about which medicine to use:    Ease pain with anesthetic sprays. Aspirin or an aspirin substitute also helps. Remember, never give aspirin to anyone 18 or younger, or if you are already taking blood thinners.     For sore throats caused by allergies, try antihistamines to block the allergic reaction.    Remember: unless a sore throat is caused by a bacterial infection, antibiotics won t help you.  Prevent future sore throats  Prevention tips include the following:    Stop smoking or reduce contact with secondhand smoke. Smoke irritates the tender throat lining.    Limit contact with pets and with allergy-causing substances, such as pollen and mold.    When you re around someone with a sore throat or cold, wash your hands often to keep viruses or bacteria from spreading.    Don t strain your vocal cords.  Call your healthcare provider  Contact your  healthcare provider if you have:    A temperature over 101 F (38.3 C)    White spots on the throat    Great difficulty swallowing    Trouble breathing    A skin rash    Recent exposure to someone else with strep bacteria    Severe hoarseness and swollen glands in the neck or jaw   Date Last Reviewed: 8/1/2016 2000-2018 [employer]. [address] [place], [place] [address]. All rights reserved. This information is not intended as a substitute for professional medical care. Always follow your healthcare professional's instructions.

## 2019-12-30 LAB
BACTERIA SPEC CULT: NORMAL
SPECIMEN SOURCE: NORMAL

## 2020-01-09 ENCOUNTER — DOCUMENTATION ONLY (OUTPATIENT)
Dept: SLEEP MEDICINE | Facility: CLINIC | Age: 40
End: 2020-01-09
Payer: COMMERCIAL

## 2020-01-09 ENCOUNTER — ONCOLOGY VISIT (OUTPATIENT)
Dept: ONCOLOGY | Facility: CLINIC | Age: 40
End: 2020-01-09
Attending: NURSE PRACTITIONER
Payer: COMMERCIAL

## 2020-01-09 VITALS
RESPIRATION RATE: 16 BRPM | OXYGEN SATURATION: 98 % | HEART RATE: 82 BPM | TEMPERATURE: 97 F | SYSTOLIC BLOOD PRESSURE: 122 MMHG | BODY MASS INDEX: 53.37 KG/M2 | DIASTOLIC BLOOD PRESSURE: 76 MMHG | WEIGHT: 293 LBS

## 2020-01-09 DIAGNOSIS — Z23 NEED FOR INFLUENZA VACCINATION: ICD-10-CM

## 2020-01-09 DIAGNOSIS — Z85.42 ENCOUNTER FOR FOLLOW-UP SURVEILLANCE OF ENDOMETRIAL CANCER: Primary | ICD-10-CM

## 2020-01-09 DIAGNOSIS — Z08 ENCOUNTER FOR FOLLOW-UP SURVEILLANCE OF ENDOMETRIAL CANCER: Primary | ICD-10-CM

## 2020-01-09 PROCEDURE — G0008 ADMIN INFLUENZA VIRUS VAC: HCPCS

## 2020-01-09 PROCEDURE — 90686 IIV4 VACC NO PRSV 0.5 ML IM: CPT | Performed by: NURSE PRACTITIONER

## 2020-01-09 PROCEDURE — 99213 OFFICE O/P EST LOW 20 MIN: CPT | Performed by: NURSE PRACTITIONER

## 2020-01-09 PROCEDURE — 25000128 H RX IP 250 OP 636: Performed by: NURSE PRACTITIONER

## 2020-01-09 PROCEDURE — G0463 HOSPITAL OUTPT CLINIC VISIT: HCPCS | Mod: 25

## 2020-01-09 RX ADMIN — INFLUENZA A VIRUS A/BRISBANE/02/2018 IVR-190 (H1N1) ANTIGEN (FORMALDEHYDE INACTIVATED), INFLUENZA A VIRUS A/KANSAS/14/2017 X-327 (H3N2) ANTIGEN (FORMALDEHYDE INACTIVATED), INFLUENZA B VIRUS B/PHUKET/3073/2013 ANTIGEN (FORMALDEHYDE INACTIVATED), AND INFLUENZA B VIRUS B/MARYLAND/15/2016 BX-69A ANTIGEN (FORMALDEHYDE INACTIVATED) 0.5 ML: 15; 15; 15; 15 INJECTION, SUSPENSION INTRAMUSCULAR at 14:00

## 2020-01-09 ASSESSMENT — PAIN SCALES - GENERAL: PAINLEVEL: MILD PAIN (2)

## 2020-01-09 NOTE — PROGRESS NOTES
30 DAY STM VISIT    Diagnostic AHI: 39.2 PSG    Message left for patient to return call.     Assessment: Pt meeting objective benchmarks.     Action plan: waiting for patient to return call.  and pt to have 6 month [facility] visit  Patient has scheduled a follow up visit with Bennett Goltz, PA-C on 2/4/2020.   Device type: Auto-CPAP  PAP settings: CPAP min 5.0 cm  H20     CPAP max 15.0 cm  H20    95th% pressure 10.2 cm  H20   Mask type:  Nasal Pillows  Objective measures: 14 day rolling measures      Compliance  100 %      Leak  7.28 lpm  last  upload      AHI 0.39   last  upload      Average number of minutes 468      Objective measure goal  Compliance   Goal >70%  Leak   Goal < 24 lpm  AHI  Goal < 5  Usage  Goal >240         Total time spent on accessing, reviewing and interpreting remote patient PAP therapy data:   9 minutes      Total time spent with direct patient communication :   1 minutes    Total time spent on  remote patient monitoring analysis for last 30 days:   32 min

## 2020-01-09 NOTE — PROGRESS NOTES
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 She completed treatment with hysterectomy and BSO on 18. She presents today for a six month surveillance visit.    HPI: Erika comes to the clinic unaccompanied. She has been feeling well without gynecologic concerns. No fevers, chills, nausea/vomiting, changes in bowels/bladder, abdominal pain, vaginal bleeding, pelvic pain, dyspareunia, edema, masses in groin, hot flashes/night sweats. Seeing the weight management clinic, has bariatric surgery scheduled coming up for which she is excited. She is up to date on visits with her PCP. Due for an influenza vaccine.    Oncology History:  She notes she has had abnormal bleeding since her very first menses with very irregular cycles.  In  she noted a major change in the amount of bleeding with a significant increase.  Due to this she was placed on metformin without much improvement in her bleeding and thus had the following work up:     18:  US Pelvis:  Uterus measures 7.3 x 4.9 x 5.5 cm, EMS 26.3 mm, normal ovaries     18:  EMB:  Grade 1 endometrial adenocarcinoma, MMR intact     18:  Robotic hysterectomy, bilateral salpingo-oophorectomy, bilateral pelvic sentinel lymph node dissection, cystoscopy                 Pathology:  Stage 1A, grade 1 endometrial adenocarcinoma, tumor size 4.9 cm, no myometrial invasion, no LVSI, 0/11 sentinel LN       Past Medical History:   Diagnosis Date     Endometrial cancer (H) 2018     Hyperlipidemia LDL goal <160 9/15/2011       Past Surgical History:   Procedure Laterality Date     ARTHROPLASTY KNEE       CYSTOSCOPY N/A 2018    Procedure: Cystoscopy;  Surgeon: Cleopatra Altamirano MD;  Location: [facility] OR     DAVINCI HYSTERECTOMY TOTAL, BILATERAL  SALPINGO-OOPHORECTOMY, NODE DISSECTION, COMBINED N/A 12/13/2018    Procedure: DaVinci Assisted Removal Of Uterus, Cervix, Both Ovaries And Fallopian Tubes, Bilateral [place] Lymph Node Dissection;  Surgeon: Cleopatra Altamirano MD;  Location:  OR       Social History     Socioeconomic History     Marital status: Single     Spouse name: Not on file     Number of children: Not on file     Years of education: Not on file     Highest education level: Not on file   Occupational History     Not on file   Social Needs     Financial resource strain: Not on file     Food insecurity:     Worry: Not on file     Inability: Not on file     Transportation needs:     Medical: Not on file     Non-medical: Not on file   Tobacco Use     Smoking status: Never Smoker     Smokeless tobacco: Never Used   Substance and Sexual Activity     Alcohol use: Yes     Alcohol/week: 0.0 standard drinks     Binge frequency: Never     Comment: rarely.  Once monthly 1-2 drinks     Drug use: No     Sexual activity: Yes     Partners: Male     Birth control/protection: Condom   Lifestyle     Physical activity:     Days per week: Not on file     Minutes per session: Not on file     Stress: Not on file   Relationships     Social connections:     Talks on phone: Not on file     Gets together: Not on file     Attends [employer] service: Not on file     Active member of club or organization: Not on file     Attends meetings of clubs or organizations: Not on file     Relationship status: Not on file     Intimate partner violence:     Fear of current or ex partner: Not on file     Emotionally abused: Not on file     Physically abused: Not on file     Forced sexual activity: Not on file   Other Topics Concern     Parent/sibling w/ CABG, MI or angioplasty before 65F 55M? No   Social History Narrative     Not on file       Family History   Problem Relation Age of Onset     Diabetes Mother      Obesity Mother      Hypertension Mother      Diabetes Father       Hypertension Father      Sleep Apnea Father      Cancer - colorectal Maternal Grandmother      Heart Disease Paternal Grandmother      Obesity Brother      Sleep Apnea Brother      Coronary Artery Disease No family hx of      Cerebrovascular Disease No family hx of        Current Outpatient Medications   Medication     Melatonin Gummies 2.5 MG CHEW     multivitamin, therapeutic with minerals (MULTI-VITAMIN) TABS tablet     VITAMIN D, CHOLECALCIFEROL, PO     No current facility-administered medications for this visit.           Allergies   Allergen Reactions     Ibuprofen Swelling     Swelling of legs       Review of Systems  10 point ROS negative other than the symptoms noted above in the HPI.    OBJECTIVE:    Physical Exam:    /76   Pulse 82   Temp 97  F (36.1  C) (Oral)   Resp 16   Wt (!) 159.2 kg (351 lb)   SpO2 98%   BMI 53.37 kg/m      CONSTITUTIONAL: Alert non-toxic appearing female in no acute distress  HEAD: Normocephalic, atraumatic  NECK: Neck supple without lymphadenopathy  RESPIRATORY: Lungs clear to auscultation, no increased work of breathing noted  CV: Regular rate and rhythm, S1S2, no clicks, murmurs, rubs, or gallops; bilateral lower extremities without edema, dorsalis pedis pulses 2+ bilaterally  GASTROINTESTINAL: Normoactive bowel sounds x4 quadrants, abdomen obese, soft, non-distended, non-tender to palpation, no organomegaly noted  GENITOURINARY: External genitalia and urethral meatus pink without lesions, masses, or excoriation. Vagina pink and well rugated without masses or lesions. Vaginal cuff without nodularity, masses, or lesions. Cervix surgically absent. Bimanual exam reveals no masses or fullness- somewhat limited by body habitus. Rectovaginal exam confirms these findings.  LYMPHATIC: Cervical, supraclavicular, and inguinal nodes without lymphadenopathy  MUSCULOSKELETAL: Moves all extremities, no obvious muscle wasting  NEUROLOGIC: No gross deficits, normal gait  SKIN:  Appropriate color for race, warm and dry, no rashes or lesions to unclothed skin  PSYCHIATRIC: Pleasant and interactive, affect bright, makes appropriate eye contact, thought process linear    Assessment/Plan:  1) Stage IA grade 1 endometrial endometrioid adenocarcinoma: No evidence of recurrent disease. To continue surveillance every six months x2 years (through 12/2020) then annually thereafter with pelvic/rectal exam. Reviewed signs and symptoms  of recurrence including but not limited to bleeding from vagina, bladder, or rectum, bloating, early satiety, swelling in the lower extremities, abdominal or lower back pain, changes in bowel or bladder patterns, shortness of breath, increased fatigue, unexplained weight loss, and night sweats. Reviewed recommendations from [facility] not to perform surveillance pap smears in women diagnosed with endometrial cancer as this does not improve detection of local recurrence. Reviewed signs and symptoms for when she should contact the clinic or seek additional care. Patient to contact the clinic with any questions or concerns in the interim.   2) Genetic testing: Risk factors have been assessed and the patient does not meet qualifications for genetic counseling based on NCCN guidelines- MMR intact.   3) Health maintenance issues discussed today include to follow up with PCP for co-morbid conditions and non-gynecologic issues. Influenza vaccine today.  4) Patient verbalized understanding of and agreement with plan.    A total of 15 minutes of face to face time spent with patient, over 50% of which was spent in counseling and coordination of care.    LEEROY Cason, FNP-C  Division of Gynecologic Oncology  [facility]  Pager: [phone]

## 2020-01-09 NOTE — NURSING NOTE
"Oncology Rooming Note    January 9, 2020 1:29 PM   Erika Reina is a 39 year old female who presents for:    Chief Complaint   Patient presents with     Oncology Clinic Visit     Endometrial cancer     Initial Vitals: There were no vitals taken for this visit. Estimated body mass index is 53.37 kg/m  as calculated from the following:    Height as of 12/3/19: 1.727 m (5' 8\").    Weight as of 12/27/19: 159.2 kg (351 lb). There is no height or weight on file to calculate BSA.  Mild Pain (2) Comment: Data Unavailable   No LMP recorded.  Allergies reviewed: Yes  Medications reviewed: Yes    Medications: Medication refills not needed today.  Pharmacy name entered into D[facility]:    [id number] - [place] - [address]  [id number] - [place] - [address] AT [facility]    Clinical concerns: f/u       Liseth Birmingham CMA            "

## 2020-01-09 NOTE — LETTER
2020         RE: Erika Reina  [address]  [facility] [address]        Dear Colleague,    Thank you for referring your patient, Erika Reina, to the [facility]. Please see a copy of my visit note below.    Gynecologic Oncology Follow-Up Visit    RE: Erika Reina  MRN: [id number]  : 1980  Date of Visit: 2020    CC: Erika Reina  is a 39 year old female with a history of stage IA grade 1 endometrial endometrioid adenocarcinoma. She completed treatment with hysterectomy and BSO on 18. She presents today for a six month surveillance visit.    HPI: Erika comes to the clinic unaccompanied. She has been feeling well without gynecologic concerns. No fevers, chills, nausea/vomiting, changes in bowels/bladder, abdominal pain, vaginal bleeding, pelvic pain, dyspareunia, edema, masses in groin, hot flashes/night sweats. Seeing the weight management clinic, has bariatric surgery scheduled coming up for which she is excited. She is up to date on visits with her PCP. Due for an influenza vaccine.    Oncology History:  She notes she has had abnormal bleeding since her very first menses with very irregular cycles.  In  she noted a major change in the amount of bleeding with a significant increase.  Due to this she was placed on metformin without much improvement in her bleeding and thus had the following work up:     18:  US Pelvis:  Uterus measures 7.3 x 4.9 x 5.5 cm, EMS 26.3 mm, normal ovaries     18:  EMB:  Grade 1 endometrial adenocarcinoma, MMR intact     18:  Robotic hysterectomy, bilateral salpingo-oophorectomy, bilateral pelvic sentinel lymph node dissection, cystoscopy                 Pathology:  Stage 1A, grade 1 endometrial adenocarcinoma, tumor size 4.9 cm, no myometrial invasion, no LVSI, 0/11 sentinel LN       Past Medical History:   Diagnosis Date     Endometrial cancer (H) 2018     Hyperlipidemia LDL goal <160  9/15/2011       Past Surgical History:   Procedure Laterality Date     ARTHROPLASTY KNEE       CYSTOSCOPY N/A 12/13/2018    Procedure: Cystoscopy;  Surgeon: Cleopatra Altamirano MD;  Location:  OR     DAVINCI HYSTERECTOMY TOTAL, BILATERAL SALPINGO-OOPHORECTOMY, NODE DISSECTION, COMBINED N/A 12/13/2018    Procedure: DaVinci Assisted Removal Of Uterus, Cervix, Both Ovaries And Fallopian Tubes, Bilateral [place] Lymph Node Dissection;  Surgeon: Cleopatra Altamirano MD;  Location:  OR       Social History     Socioeconomic History     Marital status: Single     Spouse name: Not on file     Number of children: Not on file     Years of education: Not on file     Highest education level: Not on file   Occupational History     Not on file   Social Needs     Financial resource strain: Not on file     Food insecurity:     Worry: Not on file     Inability: Not on file     Transportation needs:     Medical: Not on file     Non-medical: Not on file   Tobacco Use     Smoking status: Never Smoker     Smokeless tobacco: Never Used   Substance and Sexual Activity     Alcohol use: Yes     Alcohol/week: 0.0 standard drinks     Binge frequency: Never     Comment: rarely.  Once monthly 1-2 drinks     Drug use: No     Sexual activity: Yes     Partners: Male     Birth control/protection: Condom   Lifestyle     Physical activity:     Days per week: Not on file     Minutes per session: Not on file     Stress: Not on file   Relationships     Social connections:     Talks on phone: Not on file     Gets together: Not on file     Attends Worship service: Not on file     Active member of club or organization: Not on file     Attends meetings of clubs or organizations: Not on file     Relationship status: Not on file     Intimate partner violence:     Fear of current or ex partner: Not on file     Emotionally abused: Not on file     Physically abused: Not on file     Forced sexual activity: Not on file   Other Topics Concern      Parent/sibling w/ CABG, MI or angioplasty before 65F 55M? No   Social History Narrative     Not on file       Family History   Problem Relation Age of Onset     Diabetes Mother      Obesity Mother      Hypertension Mother      Diabetes Father      Hypertension Father      Sleep Apnea Father      Cancer - colorectal Maternal Grandmother      Heart Disease Paternal Grandmother      Obesity Brother      Sleep Apnea Brother      Coronary Artery Disease No family hx of      Cerebrovascular Disease No family hx of        Current Outpatient Medications   Medication     Melatonin Gummies 2.5 MG CHEW     multivitamin, therapeutic with minerals (MULTI-VITAMIN) TABS tablet     VITAMIN D, CHOLECALCIFEROL, PO     No current facility-administered medications for this visit.           Allergies   Allergen Reactions     Ibuprofen Swelling     Swelling of legs       Review of Systems  10 point ROS negative other than the symptoms noted above in the HPI.    OBJECTIVE:    Physical Exam:    /76   Pulse 82   Temp 97  F (36.1  C) (Oral)   Resp 16   Wt (!) 159.2 kg (351 lb)   SpO2 98%   BMI 53.37 kg/m       CONSTITUTIONAL: Alert non-toxic appearing female in no acute distress  HEAD: Normocephalic, atraumatic  NECK: Neck supple without lymphadenopathy  RESPIRATORY: Lungs clear to auscultation, no increased work of breathing noted  CV: Regular rate and rhythm, S1S2, no clicks, murmurs, rubs, or gallops; bilateral lower extremities without edema, dorsalis pedis pulses 2+ bilaterally  GASTROINTESTINAL: Normoactive bowel sounds x4 quadrants, abdomen obese, soft, non-distended, non-tender to palpation, no organomegaly noted  GENITOURINARY: External genitalia and urethral meatus pink without lesions, masses, or excoriation. Vagina pink and well rugated without masses or lesions. Vaginal cuff without nodularity, masses, or lesions. Cervix surgically absent. Bimanual exam reveals no masses or fullness- somewhat limited by body habitus.  Rectovaginal exam confirms these findings.  LYMPHATIC: Cervical, supraclavicular, and inguinal nodes without lymphadenopathy  MUSCULOSKELETAL: Moves all extremities, no obvious muscle wasting  NEUROLOGIC: No gross deficits, normal gait  SKIN: Appropriate color for race, warm and dry, no rashes or lesions to unclothed skin  PSYCHIATRIC: Pleasant and interactive, affect bright, makes appropriate eye contact, thought process linear    Assessment/Plan:  1) Stage IA grade 1 endometrial endometrioid adenocarcinoma: No evidence of recurrent disease. To continue surveillance every six months x2 years (through 12/2020) then annually thereafter with pelvic/rectal exam. Reviewed signs and symptoms  of recurrence including but not limited to bleeding from vagina, bladder, or rectum, bloating, early satiety, swelling in the lower extremities, abdominal or lower back pain, changes in bowel or bladder patterns, shortness of breath, increased fatigue, unexplained weight loss, and night sweats. Reviewed recommendations from [facility] not to perform surveillance pap smears in women diagnosed with endometrial cancer as this does not improve detection of local recurrence. Reviewed signs and symptoms for when she should contact the clinic or seek additional care. Patient to contact the clinic with any questions or concerns in the interim.   2) Genetic testing: Risk factors have been assessed and the patient does not meet qualifications for genetic counseling based on NCCN guidelines- MMR intact.   3) Health maintenance issues discussed today include to follow up with PCP for co-morbid conditions and non-gynecologic issues. Influenza vaccine today.  4) Patient verbalized understanding of and agreement with plan.    A total of 15 minutes of face to face time spent with patient, over 50% of which was spent in counseling and coordination of care.    LEEROY Cason, FNP-C  Division of Gynecologic Oncology  [facility]  Pager:  [phone]              Again, thank you for allowing me to participate in the care of your patient.        Sincerely,        LEEROY Cason CNP

## 2020-01-17 ENCOUNTER — OFFICE VISIT (OUTPATIENT)
Dept: SURGERY | Facility: CLINIC | Age: 40
End: 2020-01-17
Payer: COMMERCIAL

## 2020-01-17 VITALS — WEIGHT: 293 LBS | BODY MASS INDEX: 44.41 KG/M2 | HEIGHT: 68 IN

## 2020-01-17 DIAGNOSIS — E66.01 MORBID OBESITY DUE TO EXCESS CALORIES (H): ICD-10-CM

## 2020-01-17 PROCEDURE — 97803 MED NUTRITION INDIV SUBSEQ: CPT | Performed by: DIETITIAN, REGISTERED

## 2020-01-17 ASSESSMENT — MIFFLIN-ST. JEOR: SCORE: 2295.22

## 2020-01-28 ENCOUNTER — OFFICE VISIT (OUTPATIENT)
Dept: SURGERY | Facility: CLINIC | Age: 40
End: 2020-01-28
Payer: COMMERCIAL

## 2020-01-28 VITALS
BODY MASS INDEX: 44.41 KG/M2 | SYSTOLIC BLOOD PRESSURE: 124 MMHG | HEART RATE: 84 BPM | DIASTOLIC BLOOD PRESSURE: 78 MMHG | OXYGEN SATURATION: 99 % | WEIGHT: 293 LBS | HEIGHT: 68 IN

## 2020-01-28 DIAGNOSIS — E66.01 MORBID OBESITY WITH BMI OF 50.0-59.9, ADULT (H): Primary | ICD-10-CM

## 2020-01-28 PROCEDURE — 99214 OFFICE O/P EST MOD 30 MIN: CPT | Performed by: SURGERY

## 2020-01-28 ASSESSMENT — MIFFLIN-ST. JEOR: SCORE: 2307.92

## 2020-01-28 NOTE — PROGRESS NOTES
"Dear Dr. Ham, Glenna Nascimento,      Referring provider:       7/23/2019   Who referred you? Glenna Ham     I was asked to see the patient regarding obesity by the referring provider above.    I had the pleasure of meeting with your patient Erika Reina in our weight loss surgery office.  This patient is a 39 year old female who has been undergoing our thorough preoperative screening process in anticipation of potential bariatric surgery.    At initial evaluation we recorded Erika Reina's Height: 172.7 cm (5' 8\"), Initial Weight: 160.4 kg (353 lb 9.6 oz) and Initial BMI: 54.56.  The patient has been unsuccessful with other methods of permanent weight loss and suffers from multiple weight related medical conditions.  Due to lack of success in achieving weight loss through other methods, she is interested in undergoing bariatric surgery.    PREVIOUS WEIGHT LOSS ATTEMPTS:     7/23/2019   I have tried the following methods to lose weight Watching portions or calories, Exercise, Pre packaged meals ex: Nutrisystem, OTC Medications       CO-MORBIDITIES OF OBESITY INCLUDE:     7/23/2019   I have the following co-morbidities associated with obesity: Polycystic Ovarian Syndrome, Fatty Liver, Weight Bearing Joint Pain   PAST MEDICAL HISTORY:  Past Medical History        Past Medical History:   Diagnosis Date     Endometrial cancer (H) 12/19/2018     Hyperlipidemia LDL goal <160 9/15/2011            Past Surgical History:   Procedure Laterality Date     ARTHROPLASTY KNEE         CYSTOSCOPY N/A 12/13/2018     Procedure: Cystoscopy;  Surgeon: Cleopatra Altamirano MD;  Location: [facility] OR     DAVINCI HYSTERECTOMY TOTAL, BILATERAL SALPINGO-OOPHORECTOMY, NODE DISSECTION, COMBINED N/A 12/13/2018     Procedure: DaVinci Assisted Removal Of Uterus, Cervix, Both Ovaries And Fallopian Tubes, Bilateral [place] Lymph Node Dissection;  Surgeon: Cleopatra Altamirano MD;  Location: [facility] OR         Family History   Problem " "Relation Age of Onset     Diabetes Mother       Obesity Mother       Hypertension Mother       Diabetes Father       Hypertension Father       Cancer - colorectal Maternal Grandmother       Heart Disease Paternal Grandmother       Obesity Brother        No personal or family history of blood clots or anesthesia concerns    She was dx with \"Stage 1A, grade 1 endometrial adenocarcinoma.  Hysterectomy performed 12/13/2018.  No need for further therapy.  F/U with oncology every 6 mo for 5 years and then annually.\"  Just had a follow up 7/11/19.  See in EPIC      VITALS:  /78 (BP Location: Left arm, Patient Position: Sitting, Cuff Size: Adult Large)   Pulse 84   Ht 1.727 m (5' 8\")   Wt (!) 157.9 kg (348 lb 3.2 oz)   SpO2 99%   BMI 52.94 kg/m      PE:  GENERAL: Alert and oriented x3. NAD  HEENT exam: Sclerae not icteric. Hearing good. Head normocephalic and atraumatic.   CARDIOVASCULAR: No JVD  RESPIRATORY: Breathing unlabored  GASTROINTESTINAL: Obese  LOWER EXTREMITIES: no deformities  MUSCULOSKELETAL: Normal gait, Moves all 4 extremities equal and strong  NEUROLOGIC: no gross defect  SKIN: warm and dry to touch     In summary, she has undergone an appropriate medical evaluation, dietitian evaluation, as well as psychologic screening. The patient appears to be an appropriate candidate for bariatric surgery.    In the office today, I discussed the laparoscopic gastric bypass surgery.  Risks, benefits and anticipated outcomes were outlined including the risk of death, staple line leak (1-2%), PE, DVT, ulcer, worsening GERD, N/V, stricture, hernia, wound infection, weight regain, and vitamin deficiencies. This patient has a good chance of sustaining permanent weight loss due to this procedure.  This should also allow improvement if not resolution of his/her weight related medical conditions.    At present we are going to present your patient's file for prior authorization to insurance.  Pending prior " authorization, I anticipate a surgical date in the near future.  We will keep you updated on any progress.  If you have questions regarding care please feel free to contact me.  Total time spent in the clinic was 25 minutes with greater than 50% in face-to-face consultation.        Sincerely,    Nicanor Puri MD    Please route or send letter to:  Primary Care Provider (PCP) and Referring Provider

## 2020-01-31 ENCOUNTER — PREP FOR PROCEDURE (OUTPATIENT)
Dept: SURGERY | Facility: CLINIC | Age: 40
End: 2020-01-31

## 2020-01-31 DIAGNOSIS — E66.01 MORBIDLY OBESE (H): Primary | ICD-10-CM

## 2020-02-03 ENCOUNTER — OFFICE VISIT (OUTPATIENT)
Dept: SLEEP MEDICINE | Facility: CLINIC | Age: 40
End: 2020-02-03
Payer: COMMERCIAL

## 2020-02-03 VITALS
DIASTOLIC BLOOD PRESSURE: 72 MMHG | HEIGHT: 68 IN | WEIGHT: 293 LBS | OXYGEN SATURATION: 99 % | RESPIRATION RATE: 16 BRPM | BODY MASS INDEX: 44.41 KG/M2 | HEART RATE: 69 BPM | SYSTOLIC BLOOD PRESSURE: 116 MMHG

## 2020-02-03 DIAGNOSIS — G47.33 OSA (OBSTRUCTIVE SLEEP APNEA): Primary | ICD-10-CM

## 2020-02-03 PROCEDURE — 99214 OFFICE O/P EST MOD 30 MIN: CPT | Performed by: PHYSICIAN ASSISTANT

## 2020-02-03 ASSESSMENT — MIFFLIN-ST. JEOR: SCORE: 2320.5

## 2020-02-03 NOTE — PROGRESS NOTES
Sleep Follow-Up Visit:    Date on this visit: 2/3/2020    Erika Reina comes in today for follow-up of her CPAP use for severe ENRICO. She was initially seen at the [facility] for evaluation of sleep apnea in preparation for weight loss surgery. She has felt tired all of the time in the last 2 years. She has had trouble sleeping since about high school. Her medical history is significant for PCOS, endometrial cancer (s/p hysterectomy with bilateral salpingo-oophorectomy), anemia.    PSG 11/6/2019: AHI was 39.2/hr (0% central), with desaturations down to 81%. S/He spent 4.1 minutes below 90% SpO2 and 2.8 minutes below 89% SpO2. RDI 47.5/hr.  REM RDI 82.5/hr.  Supine RDI 53.4/hr. Her non-supine RDI was 33.8/hr, AHI 18.5/hr. Periodic Limb Movement Index 6.9/hour, 0.5/hr associated with arousals.  Weight 350.    She is on auto CPAP 5-15 cm.  She notices sleeping longer and better. She is still tired a lot though (fatigue not sleepiness). She does not notice that being worse on particular days. She does not nap, no time. She is not aware of snoring with the CPAP. She uses a nasal pillows mask. She uses the AirFit p10. She denies mask leak. She gets a little irritation in one nostril. She is comfortable with the pressures. She denies dry nose or mouth. She does use the humidifier.    The compliance data shows that the patient used the CPAP for 29/30 nights, 97% of nights for >4 hours.  The 95th% pressure is 10 cm.  The 95th% leak is 6.9 lpm.  The average nightly usage is 7:20.  The average AHI is 0.5/hr.      Past medical/surgical history, family history, social history, medications and allergies were reviewed.      Problem List:  Patient Active Problem List    Diagnosis Date Noted     ENRICO (obstructive sleep apnea) 12/03/2019     Priority: Medium     Endometrial cancer (H) 11/20/2018     Priority: Medium     SCP data entered.  Give at 7/11/19 apt.       Microcytic anemia 11/21/2016     Priority:  Medium     Plantar fasciitis 10/18/2016     Priority: Medium     Morbid obesity due to excess calories (H) 10/18/2016     Priority: Medium     Hyperlipidemia LDL goal <160 09/15/2011     Priority: Medium        Impression/Plan:    (G47.33) ENRICO (obstructive sleep apnea)  (primary encounter diagnosis)  Comment: doing well with CPAP, still has some fatigue  Plan: Continue auto CPAP 5-15 cm. She is cleared for surgery from a sleep standpoint. She was advised to bring the CPAP to surgery to be available post-operatively. We reviewed recommendations for cleaning and replacing supplies. She was encouraged to contact me if she becomes uncomfortable with the pressures as she loses weight.     SLEEP APNEA  AIRWAY MANAGEMENT        Patients should be considered for difficult airway management strategy during sedation and will need airway support whenever sleeping without intubation. Structured approach:    1. ANESTHESIAàConsider regional block or short-acting agent such as propofol or desflurane.  2.  ANALGESIAàConsider NSAID, titratable dosing of opiod without sedative to minimize sedation, dexmedetomidine for agitation.  3. OXYGENATIONàUse titratable oxygen to maintain SpO2 90-94%.  4. POSITIONàElevate head of bed 45 degrees or place in lateral position.  5.  MONITORINGàMonitor in supervised area with continuous oximetry and ECG with SpO2 alarm set at 85%. Obtain blood gases to exclude hypercapnia if suspected [SpO2<94% on room air, BMI >45, lethargy].  6. AIRWAYà    Assess for snoring, airway obstruction, or episodic desaturation.    Apply previous effective CPAP or auto-CPAP 5-15 and adjust to prevent snoring and desaturation during continuous oximetry.     Intubate if there continues to be evidence of airway obstruction, uncontrolled hypercapnia or risk of vomiting with loss of consciousness.        She will follow up with me in about 6 month(s).     Twenty-five minutes spent with patient, all of which were spent  face-to-face counseling, consulting, coordinating plan of care.      Bennett Goltz, PA-C    CC: MD Swati Mccoy PA-C

## 2020-02-06 ENCOUNTER — HOSPITAL ENCOUNTER (INPATIENT)
Facility: CLINIC | Age: 40
Setting detail: SURGERY ADMIT
End: 2020-02-06
Attending: SURGERY | Admitting: SURGERY
Payer: COMMERCIAL

## 2020-02-06 DIAGNOSIS — E66.01 MORBIDLY OBESE (H): ICD-10-CM

## 2020-02-18 RX ORDER — CEFAZOLIN SODIUM IN 0.9 % NACL 3 G/100 ML
3 INTRAVENOUS SOLUTION, PIGGYBACK (ML) INTRAVENOUS
Status: CANCELLED | OUTPATIENT
Start: 2020-02-18

## 2020-02-18 RX ORDER — HEPARIN SODIUM 5000 [USP'U]/.5ML
5000 INJECTION, SOLUTION INTRAVENOUS; SUBCUTANEOUS
Status: CANCELLED | OUTPATIENT
Start: 2020-02-18

## 2020-03-01 ENCOUNTER — OFFICE VISIT (OUTPATIENT)
Dept: URGENT CARE | Facility: URGENT CARE | Age: 40
End: 2020-03-01
Payer: COMMERCIAL

## 2020-03-01 VITALS
TEMPERATURE: 98.2 F | DIASTOLIC BLOOD PRESSURE: 68 MMHG | HEART RATE: 88 BPM | OXYGEN SATURATION: 97 % | SYSTOLIC BLOOD PRESSURE: 112 MMHG

## 2020-03-01 DIAGNOSIS — B34.9 VIRAL SYNDROME: Primary | ICD-10-CM

## 2020-03-01 DIAGNOSIS — R07.0 THROAT PAIN: ICD-10-CM

## 2020-03-01 LAB
DEPRECATED S PYO AG THROAT QL EIA: NEGATIVE
FLUAV+FLUBV AG SPEC QL: NEGATIVE
FLUAV+FLUBV AG SPEC QL: NEGATIVE
SPECIMEN SOURCE: NORMAL
STREP GROUP A PCR: NOT DETECTED

## 2020-03-01 PROCEDURE — 87804 INFLUENZA ASSAY W/OPTIC: CPT | Performed by: PHYSICIAN ASSISTANT

## 2020-03-01 PROCEDURE — 87651 STREP A DNA AMP PROBE: CPT | Performed by: PHYSICIAN ASSISTANT

## 2020-03-01 PROCEDURE — 99213 OFFICE O/P EST LOW 20 MIN: CPT | Performed by: PHYSICIAN ASSISTANT

## 2020-03-01 ASSESSMENT — ENCOUNTER SYMPTOMS
SHORTNESS OF BREATH: 0
DIARRHEA: 0
CHILLS: 0
ABDOMINAL PAIN: 0
FOCAL WEAKNESS: 0
SORE THROAT: 1
VOMITING: 0
HEADACHES: 0
NAUSEA: 0
MYALGIAS: 1
FEVER: 1

## 2020-03-01 NOTE — PATIENT INSTRUCTIONS
"Patient Education     Viral Syndrome (Adult)  A viral illness may cause a number of symptoms such as fever. Other symptoms depend on the part of the body that the virus affects. If it settles in your nose, throat, and lungs, it may cause cough, sore throat, congestion, runny nose, headache, earache and other ear symptoms, or shortness of breath. If it settles in your stomach and intestinal tract, it may cause nausea, vomiting, cramping, and diarrhea. Sometimes it causes generalized symptoms like \"aching all over,\" feeling tired, loss of energy, or loss of appetite.  A viral illness usually lasts anywhere from several days to several weeks, but sometimes it lasts longer. In some cases, a more serious infection can look like a viral syndrome in the first few days of the illness. You may need another exam and additional tests to know the difference. Watch for the warning signs listed below for when to seek medical advice.  Home care  Follow these guidelines for taking care of yourself at home:    If symptoms are severe, rest at home for the first 2 to 3 days.    Stay away from cigarette smoke - both your smoke and the smoke from others.    You may use over-the-counter acetaminophen or ibuprofen for fever, muscle aching, and headache, unless another medicine was prescribed for this. If you have chronic liver or kidney disease or ever had a stomach ulcer or gastrointestinal bleeding, talk with your healthcare provider before using these medicines. No one who is younger than 18 and ill with a fever should take aspirin. It may cause severe disease or death.    Your appetite may be poor, so a light diet is fine. Avoid dehydration by drinking 8 to 12, 8-ounce glasses of fluids each day. This may include water; orange juice; lemonade; apple, grape, and cranberry juice; clear fruit drinks; electrolyte replacement and sports drinks; and decaffeinated teas and coffee. If you have been diagnosed with a kidney disease, ask your " healthcare provider how much and what types of fluids you should drink to prevent dehydration. If you have kidney disease, drinking too much fluid can cause it build up in the your body and be dangerous to your health.    Over-the-counter remedies won't shorten the length of the illness but may be helpful for symptoms such as cough, sore throat, nasal and sinus congestion, or diarrhea. Don't use decongestants if you have high blood pressure.  Follow-up care  Follow up with your healthcare provider if you do not improve over the next week.  Call 911  Call 911 if any of the following occur:    Convulsion    Feeling weak, dizzy, or like you are going to faint    Chest pain, or more than mild shortness of breath  When to seek medical advice  Call your healthcare provider right away if any of these occur:    Cough with lots of colored sputum (mucus) or blood in your sputum    Chest pain, shortness of breath, wheezing, or trouble breathing    Severe headache; face, neck, or ear pain    Severe, constant pain in the lower right side of your belly (abdominal)    Continued vomiting (can t keep liquids down)    Frequent diarrhea (more than 5 times a day); blood (red or black color) or mucus in diarrhea    Feeling weak, dizzy, or like you are going to faint    Extreme thirst    Fever of 100.4 F (38 C) or higher, or as directed by your healthcare provider  Date Last Reviewed: 4/1/2018 2000-2019 [employer]. [address] [place] [id number]. All rights reserved. This information is not intended as a substitute for professional medical care. Always follow your healthcare professional's instructions.

## 2020-03-01 NOTE — PROGRESS NOTES
HPI  March 1, 2020    HPI: Erika Reina is a 39 year old female who complains of moderate sore throat, fatigue, myalgias, & fever onset 2 days ago. Tmax 101 F. Symptoms are constant in duration. No treatments tried. Denies cough, HA, CP, SOB, abd pain, N/V/D, rash, or any other symptoms. Patient denies sick contacts.    Past Medical History:   Diagnosis Date     Endometrial cancer (H) 12/19/2018     Hyperlipidemia LDL goal <160 9/15/2011     Past Surgical History:   Procedure Laterality Date     ARTHROPLASTY KNEE       CYSTOSCOPY N/A 12/13/2018    Procedure: Cystoscopy;  Surgeon: Cleopatra Altamirano MD;  Location: [facility] OR     DAVINCI HYSTERECTOMY TOTAL, BILATERAL SALPINGO-OOPHORECTOMY, NODE DISSECTION, COMBINED N/A 12/13/2018    Procedure: DaVinci Assisted Removal Of Uterus, Cervix, Both Ovaries And Fallopian Tubes, Bilateral [place] Lymph Node Dissection;  Surgeon: Cleopatra Altamirano MD;  Location:  OR     Social History     Tobacco Use     Smoking status: Never Smoker     Smokeless tobacco: Never Used   Substance Use Topics     Alcohol use: Yes     Alcohol/week: 0.0 standard drinks     Binge frequency: Never     Comment: rarely.  Once monthly 1-2 drinks     Drug use: No     Patient Active Problem List   Diagnosis     Hyperlipidemia LDL goal <160     Plantar fasciitis     Morbid obesity due to excess calories (H)     Microcytic anemia     Endometrial cancer (H)     ENRICO (obstructive sleep apnea)     Morbidly obese (H)     Family History   Problem Relation Age of Onset     Diabetes Mother      Obesity Mother      Hypertension Mother      Diabetes Father      Hypertension Father      Sleep Apnea Father      Cancer - colorectal Maternal Grandmother      Heart Disease Paternal Grandmother      Obesity Brother      Sleep Apnea Brother      Coronary Artery Disease No family hx of      Cerebrovascular Disease No family hx of         Problem list, Medication list, Allergies, and Medical/Social/Surgical  histories reviewed in EPIC and updated as appropriate.    Review of Systems   Constitutional: Positive for fever and malaise/fatigue. Negative for chills.   HENT: Positive for sore throat.    Respiratory: Negative for shortness of breath.    Cardiovascular: Negative for chest pain.   Gastrointestinal: Negative for abdominal pain, diarrhea, nausea and vomiting.   Musculoskeletal: Positive for myalgias.   Skin: Negative for rash.   Neurological: Negative for focal weakness and headaches.   All other systems reviewed and are negative.        Physical Exam  Vitals signs and nursing note reviewed.   HENT:      Head: Normocephalic and atraumatic.      Right Ear: Tympanic membrane and external ear normal.      Left Ear: Tympanic membrane and external ear normal.      Mouth/Throat:      Mouth: Mucous membranes are moist.      Pharynx: Posterior oropharyngeal erythema present. No pharyngeal swelling.      Tonsils: No tonsillar exudate.   Cardiovascular:      Rate and Rhythm: Normal rate and regular rhythm.      Heart sounds: Normal heart sounds.   Pulmonary:      Effort: Pulmonary effort is normal.      Breath sounds: Normal breath sounds.   Musculoskeletal: Normal range of motion.   Skin:     General: Skin is warm and dry.   Neurological:      Mental Status: She is alert and oriented to person, place, and time.       Vital Signs  /68 (BP Location: Right arm, Patient Position: Chair, Cuff Size: Adult Large)   Pulse 88   Temp 98.2  F (36.8  C) (Oral)   SpO2 97%      Diagnostic Test Results:  Results for orders placed or performed in visit on 03/01/20 (from the past 24 hour(s))   Streptococcus A Rapid Scr w Reflx to PCR   Result Value Ref Range    Strep Specimen Description Throat     Streptococcus Group A Rapid Screen Negative NEG^Negative   Influenza A/B antigen   Result Value Ref Range    Influenza A/B Agn Specimen Nasal     Influenza A Negative NEG^Negative    Influenza B Negative NEG^Negative        ASSESSMENT/PLAN      ICD-10-CM    1. Viral syndrome B34.9    2. Throat pain R07.0 Streptococcus A Rapid Scr w Reflx to PCR     Group A Streptococcus PCR Throat Swab     Influenza A/B antigen      Strep & flu negative. Suspect viral syndrome, supportive treatments discussed.      I have discussed any lab or imaging results, the patient's diagnosis, and my plan of treatment with the patient and/or family. Patient is aware to come back in if with worsening symptoms or if no relief despite treatment plan.  Patient voiced understanding and had no further questions.       Follow Up: Return in about 1 week (around 3/8/2020) for Follow up w/ primary care provider if not better.    GALLITO Davis, PAAllanC  [facility]

## 2020-03-09 ENCOUNTER — TELEPHONE (OUTPATIENT)
Dept: SURGERY | Facility: CLINIC | Age: 40
End: 2020-03-09

## 2020-03-09 NOTE — TELEPHONE ENCOUNTER
Patient returned call.  States she will discuss Echo with PCP at tomorrow's visit.  This writer will watch for PCP's documentation and route to RICK Longoria.  Jovita Dove MS, RD, RN

## 2020-03-09 NOTE — TELEPHONE ENCOUNTER
LM for patient to call clinic.    Patient has preop tomorrow.    When returns call, ask patient if she plans to discuss having Echo done at that time or to ask for it.  Nees to have done prior to surgery 3/30/2020.  Jovita Dove, MS, RD, RN

## 2020-03-10 ENCOUNTER — OFFICE VISIT (OUTPATIENT)
Dept: FAMILY MEDICINE | Facility: CLINIC | Age: 40
End: 2020-03-10
Payer: COMMERCIAL

## 2020-03-10 VITALS
BODY MASS INDEX: 53.14 KG/M2 | DIASTOLIC BLOOD PRESSURE: 72 MMHG | RESPIRATION RATE: 20 BRPM | WEIGHT: 293 LBS | HEART RATE: 84 BPM | TEMPERATURE: 97.8 F | SYSTOLIC BLOOD PRESSURE: 118 MMHG

## 2020-03-10 DIAGNOSIS — E66.01 MORBIDLY OBESE (H): ICD-10-CM

## 2020-03-10 DIAGNOSIS — Z01.818 PREOP GENERAL PHYSICAL EXAM: Primary | ICD-10-CM

## 2020-03-10 DIAGNOSIS — C54.1 ENDOMETRIAL CANCER (H): ICD-10-CM

## 2020-03-10 DIAGNOSIS — Z82.49 FAMILY HISTORY OF THORACIC AORTIC ANEURYSM: ICD-10-CM

## 2020-03-10 LAB
ERYTHROCYTE [DISTWIDTH] IN BLOOD BY AUTOMATED COUNT: 13.8 % (ref 10–15)
HCT VFR BLD AUTO: 39.8 % (ref 35–47)
HGB BLD-MCNC: 12.7 G/DL (ref 11.7–15.7)
MCH RBC QN AUTO: 28.3 PG (ref 26.5–33)
MCHC RBC AUTO-ENTMCNC: 31.9 G/DL (ref 31.5–36.5)
MCV RBC AUTO: 89 FL (ref 78–100)
PLATELET # BLD AUTO: 330 10E9/L (ref 150–450)
RBC # BLD AUTO: 4.49 10E12/L (ref 3.8–5.2)
WBC # BLD AUTO: 8.6 10E9/L (ref 4–11)

## 2020-03-10 PROCEDURE — 85027 COMPLETE CBC AUTOMATED: CPT | Performed by: NURSE PRACTITIONER

## 2020-03-10 PROCEDURE — 99214 OFFICE O/P EST MOD 30 MIN: CPT | Performed by: NURSE PRACTITIONER

## 2020-03-10 PROCEDURE — 36415 COLL VENOUS BLD VENIPUNCTURE: CPT | Performed by: NURSE PRACTITIONER

## 2020-03-10 NOTE — PROGRESS NOTES
[address], [address]  [facility] [address]  [phone]  Dept: [phone]    PRE-OP EVALUATION:  Today's date: 3/10/2020    Erika Reina (: 1980) presents for pre-operative evaluation assessment as requested by Dr. Puri. She requires evaluation and anesthesia risk assessment prior to undergoing surgery/procedure for treatment of weight loss    Fax number for surgical facility:   Primary Physician: Glenna Ham  Type of Anesthesia Anticipated:  General    Patient has a Health Care Directive or Living Will:  NO    Preop Questions 3/7/2020   Who is doing your surgery? Dr. Nicanor Puri   What are you having done? Sandrine-en-y   Date of Surgery/Procedure: 3/30/2020   Facility or Hospital where procedure/surgery will be performed: [facility]   1.  Do you have a history of Heart attack, stroke, stent, coronary bypass surgery, or other heart surgery? No   2.  Do you ever have any pain or discomfort in your chest? No   3.  Do you have a history of  Heart Failure? No   4.   Are you troubled by shortness of breath when:  walking on a level surface, or up a slight hill, or at night? No   5.  Do you currently have a cold, bronchitis or other respiratory infection? No   6.  Do you have a cough, shortness of breath, or wheezing? No   7.  Do you sometimes get pains in the calves of your legs when you walk? No   8. Do you or anyone in your family have previous history of blood clots? No   9.  Do you or does anyone in your family have a serious bleeding problem such as prolonged bleeding following surgeries or cuts? No   10. Have you ever had problems with anemia or been told to take iron pills? YES    11. Have you had any abnormal blood loss such as black, tarry or bloody stools, or abnormal vaginal bleeding? YES-in the past (vaginal s/p hysterectomy)   12. Have you ever had a blood transfusion? No   13. Have you or any of your relatives ever had problems with  anesthesia? No   14. Do you have sleep apnea, excessive snoring or daytime drowsiness? YES -sleep apnea   15. Do you have any prosthetic heart valves? No   16. Do you have prosthetic joints? No   17. Is there any chance that you may be pregnant? No         HPI:     HPI related to upcoming procedure: Plan for bariatric surgery for weight management.     Dad diagnosed with aortic aneurysm last year (early 70s).  Has been stable, but may need to surgical repair in the future. His cardiologist advised Ana be screened for this as it may be genetic.  Her surgeon would like this done preoperatively.   She denies any CP, SOB, palpitations, lightheadedness.     See problem list for active medical problems.  Problems all longstanding and stable, except as noted/documented.  See ROS for pertinent symptoms related to these conditions.      MEDICAL HISTORY:     Patient Active Problem List    Diagnosis Date Noted     Morbidly obese (H) 02/06/2020     Priority: Medium     Added automatically from request for surgery [id number]       ENRICO (obstructive sleep apnea) 12/03/2019     Priority: Medium     Endometrial cancer (H) 11/20/2018     Priority: Medium     SCP data entered.  Give at 7/11/19 apt.       Microcytic anemia 11/21/2016     Priority: Medium     Plantar fasciitis 10/18/2016     Priority: Medium     Morbid obesity due to excess calories (H) 10/18/2016     Priority: Medium     Hyperlipidemia LDL goal <160 09/15/2011     Priority: Medium      Past Medical History:   Diagnosis Date     Endometrial cancer (H) 12/19/2018     Hyperlipidemia LDL goal <160 9/15/2011     Past Surgical History:   Procedure Laterality Date     ABDOMEN SURGERY  12/18/18    hysterectomy     ARTHROPLASTY KNEE       BIOPSY  11 2018    endometrial layer     CYSTOSCOPY N/A 12/13/2018    Procedure: Cystoscopy;  Surgeon: Cleopatra Altamirano MD;  Location: [facility] OR     DAVINCI HYSTERECTOMY TOTAL, BILATERAL SALPINGO-OOPHORECTOMY, NODE DISSECTION, COMBINED N/A  12/13/2018    Procedure: DaVinci Assisted Removal Of Uterus, Cervix, Both Ovaries And Fallopian Tubes, Bilateral [place] Lymph Node Dissection;  Surgeon: Cleopatra Altamirano MD;  Location: [facility] OR     GYN SURGERY  12/18/18    hysterectomy     Current Outpatient Medications   Medication Sig Dispense Refill     Melatonin Gummies 2.5 MG CHEW Take 5 mg by mouth nightly as needed (insomnia)       multivitamin, therapeutic with minerals (MULTI-VITAMIN) TABS tablet Take 1 tablet by mouth daily       VITAMIN D, CHOLECALCIFEROL, PO Take by mouth daily       OTC products: None, except as noted above    Allergies   Allergen Reactions     Ibuprofen Swelling     Swelling of legs      Latex Allergy: NO    Social History     Tobacco Use     Smoking status: Never Smoker     Smokeless tobacco: Never Used   Substance Use Topics     Alcohol use: Not Currently     Alcohol/week: 0.0 standard drinks     Binge frequency: Never     Comment: rarely.  Once monthly 1-2 drinks     History   Drug Use No       REVIEW OF SYSTEMS:   CONSTITUTIONAL: NEGATIVE for fever, chills, change in weight  INTEGUMENTARY/SKIN: NEGATIVE for worrisome rashes, moles or lesions  EYES: NEGATIVE for vision changes or irritation  ENT/MOUTH: NEGATIVE for ear, mouth and throat problems  RESP: NEGATIVE for significant cough or SOB  CV: NEGATIVE for chest pain, palpitations or peripheral edema  GI: NEGATIVE for nausea, abdominal pain, heartburn, or change in bowel habits  : NEGATIVE for frequency, dysuria, or hematuria  MUSCULOSKELETAL: NEGATIVE for significant arthralgias or myalgia  NEURO: NEGATIVE for weakness, dizziness or paresthesias  ENDOCRINE: NEGATIVE for temperature intolerance, skin/hair changes  HEME: NEGATIVE for bleeding problems  PSYCHIATRIC: NEGATIVE for changes in mood or affect    EXAM:   /72 (BP Location: Right arm, Patient Position: Sitting, Cuff Size: Adult Large)   Pulse 84   Temp 97.8  F (36.6  C) (Oral)   Resp 20   Wt (!) 158.5 kg  (349 lb 6.4 oz)   BMI 53.14 kg/m      GENERAL APPEARANCE: healthy, alert and no distress     EYES: EOMI, PERRL     HENT: ear canals and TM's normal and nose and mouth without ulcers or lesions     NECK: no adenopathy, no asymmetry, masses, or scars and thyroid normal to palpation     RESP: lungs clear to auscultation - no rales, rhonchi or wheezes     CV: regular rates and rhythm, normal S1 S2, no S3 or S4 and no murmur, click or rub     ABDOMEN:  soft, nontender, no HSM or masses and bowel sounds normal     MS: extremities normal- no gross deformities noted, no evidence of inflammation in joints, FROM in all extremities.     SKIN: no suspicious lesions or rashes     NEURO: Normal strength and tone, sensory exam grossly normal, mentation intact and speech normal     PSYCH: mentation appears normal. and affect normal/bright     LYMPHATICS: No cervical adenopathy    DIAGNOSTICS:   EKG: Not indicated due to non-vascular surgery and low risk of event (age <65 and without cardiac risk factors)  CBC in process    Recent Labs   Lab Test 08/16/19  1307 07/29/19  0926 12/04/18  1519   HGB 13.0  --  13.5     --  379   NA  --  139 140   POTASSIUM  --  4.4 4.0   CR  --  0.73 0.67   A1C 5.0  --   --      IMPRESSION:   Reason for surgery/procedure: obesity  Diagnosis/reason for consult: preoperative evaluation     The proposed surgical procedure is considered INTERMEDIATE risk.    REVISED CARDIAC RISK INDEX  The patient has the following serious cardiovascular risks for perioperative complications such as (MI, PE, VFib and 3  AV Block):  No serious cardiac risks  INTERPRETATION: 0 risks: Class I (very low risk - 0.4% complication rate)    The patient has the following additional risks for perioperative complications:  Morbid obesity      ICD-10-CM    1. Preop general physical exam  Z01.818 CBC with platelets   2. Morbidly obese (H)  E66.01    3. Family history of thoracic aortic aneurysm  Z82.49 Echocardiogram Complete,  checking prior to surgery        RECOMMENDATIONS:       Obstructive Sleep Apnea (or suspected sleep apnea)  Patient is to bring their home CPAP with them on the day of surgery  Patient is clearly advised to use their home CPAP when released from surgery      --Patient is to take all scheduled medications on the day of surgery EXCEPT for modifications listed below.  Can hold vitamins day of surgery       Pending review of diagnostic evaluation, APPROVAL GIVEN to proceed with proposed procedure, without further diagnostic evaluation.    Signed Electronically by: LEEROY Pal CNP    Copy of this evaluation report is provided to requesting physician.    [facility] Preop Guidelines    Revised Cardiac Risk Index  [facility]  [address], [address]  [facility] [id number] [phone]  Dept: [phone]

## 2020-03-12 ENCOUNTER — TELEPHONE (OUTPATIENT)
Dept: SURGERY | Facility: CLINIC | Age: 40
End: 2020-03-12

## 2020-03-12 NOTE — TELEPHONE ENCOUNTER
Patient has bariatric preop class tomorrow.  Screened for Covid-19 using [employer]ening [employer] and all negative answers.  Jovita Dove MS, RD, RN

## 2020-03-13 ENCOUNTER — ALLIED HEALTH/NURSE VISIT (OUTPATIENT)
Dept: SURGERY | Facility: CLINIC | Age: 40
End: 2020-03-13
Payer: COMMERCIAL

## 2020-03-13 DIAGNOSIS — E66.01 MORBID OBESITY WITH BMI OF 50.0-59.9, ADULT (H): Primary | ICD-10-CM

## 2020-03-13 PROCEDURE — 99207 ZZC NO CHARGE NURSE ONLY: CPT

## 2020-03-16 NOTE — PROGRESS NOTES
Bariatric pre op class completed.  Class power point and patient checklist information gone over with patient.  Patient verbalized understanding of tasks needed to complete before surgery.  Patient also verbalized understanding of the power point and patient checklist information.  All questions were answered.  Quiz was completed.  Patient was advised to call if further questions.  Jovita Dove, MS, RD, RN

## 2020-03-17 ENCOUNTER — HOSPITAL ENCOUNTER (OUTPATIENT)
Dept: CARDIOLOGY | Facility: CLINIC | Age: 40
Discharge: HOME | End: 2020-03-17
Attending: NURSE PRACTITIONER | Admitting: NURSE PRACTITIONER
Payer: COMMERCIAL

## 2020-03-17 DIAGNOSIS — Z82.49 FAMILY HISTORY OF THORACIC AORTIC ANEURYSM: ICD-10-CM

## 2020-03-17 PROCEDURE — 40000264 ECHOCARDIOGRAM COMPLETE

## 2020-03-17 PROCEDURE — 25500064 ZZH RX 255 OP 636: Performed by: NURSE PRACTITIONER

## 2020-03-17 PROCEDURE — 93306 TTE W/DOPPLER COMPLETE: CPT | Mod: 26 | Performed by: INTERNAL MEDICINE

## 2020-03-17 RX ADMIN — HUMAN ALBUMIN MICROSPHERES AND PERFLUTREN 3 ML: 10; .22 INJECTION, SOLUTION INTRAVENOUS at 14:08

## 2020-04-17 ENCOUNTER — TELEPHONE (OUTPATIENT)
Dept: FAMILY MEDICINE | Facility: CLINIC | Age: 40
End: 2020-04-17

## 2020-04-17 ENCOUNTER — HOSPITAL ENCOUNTER (OUTPATIENT)
Dept: ULTRASOUND IMAGING | Facility: CLINIC | Age: 40
Discharge: HOME | End: 2020-04-17
Attending: NURSE PRACTITIONER | Admitting: NURSE PRACTITIONER
Payer: COMMERCIAL

## 2020-04-17 ENCOUNTER — OFFICE VISIT (OUTPATIENT)
Dept: PEDIATRICS | Facility: CLINIC | Age: 40
End: 2020-04-17
Attending: PHYSICIAN ASSISTANT
Payer: COMMERCIAL

## 2020-04-17 ENCOUNTER — VIRTUAL VISIT (OUTPATIENT)
Dept: FAMILY MEDICINE | Facility: CLINIC | Age: 40
End: 2020-04-17
Payer: COMMERCIAL

## 2020-04-17 VITALS
DIASTOLIC BLOOD PRESSURE: 79 MMHG | WEIGHT: 293 LBS | OXYGEN SATURATION: 96 % | HEART RATE: 85 BPM | RESPIRATION RATE: 16 BRPM | SYSTOLIC BLOOD PRESSURE: 118 MMHG | BODY MASS INDEX: 53.38 KG/M2 | TEMPERATURE: 98.2 F

## 2020-04-17 DIAGNOSIS — R60.0 LEG EDEMA, RIGHT: ICD-10-CM

## 2020-04-17 DIAGNOSIS — I82.4Z1 ACUTE DEEP VEIN THROMBOSIS (DVT) OF DISTAL VEIN OF RIGHT LOWER EXTREMITY (H): Primary | ICD-10-CM

## 2020-04-17 DIAGNOSIS — R60.0 LEG EDEMA, RIGHT: Primary | ICD-10-CM

## 2020-04-17 LAB
ALBUMIN SERPL-MCNC: 3.4 G/DL (ref 3.4–5)
ALP SERPL-CCNC: 84 U/L (ref 40–150)
ALT SERPL W P-5'-P-CCNC: 31 U/L (ref 0–50)
ANION GAP SERPL CALCULATED.3IONS-SCNC: 3 MMOL/L (ref 3–14)
AST SERPL W P-5'-P-CCNC: 18 U/L (ref 0–45)
BASOPHILS # BLD AUTO: 0.1 10E9/L (ref 0–0.2)
BASOPHILS NFR BLD AUTO: 0.7 %
BILIRUB SERPL-MCNC: 0.4 MG/DL (ref 0.2–1.3)
BUN SERPL-MCNC: 15 MG/DL (ref 7–30)
CALCIUM SERPL-MCNC: 9.6 MG/DL (ref 8.5–10.1)
CHLORIDE SERPL-SCNC: 106 MMOL/L (ref 94–109)
CO2 SERPL-SCNC: 29 MMOL/L (ref 20–32)
CREAT SERPL-MCNC: 0.79 MG/DL (ref 0.52–1.04)
DIFFERENTIAL METHOD BLD: ABNORMAL
EOSINOPHIL # BLD AUTO: 0.4 10E9/L (ref 0–0.7)
EOSINOPHIL NFR BLD AUTO: 4.2 %
ERYTHROCYTE [DISTWIDTH] IN BLOOD BY AUTOMATED COUNT: 14.2 % (ref 10–15)
GFR SERPL CREATININE-BSD FRML MDRD: >90 ML/MIN/{1.73_M2}
GLUCOSE SERPL-MCNC: 88 MG/DL (ref 70–99)
HCT VFR BLD AUTO: 43.4 % (ref 35–47)
HGB BLD-MCNC: 13.5 G/DL (ref 11.7–15.7)
IMM GRANULOCYTES # BLD: 0 10E9/L (ref 0–0.4)
IMM GRANULOCYTES NFR BLD: 0.3 %
INR PPP: 0.99 (ref 0.86–1.14)
LYMPHOCYTES # BLD AUTO: 1.8 10E9/L (ref 0.8–5.3)
LYMPHOCYTES NFR BLD AUTO: 17.6 %
MCH RBC QN AUTO: 28.1 PG (ref 26.5–33)
MCHC RBC AUTO-ENTMCNC: 31.1 G/DL (ref 31.5–36.5)
MCV RBC AUTO: 90 FL (ref 78–100)
MONOCYTES # BLD AUTO: 0.7 10E9/L (ref 0–1.3)
MONOCYTES NFR BLD AUTO: 7.2 %
NEUTROPHILS # BLD AUTO: 7 10E9/L (ref 1.6–8.3)
NEUTROPHILS NFR BLD AUTO: 70 %
NRBC # BLD AUTO: 0 10*3/UL
NRBC BLD AUTO-RTO: 0 /100
PLATELET # BLD AUTO: 311 10E9/L (ref 150–450)
POTASSIUM SERPL-SCNC: 4.4 MMOL/L (ref 3.4–5.3)
PROT SERPL-MCNC: 7.8 G/DL (ref 6.8–8.8)
RBC # BLD AUTO: 4.81 10E12/L (ref 3.8–5.2)
SODIUM SERPL-SCNC: 138 MMOL/L (ref 133–144)
WBC # BLD AUTO: 9.9 10E9/L (ref 4–11)

## 2020-04-17 PROCEDURE — 93971 EXTREMITY STUDY: CPT | Mod: RT

## 2020-04-17 PROCEDURE — 99215 OFFICE O/P EST HI 40 MIN: CPT | Performed by: NURSE PRACTITIONER

## 2020-04-17 PROCEDURE — 99207 REFERRAL TO ACUTE AND DIAGNOSTIC SERVICES: CPT | Performed by: PHYSICIAN ASSISTANT

## 2020-04-17 PROCEDURE — 85025 COMPLETE CBC W/AUTO DIFF WBC: CPT | Performed by: NURSE PRACTITIONER

## 2020-04-17 PROCEDURE — 36415 COLL VENOUS BLD VENIPUNCTURE: CPT | Performed by: NURSE PRACTITIONER

## 2020-04-17 PROCEDURE — 80053 COMPREHEN METABOLIC PANEL: CPT | Performed by: NURSE PRACTITIONER

## 2020-04-17 PROCEDURE — 85610 PROTHROMBIN TIME: CPT | Performed by: NURSE PRACTITIONER

## 2020-04-17 NOTE — TELEPHONE ENCOUNTER
Diagnostics calling to report US finding, thrombus in the superficial and deep system in right ankle    THis was ordered by    Horacio Navas PA-C      Diagnostics will call him    Flakita Hines, RN, BSN

## 2020-04-17 NOTE — PROGRESS NOTES
"Erika Reina is a 39 year old female who is being evaluated via a billable video visit.      The patient has been notified of following:     \"This video visit will be conducted via a call between you and your physician/provider. We have found that certain health care needs can be provided without the need for an in-person physical exam.  This service lets us provide the care you need with a video conversation.  If a prescription is necessary we can send it directly to your pharmacy.  If lab work is needed we can place an order for that and you can then stop by our lab to have the test done at a later time.    Video visits are billed at different rates depending on your insurance coverage.  Please reach out to your insurance provider with any questions.    If during the course of the call the physician/provider feels a video visit is not appropriate, you will not be charged for this service.\"    Patient has given verbal consent for Video visit? Yes    How would you like to obtain your AVS? Yris    Patient would like the video invitation sent by: Text to cell phone: [phone]    Subjective     Erika Reina is a 39 year old female who presents to clinic today for the following health issues:    Patient is complaining of a flare up of her thrombolitis in the right leg which started 4/12/2020.  She also notes pain and swelling especially around the ankle.    Joint Pain    Onset: 4/12/2020    Description:   Location: right leg  Character: Dull ache    Intensity: moderate    Progression of Symptoms: worse    Accompanying Signs & Symptoms:  Other symptoms: radiation of pain to to right ankle and swelling    History:   Previous similar pain: YES- history of svt in same leg 3 years ago      Precipitating factors:   Trauma or overuse: no     Alleviating factors:  Improved by: nothing    Therapies Tried and outcome: none     History of endometrial cancer in 2018. No known history of clotting disorders. No recent " travel or trauma. No chest pains or shortness of breath.            Video Start Time: 1:33    Patient Active Problem List   Diagnosis     Hyperlipidemia LDL goal <160     Plantar fasciitis     Morbid obesity due to excess calories (H)     Microcytic anemia     Endometrial cancer (H)     ENRICO (obstructive sleep apnea)     Morbidly obese (H)     Past Surgical History:   Procedure Laterality Date     ABDOMEN SURGERY  12/18/18    hysterectomy     ARTHROPLASTY KNEE       BIOPSY  11 2018    endometrial layer     CYSTOSCOPY N/A 12/13/2018    Procedure: Cystoscopy;  Surgeon: Cleopatra Altamiraon MD;  Location: [facility] OR     DAVINCI HYSTERECTOMY TOTAL, BILATERAL SALPINGO-OOPHORECTOMY, NODE DISSECTION, COMBINED N/A 12/13/2018    Procedure: DaVinci Assisted Removal Of Uterus, Cervix, Both Ovaries And Fallopian Tubes, Bilateral [place] Lymph Node Dissection;  Surgeon: Cleopatra Altamirano MD;  Location: [facility] OR     GYN SURGERY  12/18/18    hysterectomy       Social History     Tobacco Use     Smoking status: Never Smoker     Smokeless tobacco: Never Used   Substance Use Topics     Alcohol use: Not Currently     Alcohol/week: 0.0 standard drinks     Binge frequency: Never     Comment: rarely.  Once monthly 1-2 drinks     Family History   Problem Relation Age of Onset     Diabetes Mother      Obesity Mother      Hypertension Mother      Diabetes Father      Hypertension Father      Sleep Apnea Father      Cancer - colorectal Maternal Grandmother      Colon Cancer Maternal Grandmother      Heart Disease Paternal Grandmother      Obesity Brother      Sleep Apnea Brother      Coronary Artery Disease No family hx of      Cerebrovascular Disease No family hx of          Current Outpatient Medications   Medication Sig Dispense Refill     Melatonin Gummies 2.5 MG CHEW Take 5 mg by mouth nightly as needed (insomnia)       multivitamin, therapeutic with minerals (MULTI-VITAMIN) TABS tablet Take 1 tablet by mouth daily       VITAMIN D,  "CHOLECALCIFEROL, PO Take by mouth daily       Allergies   Allergen Reactions     Ibuprofen Swelling     Swelling of legs     Recent Labs   Lab Test 08/16/19  1307 07/29/19  0926 12/04/18  1519 09/28/18  1415  08/16/18  1544 10/18/16  1219   A1C 5.0  --   --   --   --   --   --    LDL  --   --   --   --   --  79 75   HDL  --   --   --   --   --  69 73   TRIG  --   --   --   --   --  84 92   ALT  --  34 22 24  --   --   --    CR  --  0.73 0.67 0.78   < >  --   --    GFRESTIMATED  --  >90 >90 84   < >  --   --    GFRESTBLACK  --  >90 >90 >90   < >  --   --    POTASSIUM  --  4.4 4.0 3.6   < >  --   --    TSH 2.39  --   --   --   --   --  3.02    < > = values in this interval not displayed.        Reviewed and updated as needed this visit by Provider         Review of Systems   ROS COMP: Constitutional, HEENT, cardiovascular, pulmonary, GI, , musculoskeletal, neuro, skin, endocrine and psych systems are negative, except as otherwise noted.      Objective    There were no vitals taken for this visit.  Estimated body mass index is 53.14 kg/m  as calculated from the following:    Height as of 2/3/20: 1.727 m (5' 7.99\").    Weight as of 3/10/20: 158.5 kg (349 lb 6.4 oz).  Physical Exam   GENERAL: healthy, alert and no distress  RESP: no visible respiratory distress  SKIN: unspecified edema noted on right lower leg from ankle to mid calf. No obvious erythema.   PSYCH: mentation appears normal, affect normal/bright    Diagnostic Test Results:  none         Assessment & Plan     (R60.0) Leg edema, right  (primary encounter diagnosis)  Comment: history of SVT in 2017 of same leg. Given recurrence as well as history of endometrial cancer, recommending ultrasound. Exam limited due to video visit, but does not appear infected on video. No symptoms suggesting pulmonary embolism therefore ER visit not felt needed. Recommending Hub evaluation for ultrasound. If DVT present, anticoagulate and follow up with heme due to recurrence and " history of endometrial cancer. If SVT only present, would recommend also anticoagulating with xarelto 10 mg daily for likely 45 days and follow up with heme as well given recurrent history.   Plan: Referral to Acute and Diagnostic Services (Day         of diagnostic / First order acute)                 Follow up: per hub. Called and warm handoff given.     No follow-ups on file.    Horacio Navas PA-C  [facility]      Video-Visit Details    Type of service:  Video Visit    Video End Time (time video stopped): 1:44    Originating Location (pt. Location): Home    Distant Location (provider location):  [facility]     Mode of Communication:  Video Conference via [employer]    No follow-ups on file.       Horacio Navas PA-C

## 2020-04-17 NOTE — PATIENT INSTRUCTIONS
Patient Education     Leg Swelling in a Single Leg  Swelling of the arms, feet, ankles, and legs is called edema. It is caused by extra fluid collecting in the tissues. Because of gravity, extra fluid in the body settles to the lowest part. That is why the legs and feet are most affected. You have swelling in a single leg.  Some of the causes for swelling in only a single leg include:    Infection in the foot or leg    Long-term problem with a vein not working well (venous insufficiency)    Swollen, twisted vein in the leg (varicose veins)    Insect bite or sting on the foot or leg    Injury or recent surgery on the foot or leg    Blood clot in a deep vein of the leg (deep vein thrombosis or DVT)    Inflammation of the joints of the lower leg  Medical treatment will depend on what is causing your swelling.  Home care  Follow these guidelines when caring for yourself at home:    Don t wear tight clothing.    Keep your legs up while lying or sitting.    Take any medicines as directed.    If infection, injury, or recent surgery is the cause of your swelling, stay off your legs as much as possible until your symptoms get better.    If you have venous insufficiency or varicose veins, don t sit or  one place for long periods of time. Take breaks and walk around every few hours. Talk with your healthcare provider about wearing support stockings to help lessen swelling during the day.    Wear compression stockings with your doctor's approval  Follow-up care  Follow up with your healthcare provider as advised.  Call 911  Call 911 if any of these occur:    Shortness of breath or trouble breathing    Chest pain    Coughing up blood    Fainting or loss of consciousness   When to seek medical advice  Call your healthcare provider right away if any of these occur:    Increased pain, swelling, warmth, or redness of the leg, ankle, or foot    Fever of 100.4 F (38 C) or higher, or as directed by your healthcare  provider    Weakness or dizziness    Shaking chills    Drenching sweats  Date Last Reviewed: 4/11/2016 2000-2019 [employer]. [address] [place] [address], [place] [address]. All rights reserved. This information is not intended as a substitute for professional medical care. Always follow your healthcare professional's instructions.

## 2020-04-17 NOTE — PATIENT INSTRUCTIONS
Patient Education     Discharge Instructions for Deep Vein Thrombosis (DVT)  A blood clot or thrombus that forms in a large, deep vein is called a deep vein thrombosis (DVT). If a DVT is not treated, part of the clot (embolus) can break off and travel to your lungs. This is called a pulmonary embolus (PE). This can cut off the flow of blood to part or all of the lung. PE is a medical emergency and may cause death.   Healthcare providers use the term venous thromboembolism (VTE) to describe these two conditions: DVT and PE. They use the term VTE because the two conditions are very closely related. And because their prevention and treatment are also closely related.   Follow all instructions for taking your medicine, follow-up care, and diet and lifestyle changes.  Medicine  Your healthcare provider will usually prescribe an anticoagulant medicine. This medicine is a blood thinner that helps to prevent new blood clots. Anticoagulants can be given by mouth (oral), by injection, or into your vein (intravenous or IV). Commonly used anticoagulants include warfarin and heparin. Newer anticoagulants may also be used. They include rivaroxaban, apixaban, dabigatran, and enoxaparin. Your healthcare provider will provide specific instructions on how to take your anticoagulant. You may take more than one type for a period of time.  Make sure to take your anticoagulant exactly as directed. If you miss a dose, call your healthcare provider to find out what you should do. These medicines increase the chance of bleeding. So it is very important to take them correctly. Be sure to tell all of your healthcare providers, including dentists, that you are taking an anticoagulant.  Follow-up monitoring  If you are taking warfarin, you will need regular blood tests to see how it is working. These blood tests include a prothrombin time (PT), also reported as an international normalized ratio (INR). Your dose of warfarin may be changed  based on the test results. It is very important that you keep your testing appointments after you leave the hospital.  Be sure you understand the following information before you go home:    My next PT/INR blood draw will be on ______________ (date) at _______________ (time) at __________________________________________ (name of healthcare provider or clinic and address).    The name of the healthcare provider who will monitor my anticoagulation is ________________________ and the phone number is _________________________.  Depending on which anticoagulant you are prescribed, you may need other blood tests. Before you are discharged, your healthcare provider will review what testing is needed in detail.  Diet and warfarin  Vitamin K can interact with warfarin and reduce its ability to thin your blood. Vitamin K helps your blood clot. So sudden changes in vitamin K intake can affect the way warfarin works. You don t need to avoid foods with vitamin K. Instead, keep the amount you eat about the same each day. Foods high in vitamin K include:    Leafy green vegetables like spinach, cabbage, and kale    Avocado    Asparagus    Egg yolks    Oils like canola, olive, and soybean  When taking warfarin, don't change your diet without first checking with your healthcare provider.  The other anticoagulants don't have the same interaction with vitamin K that warfarin does.   Medicines and your anticoagulant  Some medicines may cause problems with anticoagulants. Check with your healthcare provider before making any changes to your medicines. And don't take over-the-counter (OTC) medicines without checking with your provider. Some medicines interact with your anticoagulant and make your blood too thin. This increases your risk of bleeding. Others may stop your anticoagulant from doing its job, making your blood too thick. So it is very important to tell your healthcare provider about all of the medicines you take, including OTCs  and herbal supplements. Don't start or stop taking any medicine, including OTCs, unless your healthcare provider tells you to.  Medicines that may cause problems with your anticoagulant include:    Some antibiotic medicines    Some heart medicines    Cimetidine    Aspirin or other nonsteroidal anti-inflammatory medicines (NSAIDs) such as ibuprofen or naproxen.    Some medicines for depression, cancer, HIV infection, diabetes, seizures, gout, high cholesterol, or thyroid disease    Vitamins with vitamin K    Some herbal products such as Kunal's wort, garlic, coenzyme Q10, turmeric, and ginkgo biloba  Home care  To help prevent blood clots, try the following:    Wiggle your toes and move your ankles while sitting or lying down.    When traveling by car, make frequent stops to get up and move around.    On long airplane rides, get up and move around when possible. If you can t get up, wiggle your toes, move your ankles and tighten your calves to keep your blood moving.    If you have to stay in bed, do leg exercises.    Wear support or compression stockings, if prescribed by your healthcare provider.    Rest and put your legs up whenever they feel swollen or heavy.    Raise the foot of your mattress 5 to 6 inches, using a foam wedge.  Lifestyle changes  To help you stay healthy, especially your heart and blood vessels, you should:    Begin an exercise program, if you are not exercising. Ask your healthcare provider how to get started. Try walking, inside or out.    Stay at a healthy weight. Get help to lose any extra pounds.    Keep blood pressure in a healthy range    If you smoke, make a plan to quit. Ask your healthcare provider about stop-smoking programs to help you quit.  Call 911  Call 911 right away if you have the following symptoms. They may mean a blood clot in your lungs:    Chest pain    Trouble breathing    Fast heartbeat    Sweating    Fainting    Coughing (may cough up blood)    Heavy or uncontrolled  bleeding  When to call your healthcare provider  Call your healthcare provider if you have pain, swelling, or redness in your leg, arm, or other area. These symptoms may mean a blood clot.  If you take blood thinners and are bleeding, you may have:    Blood in the urine     Bleeding with bowel movements    Very dark or tar-like stool    Vomiting with blood    Coughing with blood    Bleeding from the nose    Bleeding from the gums    A cut that will not stop bleeding    Bleeding from the vagina  Date Last Reviewed: 5/1/2016 2000-2019 [employer]. [address] [place], [place] [address]. All rights reserved. This information is not intended as a substitute for professional medical care. Always follow your healthcare professional's instructions.

## 2020-04-17 NOTE — PROGRESS NOTES
Subjective     Erika Reina is a 39 year old female who presents urgently  To [facility] today by referral from Nicolas Navas to rule out DVT:    HPI   Joint Pain    Onset: X 6 days    Description:   Location: right ankle  Character: Stabbing    Intensity: severe    Progression of Symptoms: worse    Accompanying Signs & Symptoms:  Other symptoms: warmth, swelling and redness, pain radiating to calf    History:   Previous similar pain: YES- previous history of superficial thrombophlebitis, but notes pain was not as bad as it is now      Precipitating factors:   Trauma or overuse: no     Alleviating factors:  Improved by: rest/inactivity/elevating     Therapies Tried and outcome: heat, resting, elevating, none of this has helped much.    Had a superficial thrombophlebitis in 2018.    Is not anticoagulated.    No recent travel  No flights  No known injury or trauma.    Feels hot and red through the ankle  Denies chest pain, DOMINGUEZ, SOB, dizziness or lightheadedness.  No pain radiating to left arm or jaw.  No reflux.      Reviewed and updated as needed this visit by Provider  Tobacco  Allergies  Meds  Problems  Med Hx  Surg Hx  Fam Hx         Review of Systems   ROS COMP: Constitutional, HEENT, cardiovascular, pulmonary, GI, , musculoskeletal, neuro, skin, endocrine and psych systems are negative, except as otherwise noted.      Objective    /79 (BP Location: Right arm, Patient Position: Chair, Cuff Size: Adult Large)   Pulse 85   Temp 98.2  F (36.8  C) (Oral)   Resp 16   Wt (!) 159.2 kg (351 lb)   SpO2 96%   BMI 53.38 kg/m    Body mass index is 53.38 kg/m .  Physical Exam   GENERAL: healthy, alert and no distress  RESP: lungs clear to auscultation - no rales, rhonchi or wheezes  CV: regular rate and rhythm, normal S1 S2, no S3 or S4, no murmur, click or rub, no peripheral edema and peripheral pulses strong  MS: normal range of motion, 2+ edema to lateral right ankle, tenderness to palpation  and  RLE exam shows erythema and warmth over lateral right ankle  SKIN: erythema - lateral right ankle          Assessment & Plan       ICD-10-CM    1. Acute deep vein thrombosis (DVT) of distal vein of right lower extremity (H)  I82.4Z1 INR     apixaban ANTICOAGULANT (ELIQUIS STARTER PACK) 5 MG tablet     enoxaparin ANTICOAGULANT (LOVENOX) 60 MG/0.6ML syringe     Oncology/Hematology Adult Referral   2. Leg edema, right  R60.0 Referral to Acute and Diagnostic Services (Day of diagnostic / First order acute)     US Lower Extremity Venous Duplex Right     CBC with platelets and differential     Comprehensive metabolic panel     INR       Estimated Creatinine Clearance: 155.5 mL/min (based on SCr of 0.79 mg/dL).     DVT positive in right ankle, post tib and superficial in greater saphenous  Will plan to start eliquis, lovenox tonight   Previously had a superficial thrombophlebitis will refer to hematology as this is recurrent.    Plan to recheck and get INR in 3-4 days at PCP.      Patient Instructions     Patient Education     Discharge Instructions for Deep Vein Thrombosis (DVT)  A blood clot or thrombus that forms in a large, deep vein is called a deep vein thrombosis (DVT). If a DVT is not treated, part of the clot (embolus) can break off and travel to your lungs. This is called a pulmonary embolus (PE). This can cut off the flow of blood to part or all of the lung. PE is a medical emergency and may cause death.   Healthcare providers use the term venous thromboembolism (VTE) to describe these two conditions: DVT and PE. They use the term VTE because the two conditions are very closely related. And because their prevention and treatment are also closely related.   Follow all instructions for taking your medicine, follow-up care, and diet and lifestyle changes.  Medicine  Your healthcare provider will usually prescribe an anticoagulant medicine. This medicine is a blood thinner that helps to prevent new blood clots.  Anticoagulants can be given by mouth (oral), by injection, or into your vein (intravenous or IV). Commonly used anticoagulants include warfarin and heparin. Newer anticoagulants may also be used. They include rivaroxaban, apixaban, dabigatran, and enoxaparin. Your healthcare provider will provide specific instructions on how to take your anticoagulant. You may take more than one type for a period of time.  Make sure to take your anticoagulant exactly as directed. If you miss a dose, call your healthcare provider to find out what you should do. These medicines increase the chance of bleeding. So it is very important to take them correctly. Be sure to tell all of your healthcare providers, including dentists, that you are taking an anticoagulant.  Follow-up monitoring  If you are taking warfarin, you will need regular blood tests to see how it is working. These blood tests include a prothrombin time (PT), also reported as an international normalized ratio (INR). Your dose of warfarin may be changed based on the test results. It is very important that you keep your testing appointments after you leave the hospital.  Be sure you understand the following information before you go home:    My next PT/INR blood draw will be on ______________ (date) at _______________ (time) at __________________________________________ (name of healthcare provider or clinic and address).    The name of the healthcare provider who will monitor my anticoagulation is ________________________ and the phone number is _________________________.  Depending on which anticoagulant you are prescribed, you may need other blood tests. Before you are discharged, your healthcare provider will review what testing is needed in detail.  Diet and warfarin  Vitamin K can interact with warfarin and reduce its ability to thin your blood. Vitamin K helps your blood clot. So sudden changes in vitamin K intake can affect the way warfarin works. You don t need to  avoid foods with vitamin K. Instead, keep the amount you eat about the same each day. Foods high in vitamin K include:    Leafy green vegetables like spinach, cabbage, and kale    Avocado    Asparagus    Egg yolks    Oils like canola, olive, and soybean  When taking warfarin, don't change your diet without first checking with your healthcare provider.  The other anticoagulants don't have the same interaction with vitamin K that warfarin does.   Medicines and your anticoagulant  Some medicines may cause problems with anticoagulants. Check with your healthcare provider before making any changes to your medicines. And don't take over-the-counter (OTC) medicines without checking with your provider. Some medicines interact with your anticoagulant and make your blood too thin. This increases your risk of bleeding. Others may stop your anticoagulant from doing its job, making your blood too thick. So it is very important to tell your healthcare provider about all of the medicines you take, including OTCs and herbal supplements. Don't start or stop taking any medicine, including OTCs, unless your healthcare provider tells you to.  Medicines that may cause problems with your anticoagulant include:    Some antibiotic medicines    Some heart medicines    Cimetidine    Aspirin or other nonsteroidal anti-inflammatory medicines (NSAIDs) such as ibuprofen or naproxen.    Some medicines for depression, cancer, HIV infection, diabetes, seizures, gout, high cholesterol, or thyroid disease    Vitamins with vitamin K    Some herbal products such as Kunal's wort, garlic, coenzyme Q10, turmeric, and ginkgo biloba  Home care  To help prevent blood clots, try the following:    Wiggle your toes and move your ankles while sitting or lying down.    When traveling by car, make frequent stops to get up and move around.    On long airplane rides, get up and move around when possible. If you can t get up, wiggle your toes, move your ankles and  tighten your calves to keep your blood moving.    If you have to stay in bed, do leg exercises.    Wear support or compression stockings, if prescribed by your healthcare provider.    Rest and put your legs up whenever they feel swollen or heavy.    Raise the foot of your mattress 5 to 6 inches, using a foam wedge.  Lifestyle changes  To help you stay healthy, especially your heart and blood vessels, you should:    Begin an exercise program, if you are not exercising. Ask your healthcare provider how to get started. Try walking, inside or out.    Stay at a healthy weight. Get help to lose any extra pounds.    Keep blood pressure in a healthy range    If you smoke, make a plan to quit. Ask your healthcare provider about stop-smoking programs to help you quit.  Call 911  Call 911 right away if you have the following symptoms. They may mean a blood clot in your lungs:    Chest pain    Trouble breathing    Fast heartbeat    Sweating    Fainting    Coughing (may cough up blood)    Heavy or uncontrolled bleeding  When to call your healthcare provider  Call your healthcare provider if you have pain, swelling, or redness in your leg, arm, or other area. These symptoms may mean a blood clot.  If you take blood thinners and are bleeding, you may have:    Blood in the urine     Bleeding with bowel movements    Very dark or tar-like stool    Vomiting with blood    Coughing with blood    Bleeding from the nose    Bleeding from the gums    A cut that will not stop bleeding    Bleeding from the vagina  Date Last Reviewed: 5/1/2016 2000-2019 [employer]. [address] [place] [address], [place] [address]. All rights reserved. This information is not intended as a substitute for professional medical care. Always follow your healthcare professional's instructions.               Return in about 3 days (around 4/20/2020) for Lab Work, f/u appt.    LEEROY Hollingsworth CNP  [facility]

## 2020-04-17 NOTE — Clinical Note
MARCELLUS sent this pt to you this coming Monday for f/u with new DVT diagnosed at [facility] and started on eliquis and lovenox.  I wanted her to be seen and recheck INR

## 2020-04-18 ENCOUNTER — NURSE TRIAGE (OUTPATIENT)
Dept: NURSING | Facility: CLINIC | Age: 40
End: 2020-04-18

## 2020-04-18 NOTE — TELEPHONE ENCOUNTER
She was calling to check if it is OK to give herself the lovenox shot at the same time as taking her Eliquis. She can, just watch for extra bleeding.     Jovita Balderas RN/ [facility]        Reason for Disposition    Caller has medication question only, adult not sick, and triager answers question    Protocols used: MEDICATION QUESTION CALL-A-AH

## 2020-04-20 ENCOUNTER — OFFICE VISIT (OUTPATIENT)
Dept: FAMILY MEDICINE | Facility: CLINIC | Age: 40
End: 2020-04-20
Payer: COMMERCIAL

## 2020-04-20 VITALS
OXYGEN SATURATION: 95 % | SYSTOLIC BLOOD PRESSURE: 126 MMHG | TEMPERATURE: 97.9 F | WEIGHT: 293 LBS | HEART RATE: 82 BPM | BODY MASS INDEX: 53.52 KG/M2 | DIASTOLIC BLOOD PRESSURE: 89 MMHG

## 2020-04-20 DIAGNOSIS — I82.441 ACUTE DEEP VEIN THROMBOSIS (DVT) OF TIBIAL VEIN OF RIGHT LOWER EXTREMITY (H): Primary | ICD-10-CM

## 2020-04-20 LAB
CAPILLARY BLOOD COLLECTION: NORMAL
INR PPP: 1.2 (ref 0.86–1.14)

## 2020-04-20 PROCEDURE — 99214 OFFICE O/P EST MOD 30 MIN: CPT | Performed by: FAMILY MEDICINE

## 2020-04-20 PROCEDURE — 85610 PROTHROMBIN TIME: CPT | Performed by: FAMILY MEDICINE

## 2020-04-20 PROCEDURE — 36416 COLLJ CAPILLARY BLOOD SPEC: CPT | Performed by: FAMILY MEDICINE

## 2020-04-20 NOTE — PROGRESS NOTES
Subjective     Erika Reina is a 39 year old female who presents to clinic today for the following health issues:    History of Present Illness        She eats 2-3 servings of fruits and vegetables daily.She consumes 0 sweetened beverage(s) daily.She exercises with enough effort to increase her heart rate 20 to 29 minutes per day.  She exercises with enough effort to increase her heart rate 3 or less days per week.   She is taking medications regularly.     Patient is here for a follow up on the dvt in her right leg.    Things are getting better, was walking with cane due to pain, but is now ambulating without assistance.  Swelling is decreasing, still painful, but less than before.  Feels overall to be improving slowly.  Was discharged on lovenox and eliquis, one bruise on injection site on abdomen.  No other concerns today.        Patient Active Problem List   Diagnosis     Hyperlipidemia LDL goal <160     Plantar fasciitis     Morbid obesity due to excess calories (H)     Microcytic anemia     Endometrial cancer (H)     ENRICO (obstructive sleep apnea)     Morbidly obese (H)     Past Surgical History:   Procedure Laterality Date     ABDOMEN SURGERY  12/18/18    hysterectomy     ARTHROPLASTY KNEE       BIOPSY  11 2018    endometrial layer     CYSTOSCOPY N/A 12/13/2018    Procedure: Cystoscopy;  Surgeon: Cleopatra Altamirano MD;  Location: [facility] OR     DAVINCI HYSTERECTOMY TOTAL, BILATERAL SALPINGO-OOPHORECTOMY, NODE DISSECTION, COMBINED N/A 12/13/2018    Procedure: DaVinci Assisted Removal Of Uterus, Cervix, Both Ovaries And Fallopian Tubes, Bilateral Fitzhugh Lymph Node Dissection;  Surgeon: Cleopatra Altamirano MD;  Location: [facility] OR     GYN SURGERY  12/18/18    hysterectomy       Social History     Tobacco Use     Smoking status: Never Smoker     Smokeless tobacco: Never Used   Substance Use Topics     Alcohol use: Not Currently     Alcohol/week: 0.0 standard drinks     Binge frequency: Never      Comment: rarely.  Once monthly 1-2 drinks     Family History   Problem Relation Age of Onset     Diabetes Mother      Obesity Mother      Hypertension Mother      Diabetes Father      Hypertension Father      Sleep Apnea Father      Cancer - colorectal Maternal Grandmother      Colon Cancer Maternal Grandmother      Heart Disease Paternal Grandmother      Obesity Brother      Sleep Apnea Brother      Coronary Artery Disease No family hx of      Cerebrovascular Disease No family hx of          Current Outpatient Medications   Medication Sig Dispense Refill     apixaban ANTICOAGULANT (ELIQUIS STARTER PACK) 5 MG tablet Take 2 tablets (10 mg) by mouth 2 times daily for 7 days, THEN 1 tablet (5 mg) 2 times daily for 23 days. 74 tablet 0     apixaban ANTICOAGULANT (ELIQUIS) 5 MG tablet Take 1 tablet (5 mg) by mouth 2 times daily 60 tablet 2     enoxaparin ANTICOAGULANT (LOVENOX) 60 MG/0.6ML syringe Inject 1.6 mLs (160 mg) Subcutaneous 2 times daily 108 mL 0     Melatonin Gummies 2.5 MG CHEW Take 5 mg by mouth nightly as needed (insomnia)       multivitamin, therapeutic with minerals (MULTI-VITAMIN) TABS tablet Take 1 tablet by mouth daily       VITAMIN D, CHOLECALCIFEROL, PO Take by mouth daily       Allergies   Allergen Reactions     Ibuprofen Swelling     Swelling of legs       Reviewed and updated as needed this visit by Provider         Review of Systems   ROS COMP: Constitutional, HEENT, cardiovascular, pulmonary, gi and gu systems are negative, except as otherwise noted.      Objective    LMP  (LMP Unknown)   There is no height or weight on file to calculate BMI.  Physical Exam   GENERAL: healthy, alert and no distress  ABDOMEN: 3cm oval ecchymosis with underlying hematoma on right abdomen, minimally tender to palpation.  MS: no gross musculoskeletal defects noted, Trace nonpitting edema to right medial malleolar region, minimal tenderness to palpation.  SKIN: Slight erythema and edema to right medial malleolar  region    Diagnostic Test Results:  Labs reviewed in Epic        Assessment & Plan     1. Acute deep vein thrombosis (DVT) of tibial vein of right lower extremity (H)  Ordered INR as requested by [facility] staff.  Placed hematology referral.  Stop Lovenox. Continue Eliquis.  Refills sent for after starter pack complete.  DME for compression stockings.  Follow up after Hematology appointment, or sooner as needed.  - INR  - Oncology/Hematology Adult Referral; Future  - apixaban ANTICOAGULANT (ELIQUIS) 5 MG tablet; Take 1 tablet (5 mg) by mouth 2 times daily  Dispense: 60 tablet; Refill: 2  - Compression Sleeve/Stocking Order for DME - ONLY FOR DME  - Capillary Blood Collection       There are no Patient Instructions on file for this visit.    No follow-ups on file.    Amy Padron MD  [facility]

## 2020-05-04 ENCOUNTER — VIRTUAL VISIT (OUTPATIENT)
Dept: HEMATOLOGY | Facility: CLINIC | Age: 40
End: 2020-05-04
Attending: INTERNAL MEDICINE
Payer: COMMERCIAL

## 2020-05-04 DIAGNOSIS — I82.4Z1 ACUTE DEEP VEIN THROMBOSIS (DVT) OF DISTAL VEIN OF RIGHT LOWER EXTREMITY (H): ICD-10-CM

## 2020-05-04 PROCEDURE — 99204 OFFICE O/P NEW MOD 45 MIN: CPT | Mod: 95 | Performed by: INTERNAL MEDICINE

## 2020-05-04 NOTE — PROGRESS NOTES
Patient was contacted to complete the pre-visit call prior to their video visit with the provider.  The following statement was read:       Because of Coronavirus we are instituting video visits when possible to keep everyone safe. This video visit will be conducted between you and the provider.  This service lets us provide the care you need with a video conversation.  If a prescription is necessary, we can send it directly to your pharmacy.If lab work or other testing is needed, we can help arrange a place/time for that to be done at a later date.If during the course of the call the provider feels a video visit is not appropriate, then your insurance company will not be billed.       Allergies and medications were reviewed and travel screening complete.     A test connection was Completed with Pt? Pt said no    I thanked them for their time to cover this information     [facility]      [facility] Consult    I confirmed permission to do this visit by video conference.    Reasons for Consult: -Distal R lower extremity DVT and saphenous vein thrombus 4/17/20    History of Present Illness: Ms. Reina is a 39 year old woman diagnosed with a distal right lower extremity DVT and saphenous vein thrombus on 4/17/2020.  She reports that in September 2018 she had some superficial thrombophlebitis in the right leg that she treated with just local measures and it went away.  Then in February 2020 she developed some of the same symptoms in the right leg to some Tylenol for it and it improved.  However she then developed an upper respiratory infection at the end of March, was negative for strep and influenza, was not COVID-19 tested.  She gradually got better but then she developed pain in her right upper leg which then progressed to pain and swelling in her calf and ankle with ankle very red.  She then sought medical attention and was diagnosed with the DVT and superficial  thrombophlebitis.  She was started on Lovenox twice daily for 3 days that was actually overlapped with apixaban 10 mg every 12 x7 days.  She is now on apixaban 5 mg every 12 hours.  She reports that 1 of the Lovenox shots gave her a very large bruise on her abdomen.  The bruising is gone but she still has a lump there.  No other bruising symptoms.  No epistaxis, gum bleeding, melena, hematochezia.  She says that the leg pain and swelling is better but not totally gone.  She reports some spider veins on the right ankle.  She says that for years both of her legs swell intermittently and she takes ibuprofen for it and often wears knee-high compression stockings.    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  -Morbid obesity  -Obstructive sleep apnea  -Hyperlipidemia  - Status post knee surgery    Medications:  - Apixaban 5 mg twice daily  -Melatonin 5 mg nightly  -Multivitamin 1 daily  -Vitamin D daily    Family History: No family history of DVT or PE.  Father was recently diagnosed with a an aortic aneurysm.  Both mother and father have hypertension and diabetes.    Social History: smoking-never,  Alcohol- about one drink every two months. No ilicit drugs.  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    Review of systems:  As in HPI.  She has occasional back and knee pain.  She says she is completely recovered from her respiratory infection.  She is being followed closely by gynecology because of her history of stage I endometrial cancer, no indication of relapse at this time.  She is considering bariatric surgery.  The rest of the > 10 point review of systems was negative.    PHYSICAL EXAMINATION:  LMP  (LMP Unknown)     General appearance:  Patient is 39 year old woman, well appearing.     HEENT:  No pallor, icterus.  Lungs: Respirations appear normal.  No cough, no audible wheezes.  Abdomen: Obese with diffuse  striae, no obvious bruise, unable to appreciate the hematoma she says is present on the video.  Extremities: 1+ right ankle edema with some spider veins and small varicosities.     Skin:  No rash, no petechiae or ecchymoses.    The rest of a comprehensive physical examination is deferred due to public [facility] emergency video visit restrictions.    Labs:  Results for [facility]A LEÓN (MRN [id number]) as of 5/4/2020 17:11   Ref. Range 4/17/2020 16:00   Sodium Latest Ref Range: 133 - 144 mmol/L 138   Potassium Latest Ref Range: 3.4 - 5.3 mmol/L 4.4   Chloride Latest Ref Range: 94 - 109 mmol/L 106   Carbon Dioxide Latest Ref Range: 20 - 32 mmol/L 29   Urea Nitrogen Latest Ref Range: 7 - 30 mg/dL 15   Creatinine Latest Ref Range: 0.52 - 1.04 mg/dL 0.79   GFR Estimate Latest Ref Range: >60 mL/min/1.73_m2 >90   GFR Estimate If Black Latest Ref Range: >60 mL/min/1.73_m2 >90   Calcium Latest Ref Range: 8.5 - 10.1 mg/dL 9.6   Anion Gap Latest Ref Range: 3 - 14 mmol/L 3   Albumin Latest Ref Range: 3.4 - 5.0 g/dL 3.4   Protein Total Latest Ref Range: 6.8 - 8.8 g/dL 7.8   Bilirubin Total Latest Ref Range: 0.2 - 1.3 mg/dL 0.4   Alkaline Phosphatase Latest Ref Range: 40 - 150 U/L 84   ALT Latest Ref Range: 0 - 50 U/L 31   AST Latest Ref Range: 0 - 45 U/L 18   Glucose Latest Ref Range: 70 - 99 mg/dL 88   WBC Latest Ref Range: 4.0 - 11.0 10e9/L 9.9   Hemoglobin Latest Ref Range: 11.7 - 15.7 g/dL 13.5   Hematocrit Latest Ref Range: 35.0 - 47.0 % 43.4   Platelet Count Latest Ref Range: 150 - 450 10e9/L 311   RBC Count Latest Ref Range: 3.8 - 5.2 10e12/L 4.81   MCV Latest Ref Range: 78 - 100 fl 90   MCH Latest Ref Range: 26.5 - 33.0 pg 28.1   MCHC Latest Ref Range: 31.5 - 36.5 g/dL 31.1 (L)   RDW Latest Ref Range: 10.0 - 15.0 % 14.2   Diff Method Unknown Automated Method   % Neutrophils Latest Units: % 70.0   % Lymphocytes Latest Units: % 17.6   % Monocytes Latest Units: % 7.2   % Eosinophils Latest Units: % 4.2   % Basophils Latest  Units: % 0.7   % Immature Granulocytes Latest Units: % 0.3   Nucleated RBCs Latest Ref Range: 0 /100 0   Absolute Neutrophil Latest Ref Range: 1.6 - 8.3 10e9/L 7.0   Absolute Lymphocytes Latest Ref Range: 0.8 - 5.3 10e9/L 1.8   Absolute Monocytes Latest Ref Range: 0.0 - 1.3 10e9/L 0.7   Absolute Eosinophils Latest Ref Range: 0.0 - 0.7 10e9/L 0.4   Absolute Basophils Latest Ref Range: 0.0 - 0.2 10e9/L 0.1   Abs Immature Granulocytes Latest Ref Range: 0 - 0.4 10e9/L 0.0   Absolute Nucleated RBC Unknown 0.0     Imaging:    US LOWER EXTREMITY VENOUS DUPLEX RIGHT 4/17/2020 3:36 PM     CLINICAL HISTORY: Leg edema, right  TECHNIQUE: Venous Duplex ultrasound of the right lower extremity with  and without compression, augmentation and duplex. Color flow and  spectral Doppler with waveform analysis performed.     COMPARISON: 9/7/2019     FINDINGS: Exam includes the common femoral, femoral, popliteal, and  contralateral common femoral veins as well as segmentally visualized  deep calf veins and greater saphenous vein.      RIGHT: No deep vein thrombosis above-the-knee. There is deep venous  thrombus in the posterior tibial veins. There is also superficial  thrombus in the lower greater saphenous vein in the area of pain. No  superficial thrombophlebitis. No popliteal cyst.                                                                      IMPRESSION:  1.  No deep venous thrombosis above the knee.  2.  There is deep venous thrombus below the knee in posterior tibial  veins. There is superficial thrombus in the lower great saphenous vein  in the area of pain. This was also the location of thrombus on the  comparison ultrasound study.     LESTER J FAHRNER, MD    Assessment and Recommendation: -This is a 39-year-old woman with recurrent right leg superficial thrombophlebitis, and now a below the knee DVT.  She had an upper respiratory illness recently, which may have been a contributing provoking factor for the DVT. Unknown if  it was COVID-19. She also has obesity as a contributing factor.  Since this was a below the knee DVT, I think it is reasonable to stop the anticoagulation when she has completed 3 months.  It is good to get a new baseline ultrasound, so I will arrange that for her in mid to late July.  She should also have a d-dimer checked in mid to late July on the same day as the ultrasound to help assess her risk for recurrence.  From there we can decide if she should continue on anticoagulation or can stop.    She is considering bariatric surgery.  I recommend that she wait 6 months before going forward with surgery.  This is to decrease her risk of recurrent DVT or PE at the time of surgery.    Continue apixaban 3 months  R leg US 3 months, D-dimer 3 months  RTC 3 months ,a few days after US    Video visit  Start: 11:38 am  Stop:  12:02 am  Total time 24 minutes    Sis Block MD  Hematology

## 2020-05-04 NOTE — PATIENT INSTRUCTIONS
Continue apixaban for total of 3 months  Continue to wear knee-high graduated compression stockings as tolerated during the day  R leg ultrasound in 3 months, lab test d-dimer 3 months  Return to [facility] in 3 month, a few days after ultrasound-hopefully this get done in person    [facility] [phone]

## 2020-07-16 ENCOUNTER — HOSPITAL ENCOUNTER (OUTPATIENT)
Dept: ULTRASOUND IMAGING | Facility: CLINIC | Age: 40
Discharge: HOME | End: 2020-07-16
Attending: INTERNAL MEDICINE | Admitting: INTERNAL MEDICINE
Payer: COMMERCIAL

## 2020-07-16 DIAGNOSIS — I82.4Z1 ACUTE DEEP VEIN THROMBOSIS (DVT) OF DISTAL VEIN OF RIGHT LOWER EXTREMITY (H): ICD-10-CM

## 2020-07-16 LAB — D DIMER PPP FEU-MCNC: 0.4 UG/ML FEU (ref 0–0.5)

## 2020-07-16 PROCEDURE — 36415 COLL VENOUS BLD VENIPUNCTURE: CPT | Performed by: INTERNAL MEDICINE

## 2020-07-16 PROCEDURE — 93971 EXTREMITY STUDY: CPT | Mod: RT

## 2020-07-16 PROCEDURE — 85379 FIBRIN DEGRADATION QUANT: CPT | Performed by: INTERNAL MEDICINE

## 2020-07-21 ENCOUNTER — VIRTUAL VISIT (OUTPATIENT)
Dept: SURGERY | Facility: CLINIC | Age: 40
End: 2020-07-21
Payer: COMMERCIAL

## 2020-07-21 VITALS — WEIGHT: 293 LBS | HEIGHT: 68 IN | BODY MASS INDEX: 44.41 KG/M2

## 2020-07-21 DIAGNOSIS — E66.01 MORBIDLY OBESE (H): ICD-10-CM

## 2020-07-21 DIAGNOSIS — E66.01 MORBID OBESITY DUE TO EXCESS CALORIES (H): ICD-10-CM

## 2020-07-21 PROCEDURE — 97803 MED NUTRITION INDIV SUBSEQ: CPT | Mod: 95 | Performed by: DIETITIAN, REGISTERED

## 2020-07-21 ASSESSMENT — MIFFLIN-ST. JEOR: SCORE: 2317

## 2020-07-21 NOTE — PROGRESS NOTES
"Erika Reina is a 39 year old female who is being evaluated via a billable video visit.      The patient has been notified of following:     \"This video visit will be conducted via a call between you and your physician/provider. We have found that certain health care needs can be provided without the need for an in-person physical exam.  This service lets us provide the care you need with a video conversation.  If a prescription is necessary we can send it directly to your pharmacy.  If lab work is needed we can place an order for that and you can then stop by our lab to have the test done at a later time.    Video visits are billed at different rates depending on your insurance coverage.  Please reach out to your insurance provider with any questions.    If during the course of the call the physician/provider feels a video visit is not appropriate, you will not be charged for this service.\"    Patient has given verbal consent for Video visit? Yes  How would you like to obtain your AVS? MyChart  Will anyone else be joining your video visit? No        Video-Visit Details    Type of service:  Video Visit    Video Start Time: 9:58am   Video End Time: 10:08am    Originating Location (pt. Location): Home    Distant Location (provider location):  [facility]     Platform used for Video Visit: [facility]        PRE SURGICAL WEIGHT LOSS NUTRITION APPOINTMENT    Erika Reina  1980  female  [id number]  39 year old    ASSESSMENT    Desired Surgical Procedure: gastric bypass    REASON FOR VISIT:  Erika Reina is a 39 year old year old female presents today for a pre-surgical weight loss follow-up appointment. Patient accompanied by self.    DIAGNOSIS:  Weight Status Obesity Grade III BMI >40    ANTHROPOMETRICS:  Height: 5' 8\"    Initial Weight: 353.6 lbs     Weight last visit: 345.4 lbs    Current weight: 350 lbs 3.2 oz    BMI: Body mass index is 53.25 kg/m .    VITAMINS AND " MINERALS:  1 Multivitamin with Minerals (Iron's Complete - HS)  600 mg Calcium with Vitamin D (AM, lunch)  5000 International units Vitamin D      NUTRITION HISTORY:  Breakfast: yogurt or scrambled eggs  cereal w/skim milk  Lunch: salad w/added fruit and chicken  low-ting turkey wrap  Supper: pasta  roast beef or chicken  Snacks: occasional - fruit or vegetables   luids Consumed: Water (100oz), milk in cereal (skim)   Eating slower: Yes  Chewing foods thoroughly: Yes  Take 20-30 minutes to consume each meal: Yes  Fluids and meals separate by at least 30 minutes: usually  Carbonation: none  Caffeine: rare  Additional Information: Check in-visit; surgery postponed earlier this year d/t COVID. Pt shares she initially got off track with her eating patterns but feels confident in recent ability to recommit to goals. Remains appropriate for surgery however will have RN contact pt 2w prior to surgery to verify weight.    PHYSICAL ACTIVITY:  Type: sit ups and push ups/home exercises  Frequency: 7 (days per week)  Duration: 15-30(minutes)     DIAGNOSIS:  Previous Nutrition Diagnosis: Obesity related to long history of self- monitoring deficit and excessive energy intake evidenced by BMI of 52.52 kg/m2  No change, modified below    Previous goals:   Consistently separate fluids and meals by 30 minutes - partially met  Continue to follow all pre-op guidelines - met    Current Nutrition Diagnosis: Obesity related to long history of self-monitoring deficit and excessive energy intake as evidenced by BMI of 53.25 kg/m2.    INTERVENTION:  Nutrition Prescription: Recommended energy/nutrient modification.    GOALS:  Consistently separate fluids and meals by 30 minutes  Continue to follow all pre-op guidelines    Intervention:  - Discussed progress towards previous goals.  - Reinforced importance of making behavior changes in preparation for bariatric surgery.   - Assessed learning needs and learning preferences        NUTRITION MONITORING AND EVALUATION:  Anticipated compliance: fair-good  Patient demonstrated good understanding.   Patient has met pre bariatric surgery diet requirements    Follow up: Continue to monitor patient closely regarding weight loss and diet.  # of visits needed: q2m until surgery  Cleared by RD: Yes (previously cleared)    Visit Length: 10 minutes    Nhi Obrien RD, LD  Clinical Dietitian

## 2020-07-28 ENCOUNTER — ONCOLOGY VISIT (OUTPATIENT)
Dept: ONCOLOGY | Facility: CLINIC | Age: 40
End: 2020-07-28
Attending: NURSE PRACTITIONER
Payer: COMMERCIAL

## 2020-07-28 VITALS
DIASTOLIC BLOOD PRESSURE: 84 MMHG | SYSTOLIC BLOOD PRESSURE: 129 MMHG | RESPIRATION RATE: 16 BRPM | OXYGEN SATURATION: 98 % | TEMPERATURE: 98 F | WEIGHT: 293 LBS | HEART RATE: 84 BPM | BODY MASS INDEX: 53.23 KG/M2

## 2020-07-28 DIAGNOSIS — C54.1 ENDOMETRIAL CANCER (H): Primary | ICD-10-CM

## 2020-07-28 PROCEDURE — 99213 OFFICE O/P EST LOW 20 MIN: CPT | Performed by: OBSTETRICS & GYNECOLOGY

## 2020-07-28 PROCEDURE — G0463 HOSPITAL OUTPT CLINIC VISIT: HCPCS

## 2020-07-28 ASSESSMENT — PAIN SCALES - GENERAL: PAINLEVEL: NO PAIN (0)

## 2020-07-28 NOTE — NURSING NOTE
"Oncology Rooming Note    July 28, 2020 12:54 PM   Erika Reina is a 39 year old female who presents for:    Chief Complaint   Patient presents with     Oncology Clinic Visit     Initial Vitals: /84 (BP Location: Right arm)   Pulse 84   Temp 98  F (36.7  C) (Tympanic)   Resp 16   Wt (!) 158.8 kg (350 lb 1.6 oz)   LMP  (LMP Unknown)   SpO2 98%   BMI 53.23 kg/m   Estimated body mass index is 53.23 kg/m  as calculated from the following:    Height as of 7/21/20: 1.727 m (5' 8\").    Weight as of this encounter: 158.8 kg (350 lb 1.6 oz). Body surface area is 2.76 meters squared.  No Pain (0) Comment: Data Unavailable   No LMP recorded (lmp unknown). Patient has had a hysterectomy.     Allergies reviewed: No  Medications reviewed: No    Medications: Medication refills not needed today.  Pharmacy name entered into [employer]:    [id number] [place], [place] - [address]  [id number] [place], [place] - [address] AT [facility]    Clinical concerns:  Patient denies any concerns to discuss with Dr. Evans today.    Angeles Pelayo, RN, BSN, OCN on 7/28/2020 at 12:55 PM            "

## 2020-07-28 NOTE — LETTER
2020         RE: Erika Reina  [address]  [place] [id number]        Dear Colleague,    Thank you for referring your patient, Erika Reina, to the [facility]. Please see a copy of my visit note below.    Consult Notes on Referred Patient        Dr. Alla Wilde MD  [address] [place] [address]       RE: Erika Reina  : 1980  OBEY: 2020     HPI:  Erika Reina is 39 year old with stage 1A, grade 1 endometrial adenocarcinoma. She is unaccompanied today.  She denies any concerns.   She denies vaginal bleeding, abdominal/pelvic pain, changes in her bowel/bladder function.  She was recently diagnosed with a DVT and is being worked up for a clotting disorder by hematology.  She is supposed to have bariatric surgery but this has been delayed due to COVID.  She is hopeful once she completes her therapy for her DVT that she can proceed with surgery.    Cancer Course:    She notes she has had abnormal bleeding since her very first menses with very irregular cycles.  Recently, however she has noted a major change in the amount of bleeding with a significant increase.  Due to this she was placed on Metformin without much improvement in her bleeding and thus had the following work up.    18:  US Pelvis:  Uterus measures 7.3 x 4.9 x 5.5 cm, EMS 26.3 mm, normal ovaries    18:  EMB:  Grade 1 endometrial adenocarcinoma, MMR intact    18:  Robotic hysterectomy, bilateral salpingo-oophorectomy, bilateral pelvic sentinel lymph node dissection, cystoscopy   Pathology:  Stage 1A, grade 1 endometrial adenocarcinoma, tumor size 4.9 cm, no myometrial invasion, no LVSI, 0/11 sentinel LN    19:  CT A/P:  1.  Diffuse hepatic steatosis. 2.  Unchanged bilateral renal calyceal prominence which may suggest bilateral ureteropelvic junction obstruction.    Review of Systems:  Systemic           no weight changes; no fever; no chills; no night  sweats; no appetite changes; no fatigue  Skin           no rashes, or lesions  Eye           no irritation; no changes in vision  Dilcia-Laryngeal           no dysphagia; no hoarseness   Pulmonary    no cough; no shortness of breath  Cardiovascular    no chest pain; no palpitations  Gastrointestinal    no diarrhea; no constipation; no abdominal pain; no changes in bowel  habits; no blood in stool  Genitourinary   no urinary frequency; no urinary urgency; no dysuria; no pain; no abnormal vaginal discharge; no abnormal vaginal bleeding  Breast    no breast discharge; no breast changes; no breast pain  Musculoskeletal    no myalgias; no arthralgias; no back pain  Psychiatric           no depressed mood; no anxiety    Hematologic            no tender lymph nodes; no noticeable swellings or lumps   Endocrine    no hot flashes; no heat/cold intolerance         Neurological   no tremor; no numbness and tingling; no headaches; no difficulty sleeping    Obstetrics and Gynecology History:  ROHIT  Had always thought she would have children but this diagnosis has made her consider that in depth.  She currently has no partner.      Past Medical History:  Past Medical History:   Diagnosis Date     Endometrial cancer (H) 12/19/2018     Hyperlipidemia LDL goal <160 9/15/2011       Past Surgical History:  Past Surgical History:   Procedure Laterality Date     ABDOMEN SURGERY  12/18/18    hysterectomy     ARTHROPLASTY KNEE       BIOPSY  11 2018    endometrial layer     CYSTOSCOPY N/A 12/13/2018    Procedure: Cystoscopy;  Surgeon: Cleopatra Altamirano MD;  Location: [facility] OR     DAVINCI HYSTERECTOMY TOTAL, BILATERAL SALPINGO-OOPHORECTOMY, NODE DISSECTION, COMBINED N/A 12/13/2018    Procedure: DaVinci Assisted Removal Of Uterus, Cervix, Both Ovaries And Fallopian Tubes, Bilateral [place] Lymph Node Dissection;  Surgeon: Cleopatra Altamirano MD;  Location: [facility] OR     GYN SURGERY  12/18/18    hysterectomy       Health Maintenance:  Last  Pap Smear: No need for further pap smear exams  She has not had a history of abnormal Pap smears.    Last Mammogram: N/A    Last Colonoscopy: N/A      Current Medications:   has a current medication list which includes the following prescription(s): apixaban anticoagulant, melatonin gummies, multivitamin w/minerals, and cholecalciferol.     Allergies:   Ibuprofen      Social History:  Social History     Socioeconomic History     Marital status: Single     Spouse name: Not on file     Number of children: Not on file     Years of education: Not on file     Highest education level: Not on file   Occupational History     Not on file   Social Needs     Financial resource strain: Not on file     Food insecurity     Worry: Not on file     Inability: Not on file     Transportation needs     Medical: Not on file     Non-medical: Not on file   Tobacco Use     Smoking status: Never Smoker     Smokeless tobacco: Never Used   Substance and Sexual Activity     Alcohol use: Not Currently     Alcohol/week: 0.0 standard drinks     Binge frequency: Never     Comment: rarely.  Once monthly 1-2 drinks     Drug use: No     Sexual activity: Yes     Partners: Male     Birth control/protection: Condom   Lifestyle     Physical activity     Days per week: Not on file     Minutes per session: Not on file     Stress: Not on file   Relationships     Social connections     Talks on phone: Not on file     Gets together: Not on file     Attends Gnosticist service: Not on file     Active member of club or organization: Not on file     Attends meetings of clubs or organizations: Not on file     Relationship status: Not on file     Intimate partner violence     Fear of current or ex partner: Not on file     Emotionally abused: Not on file     Physically abused: Not on file     Forced sexual activity: Not on file   Other Topics Concern     Parent/sibling w/ CABG, MI or angioplasty before 65F 55M? No   Social History Narrative     Not on file        Lives with mother, father and sister, feels safe at home.  Works as .  Enjoys crafts, spending time with friends.  Does not have an advanced directive on file and would like her mother to be her POA.  Full code.    Family History:   The patient's family history is significant for.  Family History   Problem Relation Age of Onset     Diabetes Mother      Obesity Mother      Hypertension Mother      Diabetes Father      Hypertension Father      Sleep Apnea Father      Cancer - colorectal Maternal Grandmother      Colon Cancer Maternal Grandmother      Heart Disease Paternal Grandmother      Obesity Brother      Sleep Apnea Brother      Coronary Artery Disease No family hx of      Cerebrovascular Disease No family hx of          Physical Exam:   /84 (BP Location: Right arm)   Pulse 84   Temp 98  F (36.7  C) (Tympanic)   Resp 16   Wt (!) 158.8 kg (350 lb 1.6 oz)   LMP  (LMP Unknown)   SpO2 98%   BMI 53.23 kg/m    Body mass index is 53.23 kg/m .    General Appearance: healthy and alert, no distress     HEENT:  no thyromegaly, no palpable nodules or masses        Cardiovascular: regular rate and rhythm, no gallops, rubs or murmurs     Respiratory: lungs clear, no rales, rhonchi or wheezes, normal diaphragmatic excursion    Musculoskeletal: extremities non tender and without edema    Skin: no lesions or rashes     Neurological: normal gait, no gross defects     Psychiatric: appropriate mood and affect                               Hematological: normal cervical, supraclavicular and inguinal lymph nodes     Gastrointestinal:       abdomen soft, non-tender, non-distended, no organomegaly or masses    Genitourinary: External genitalia and urethral meatus appears normal.  Vagina is smooth without nodularity or masses.  Cervix surgically absent.  Bimanual exam reveal no masses, nodularity or fullness.  Recto-vaginal exam confirms these findings.     Assessment:    Erika Reina is a 39 year  old woman with a diagnosis of stage 1A, grade 1 endometrial adenocarcinoma.     A total of 15 minutes was spent with the patient, >50% of which were spent in counseling the patient and/or treatment planning.      Plan:     1.)    Stage 1A, grade 1 endometrial adenocarcinoma.  Reviewed signs and symptoms of recurrence and to call if these should occur.  Reinforced surveillance visits every 6 months for up to 5 years (12/23) then annually thereafter.  She will return in 6 months.    2.) Menopausal symptoms -She denies any at this point.       3.) Genetic risk factors were assessed and the patient does not meet the qualifications for a referral.      4.) Labs and/or tests ordered include:  None     5.) Health maintenance issues addressed today include pt is up to date.          Thank you for allowing us to participate in the care of your patient.         Sincerely,    Cleopatra Evans MD  Gynecologic Oncology  [facility]       ZULMA CARBAJAL         Again, thank you for allowing me to participate in the care of your patient.        Sincerely,        Chan Evans MD

## 2020-07-28 NOTE — PROGRESS NOTES
Consult Notes on Referred Patient        Dr. Alla Wilde MD  [address] [place] [address]       RE: Erika Reina  : 1980  OEBY: 2020     HPI:  Erika Reina is 39 year old with stage 1A, grade 1 endometrial adenocarcinoma. She is unaccompanied today.  She denies any concerns.   She denies vaginal bleeding, abdominal/pelvic pain, changes in her bowel/bladder function.  She was recently diagnosed with a DVT and is being worked up for a clotting disorder by hematology.  She is supposed to have bariatric surgery but this has been delayed due to COVID.  She is hopeful once she completes her therapy for her DVT that she can proceed with surgery.    Cancer Course:    She notes she has had abnormal bleeding since her very first menses with very irregular cycles.  Recently, however she has noted a major change in the amount of bleeding with a significant increase.  Due to this she was placed on Metformin without much improvement in her bleeding and thus had the following work up.    18:  US Pelvis:  Uterus measures 7.3 x 4.9 x 5.5 cm, EMS 26.3 mm, normal ovaries    18:  EMB:  Grade 1 endometrial adenocarcinoma, MMR intact    18:  Robotic hysterectomy, bilateral salpingo-oophorectomy, bilateral pelvic sentinel lymph node dissection, cystoscopy   Pathology:  Stage 1A, grade 1 endometrial adenocarcinoma, tumor size 4.9 cm, no myometrial invasion, no LVSI, 0/11 sentinel LN    19:  CT A/P:  1.  Diffuse hepatic steatosis. 2.  Unchanged bilateral renal calyceal prominence which may suggest bilateral ureteropelvic junction obstruction.    Review of Systems:  Systemic           no weight changes; no fever; no chills; no night sweats; no appetite changes; no fatigue  Skin           no rashes, or lesions  Eye           no irritation; no changes in vision  Dilcia-Laryngeal           no dysphagia; no hoarseness   Pulmonary    no cough; no shortness of breath  Cardiovascular     no chest pain; no palpitations  Gastrointestinal    no diarrhea; no constipation; no abdominal pain; no changes in bowel  habits; no blood in stool  Genitourinary   no urinary frequency; no urinary urgency; no dysuria; no pain; no abnormal vaginal discharge; no abnormal vaginal bleeding  Breast    no breast discharge; no breast changes; no breast pain  Musculoskeletal    no myalgias; no arthralgias; no back pain  Psychiatric           no depressed mood; no anxiety    Hematologic            no tender lymph nodes; no noticeable swellings or lumps   Endocrine    no hot flashes; no heat/cold intolerance         Neurological   no tremor; no numbness and tingling; no headaches; no difficulty sleeping    Obstetrics and Gynecology History:  G0  Had always thought she would have children but this diagnosis has made her consider that in depth.  She currently has no partner.      Past Medical History:  Past Medical History:   Diagnosis Date     Endometrial cancer (H) 12/19/2018     Hyperlipidemia LDL goal <160 9/15/2011       Past Surgical History:  Past Surgical History:   Procedure Laterality Date     ABDOMEN SURGERY  12/18/18    hysterectomy     ARTHROPLASTY KNEE       BIOPSY  11 2018    endometrial layer     CYSTOSCOPY N/A 12/13/2018    Procedure: Cystoscopy;  Surgeon: Cleopatra Altamirano MD;  Location: [facility] OR     DAVINCI HYSTERECTOMY TOTAL, BILATERAL SALPINGO-OOPHORECTOMY, NODE DISSECTION, COMBINED N/A 12/13/2018    Procedure: DaVinci Assisted Removal Of Uterus, Cervix, Both Ovaries And Fallopian Tubes, Bilateral [place] Lymph Node Dissection;  Surgeon: Cleopatra Altamirano MD;  Location: [facility] OR     GYN SURGERY  12/18/18    hysterectomy       Health Maintenance:  Last Pap Smear: No need for further pap smear exams  She has not had a history of abnormal Pap smears.    Last Mammogram: N/A    Last Colonoscopy: N/A      Current Medications:   has a current medication list which includes the following  prescription(s): apixaban anticoagulant, melatonin gummies, multivitamin w/minerals, and cholecalciferol.     Allergies:   Ibuprofen      Social History:  Social History     Socioeconomic History     Marital status: Single     Spouse name: Not on file     Number of children: Not on file     Years of education: Not on file     Highest education level: Not on file   Occupational History     Not on file   Social Needs     Financial resource strain: Not on file     Food insecurity     Worry: Not on file     Inability: Not on file     Transportation needs     Medical: Not on file     Non-medical: Not on file   Tobacco Use     Smoking status: Never Smoker     Smokeless tobacco: Never Used   Substance and Sexual Activity     Alcohol use: Not Currently     Alcohol/week: 0.0 standard drinks     Binge frequency: Never     Comment: rarely.  Once monthly 1-2 drinks     Drug use: No     Sexual activity: Yes     Partners: Male     Birth control/protection: Condom   Lifestyle     Physical activity     Days per week: Not on file     Minutes per session: Not on file     Stress: Not on file   Relationships     Social connections     Talks on phone: Not on file     Gets together: Not on file     Attends Judaism service: Not on file     Active member of club or organization: Not on file     Attends meetings of clubs or organizations: Not on file     Relationship status: Not on file     Intimate partner violence     Fear of current or ex partner: Not on file     Emotionally abused: Not on file     Physically abused: Not on file     Forced sexual activity: Not on file   Other Topics Concern     Parent/sibling w/ CABG, MI or angioplasty before 65F 55M? No   Social History Narrative     Not on file       Lives with mother, father and sister, feels safe at home.  Works as .  Enjoys crafts, spending time with friends.  Does not have an advanced directive on file and would like her mother to be her POA.  Full code.    Family  History:   The patient's family history is significant for.  Family History   Problem Relation Age of Onset     Diabetes Mother      Obesity Mother      Hypertension Mother      Diabetes Father      Hypertension Father      Sleep Apnea Father      Cancer - colorectal Maternal Grandmother      Colon Cancer Maternal Grandmother      Heart Disease Paternal Grandmother      Obesity Brother      Sleep Apnea Brother      Coronary Artery Disease No family hx of      Cerebrovascular Disease No family hx of          Physical Exam:   /84 (BP Location: Right arm)   Pulse 84   Temp 98  F (36.7  C) (Tympanic)   Resp 16   Wt (!) 158.8 kg (350 lb 1.6 oz)   LMP  (LMP Unknown)   SpO2 98%   BMI 53.23 kg/m    Body mass index is 53.23 kg/m .    General Appearance: healthy and alert, no distress     HEENT:  no thyromegaly, no palpable nodules or masses        Cardiovascular: regular rate and rhythm, no gallops, rubs or murmurs     Respiratory: lungs clear, no rales, rhonchi or wheezes, normal diaphragmatic excursion    Musculoskeletal: extremities non tender and without edema    Skin: no lesions or rashes     Neurological: normal gait, no gross defects     Psychiatric: appropriate mood and affect                               Hematological: normal cervical, supraclavicular and inguinal lymph nodes     Gastrointestinal:       abdomen soft, non-tender, non-distended, no organomegaly or masses    Genitourinary: External genitalia and urethral meatus appears normal.  Vagina is smooth without nodularity or masses.  Cervix surgically absent.  Bimanual exam reveal no masses, nodularity or fullness.  Recto-vaginal exam confirms these findings.     Assessment:    Erika Reina is a 39 year old woman with a diagnosis of stage 1A, grade 1 endometrial adenocarcinoma.     A total of 15 minutes was spent with the patient, >50% of which were spent in counseling the patient and/or treatment planning.      Plan:     1.)    Stage 1A,  grade 1 endometrial adenocarcinoma.  Reviewed signs and symptoms of recurrence and to call if these should occur.  Reinforced surveillance visits every 6 months for up to 5 years (12/23) then annually thereafter.  She will return in 6 months.    2.) Menopausal symptoms -She denies any at this point.       3.) Genetic risk factors were assessed and the patient does not meet the qualifications for a referral.      4.) Labs and/or tests ordered include:  None     5.) Health maintenance issues addressed today include pt is up to date.          Thank you for allowing us to participate in the care of your patient.         Sincerely,    Cleopatra Evans MD  Gynecologic Oncology  [facility]       CC  ZULMA MARTINEZ

## 2020-07-28 NOTE — PATIENT INSTRUCTIONS
Next visit in person with NP in 6 months    RTC Dr. Evans 1/26/2021  Lillian Calabrese RN on 7/29/2020 at 1:08 PM

## 2020-08-03 ENCOUNTER — OFFICE VISIT (OUTPATIENT)
Dept: HEMATOLOGY | Facility: CLINIC | Age: 40
End: 2020-08-03
Attending: INTERNAL MEDICINE
Payer: COMMERCIAL

## 2020-08-03 VITALS
TEMPERATURE: 97.9 F | DIASTOLIC BLOOD PRESSURE: 85 MMHG | BODY MASS INDEX: 53.67 KG/M2 | SYSTOLIC BLOOD PRESSURE: 132 MMHG | HEART RATE: 77 BPM | WEIGHT: 293 LBS

## 2020-08-03 DIAGNOSIS — Z86.718 H/O DEEP VENOUS THROMBOSIS: Primary | ICD-10-CM

## 2020-08-03 PROCEDURE — 99213 OFFICE O/P EST LOW 20 MIN: CPT | Performed by: INTERNAL MEDICINE

## 2020-08-03 PROCEDURE — G0463 HOSPITAL OUTPT CLINIC VISIT: HCPCS

## 2020-08-03 NOTE — PATIENT INSTRUCTIONS
Dr. Block has recommended that you stay on a lower dose of Eliquis 2.5 mg twice daily for 14 days after your surgery.    Call the [facility]  at [phone]   -If surgeries or procedures are planned.    -If off anticoagulation, please call during high risk times (long-distance travel, broken      bones or trauma, immobilization, surgery, pregnancy, or taking estrogen).   -Any new symptoms of DVT (deep vein thrombosis) or PE (pulmonary embolism)    -pain     -swelling     -redness    -warmth    -shortness of breath    -chest pain    -coughing up blood    Return to clinic as needed.    Your nurse clinician is Lucila Rodrigez RN at phone number  [phone].  If she is unavailable and you have immediate concerns, please call [phone] and ask for a nurse.     Lucila Rodrigez RN - Nurse Clinician - [facility] - [phone]              [facility]  [address] [place] [address] [address] [address]

## 2020-08-03 NOTE — PROGRESS NOTES
PROBLEM LIST:  1. RLE DVT and saphenous vein thrombus 4/17/20-likely provoked by upper respiratory infection.  2. Morbid obesity    Interim history: Ms. Reina is here for follow-up of DVT.  She is completed 3 months of apixaban.  No bleeding issues.  She has mild swelling of both legs, which is been chronic.  She wears knee-high compression stockings while at work and says they feel okay to wear and help the swelling.  She does note that she has some mild varicose veins which is longstanding.  She ran out of apixaban 3 days ago and wonders if she needs to go back on it.    She is hoping to be able to undergo laparoscopic Sandrine-en-Y gastric bypass surgery.  She was going to have it in March, then COVID-19 hit, and got postponed.  She then had an upper respiratory infection (not checked for COVID), and developed a DVT.  At this point she says she feels fine.  She would like to be able to go forward with a gastric bypass surgery.  She is also concerned about possible insurance changes if she does not have the surgery before the end of the year.     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  Her company is just filed for bankruptcy, so she is a little bit concerned about insurance issues.    She complains of occasional numbness and tingling in the first 3 fingers of both hands, is more prominent on the right.  No discoloration of the fingers.    She is wondering about COVID antibody testing.      PHYSICAL EXAMINATION:  /85 (BP Location: Right arm, Patient Position: Sitting, Cuff Size: Adult Large)   Pulse 77   Temp 97.9  F (36.6  C) (Oral)   Wt (!) 160.1 kg (352 lb 15.3 oz)   LMP  (LMP Unknown)   BMI 53.67 kg/m      General appearance:  Patient is 39 year old woman in no acute distress.     HEENT:  No pallor, icterus.   Extremities: Bilateral trace ankle edema.  Scattered prominent varicosity in lower legs, right more obvious than left.  No  chronic stasis changes or skin breakdown.  Skin:  No rash, no petechiae or ecchymoses.      Imaging:  ULTRASOUND LOWER EXTREMITY VENOUS DUPLEX RIGHT 7/16/2020 11:48 AM     CLINICAL HISTORY: History of saphenous thrombus and distal deep vein  thrombosis, assess for resolution. Acute deep vein thrombosis (DVT) of  distal vein of right lower extremity (H). Right leg swelling.     TECHNIQUE: Venous Duplex ultrasound of the right lower extremity with  and without compression, augmentation and duplex. Color flow and  spectral Doppler with waveform analysis performed.     COMPARISON: Ultrasound right leg 4/17/2020.     FINDINGS: Exam includes the common femoral, femoral, popliteal, and  contralateral common femoral veins as well as segmentally visualized  deep calf veins and greater saphenous vein.      RIGHT: No deep vein thrombosis. No superficial thrombophlebitis. No  popliteal cyst.                                                                      IMPRESSION:  1.  No deep venous thrombosis in the right lower extremity.     CHITO MONIQUE MD  Labs:  Results for CHA LEÓN MALI (MRN [id number]) as of 8/3/2020 10:10   Ref. Range 7/16/2020 12:13   D-Dimer Latest Ref Range: 0.0 - 0.50 ug/ml FEU 0.4       Assessment and recommendation: Right lower extremity superficial and distal deep vein thrombosis-provoked by respiratory illness.  Because this thrombosis was distal and possibly provoked by infection and has completely resolved, I think it is reasonable to stop the chronic anticoagulation.    I discussed how there is concern about recurrent DVT with surgery.  The risk of this occurring goes down the longer it is since the acute DVT.  Normally I recommend waiting 6 months after the acute DVT, but it is not clear how much difference it makes as long as she is more than 3 months from the DVT.  She would like to get going with her gastric bypass surgery soon because of insurance concerns.  I think it would be reasonable to go  forward even if it has not been quite 6 months from the DVT episode.  There is no indication for any IVC filter.  The surgery is laparoscopic and she will only in the hospital for a day or two.  I recommend prophylactic anticoagulation with apixaban 2.5 mg twice daily starting 12-24 hours after surgery and continuing for 14 days. I gave her a prescription for this.     She should continue to wear knee-high compression stockings.  If she loses weight she will probably need to be remeasured to have the correct size.  She was also given teaching about signs and symptoms of DVT and PE.    I discussed with her at this time I am not recommending having COVID antibody testing.  It is not clear about the sensitivity and specificity of the testing.  Also knowing that when he has antibodies should not take away the precautions about wearing a mask and social distancing.    She does not need routine follow-up in hematology clinic, but should not hesitate to call us if she has questions.    Sis Block MD  Hematology

## 2020-08-03 NOTE — NURSING NOTE
Teaching Flowsheet   Relevant Diagnosis: right lower extremity DVT  Teaching Topic: Basics of thrombosis, treatment options and introduction to the [facility]     Person(s) involved in teaching:   Patient     Motivation Level:  Asks Questions: Yes      Eager to Learn: Yes  Cooperative: Yes    Receptive (willing/able to accept information): Yes     Patient demonstrates understanding of the following:  Reason for the appointment, diagnosis and treatment plan: Yes  Knowledge of proper use of medications and conditions for which they are ordered (with special attention to potential side effects or drug interactions): Yes  Which situations necessitate calling provider and whom to contact: Yes        Proper use and care of   (medical equip, care aids, etc.): NA  Nutritional needs and diet plan: NA  Pain management techniques: NA  Wound Care: NA  How and/when to access community resources: Yes    Vital signs were completed.  Allergies and medications were reviewed and updated by nursing staff.    Educated patient about the signs, symptoms of and risk factors for venous thrombosis (VTE) and provided an educational  book lindy, which reviews these facts. And includes the following web links  for further information:  www.stoptheclot.org, www.clotconnect.org.    Discussed the differences between arterial and venous thrombosis with the patient.    Patient verbalized understanding of the above information.  Reviewed and discussed the patient instructions point by point and patient verbalized understanding.They deny further questions at this time.    Copy of the After Visit Summary (AVS) given to patient for future reference. Patient given the contact card for the [facility] and has the appropriate numbers to call with any questions.      Lucila Rodrigez RN - Nurse Clinician - [facility] - [phone]

## 2020-08-07 ENCOUNTER — TELEPHONE (OUTPATIENT)
Dept: SURGERY | Facility: CLINIC | Age: 40
End: 2020-08-07

## 2020-08-07 NOTE — TELEPHONE ENCOUNTER
Spoke with Dr. Puri re: patient's postponed surgery due to COVID and re: recent DVT April 2020.    Okay to move forward with surgery but needs to see RD to get back on track with weight.  Once back on track can have surgery.     Also needs to follow Dr. Block recommendations re: anticoagulation:  Prophylactic anticoagulation with apixaban 2.5 mg twice daily starting 12-24 hours after surgery and continuing for 14 days. I gave her a prescription for this.     Called patient to get her scheduled with dietitian.  Had to LM on her VM to call Call Center and schedule per my note.  Will need to make appointment to see RD until cleared again and weight down.  Jovita Dove, MS, RD, RN

## 2020-08-10 ENCOUNTER — TELEPHONE (OUTPATIENT)
Dept: SURGERY | Facility: CLINIC | Age: 40
End: 2020-08-10

## 2020-08-10 NOTE — TELEPHONE ENCOUNTER
LM for patient to call the Call Center at [phone] to make appointment.  The appointment type will be noted in her chart when she calls.  Will also send My Chart message re: the phone message.    If calls please schedule with dietitian.  Will need to make appointment to see RD until cleared again and weight down.  Jovita Dove, MS, RD, RN

## 2020-08-10 NOTE — PROGRESS NOTES
"Erika Reina is a 39 year old female who is being evaluated via a billable video visit.      The patient has been notified of following:     \"This video visit will be conducted via a call between you and your physician/provider. We have found that certain health care needs can be provided without the need for an in-person physical exam.  This service lets us provide the care you need with a video conversation.  If a prescription is necessary we can send it directly to your pharmacy.  If lab work is needed we can place an order for that and you can then stop by our lab to have the test done at a later time.    Video visits are billed at different rates depending on your insurance coverage.  Please reach out to your insurance provider with any questions.    If during the course of the call the physician/provider feels a video visit is not appropriate, you will not be charged for this service.\"    Patient has given verbal consent for Video visit? Yes  How would you like to obtain your AVS? MyChart  If you are dropped from the video visit, the video invite should be resent to: Text to cell phone: [phone]  Will anyone else be joining your video visit? No        Video-Visit Details    Type of service:  Video Visit    Video Start Time: 9:09am  Video End Time: 9:20am    Originating Location (pt. Location): Home    Distant Location (provider location):  [facility]     Platform used for Video Visit: [facility]      PRE SURGICAL WEIGHT LOSS NUTRITION APPOINTMENT    Erika Reina  1980  female  [id number]  39 year old    ASSESSMENT    Desired Surgical Procedure: gastric bypass    REASON FOR VISIT:  Erika Reina is a 39 year old year old female presents today for a pre-surgical weight loss follow-up appointment. Patient accompanied by self.    DIAGNOSIS:  Weight Status Obesity Grade III BMI >40    ANTHROPOMETRICS:  Height: 5' 8\"    Initial Weight: 353.6 lbs     Weight last " visit: 350.2 lbs    Current weight: 346 lbs 0 oz    BMI: Body mass index is 52.61 kg/m .    VITAMINS AND MINERALS:  1 Multivitamin with Minerals (Hanna's Complete - HS)  600 mg Calcium with Vitamin D (AM, lunch)  5000 International units Vitamin D      NUTRITION HISTORY:  Breakfast: Isagenix shake  Lunch: Isagenix shake  Supper: chicken w/potatoes and vegetables  salad  pork chops + vegetables + potatoes  Snacks: apple w/peanut butter, vegetables   Fluids Consumed: Water (100oz), milk (skim, occasional)   Eating slower: Yes  Chewing foods thoroughly: Yes  Take 20-30 minutes to consume each meal: Yes  Fluids and meals separate by at least 30 minutes: Yes  Carbonation: none  Caffeine: none  Additional Information: Pt following modified liquid diet and has successfully lost 6 lbs over the last week. Encouraged pt to follow this plan up until surgery. Pt understands need to be at goal weight morning of surgery. Pt will contact clinic if having trouble meeting this goal.     PHYSICAL ACTIVITY:  Type: isometric exercise around the house, walking, home exercise equipment   Frequency: 4-5 (days per week)  Duration: 30(minutes)     DIAGNOSIS:  Previous Nutrition Diagnosis: Obesity related to long history of self- monitoring deficit and excessive energy intake evidenced by BMI of 53.25 kg/m2  No change, modified below    Previous goals:   Consistently separate fluids and meals by 30 minutes - met  Continue to follow all pre-op guidelines - met    Current Nutrition Diagnosis: Obesity related to long history of self-monitoring deficit and excessive energy intake as evidenced by BMI of 52.61 kg/m2.    INTERVENTION:  Nutrition Prescription: Recommended energy/nutrient modification.    GOALS:  Continue to follow all pre-op guidelines  Continue to follow modified liquid diet    Intervention:  - Discussed progress towards previous goals.  - Reinforced importance of making behavior changes in preparation for bariatric surgery.    - Assessed learning needs and learning preferences       NUTRITION MONITORING AND EVALUATION:  Anticipated compliance: good  Patient demonstrated good understanding.   Patient has met pre bariatric surgery diet requirements    Follow up: Continue to monitor patient closely regarding weight loss and diet.  # of visits needed: 0  Cleared by RD: Yes (previously cleared)    Visit Length: 11 minutes    Nhi Obrien RD, LD  Clinical Dietitian

## 2020-08-11 ENCOUNTER — VIRTUAL VISIT (OUTPATIENT)
Dept: SURGERY | Facility: CLINIC | Age: 40
End: 2020-08-11
Payer: COMMERCIAL

## 2020-08-11 VITALS — BODY MASS INDEX: 44.41 KG/M2 | WEIGHT: 293 LBS | HEIGHT: 68 IN

## 2020-08-11 DIAGNOSIS — E66.01 MORBIDLY OBESE (H): ICD-10-CM

## 2020-08-11 DIAGNOSIS — E66.01 MORBID OBESITY DUE TO EXCESS CALORIES (H): ICD-10-CM

## 2020-08-11 PROCEDURE — 97803 MED NUTRITION INDIV SUBSEQ: CPT | Mod: 95 | Performed by: DIETITIAN, REGISTERED

## 2020-08-11 ASSESSMENT — MIFFLIN-ST. JEOR: SCORE: 2297.95

## 2020-09-09 ENCOUNTER — TELEPHONE (OUTPATIENT)
Dept: SURGERY | Facility: CLINIC | Age: 40
End: 2020-09-09

## 2020-09-09 DIAGNOSIS — Z11.59 ENCOUNTER FOR SCREENING FOR OTHER VIRAL DISEASES: Primary | ICD-10-CM

## 2020-09-09 NOTE — TELEPHONE ENCOUNTER
Asked by  to call pt re: blood donation prior to surgery in about 1 month.    Consulted RICK Hoffman; informed that we don't recommend pt to donate blood within 3 months of surgery or for 1 year afterward, also to limit to once a year following that.    Called pt to inform her.  No answer; LM requesting call back.  Gabriela Hennessy RN on 9/9/2020 at 3:58 PM

## 2020-09-11 NOTE — TELEPHONE ENCOUNTER
Called pt a second time to discuss her question about blood donation.  Reviewed message as listed below.  Pt questions answered.    No further needs at this time.  Gabriela Hennessy RN on 9/11/2020 at 9:07 AM

## 2020-09-22 ENCOUNTER — TELEPHONE (OUTPATIENT)
Dept: SURGERY | Facility: CLINIC | Age: 40
End: 2020-09-22

## 2020-09-22 NOTE — TELEPHONE ENCOUNTER
Left voicemail for pt. I sent the form to be signed to her email and gave our fax number to return it to us      Tabatha Bauer MA

## 2020-09-28 ENCOUNTER — OFFICE VISIT (OUTPATIENT)
Dept: FAMILY MEDICINE | Facility: CLINIC | Age: 40
End: 2020-09-28
Payer: COMMERCIAL

## 2020-09-28 VITALS
BODY MASS INDEX: 52.07 KG/M2 | SYSTOLIC BLOOD PRESSURE: 122 MMHG | HEART RATE: 86 BPM | OXYGEN SATURATION: 98 % | DIASTOLIC BLOOD PRESSURE: 74 MMHG | TEMPERATURE: 98.1 F | WEIGHT: 293 LBS

## 2020-09-28 DIAGNOSIS — G47.33 OSA (OBSTRUCTIVE SLEEP APNEA): ICD-10-CM

## 2020-09-28 DIAGNOSIS — Z11.59 SCREENING FOR VIRAL DISEASE: ICD-10-CM

## 2020-09-28 DIAGNOSIS — Z86.718 HISTORY OF DEEP VENOUS THROMBOSIS: ICD-10-CM

## 2020-09-28 DIAGNOSIS — Z01.818 PREOP GENERAL PHYSICAL EXAM: Primary | ICD-10-CM

## 2020-09-28 DIAGNOSIS — E66.01 MORBID OBESITY DUE TO EXCESS CALORIES (H): ICD-10-CM

## 2020-09-28 LAB
ERYTHROCYTE [DISTWIDTH] IN BLOOD BY AUTOMATED COUNT: 15 % (ref 10–15)
HCT VFR BLD AUTO: 39.7 % (ref 35–47)
HGB BLD-MCNC: 13 G/DL (ref 11.7–15.7)
MCH RBC QN AUTO: 29 PG (ref 26.5–33)
MCHC RBC AUTO-ENTMCNC: 32.7 G/DL (ref 31.5–36.5)
MCV RBC AUTO: 88 FL (ref 78–100)
PLATELET # BLD AUTO: 304 10E9/L (ref 150–450)
RBC # BLD AUTO: 4.49 10E12/L (ref 3.8–5.2)
WBC # BLD AUTO: 8.4 10E9/L (ref 4–11)

## 2020-09-28 PROCEDURE — 86769 SARS-COV-2 COVID-19 ANTIBODY: CPT | Performed by: NURSE PRACTITIONER

## 2020-09-28 PROCEDURE — 85027 COMPLETE CBC AUTOMATED: CPT | Performed by: NURSE PRACTITIONER

## 2020-09-28 PROCEDURE — 36415 COLL VENOUS BLD VENIPUNCTURE: CPT | Performed by: NURSE PRACTITIONER

## 2020-09-28 PROCEDURE — 99214 OFFICE O/P EST MOD 30 MIN: CPT | Performed by: NURSE PRACTITIONER

## 2020-09-28 NOTE — PATIENT INSTRUCTIONS

## 2020-09-28 NOTE — PROGRESS NOTES
[facility]  [address] [facility] [address]  [phone]  Dept: [phone]    PRE-OP EVALUATION:  Today's date: 2020    Erika Reina (: 1980) presents for pre-operative evaluation assessment as requested by Dr. Puri.  She requires evaluation and anesthesia risk assessment prior to undergoing surgery/procedure for treatment of gastric bypass  .    Fax number for surgical facility:   Primary Physician: Glenna Ham  Type of Anesthesia Anticipated: General    Preop Questionnnaire:  Pre-op Questionnaire 2020   Surgery Location: [facility]   Surgeon: Dr. Nicanor Puri   Surgery/Procedure: Rouxen-y surgery   Surgery Date: 10/5/202]   Time of Surgery: 10:30am   Where patient plans to recover: At home with family   1. Have you ever had a heart attack or stroke? No   2. Have you ever had surgery on your heart or blood vessels, such as a stent placement, a coronary artery bypass, or surgery on an artery in your head, neck, heart, or legs? No   3. Do you have chest pain with activity? No   4. Do you have a history of  heart failure? No   5. Do you currently have a cold, bronchitis or symptoms of other infection? No   6. Do you have a cough, shortness of breath, or wheezing? No   7. Do you or anyone in your family have previous history of blood clots? YES - pt has had blood clots before--DVT In 2020   8. Do you or does anyone in your family have a serious bleeding problem such as prolonged bleeding following surgeries or cuts? No   9. Have you ever had problems with anemia or been told to take iron pills? YES - pt did a long time ago    10. Have you had any abnormal blood loss such as black, tarry or bloody stools, or abnormal vaginal bleeding? No   11. Have you ever had a blood transfusion? No   Are you willing to have a blood transfusion if it is medically needed before, during, or after your surgery? Yes   13. Have you or any of your  relatives ever had problems with anesthesia? No   14. Do you have sleep apnea, excessive snoring or daytime drowsiness? YES - pt does have sleep apnea and uses a CPAP machine    14a. Do you have a CPAP machine? Yes   15. Do you have any artifical heart valves or other implanted medical devices like a pacemaker, defibrillator, or continuous glucose monitor? No   16. Do you have artificial joints? No   17. Are you allergic to latex? No   18. Is there any chance that you may be pregnant? No         HPI:     HPI related to upcoming procedure: bariatric weight loss surgery       See problem list for active medical problems.  Problems all longstanding and stable, except as noted/documented.  See ROS for pertinent symptoms related to these conditions.    Hx of DVT in 2020.  She was seen by Melecio in August.  It was thought her DVT was provoked by a respiratory illness.  She was advised to take prophylactic anticoagulation with apixaban 2.5 mg BID starting 12-24 hours after surgery and continuing for 14 days.  She has a prescription for this.      Dad was diagnosed with aortic aneurysm in his early 70s.  She had an echo to screen for this earlier this year.  Results below.  Name: BROCK LEÓN  MRN: [id number]  : 1980  Study Date: 2020 01:29 PM  Age: 39 yrs  Gender: Female  Patient Location: [facility]  Reason For Study: Family history of thoracic aortic aneurysm  Ordering Physician: KIERAN ABBASI  Referring Physician: KIERAN ABBASI  Performed By: Tracy Turner     BSA: 2.6 m2  Height: 67 in  Weight: 349 lb  HR: 85  BP: 118/80 mmHg  _____________________________________________________________________________  __        Procedure  Complete Echo Adult. Optison (NDC #6275-7641) given intravenously.  _____________________________________________________________________________  __        Interpretation Summary     Technically difficult study limited views obtained due to body habitus  The left  atrium is mildly dilated.  Right atrial size is normal.  Portion of asc. aorta and portion of arch/desc. arch imaged and are normal  _____________________________________________________________________________  __        Left Ventricle  The left ventricle is normal in size. There is normal left ventricular wall  thickness. Left ventricular systolic function is normal. The visual ejection  fraction is estimated at 55%. Left ventricular diastolic function is normal.  Normal left ventricular wall motion.     Right Ventricle  The right ventricle is normal in structure, function and size.     Atria  The left atrium is mildly dilated. Right atrial size is normal.     Mitral Valve  The mitral valve leaflets appear normal. There is no evidence of stenosis,  fluttering, or prolapse.        Tricuspid Valve  There is trace tricuspid regurgitation. The right ventricular systolic  pressure is approximated at 16.6 mmHg plus the right atrial pressure. Doppler  findings do not suggest pulmonary hypertension.     Aortic Valve  Normal tricuspid aortic valve.     Pulmonic Valve  The pulmonic valve is not well seen, but is grossly normal.     Vessels  Normal size aorta. Portion of asc. aorta and portion of arch/desc. arch imaged  and are normal. The inferior vena cava is normal.     Pericardium  The pericardium appears normal.        Rhythm  Sinus rhythm was noted.  _____________________________________________________________________________  __  MMode/2D Measurements & Calculations  IVSd: 1.1 cm     LVIDd: 4.3 cm  LVIDs: 2.8 cm  LVPWd: 1.1 cm  FS: 35.0 %  LV mass(C)d: 166.9 grams  LV mass(C)dI: 65.1 grams/m2  Ao root diam: 3.4 cm  LA dimension: 4.7 cm  asc Aorta Diam: 3.3 cm  LA/Ao: 1.4  LVOT diam: 2.4 cm  LVOT area: 4.4 cm2  LA Volume (BP): 85.0 ml  LA Volume Index (BP): 33.2 ml/m2  RWT: 0.52  TAPSE: 2.0 cm           Doppler Measurements & Calculations  MV E max wood: 104.6 cm/sec  MV A max wood: 79.0 cm/sec  MV E/A: 1.3  MV dec  time: 0.18 sec  LV V1 max P.8 mmHg  LV V1 max: 130.2 cm/sec  LV V1 VTI: 25.5 cm  SV(LVOT): 111.8 ml  SI(LVOT): 43.6 ml/m2  PA acc time: 0.09 sec  TR max wood: 203.6 cm/sec  TR max P.6 mmHg  E/E' av.7  Lateral E/e': 9.4  Medial E/e': 8.0    SLEEP PROBLEM - Hx of ENRICO; uses CPAP.        MEDICAL HISTORY:     Patient Active Problem List    Diagnosis Date Noted     History of deep venous thrombosis 2020     Priority: Medium     Morbidly obese (H) 2020     Priority: Medium     Added automatically from request for surgery [id number]       ENRICO (obstructive sleep apnea) 2019     Priority: Medium     Endometrial cancer (H) 2018     Priority: Medium     SCP data entered.  Give at 19 apt.       Microcytic anemia 2016     Priority: Medium     Plantar fasciitis 10/18/2016     Priority: Medium     Morbid obesity due to excess calories (H) 10/18/2016     Priority: Medium     Hyperlipidemia LDL goal <160 09/15/2011     Priority: Medium      Past Medical History:   Diagnosis Date     Endometrial cancer (H) 2018     Hyperlipidemia LDL goal <160 9/15/2011     Past Surgical History:   Procedure Laterality Date     ABDOMEN SURGERY  18    hysterectomy     ARTHROPLASTY KNEE       BIOPSY  2018    endometrial layer     CYSTOSCOPY N/A 2018    Procedure: Cystoscopy;  Surgeon: Cleopatra Altamirano MD;  Location: [facility] OR     DAVINCI HYSTERECTOMY TOTAL, BILATERAL SALPINGO-OOPHORECTOMY, NODE DISSECTION, COMBINED N/A 2018    Procedure: DaVinci Assisted Removal Of Uterus, Cervix, Both Ovaries And Fallopian Tubes, Bilateral [place] Lymph Node Dissection;  Surgeon: Cleopatra Altamirano MD;  Location: [facility] OR     GYN SURGERY  18    hysterectomy     Current Outpatient Medications   Medication Sig Dispense Refill     Melatonin Gummies 2.5 MG CHEW Take 5 mg by mouth nightly as needed (insomnia)       multivitamin, therapeutic with minerals (MULTI-VITAMIN) TABS tablet Take 1  tablet by mouth daily       Omeprazole (PRILOSEC PO)        VITAMIN D, CHOLECALCIFEROL, PO Take by mouth daily       apixaban ANTICOAGULANT (ELIQUIS ANTICOAGULANT) 2.5 MG tablet Take 1 tablet (2.5 mg) by mouth 2 times daily Start 24 hours after surgery 28 tablet 0     OTC products: None, except as noted above    Allergies   Allergen Reactions     Ibuprofen Swelling     Swelling of legs      Latex Allergy: NO    Social History     Tobacco Use     Smoking status: Never Smoker     Smokeless tobacco: Never Used   Substance Use Topics     Alcohol use: Not Currently     Alcohol/week: 0.0 standard drinks     Binge frequency: Never     Comment: rarely.  Once monthly 1-2 drinks     History   Drug Use No       REVIEW OF SYSTEMS:   CONSTITUTIONAL: NEGATIVE for fever, chills, change in weight  INTEGUMENTARY/SKIN: NEGATIVE for worrisome rashes, moles or lesions  EYES: NEGATIVE for vision changes or irritation  ENT/MOUTH: NEGATIVE for ear, mouth and throat problems  RESP: NEGATIVE for significant cough or SOB  CV: NEGATIVE for chest pain, palpitations or peripheral edema  GI: NEGATIVE for nausea, abdominal pain, heartburn, or change in bowel habits  : NEGATIVE for frequency, dysuria, or hematuria  MUSCULOSKELETAL: NEGATIVE for significant arthralgias or myalgia  NEURO: NEGATIVE for weakness, dizziness or paresthesias  ENDOCRINE: NEGATIVE for temperature intolerance, skin/hair changes  HEME: NEGATIVE for bleeding problems  PSYCHIATRIC: NEGATIVE for changes in mood or affect    EXAM:   /74 (BP Location: Right arm, Patient Position: Chair, Cuff Size: Adult Large)   Pulse 86   Temp 98.1  F (36.7  C) (Oral)   Wt (!) 155.3 kg (342 lb 7 oz)   LMP  (LMP Unknown)   SpO2 98%   BMI 52.07 kg/m      GENERAL APPEARANCE: healthy, alert and no distress     EYES: EOMI, PERRL     HENT: ear canals and TM's normal and nose and mouth without ulcers or lesions     NECK: no adenopathy, no asymmetry, masses, or scars and thyroid normal to  palpation     RESP: lungs clear to auscultation - no rales, rhonchi or wheezes     CV: regular rates and rhythm, normal S1 S2, no S3 or S4 and no murmur, click or rub     ABDOMEN:  soft, nontender, no HSM or masses and bowel sounds normal     MS: extremities normal- no gross deformities noted, no evidence of inflammation in joints, FROM in all extremities.     SKIN: no suspicious lesions or rashes     NEURO: Normal strength and tone, sensory exam grossly normal, mentation intact and speech normal     PSYCH: mentation appears normal. and affect normal/bright     LYMPHATICS: No cervical adenopathy    DIAGNOSTICS:     EKG: Not indicated due to non-vascular surgery and low risk of event (age <65 and without cardiac risk factors)  Labs Drawn and in Process:   Unresulted Labs Ordered in the Past 30 Days of this Admission     Date and Time Order Name Status Description    9/28/2020 1116 COVID-19 VIRUS (CORONAVIRUS) ANTIBODY & TITER REFLEX In process           Recent Labs   Lab Test 04/20/20  1106 04/17/20  1600 03/10/20  1018 08/16/19  1307 07/29/19  0926   HGB  --  13.5 12.7 13.0  --    PLT  --  311 330 301  --    INR 1.20* 0.99  --   --   --    NA  --  138  --   --  139   POTASSIUM  --  4.4  --   --  4.4   CR  --  0.79  --   --  0.73   A1C  --   --   --  5.0  --         IMPRESSION:   Reason for surgery/procedure: obesity  Diagnosis/reason for consult: preoperative evaluation     The proposed surgical procedure is considered INTERMEDIATE risk.    REVISED CARDIAC RISK INDEX  The patient has the following serious cardiovascular risks for perioperative complications such as (MI, PE, VFib and 3  AV Block):  No serious cardiac risks  INTERPRETATION: 0 risks: Class I (very low risk - 0.4% complication rate)    The patient has the following additional risks for perioperative complications:  Morbid obesity  Hx of DVT       ICD-10-CM    1. Preop general physical exam  Z01.818 CBC with platelets   2. ENRICO (obstructive sleep apnea)   G47.33    3. Morbid obesity due to excess calories (H)  E66.01    4. History of deep venous thrombosis  Z86.718    5. Screening for viral disease  Z11.59 COVID-19 Virus (Coronavirus) Antibody & Titer Reflex     COVID-19 Virus (Coronavirus) Antibody & Titer Reflex       RECOMMENDATIONS:     --Because of DVT or PE history, patient should be on low molecular weight heparin and wear compression hose before & after surgery  Has prescription from [facility] for apixaban x 14 days    --Patient is on no chronic medications  Will hold OTC vitamins morning of surgery     APPROVAL GIVEN to proceed with proposed procedure, without further diagnostic evaluation       Signed Electronically by: LEEROY Pal CNP    Copy of this evaluation report is provided to requesting physician.    [facility] Preop Guidelines    Revised Cardiac Risk Index

## 2020-09-29 LAB
COVID-19 SPIKE RBD ABY TITER: NORMAL
COVID-19 SPIKE RBD ABY: NEGATIVE

## 2020-10-01 ENCOUNTER — TELEPHONE (OUTPATIENT)
Dept: SURGERY | Facility: CLINIC | Age: 40
End: 2020-10-01

## 2020-10-01 DIAGNOSIS — Z11.59 ENCOUNTER FOR SCREENING FOR OTHER VIRAL DISEASES: ICD-10-CM

## 2020-10-01 PROCEDURE — U0003 INFECTIOUS AGENT DETECTION BY NUCLEIC ACID (DNA OR RNA); SEVERE ACUTE RESPIRATORY SYNDROME CORONAVIRUS 2 (SARS-COV-2) (CORONAVIRUS DISEASE [COVID-19]), AMPLIFIED PROBE TECHNIQUE, MAKING USE OF HIGH THROUGHPUT TECHNOLOGIES AS DESCRIBED BY CMS-2020-01-R: HCPCS | Performed by: SURGERY

## 2020-10-01 NOTE — TELEPHONE ENCOUNTER
Called pt to update her on surgical care plan.  No answer; left message to call back.  Gabriela Hennessy RN on 10/1/2020 at 11:13 AM

## 2020-10-01 NOTE — TELEPHONE ENCOUNTER
"Patient surgical care plan:  Per Dr. Block and ofe per PLB okay for surgery after DVT in April 2020.   Per Dr. Block  \"I recommend prophylactic anticoagulation with apixaban 2.5 mg twice daily starting 12-24 hours after surgery and continuing for 14 days. I gave her a prescription for this.\"    --    Above care plan sent via Epic message to care team in preparation for surgery.    Gabriela Hennessy RN on 10/1/2020 at 9:43 AM      "

## 2020-10-02 LAB
SARS-COV-2 RNA SPEC QL NAA+PROBE: NOT DETECTED
SPECIMEN SOURCE: NORMAL

## 2020-10-02 NOTE — PROGRESS NOTES
PTA medications updated by Medication Scribe prior to surgery via phone call with patient      -LAST DOSES ENTERED BY NURSE-    Comments:    Medication history sources: Patient, Surescripts and H&P  Medication history source reliability: Good  Adherence assessment: N/A Not Observed    Significant changes made to the medication list:  None      Additional medication history information:   None        Prior to Admission medications    Medication Sig Last Dose Taking? Auth Provider   MELATONIN PO Take 1 Dose by mouth nightly as needed MORE THAN 1 WEEK at PRN Yes Reported, Patient   multivitamin, therapeutic with minerals (MULTI-VITAMIN) TABS tablet Take 1 tablet by mouth daily  Yes Reported, Patient   omeprazole (PRILOSEC) 20 MG DR capsule Take 20 mg by mouth daily before breakfast  at AM Yes Reported, Patient   Vitamin D3 (CHOLECALCIFEROL) 125 MCG (5000 UT) tablet Take 125 mcg by mouth daily  Yes Reported, Patient   apixaban ANTICOAGULANT (ELIQUIS ANTICOAGULANT) 2.5 MG tablet Take 1 tablet (2.5 mg) by mouth 2 times daily Start 24 hours after surgery NOT STARTED  Sis Block MD

## 2020-10-05 ENCOUNTER — ANESTHESIA EVENT (OUTPATIENT)
Dept: SURGERY | Facility: CLINIC | Age: 40
End: 2020-10-05
Payer: COMMERCIAL

## 2020-10-05 ENCOUNTER — HOSPITAL ENCOUNTER (INPATIENT)
Facility: CLINIC | Age: 40
LOS: 2 days | Discharge: HOME | End: 2020-10-07
Attending: SURGERY | Admitting: SURGERY
Payer: COMMERCIAL

## 2020-10-05 ENCOUNTER — SURGERY (OUTPATIENT)
Age: 40
End: 2020-10-05
Payer: COMMERCIAL

## 2020-10-05 ENCOUNTER — APPOINTMENT (OUTPATIENT)
Dept: SURGERY | Facility: PHYSICIAN GROUP | Age: 40
End: 2020-10-05
Payer: COMMERCIAL

## 2020-10-05 ENCOUNTER — ANESTHESIA (OUTPATIENT)
Dept: SURGERY | Facility: CLINIC | Age: 40
End: 2020-10-05
Payer: COMMERCIAL

## 2020-10-05 DIAGNOSIS — G89.18 ACUTE POST-OPERATIVE PAIN: Primary | ICD-10-CM

## 2020-10-05 PROCEDURE — 250N000009 HC RX 250: Performed by: SURGERY

## 2020-10-05 PROCEDURE — 258N000003 HC RX IP 258 OP 636: Performed by: ANESTHESIOLOGY

## 2020-10-05 PROCEDURE — 250N000009 HC RX 250: Performed by: NURSE ANESTHETIST, CERTIFIED REGISTERED

## 2020-10-05 PROCEDURE — 0D164ZA BYPASS STOMACH TO JEJUNUM, PERCUTANEOUS ENDOSCOPIC APPROACH: ICD-10-PCS | Performed by: SURGERY

## 2020-10-05 PROCEDURE — 250N000011 HC RX IP 250 OP 636: Performed by: NURSE ANESTHETIST, CERTIFIED REGISTERED

## 2020-10-05 PROCEDURE — 272N000001 HC OR GENERAL SUPPLY STERILE: Performed by: SURGERY

## 2020-10-05 PROCEDURE — 43644 LAP GASTRIC BYPASS/ROUX-EN-Y: CPT | Mod: AS | Performed by: PHYSICIAN ASSISTANT

## 2020-10-05 PROCEDURE — 43644 LAP GASTRIC BYPASS/ROUX-EN-Y: CPT | Performed by: SURGERY

## 2020-10-05 PROCEDURE — 250N000011 HC RX IP 250 OP 636: Performed by: PHYSICIAN ASSISTANT

## 2020-10-05 PROCEDURE — 250N000011 HC RX IP 250 OP 636: Performed by: ANESTHESIOLOGY

## 2020-10-05 PROCEDURE — 250N000011 HC RX IP 250 OP 636: Performed by: SURGERY

## 2020-10-05 PROCEDURE — 250N000009 HC RX 250: Performed by: PHYSICIAN ASSISTANT

## 2020-10-05 PROCEDURE — 999N000139 HC STATISTIC PRE-PROCEDURE ASSESSMENT II: Performed by: SURGERY

## 2020-10-05 PROCEDURE — 370N000002 HC ANESTHESIA TECHNICAL FEE, EACH ADDTL 15 MIN: Performed by: SURGERY

## 2020-10-05 PROCEDURE — 258N000001 HC RX 258: Performed by: SURGERY

## 2020-10-05 PROCEDURE — 360N000027 HC SURGERY LEVEL 4 EA 15 ADDTL MIN: Performed by: SURGERY

## 2020-10-05 PROCEDURE — 258N000003 HC RX IP 258 OP 636: Performed by: NURSE ANESTHETIST, CERTIFIED REGISTERED

## 2020-10-05 PROCEDURE — 120N000001 HC R&B MED SURG/OB

## 2020-10-05 PROCEDURE — 360N000026 HC SURGERY LEVEL 4 1ST 30 MIN: Performed by: SURGERY

## 2020-10-05 PROCEDURE — 258N000003 HC RX IP 258 OP 636: Performed by: PHYSICIAN ASSISTANT

## 2020-10-05 PROCEDURE — 761N000001 HC RECOVERY PHASE 1 LEVEL 1 FIRST HR: Performed by: SURGERY

## 2020-10-05 PROCEDURE — 250N000013 HC RX MED GY IP 250 OP 250 PS 637: Performed by: PHYSICIAN ASSISTANT

## 2020-10-05 PROCEDURE — 96372 THER/PROPH/DIAG INJ SC/IM: CPT | Performed by: SURGERY

## 2020-10-05 PROCEDURE — 370N000001 HC ANESTHESIA TECHNICAL FEE, 1ST 30 MIN: Performed by: SURGERY

## 2020-10-05 PROCEDURE — 272N000002 HC OR SUPPLY OTHER OPNP: Performed by: SURGERY

## 2020-10-05 PROCEDURE — 761N000002 HC RECOVERY PHASE 1 LEVEL 1 EA ADDTL HR: Performed by: SURGERY

## 2020-10-05 PROCEDURE — 250N000003 HC SEVOFLURANE, EA 15 MIN: Performed by: SURGERY

## 2020-10-05 RX ORDER — ACETAMINOPHEN 325 MG/1
650 TABLET ORAL 4 TIMES DAILY PRN
Status: DISCONTINUED | OUTPATIENT
Start: 2020-10-05 | End: 2020-10-07 | Stop reason: HOSPADM

## 2020-10-05 RX ORDER — ONDANSETRON 4 MG/1
4 TABLET, ORALLY DISINTEGRATING ORAL EVERY 30 MIN PRN
Status: DISCONTINUED | OUTPATIENT
Start: 2020-10-05 | End: 2020-10-05 | Stop reason: HOSPADM

## 2020-10-05 RX ORDER — ONDANSETRON 2 MG/ML
4 INJECTION INTRAMUSCULAR; INTRAVENOUS EVERY 6 HOURS PRN
Status: DISCONTINUED | OUTPATIENT
Start: 2020-10-05 | End: 2020-10-07 | Stop reason: HOSPADM

## 2020-10-05 RX ORDER — DIPHENHYDRAMINE HYDROCHLORIDE 50 MG/ML
25 INJECTION INTRAMUSCULAR; INTRAVENOUS EVERY 6 HOURS PRN
Status: DISCONTINUED | OUTPATIENT
Start: 2020-10-05 | End: 2020-10-07 | Stop reason: HOSPADM

## 2020-10-05 RX ORDER — FENTANYL CITRATE 50 UG/ML
INJECTION, SOLUTION INTRAMUSCULAR; INTRAVENOUS PRN
Status: DISCONTINUED | OUTPATIENT
Start: 2020-10-05 | End: 2020-10-05

## 2020-10-05 RX ORDER — NEOSTIGMINE METHYLSULFATE 1 MG/ML
VIAL (ML) INJECTION PRN
Status: DISCONTINUED | OUTPATIENT
Start: 2020-10-05 | End: 2020-10-05

## 2020-10-05 RX ORDER — NALOXONE HYDROCHLORIDE 0.4 MG/ML
.1-.4 INJECTION, SOLUTION INTRAMUSCULAR; INTRAVENOUS; SUBCUTANEOUS
Status: DISCONTINUED | OUTPATIENT
Start: 2020-10-05 | End: 2020-10-05

## 2020-10-05 RX ORDER — GLYCOPYRROLATE 0.2 MG/ML
INJECTION, SOLUTION INTRAMUSCULAR; INTRAVENOUS PRN
Status: DISCONTINUED | OUTPATIENT
Start: 2020-10-05 | End: 2020-10-05

## 2020-10-05 RX ORDER — PROPOFOL 10 MG/ML
INJECTION, EMULSION INTRAVENOUS PRN
Status: DISCONTINUED | OUTPATIENT
Start: 2020-10-05 | End: 2020-10-05

## 2020-10-05 RX ORDER — CEFAZOLIN SODIUM 2 G/100ML
2 INJECTION, SOLUTION INTRAVENOUS EVERY 8 HOURS
Status: COMPLETED | OUTPATIENT
Start: 2020-10-05 | End: 2020-10-06

## 2020-10-05 RX ORDER — BUPIVACAINE HYDROCHLORIDE AND EPINEPHRINE 2.5; 5 MG/ML; UG/ML
INJECTION, SOLUTION INFILTRATION; PERINEURAL PRN
Status: DISCONTINUED | OUTPATIENT
Start: 2020-10-05 | End: 2020-10-05 | Stop reason: HOSPADM

## 2020-10-05 RX ORDER — OXYCODONE HYDROCHLORIDE 5 MG/1
5-10 TABLET ORAL
Status: DISCONTINUED | OUTPATIENT
Start: 2020-10-05 | End: 2020-10-07 | Stop reason: HOSPADM

## 2020-10-05 RX ORDER — SODIUM CHLORIDE, SODIUM LACTATE, POTASSIUM CHLORIDE, CALCIUM CHLORIDE 600; 310; 30; 20 MG/100ML; MG/100ML; MG/100ML; MG/100ML
INJECTION, SOLUTION INTRAVENOUS CONTINUOUS
Status: DISCONTINUED | OUTPATIENT
Start: 2020-10-05 | End: 2020-10-05 | Stop reason: HOSPADM

## 2020-10-05 RX ORDER — DIPHENHYDRAMINE HCL 25 MG
25 CAPSULE ORAL EVERY 6 HOURS PRN
Status: DISCONTINUED | OUTPATIENT
Start: 2020-10-05 | End: 2020-10-07 | Stop reason: HOSPADM

## 2020-10-05 RX ORDER — HYDROMORPHONE HYDROCHLORIDE 1 MG/ML
.3-.5 INJECTION, SOLUTION INTRAMUSCULAR; INTRAVENOUS; SUBCUTANEOUS
Status: DISCONTINUED | OUTPATIENT
Start: 2020-10-05 | End: 2020-10-07 | Stop reason: HOSPADM

## 2020-10-05 RX ORDER — PROPOFOL 10 MG/ML
INJECTION, EMULSION INTRAVENOUS CONTINUOUS PRN
Status: DISCONTINUED | OUTPATIENT
Start: 2020-10-05 | End: 2020-10-05

## 2020-10-05 RX ORDER — LIDOCAINE HYDROCHLORIDE 20 MG/ML
INJECTION, SOLUTION INFILTRATION; PERINEURAL PRN
Status: DISCONTINUED | OUTPATIENT
Start: 2020-10-05 | End: 2020-10-05

## 2020-10-05 RX ORDER — ONDANSETRON 2 MG/ML
4 INJECTION INTRAMUSCULAR; INTRAVENOUS EVERY 30 MIN PRN
Status: DISCONTINUED | OUTPATIENT
Start: 2020-10-05 | End: 2020-10-05 | Stop reason: HOSPADM

## 2020-10-05 RX ORDER — LIDOCAINE 40 MG/G
CREAM TOPICAL
Status: DISCONTINUED | OUTPATIENT
Start: 2020-10-05 | End: 2020-10-07 | Stop reason: HOSPADM

## 2020-10-05 RX ORDER — ONDANSETRON 4 MG/1
4 TABLET, ORALLY DISINTEGRATING ORAL EVERY 6 HOURS PRN
Status: DISCONTINUED | OUTPATIENT
Start: 2020-10-05 | End: 2020-10-07 | Stop reason: HOSPADM

## 2020-10-05 RX ORDER — SIMETHICONE 40MG/0.6ML
100 SUSPENSION, DROPS(FINAL DOSAGE FORM)(ML) ORAL 4 TIMES DAILY
Status: DISCONTINUED | OUTPATIENT
Start: 2020-10-05 | End: 2020-10-07 | Stop reason: HOSPADM

## 2020-10-05 RX ORDER — HYDROMORPHONE HYDROCHLORIDE 1 MG/ML
.3-.5 INJECTION, SOLUTION INTRAMUSCULAR; INTRAVENOUS; SUBCUTANEOUS EVERY 5 MIN PRN
Status: DISCONTINUED | OUTPATIENT
Start: 2020-10-05 | End: 2020-10-05 | Stop reason: HOSPADM

## 2020-10-05 RX ORDER — FENTANYL CITRATE 50 UG/ML
25-50 INJECTION, SOLUTION INTRAMUSCULAR; INTRAVENOUS
Status: DISCONTINUED | OUTPATIENT
Start: 2020-10-05 | End: 2020-10-05 | Stop reason: HOSPADM

## 2020-10-05 RX ORDER — DIAZEPAM 10 MG/2ML
5 INJECTION, SOLUTION INTRAMUSCULAR; INTRAVENOUS EVERY 4 HOURS PRN
Status: DISCONTINUED | OUTPATIENT
Start: 2020-10-05 | End: 2020-10-07 | Stop reason: HOSPADM

## 2020-10-05 RX ORDER — EPHEDRINE SULFATE 50 MG/ML
INJECTION, SOLUTION INTRAMUSCULAR; INTRAVENOUS; SUBCUTANEOUS PRN
Status: DISCONTINUED | OUTPATIENT
Start: 2020-10-05 | End: 2020-10-05

## 2020-10-05 RX ORDER — ONDANSETRON 2 MG/ML
INJECTION INTRAMUSCULAR; INTRAVENOUS PRN
Status: DISCONTINUED | OUTPATIENT
Start: 2020-10-05 | End: 2020-10-05

## 2020-10-05 RX ORDER — SODIUM CHLORIDE, SODIUM LACTATE, POTASSIUM CHLORIDE, CALCIUM CHLORIDE 600; 310; 30; 20 MG/100ML; MG/100ML; MG/100ML; MG/100ML
INJECTION, SOLUTION INTRAVENOUS CONTINUOUS
Status: DISCONTINUED | OUTPATIENT
Start: 2020-10-05 | End: 2020-10-07 | Stop reason: HOSPADM

## 2020-10-05 RX ORDER — NALOXONE HYDROCHLORIDE 0.4 MG/ML
.1-.4 INJECTION, SOLUTION INTRAMUSCULAR; INTRAVENOUS; SUBCUTANEOUS
Status: DISCONTINUED | OUTPATIENT
Start: 2020-10-05 | End: 2020-10-07 | Stop reason: HOSPADM

## 2020-10-05 RX ORDER — CEFAZOLIN SODIUM IN 0.9 % NACL 3 G/100 ML
3 INTRAVENOUS SOLUTION, PIGGYBACK (ML) INTRAVENOUS
Status: COMPLETED | OUTPATIENT
Start: 2020-10-05 | End: 2020-10-05

## 2020-10-05 RX ORDER — PROCHLORPERAZINE MALEATE 10 MG
10 TABLET ORAL EVERY 6 HOURS PRN
Status: DISCONTINUED | OUTPATIENT
Start: 2020-10-05 | End: 2020-10-07 | Stop reason: HOSPADM

## 2020-10-05 RX ORDER — HEPARIN SODIUM 5000 [USP'U]/.5ML
5000 INJECTION, SOLUTION INTRAVENOUS; SUBCUTANEOUS
Status: COMPLETED | OUTPATIENT
Start: 2020-10-05 | End: 2020-10-05

## 2020-10-05 RX ORDER — DEXAMETHASONE SODIUM PHOSPHATE 4 MG/ML
INJECTION, SOLUTION INTRA-ARTICULAR; INTRALESIONAL; INTRAMUSCULAR; INTRAVENOUS; SOFT TISSUE PRN
Status: DISCONTINUED | OUTPATIENT
Start: 2020-10-05 | End: 2020-10-05

## 2020-10-05 RX ADMIN — GLYCOPYRROLATE 0.8 MG: 0.2 INJECTION, SOLUTION INTRAMUSCULAR; INTRAVENOUS at 13:41

## 2020-10-05 RX ADMIN — ROCURONIUM BROMIDE 10 MG: 10 INJECTION INTRAVENOUS at 13:30

## 2020-10-05 RX ADMIN — HYDROMORPHONE HYDROCHLORIDE 0.5 MG: 1 INJECTION, SOLUTION INTRAMUSCULAR; INTRAVENOUS; SUBCUTANEOUS at 14:03

## 2020-10-05 RX ADMIN — Medication 3 G: at 11:13

## 2020-10-05 RX ADMIN — HYDROMORPHONE HYDROCHLORIDE 0.5 MG: 1 INJECTION, SOLUTION INTRAMUSCULAR; INTRAVENOUS; SUBCUTANEOUS at 22:09

## 2020-10-05 RX ADMIN — FENTANYL CITRATE 50 MCG: 0.05 INJECTION, SOLUTION INTRAMUSCULAR; INTRAVENOUS at 15:08

## 2020-10-05 RX ADMIN — PROCHLORPERAZINE EDISYLATE 5 MG: 5 INJECTION INTRAMUSCULAR; INTRAVENOUS at 14:47

## 2020-10-05 RX ADMIN — MIDAZOLAM 2 MG: 1 INJECTION INTRAMUSCULAR; INTRAVENOUS at 11:00

## 2020-10-05 RX ADMIN — CEFAZOLIN SODIUM 2 G: 2 INJECTION, SOLUTION INTRAVENOUS at 18:50

## 2020-10-05 RX ADMIN — BUPIVACAINE HYDROCHLORIDE AND EPINEPHRINE BITARTRATE 60 ML: 2.5; .005 INJECTION, SOLUTION EPIDURAL; INFILTRATION; INTRACAUDAL; PERINEURAL at 13:49

## 2020-10-05 RX ADMIN — ROCURONIUM BROMIDE 20 MG: 10 INJECTION INTRAVENOUS at 11:39

## 2020-10-05 RX ADMIN — DEXMEDETOMIDINE HYDROCHLORIDE: 100 INJECTION, SOLUTION INTRAVENOUS at 11:37

## 2020-10-05 RX ADMIN — SIMETHICONE 100 MG: 20 EMULSION ORAL at 18:50

## 2020-10-05 RX ADMIN — PROPOFOL 25 MCG/KG/MIN: 10 INJECTION, EMULSION INTRAVENOUS at 11:05

## 2020-10-05 RX ADMIN — Medication 5 MG: at 12:28

## 2020-10-05 RX ADMIN — ROCURONIUM BROMIDE 10 MG: 10 INJECTION INTRAVENOUS at 12:38

## 2020-10-05 RX ADMIN — FENTANYL CITRATE 50 MCG: 50 INJECTION, SOLUTION INTRAMUSCULAR; INTRAVENOUS at 12:13

## 2020-10-05 RX ADMIN — NEOSTIGMINE METHYLSULFATE 5 MG: 1 INJECTION, SOLUTION INTRAVENOUS at 13:41

## 2020-10-05 RX ADMIN — Medication 20 MG: at 16:11

## 2020-10-05 RX ADMIN — SODIUM CHLORIDE 1000 ML: 900 IRRIGANT IRRIGATION at 13:11

## 2020-10-05 RX ADMIN — FENTANYL CITRATE 50 MCG: 50 INJECTION, SOLUTION INTRAMUSCULAR; INTRAVENOUS at 12:11

## 2020-10-05 RX ADMIN — SODIUM CHLORIDE, POTASSIUM CHLORIDE, SODIUM LACTATE AND CALCIUM CHLORIDE: 600; 310; 30; 20 INJECTION, SOLUTION INTRAVENOUS at 16:11

## 2020-10-05 RX ADMIN — FENTANYL CITRATE 50 MCG: 0.05 INJECTION, SOLUTION INTRAMUSCULAR; INTRAVENOUS at 14:14

## 2020-10-05 RX ADMIN — DEXAMETHASONE SODIUM PHOSPHATE 4 MG: 4 INJECTION, SOLUTION INTRA-ARTICULAR; INTRALESIONAL; INTRAMUSCULAR; INTRAVENOUS; SOFT TISSUE at 11:05

## 2020-10-05 RX ADMIN — ROCURONIUM BROMIDE 50 MG: 10 INJECTION INTRAVENOUS at 11:15

## 2020-10-05 RX ADMIN — Medication 1 G: at 13:13

## 2020-10-05 RX ADMIN — LIDOCAINE HYDROCHLORIDE 100 MG: 20 INJECTION, SOLUTION INFILTRATION; PERINEURAL at 11:05

## 2020-10-05 RX ADMIN — HYDROMORPHONE HYDROCHLORIDE 0.5 MG: 1 INJECTION, SOLUTION INTRAMUSCULAR; INTRAVENOUS; SUBCUTANEOUS at 16:15

## 2020-10-05 RX ADMIN — ROCURONIUM BROMIDE 10 MG: 10 INJECTION INTRAVENOUS at 13:17

## 2020-10-05 RX ADMIN — HYDROMORPHONE HYDROCHLORIDE 0.5 MG: 1 INJECTION, SOLUTION INTRAMUSCULAR; INTRAVENOUS; SUBCUTANEOUS at 19:30

## 2020-10-05 RX ADMIN — ONDANSETRON 4 MG: 2 INJECTION INTRAMUSCULAR; INTRAVENOUS at 11:05

## 2020-10-05 RX ADMIN — FENTANYL CITRATE 100 MCG: 50 INJECTION, SOLUTION INTRAMUSCULAR; INTRAVENOUS at 11:05

## 2020-10-05 RX ADMIN — SUCCINYLCHOLINE CHLORIDE 100 MG: 20 INJECTION, SOLUTION INTRAMUSCULAR; INTRAVENOUS; PARENTERAL at 11:05

## 2020-10-05 RX ADMIN — ONDANSETRON 4 MG: 2 INJECTION INTRAMUSCULAR; INTRAVENOUS at 14:14

## 2020-10-05 RX ADMIN — SODIUM CHLORIDE, POTASSIUM CHLORIDE, SODIUM LACTATE AND CALCIUM CHLORIDE: 600; 310; 30; 20 INJECTION, SOLUTION INTRAVENOUS at 10:19

## 2020-10-05 RX ADMIN — ONDANSETRON 4 MG: 2 INJECTION INTRAMUSCULAR; INTRAVENOUS at 13:39

## 2020-10-05 RX ADMIN — HEPARIN SODIUM 5000 UNITS: 10000 INJECTION, SOLUTION INTRAVENOUS; SUBCUTANEOUS at 11:19

## 2020-10-05 RX ADMIN — DEXMEDETOMIDINE HYDROCHLORIDE 0.3 MCG/KG/HR: 100 INJECTION, SOLUTION INTRAVENOUS at 11:06

## 2020-10-05 RX ADMIN — PROPOFOL 200 MG: 10 INJECTION, EMULSION INTRAVENOUS at 11:05

## 2020-10-05 RX ADMIN — SODIUM CHLORIDE, POTASSIUM CHLORIDE, SODIUM LACTATE AND CALCIUM CHLORIDE: 600; 310; 30; 20 INJECTION, SOLUTION INTRAVENOUS at 12:03

## 2020-10-05 ASSESSMENT — ACTIVITIES OF DAILY LIVING (ADL)
ADLS_ACUITY_SCORE: 11
ADLS_ACUITY_SCORE: 11

## 2020-10-05 ASSESSMENT — LIFESTYLE VARIABLES: TOBACCO_USE: 0

## 2020-10-05 ASSESSMENT — MIFFLIN-ST. JEOR: SCORE: 2258.93

## 2020-10-05 ASSESSMENT — ENCOUNTER SYMPTOMS: SEIZURES: 0

## 2020-10-05 NOTE — ANESTHESIA CARE TRANSFER NOTE
Patient: Erika Reina    Procedure(s):  LAPAROSCOPIC LE-EN-Y GASTRIC BYPASS    Diagnosis: Morbidly obese (H) [E66.01]  Diagnosis Additional Information: No value filed.    Anesthesia Type:   General     Note:  Airway :Face Mask  Patient transferred to:PACU  Comments: Neuromuscular blockade reversed after TOF 1/4, spontaneous respirations, adequate tidal volumes, followed commands to voice, oropharynx suctioned with soft flexible catheter, extubated atraumatically, extubated with suction, airway patent after extubation.  Oxygen via facemask at 6 liters per minute to PACU. Oxygen tubing connected to wall O2 in PACU, SpO2, NiBP, and EKG monitors and alarms on and functioning, report on patient's clinical status given to PACU RN, RN questions answered.   Handoff Report: Identifed the Patient, Identified the Reponsible Provider, Reviewed the pertinent medical history, Discussed the surgical course, Reviewed Intra-OP anesthesia mangement and issues during anesthesia, Set expectations for post-procedure period and Allowed opportunity for questions and acknowledgement of understanding      Vitals: (Last set prior to Anesthesia Care Transfer)    CRNA VITALS  10/5/2020 1333 - 10/5/2020 1409      10/5/2020             NIBP:  (!) 133/92    NIBP Mean:  105                Electronically Signed By: LEEROY Cole CRNA  October 5, 2020  2:09 PM

## 2020-10-05 NOTE — OR NURSING
OK by CAPRI Tracy to transfer to floor. Belonging bags and CPAP brought to room by pacu NA. Sister Lexi called with update and room number.

## 2020-10-05 NOTE — ANESTHESIA PREPROCEDURE EVALUATION
Anesthesia Pre-Procedure Evaluation    Patient: Erika Reina   MRN: [id number] : 1980          Preoperative Diagnosis: Morbidly obese (H) [E66.01]    Procedure(s):  LAPAROSCOPIC GASTRIC BYPASS WITH POSSIBLE LAPAROSCOPIC CHOLECYSTECTOMY    Past Medical History:   Diagnosis Date     DVT of lower extremity (deep venous thrombosis) (H) 2020    right ankle     Endometrial cancer (H) 2018     Endometrial cancer (H)      Hyperlipidemia LDL goal <160 9/15/2011     Sleep apnea      Past Surgical History:   Procedure Laterality Date     ABDOMEN SURGERY  18    hysterectomy     ARTHROPLASTY KNEE Right      BIOPSY  2018    endometrial layer     CYSTOSCOPY N/A 2018    Procedure: Cystoscopy;  Surgeon: Cleopatra Altamirano MD;  Location: [facility] OR     DAVINCI HYSTERECTOMY TOTAL, BILATERAL SALPINGO-OOPHORECTOMY, NODE DISSECTION, COMBINED N/A 2018    Procedure: DaVinci Assisted Removal Of Uterus, Cervix, Both Ovaries And Fallopian Tubes, Bilateral [place] Lymph Node Dissection;  Surgeon: Cleopatra Altamirano MD;  Location: [facility] OR     GYN SURGERY  18    hysterectomy       Anesthesia Evaluation     . Pt has had prior anesthetic.     No history of anesthetic complications          ROS/MED HX    ENT/Pulmonary:     (+)sleep apnea, , . .   (-) tobacco use and asthma   Neurologic:      (-) seizures and CVA   Cardiovascular:         METS/Exercise Tolerance:     Hematologic: Comments: Hx of dvt        Musculoskeletal:         GI/Hepatic:     (+) GERD Asymptomatic on medication,       Renal/Genitourinary:         Endo:     (+) Obesity, .   (-) Type II DM   Psychiatric:         Infectious Disease:         Malignancy:   (+) Malignancy History of Other  Other CA endometrial status post         Other:                          Physical Exam  Normal systems: dental    Airway   Mallampati: I  TM distance: >3 FB  Neck ROM: full    Dental     Cardiovascular   Rhythm and rate: regular and  "normal      Pulmonary    breath sounds clear to auscultation            Lab Results   Component Value Date    WBC 8.4 09/28/2020    HGB 13.0 09/28/2020    HCT 39.7 09/28/2020     09/28/2020     04/17/2020    POTASSIUM 4.4 04/17/2020    CHLORIDE 106 04/17/2020    CO2 29 04/17/2020    BUN 15 04/17/2020    CR 0.79 04/17/2020    GLC 88 04/17/2020    JESSE 9.6 04/17/2020    ALBUMIN 3.4 04/17/2020    PROTTOTAL 7.8 04/17/2020    ALT 31 04/17/2020    AST 18 04/17/2020    ALKPHOS 84 04/17/2020    BILITOTAL 0.4 04/17/2020    LIPASE 69 (L) 09/28/2018    INR 1.20 (H) 04/20/2020    TSH 2.39 08/16/2019    HCG Negative 12/13/2018       Preop Vitals  BP Readings from Last 3 Encounters:   10/05/20 111/73   09/28/20 122/74   08/03/20 132/85    Pulse Readings from Last 3 Encounters:   10/05/20 80   09/28/20 86   08/03/20 77      Resp Readings from Last 3 Encounters:   10/05/20 16   07/28/20 16   04/17/20 16    SpO2 Readings from Last 3 Encounters:   10/05/20 98%   09/28/20 98%   07/28/20 98%      Temp Readings from Last 1 Encounters:   10/05/20 36.7  C (98.1  F) (Temporal)    Ht Readings from Last 1 Encounters:   10/05/20 1.727 m (5' 8\")      Wt Readings from Last 1 Encounters:   10/05/20 (!) 153.5 kg (338 lb 8 oz)    Estimated body mass index is 51.47 kg/m  as calculated from the following:    Height as of this encounter: 1.727 m (5' 8\").    Weight as of this encounter: 153.5 kg (338 lb 8 oz).       Anesthesia Plan      History & Physical Review  History and physical reviewed and following examination; no interval change.    ASA Status:  2 .        Plan for General with Intravenous induction. Maintenance will be Balanced.    PONV prophylaxis:  Ondansetron (or other 5HT-3) and Dexamethasone or Solumedrol  Additional equipment: Videolaryngoscope        Postoperative Care  Postoperative pain management:  IV analgesics and Oral pain medications.      Consents  Anesthetic plan, risks, benefits and alternatives discussed with:  " Patient..                 Alissa Mena

## 2020-10-05 NOTE — BRIEF OP NOTE
[facility]    Brief Operative Note    Pre-operative diagnosis: Morbidly obese (H) [E66.01]  Post-operative diagnosis Same as pre-operative diagnosis    Procedure: Procedure(s):  LAPAROSCOPIC LE-EN-Y GASTRIC BYPASS  Surgeon: Surgeon(s) and Role:     * Nicanor Puri MD - Primary     * Stacey Kc PA-C - Assisting  Anesthesia: General   Estimated blood loss: 5 mL  Drains: None  Specimens: * No specimens in log *  Findings:   As expected..  Complications: None.  Implants: * No implants in log *    ANTHONY Euceda-S3

## 2020-10-05 NOTE — OP NOTE
General Surgery Operative Note    PREOPERATIVE DIAGNOSIS:  Morbidly obese (H) [E66.01]    POSTOPERATIVE DIAGNOSIS:  Same, multiple comorbidities    PROCEDURE:   Procedure(s):  LAPAROSCOPIC LE-EN-Y GASTRIC BYPASS    ANESTHESIA:  General.      SURGEON:  Nicanor Puri MD    ASSISTANT:  Stacey Kc PA-C. The physician assistant was medically necessary for their expertise in camera management, suctioning, and retraction    INDICATIONS:  Morbid Obesity, multiple co-morbidities  Due to the patient's comorbidity conditions of polycystic ovarian syndrome, fatty liver, and weight bearing joint pain, in association with elevated body mass index, bariatric surgery has been recommended and is being performed today.   Moreover, as the surgeon performing this procedure, I certify that the following are true in regards to this patient at or prior to the day of surgery:   1. The patient's body mass index (BMI) is or has been greater than or equal to 35 kg/m2.  2. The patient has at least one co-morbidity related to obesity (as outlined above).  3. The patient has been previously unsuccessful with medical treatment for obesity.  Please note that some of this information has been documented as part of a comprehensive pre-bariatric surgery process in the outpatient clinic and is NOT immediately available in the inpatient encounter for this bariatric operation.        PROCEDURE:  The patient was taken to the operating suite.  The operative area was prepped and draped in a sterile fashion.  Surgeon initiated timeout was acknowledged.  The abdomen was insufflated via a left supraumbilical incision using a veress needle with low pressures. An 11mm trocar was inserted. There was no injury seen when the camera was placed. 5mm trocars were placed in the right subxiphoid position and the left upper quadrant and 12 mm trocars in the right medial and left far lateral abdomen. The abdomen was inspected. The ligament of Treitz was identified and we  went distal to an area of long mesenteric reach. The mesentery was divided with the harmonic scalpel and the bowel divided with a 60 blue stapler load. A 130cm Sandrine limb was measured and a side to side Jejuno-jejunostomy was created with a blue 60 load. The defect was closed with a transversely oriented 60 blue load. The mesenteric defect was closed with running 2-0 silk, the anastomotic crotch was reinforced with 2-0 suture. The omentum was divided in the midline. The gallbladder appeared soft. A liver retractor was placed. The angle of His was dissected clean. A window was made behind the stomach.  An anterior gastrotomy was made and a circular stapler anvil size 21 was introduced.  This was placed out through the anterior portion of the proximal stomach.  Gastrostomy was closed with a blue load of the 60 stapler after the orogastric tube was removed.  Pouch was created with multiple applications of the 60 blue load stapler.  Hemostasis was assured on both staple lines with clips and cautery.  The Sandrine limb was brought up from below and checked for twists, there were none.  The end of the Sandrine limb was opened with harmonic scalpel.  A mesenteric vessel was sacrificed.  The circular stapler size 21 was introduced from the left side and placed into the small bowel.  The shaft was placed through the antimesenteric portion of the small bowel.  Pieces of the stapler were connected. It was closed, fired, opened and removed without difficulty.   Excess Sandrine limb was divided with a blue load of the stapler with suture line reinforcement.  Hemostasis on the candycane was assured with cautery.  Gastrojejunostomy was reinforced with interrupted 2-0 Vicryl.  Bubble test was done showing no leakage.  Hines's defect was closed with running 2-0 silk.  Omentum was laid in front. The abdomen was inspected and appeared to be dry.  Left lateral trocar was removed and the fascia closed with 0 Vicryl under direct vision.  Gas was  suctioned out of the abdomen and trocars were all removed.   Sponge count was reported as correct.  The skin edges were reapproximated with 4-0 Vicryl and Steri-Strips.  The patient was  awakened and taken to the PACU in stable condition.  At the conclusion of the case, all counts were correct.    Nicanor Puri MD

## 2020-10-05 NOTE — DISCHARGE INSTRUCTIONS
After Bariatric Surgery Discharge Instructions  [facility]     Note: Ask your nurse to order your medications from the pharmacy. Be sure you have your medications with you when you leave.    What should my diet be for the first 2 weeks after surgery?    Follow the bariatric diet the dietitian discussed with you.    How much fluid should I drink?    Strive for 48-64 ounces daily.    Carry a water bottle with you without a straw or sports top. Drink from it often.    Keep track of how much fluid you drink in a day.    Remember:  -Do not use straws, chew gum or suck on hard candies. They may cause painful gas.    -Sip, don't slurp when you drink.    -Practice small sips using a medicine cup for the first week postop.   -No ice or cold drinks. This could cause gas or spasms.   -No coffee, soda pop or drinks with caffeine. These may cause stomach pain.   -No alcohol. It is bad for your liver and will cause stomach pain.    How often should I do my deep breathing and coughing?    Use your incentive spirometer (small plastic breathing device) every hour while awake after you get home. Using the incentive spirometer helps you deep breath. Continuing to cough and deep breath will help prevent fevers and pneumonia.     If you do not have a fever after one week, you can stop using the incentive spirometer and discard it.       You can continue to take deep breaths without the incentive spirometer every one to two hours while awake for the month after surgery.    What kinds of activity can I do?    Get plenty of rest the first few days after surgery and try to balance rest and activity. You will need some time to recover - you may be more tired than you realize at first. You'll feel better and heal faster if you take good care of yourself.  For 4 weeks after surgery (Please review restrictions at your one week visit, they could change based on how well you are doing):  -Don't lift more  than 20 pounds.   -Take 4-5 short walks every day.  -Don't jog, run, or do belly exercises.    Don't swim, bathe or use a hot tub until your cuts are healed (scabs are gone).  You may shower    Don't plan to fly or take a road trip within the first 1 to 3 months after surgery.  You could get a blood clot in your legs. If you must travel, get up and move around every hour for at least 5 minutes before continuing on your journey.    Your care team can help you decide when it's safe for you to travel.     What can I do for pain control?  You had major belly surgery that involves all layers of your belly muscles. Pain is expected, even for some as far out as 6-8 weeks after surgery. Moving, sneezing, coughing, and breathing will cause discomfort because these activities use your belly muscles.     Please see your after visit summary medication review for what pain medication will be continued, discontinued and newly started for you.      You can take opioid pain medicine if prescribed and if needed. Try to wean off from it as soon as you feel comfortable.     Do not drive while you are taking opioid pain medicine. This is dangerous.    You can take acetaminophen (Tylenol) between your prescribed pain medicine on a scheduled basis OR take it scheduled every 6 hours (check with your care team for specifics).  -Acetaminophen formulation options:    -Liquid   -Caplet (Cut caplet in half before taking)   -Do not take more than 3000 mg Tylenol in a 24 hour period.  -You may also take Tylenol for pain in place of the opioid pain medicine (check with your care team for specifics).     You can apply ice or heat to the affected area(s). Just remember to wrap the ice in something and limit icing sessions to 20 minutes. Excessive icing can irritate the skin or cause skin damage.    You can apply heat with a hot, wet towel or heating pad. Just like cold therapy, limit heat application to 20 minutes. Never sleep with a heating pad  on. It could cause severe burns to your skin.    Wear your binder to support your belly muscles if you have one.  Take this off a little more each day and try to be off completely by 2 weeks after surgery. If you don't need your binder for comfort or support, you don't need to wear it.     You may not be able to sleep in a comfortable position for a few weeks after surgery. This is normal. You may be more comfortable sleeping in a recliner or propped up with 3 pillows for the first couple of weeks after surgery.    Do not take NSAID's (Non-Steroidal Anti-Inflammatory Drugs) (examples: ibuprofen, Motrin, Advil, Aleve, and Naproxen), aspirin, or use pain patches with NSAID's. They will increase your risk of bleeding or getting an ulcer.    Please call the clinic for any of the following pain concerns, we would like to talk to you:  -pain that does not improve with rest  -pain that gets worse and worse  -pain that is not controlled by your pain medicine  -a sudden severe increase in pain    What medications will I need to take after surgery?    You may be discharged with the following types of medications: antacids, pain medications, anti-nausea medications, etc.  Please see your after visit summary medication review for what will be continued, discontinued and newly started.    It is important to reduce the amount of acid in your new stomach for a couple of months after surgery while it is still healing. We will prescribe an acid reducer or antacid. Take it as directed. This will help prevent ulcers, heartburn and acid reflux.    If you took an acid reducer before you had surgery, your care team will let you know what acid reducer you will take after surgery.     It is okay to swallow any medications smaller than   inch in size.   -If it is larger than   inch, it may need to be cut, crushed, or in a liquid form. Check with your care team about which way is most appropriate for you and your medications.    What should  I know about my incisions (cuts)?  Your incisions are covered with white steri strips or butterfly tape and have band aids or gauze over the top. If you have gauze or band aids, they can come off in the hospital.    Leave your steri strips on until they fall off on their own. If the steri strips don't fall off after 1 to 2 weeks, you can take them off. If they fall off earlier, replace them with clean band aids trying to avoid touching the incision itself.     If you have gauze covered by a clear dressing, remove 2-3 days after surgery or as directed by your care team.    You may shower in the hospital after surgery and can get your incision coverings wet.    Do not submerge in water (e.g. No baths, swimming pools, hot tubs) until your care team tells you it is okay and your incisions are completely healed.    Call the clinic if you have any of these signs or symptoms of infection:  -Redness around the site.  -Drainage that smells bad.  -Drainage that is thick yellow or green.  -An increase in pain around the incision site  -An increase in swelling around the incision site  -Heat or warmth around incision site   -Fever of 101.5  F (38.3  C) or higher when taken under the tongue.    -Chills    Will my urine or bowel movements change?   Your first bowel movements (stools) will likely be liquid. You may also notice old blood or a darker color (black or maroon color) in your bowel movements.  This is not unusual and usually goes away after the first week, if not sooner. You may not have a bowel movement for a week.     If you have not had a bowel movement for at least three days after your surgery date and are passing gas, you can use over the counter stool softeners.  Please stop taking the stool softeners and laxatives if your stools are loose.    Increasing fluids and activity as well as getting off narcotics will help prevent constipation.    Call the clinic if:   -You have stomach pain.   -You continue to have  "constipation.  -You have excessive bloating after walking and passing gas.    How can I prevent dehydration if I feel nauseated (sick to my stomach) and vomit (throw up)?     Vomiting is not normal after surgery. If you continue to have nausea and vomiting, call the clinic.     Nausea can be a sign of dehydration. That is why it is very important to track your fluids.    Do not nap more than one hour during the day. Set a timer to wake yourself up, if needed. Too much sleep will keep you from drinking enough fluid during the day and lead to dehydration.    No outside activity in hot, humid weather until you can drink 48 to 64 ounces of fluid in 24 hours. If you sweat a lot, your body may lose too much water.    Try to take a 1 ounce sip of water (one medicine cup) every 15 minutes.  Set a timer to remind yourself.    Call the clinic if you have any of these signs or symptoms of dehydration:  -Dark colored urine  -Urinating (pass water) less than 2-3 times per day  -Lack of energy  -Nausea  -Dizziness  -Headache    Call the clinic ANY TIME at [phone] if:  -Your pain medicine is not working.  -You have a fever ? 101.5 F.  -You have belly or left shoulder pain that gets worse and worse.  -You have a swollen leg with redness, warmth, or pain behind the knee or calf.  -You have chest pain   -You feel very short of breath.  -You have a sudden severe increase in heart rate.  -You have vomiting that gets worse and worse.  -You have constant nausea (feeling sick to your stomach) that does not go away with medication.  -You have trouble swallowing.  -You have an increasing feeling that \"something is not right\".  -You have hiccups that do not stop.  -You have any questions or concerns.    If you have to go to the Emergency Room, we prefer you go to the hospital that did your surgery. Please let them know that you had bariatric surgery and to notify your surgeon.    When should I go back to the clinic?    Follow up with " your care team in 1-2 weeks.     If this appointment was not already made, please call: [phone]

## 2020-10-06 LAB
ANION GAP SERPL CALCULATED.3IONS-SCNC: 4 MMOL/L (ref 3–14)
CHLORIDE SERPL-SCNC: 106 MMOL/L (ref 94–109)
CO2 SERPL-SCNC: 29 MMOL/L (ref 20–32)
CREAT SERPL-MCNC: 0.7 MG/DL (ref 0.52–1.04)
GFR SERPL CREATININE-BSD FRML MDRD: >90 ML/MIN/{1.73_M2}
GLUCOSE SERPL-MCNC: 97 MG/DL (ref 70–99)
HGB BLD-MCNC: 12.4 G/DL (ref 11.7–15.7)
POTASSIUM SERPL-SCNC: 4.1 MMOL/L (ref 3.4–5.3)
SODIUM SERPL-SCNC: 139 MMOL/L (ref 133–144)

## 2020-10-06 PROCEDURE — 120N000001 HC R&B MED SURG/OB

## 2020-10-06 PROCEDURE — 36415 COLL VENOUS BLD VENIPUNCTURE: CPT | Performed by: PHYSICIAN ASSISTANT

## 2020-10-06 PROCEDURE — 250N000013 HC RX MED GY IP 250 OP 250 PS 637: Performed by: PHYSICIAN ASSISTANT

## 2020-10-06 PROCEDURE — 82947 ASSAY GLUCOSE BLOOD QUANT: CPT | Performed by: PHYSICIAN ASSISTANT

## 2020-10-06 PROCEDURE — 82565 ASSAY OF CREATININE: CPT | Performed by: PHYSICIAN ASSISTANT

## 2020-10-06 PROCEDURE — 85018 HEMOGLOBIN: CPT | Performed by: PHYSICIAN ASSISTANT

## 2020-10-06 PROCEDURE — 258N000003 HC RX IP 258 OP 636: Performed by: PHYSICIAN ASSISTANT

## 2020-10-06 PROCEDURE — 250N000009 HC RX 250: Performed by: PHYSICIAN ASSISTANT

## 2020-10-06 PROCEDURE — 80051 ELECTROLYTE PANEL: CPT | Performed by: PHYSICIAN ASSISTANT

## 2020-10-06 PROCEDURE — 250N000011 HC RX IP 250 OP 636: Performed by: PHYSICIAN ASSISTANT

## 2020-10-06 RX ORDER — OXYCODONE HYDROCHLORIDE 5 MG/1
5-10 TABLET ORAL EVERY 4 HOURS PRN
Qty: 18 TABLET | Refills: 0 | Status: SHIPPED | OUTPATIENT
Start: 2020-10-06 | End: 2021-01-06

## 2020-10-06 RX ADMIN — SODIUM CHLORIDE, POTASSIUM CHLORIDE, SODIUM LACTATE AND CALCIUM CHLORIDE: 600; 310; 30; 20 INJECTION, SOLUTION INTRAVENOUS at 09:00

## 2020-10-06 RX ADMIN — SODIUM CHLORIDE, POTASSIUM CHLORIDE, SODIUM LACTATE AND CALCIUM CHLORIDE: 600; 310; 30; 20 INJECTION, SOLUTION INTRAVENOUS at 00:15

## 2020-10-06 RX ADMIN — HYDROMORPHONE HYDROCHLORIDE 0.5 MG: 1 INJECTION, SOLUTION INTRAMUSCULAR; INTRAVENOUS; SUBCUTANEOUS at 08:56

## 2020-10-06 RX ADMIN — OXYCODONE HYDROCHLORIDE 5 MG: 5 TABLET ORAL at 15:49

## 2020-10-06 RX ADMIN — SIMETHICONE 100 MG: 20 EMULSION ORAL at 08:56

## 2020-10-06 RX ADMIN — OXYCODONE HYDROCHLORIDE 5 MG: 5 TABLET ORAL at 11:37

## 2020-10-06 RX ADMIN — HYDROMORPHONE HYDROCHLORIDE 0.5 MG: 1 INJECTION, SOLUTION INTRAMUSCULAR; INTRAVENOUS; SUBCUTANEOUS at 13:10

## 2020-10-06 RX ADMIN — SIMETHICONE 100 MG: 20 EMULSION ORAL at 22:08

## 2020-10-06 RX ADMIN — HYDROMORPHONE HYDROCHLORIDE 0.5 MG: 1 INJECTION, SOLUTION INTRAMUSCULAR; INTRAVENOUS; SUBCUTANEOUS at 05:50

## 2020-10-06 RX ADMIN — OXYCODONE HYDROCHLORIDE 5 MG: 5 TABLET ORAL at 19:51

## 2020-10-06 RX ADMIN — SIMETHICONE 100 MG: 20 EMULSION ORAL at 13:10

## 2020-10-06 RX ADMIN — APIXABAN 2.5 MG: 2.5 TABLET, FILM COATED ORAL at 08:56

## 2020-10-06 RX ADMIN — SODIUM CHLORIDE, POTASSIUM CHLORIDE, SODIUM LACTATE AND CALCIUM CHLORIDE: 600; 310; 30; 20 INJECTION, SOLUTION INTRAVENOUS at 16:55

## 2020-10-06 RX ADMIN — Medication 20 MG: at 15:49

## 2020-10-06 RX ADMIN — CEFAZOLIN SODIUM 2 G: 2 INJECTION, SOLUTION INTRAVENOUS at 02:40

## 2020-10-06 RX ADMIN — APIXABAN 2.5 MG: 2.5 TABLET, FILM COATED ORAL at 22:09

## 2020-10-06 RX ADMIN — SIMETHICONE 100 MG: 20 EMULSION ORAL at 19:16

## 2020-10-06 RX ADMIN — HYDROMORPHONE HYDROCHLORIDE 0.5 MG: 1 INJECTION, SOLUTION INTRAMUSCULAR; INTRAVENOUS; SUBCUTANEOUS at 00:14

## 2020-10-06 RX ADMIN — Medication 20 MG: at 02:40

## 2020-10-06 RX ADMIN — HYDROMORPHONE HYDROCHLORIDE 0.5 MG: 1 INJECTION, SOLUTION INTRAMUSCULAR; INTRAVENOUS; SUBCUTANEOUS at 03:13

## 2020-10-06 RX ADMIN — ACETAMINOPHEN 650 MG: 325 TABLET, FILM COATED ORAL at 22:08

## 2020-10-06 RX ADMIN — SIMETHICONE 100 MG: 20 EMULSION ORAL at 00:24

## 2020-10-06 ASSESSMENT — ACTIVITIES OF DAILY LIVING (ADL)
ADLS_ACUITY_SCORE: 13
ADLS_ACUITY_SCORE: 13
ADLS_ACUITY_SCORE: 11
ADLS_ACUITY_SCORE: 13
ADLS_ACUITY_SCORE: 11
ADLS_ACUITY_SCORE: 13

## 2020-10-06 NOTE — PLAN OF CARE
VSS, lap site C/D/I. Pain well controlled with 0.5 mg IVP Dilaudid q3.5 hours. Up with SBA to bathroom, voiding well.

## 2020-10-06 NOTE — CONSULTS
NUTRITION EDUCATION    REASON FOR ASSESSMENT:  Bariatric Surgery Consult    CURRENT DIET:  Bariatric Clear Liquid    NUTRITION HISTORY:  Patient worked with clinical dietitian in [facility] Loss Clinic prior to surgery to assist with lifestyle modification.    ANTHROPOMETRICS:   Ht: 67 inches  Wt: 153.5 kg  BMI: 51.47 kg/(m^2)  IBW: 63.6 kg  %IBW: 241    ASSESSED NUTRITION NEEDS:  Energy Needs: 4423-8083 kcal. (11 - 14 kcal/kg ABW)                      Protein Needs: 64-95 g. (1 - 1.5 gm/kg IBW)  Fluid Needs: 2678-5183 mL (1mL/kcal/ABW)    Know patient will not meet assessed needs due to the restrictive nature of surgery.    NUTRITION DIAGNOSIS:   Food- and nutrition related knowledge deficit related to bariatric surgery as evidence by sleeve gastrectomy surgery on 10/5/20.    INTERVENTIONS:    Nutrition Prescription:    Recommended patient follow bariatric diet advancement for sleeve gastrectomy    Implementation:    Nutrition Education (Content):  a) Reviewed Bariatric full liquid diet guidelines with sisterLexi present  b) Provided handouts:  Your Stage 2 Diet, Low-Fat Full Liquids and Supplements After Weight Loss Surgery    Nutrition Education (Application):  c) Discussed current eating habits and recommended alternative food choices  d) Patient verbalizes understanding of diet by listing appropriate foods for home    Anticipate good compliance    Diet Education - refer to Education Flowsheet    Goals:    Patient will follow bariatric diet advancement schedule    Patient will follow post-operative vitamin mineral schedule      Follow Up:    Patient to follow up in 2 weeks with RD in [facility] Loss Clinic  Provided RD contact information for future questions    Rancho Isaac RD, DAMI  [facility], [place]  [phone]

## 2020-10-06 NOTE — PROGRESS NOTES
"[facility]  Bariatric Surgery Progress Note    Admission Date: 10/5/2020  10/6/2020         Assessment and Plan:     Erika Reina is a 40 year old female S/P Procedure(s):  LAPAROSCOPIC LE-EN-Y GASTRIC BYPASS, 1 Day Post-Op.    - Start clears at lunch, go slow  - Resume PO meds  - Hgb stable, Eliquis started this am  - Continue Pepcid IV  - Appreciate Pharmacy and Nutrition assistance  - Ambulate 4x day and encourage IS  - Home probably tomorrow, monitor pulse             Interval History:     Pulse in low 100s, sore but pain controlled, denies nausea, UO adequate, urinating without difficulty, ambulating some. Meds reviewed.                     Physical Exam:   Blood pressure 112/67, pulse 103, temperature 98.7  F (37.1  C), temperature source Oral, resp. rate 18, height 1.727 m (5' 8\"), weight (!) 153.5 kg (338 lb 8 oz), SpO2 92 %, not currently breastfeeding.  Temperature Temp  Av.3  F (36.8  C)  Min: 97.6  F (36.4  C)  Max: 98.7  F (37.1  C)   I/O last 3 completed shifts:  In: 3111 [I.V.:3111]  Out: 1455 [Urine:1450; Blood:5]  Constitutional:  Awake, alert, oriented, and in no apparent distress.   Lungs: No increased work of breathing, good air exchange, clear to auscultation bilaterally, and no crackles or wheezing.   Cardiovascular: Regular rate and rhythm, normal S1 and S2, and no murmur noted.   Abdomen: Soft, non-distended, appropriately tender at incision(s). Binder present.    Wounds: Clean, dry, and intact. No erythema or drainage.    Extremities: No edema or calf tenderness. +SCDs.          Data:     Recent Labs   Lab Test 10/06/20  0633 20  1104 20  1600 03/10/20  1018   WBC  --  8.4 9.9 8.6   HGB 12.4 13.0 13.5 12.7   HCT  --  39.7 43.4 39.8   PLT  --  304 311 330      Recent Labs   Lab Test 10/06/20  0633 20  1600 19  0926 18  1519   POTASSIUM 4.1 4.4 4.4 4.0   CHLORIDE 106 106 107 106   CO2 29 29 28 30   BUN  --  15 14 12   CR 0.70 0.79 " 0.73 0.67     Recent Labs   Lab 10/06/20  0633   GLC 97       Stacey Kc PA-C  [facility]  [phone]

## 2020-10-06 NOTE — ANESTHESIA POSTPROCEDURE EVALUATION
Patient: Erika Reina    Procedure(s):  LAPAROSCOPIC LE-EN-Y GASTRIC BYPASS    Diagnosis:Morbidly obese (H) [E66.01]  Diagnosis Additional Information: No value filed.    Anesthesia Type:  General    Note:  Anesthesia Post Evaluation    Patient location during evaluation: PACU  Patient participation: Able to fully participate in evaluation  Level of consciousness: awake and alert  Pain management: adequate  Airway patency: patent  Cardiovascular status: acceptable and hemodynamically stable  Respiratory status: acceptable  Hydration status: euvolemic  PONV: none     Anesthetic complications: None          Last vitals:  Vitals:    10/05/20 1644 10/05/20 1700 10/05/20 1752   BP: 130/70 132/76 127/82   Pulse: 70 81 97   Resp: 18  16   Temp:      SpO2: 96%  95%         Electronically Signed By: Kimo Tracy MD  October 5, 2020  7:10 PM

## 2020-10-07 VITALS
OXYGEN SATURATION: 95 % | BODY MASS INDEX: 44.41 KG/M2 | DIASTOLIC BLOOD PRESSURE: 66 MMHG | RESPIRATION RATE: 18 BRPM | HEIGHT: 68 IN | TEMPERATURE: 99.1 F | SYSTOLIC BLOOD PRESSURE: 137 MMHG | WEIGHT: 293 LBS | HEART RATE: 98 BPM

## 2020-10-07 PROCEDURE — 250N000009 HC RX 250: Performed by: PHYSICIAN ASSISTANT

## 2020-10-07 PROCEDURE — 250N000013 HC RX MED GY IP 250 OP 250 PS 637: Performed by: PHYSICIAN ASSISTANT

## 2020-10-07 RX ADMIN — OMEPRAZOLE 20 MG: 20 CAPSULE, DELAYED RELEASE ORAL at 09:19

## 2020-10-07 RX ADMIN — SIMETHICONE 100 MG: 20 EMULSION ORAL at 09:19

## 2020-10-07 RX ADMIN — Medication 20 MG: at 02:54

## 2020-10-07 RX ADMIN — ACETAMINOPHEN 650 MG: 325 TABLET, FILM COATED ORAL at 02:54

## 2020-10-07 RX ADMIN — APIXABAN 2.5 MG: 2.5 TABLET, FILM COATED ORAL at 09:19

## 2020-10-07 RX ADMIN — OXYCODONE HYDROCHLORIDE 5 MG: 5 TABLET ORAL at 10:48

## 2020-10-07 RX ADMIN — OXYCODONE HYDROCHLORIDE 5 MG: 5 TABLET ORAL at 00:14

## 2020-10-07 RX ADMIN — OXYCODONE HYDROCHLORIDE 5 MG: 5 TABLET ORAL at 14:09

## 2020-10-07 RX ADMIN — SIMETHICONE 100 MG: 20 EMULSION ORAL at 14:09

## 2020-10-07 RX ADMIN — OXYCODONE HYDROCHLORIDE 5 MG: 5 TABLET ORAL at 07:29

## 2020-10-07 ASSESSMENT — ACTIVITIES OF DAILY LIVING (ADL)
ADLS_ACUITY_SCORE: 13

## 2020-10-07 NOTE — PLAN OF CARE
VSS. A/O. Ind. Abdo lap sites CDI. Audible BS, +gas. Voiding own. Oxy given. Tolerating diet. Pt diet education done by nutrition service. D'c papers and meds given to pt.

## 2020-10-07 NOTE — PLAN OF CARE
AVSS on RA. Pain controlled with PO tylenol and Oxycodone. Incisions CDI with steri strips. LS clear and equal; encouraged pulm. Toileting.  Diet Bariatric clears. Up IND; stable on feet. BS active and audible; passing gas. Voiding well.

## 2020-10-07 NOTE — PLAN OF CARE
VSS. A/O. Ind. Abdo lap incisions CDI. -bs/gas. Walking. Tolerating clears. Oxy/dilaudid given. Voiding own.

## 2020-10-07 NOTE — PROGRESS NOTES
Afebrile, pulse about 100. Looks OK. Tolerated PO, walked, wounds OK, sore on left side.  I think she can go home today. Discussed post op cares

## 2020-10-07 NOTE — PROGRESS NOTES
"[facility]  Bariatric Surgery Progress Note    Admission Date: 10/5/2020  10/7/2020         Assessment and Plan:     Erika Reina is a 40 year old female S/P Procedure(s):  LAPAROSCOPIC LE-EN-Y GASTRIC BYPASS, 2 Days Post-Op.     - Continue bariatric fulls  - Ambulate 4x day and encourage IS  - Shower  - Home today after seen by Dr. Puri and vitals stable, discussed with Dr. Puri  - DC instructions discussed             Interval History:     Pulse remains in low 100s, T max 101.5, afebrile now, tolerating diet, pain controlled with PO meds, denies nausea, urinating without difficulty, ambulating. Meds reviewed.                     Physical Exam:   Blood pressure 127/84, pulse 100, temperature 98.7  F (37.1  C), temperature source Oral, resp. rate 16, height 1.727 m (5' 8\"), weight (!) 153.5 kg (338 lb 8 oz), SpO2 98 %, not currently breastfeeding.  Temperature Temp  Av.4  F (37.4  C)  Min: 98.7  F (37.1  C)  Max: 101.5  F (38.6  C)   I/O last 3 completed shifts:  In: 2410 [P.O.:660; I.V.:1750]  Out: 2150 [Urine:2150]  Constitutional:  Awake, alert, oriented, and in no apparent distress.   Lungs: No increased work of breathing, good air exchange, clear to auscultation bilaterally, and no crackles or wheezing.   Cardiovascular: Regular rate and rhythm, normal S1 and S2, and no murmur noted.   Abdomen: Soft, non-distended, appropriately tender at incision(s), + BS. Binder present.    Wounds: Clean, dry, and intact.  No erythema or drainage.    Extremities: No edema or calf tenderness. +SCDs.          Data:   No new labs/imaging.    Stacey Kc PA-C  [facility]  [phone]  "

## 2020-10-07 NOTE — DISCHARGE SUMMARY
"  Discharge Summary    Erika Reina MRN# [id number]   YOB: 1980 Age: 40 year old     Date of Admission:  10/5/2020  Date of Discharge:  10/7/2020  Admitting Physician:  Nicanor Puri MD  Discharging Service:  Surgery  Primary Provider: Glenna Ham         Admission Diagnosis:   Principle Diagnosis: Morbid Obesity Body mass index is 51.47 kg/m .  Secondary Diagnosis: Hx of DVT         Procedures:   Procedure(s):  LAPAROSCOPIC LE-EN-Y GASTRIC BYPASS         Brief History of Illness:   This patient was a 40 year old female who presented to the [facility] for potential bariatric surgery.  The patient has failed previous weight loss attempts and has co-morbidities of Polycystic Ovarian Syndrome, Fatty Liver, Weight Bearing Joint Pain, and history of DVT due to morbid obesity.  After pre-op screening, discussing the risks, benefits, and possible complications, an informed consent was obtained and the patient underwent the above procedure.  Please see the Operative Report for full details.         Hospital Course:   The patient recovered as anticipated.  Her pulse was running in the low 100's; however she normalized to the high 90s on POD 2.  The patient was discharged to home in stable condition by the surgical service.  She verbalized understanding of all discharge instructions.  She was asked to call with any further questions or concerns.  Please see chart for details.          Consultations:     Consultation during this admission received from nutrition and pharmacy          Discharge Disposition:     Discharged to home          Condition on Discharge:     Discharge condition: Stable   Discharge vitals: Blood pressure 137/66, pulse 98, temperature 99.1  F (37.3  C), temperature source Oral, resp. rate 18, height 1.727 m (5' 8\"), weight (!) 153.5 kg (338 lb 8 oz), SpO2 95 %, not currently breastfeeding.           Discharge Medications:     Current " Discharge Medication List      START taking these medications    Details   oxyCODONE (ROXICODONE) 5 MG tablet Take 1-2 tablets (5-10 mg) by mouth every 4 hours as needed for moderate to severe pain  Qty: 18 tablet, Refills: 0    Associated Diagnoses: Acute post-operative pain         CONTINUE these medications which have NOT CHANGED    Details   MELATONIN PO Take 1 Dose by mouth nightly as needed      multivitamin, therapeutic with minerals (MULTI-VITAMIN) TABS tablet Take 1 tablet by mouth daily      omeprazole (PRILOSEC) 20 MG DR capsule Take 20 mg by mouth daily before breakfast      Vitamin D3 (CHOLECALCIFEROL) 125 MCG (5000 UT) tablet Take 125 mcg by mouth daily      apixaban ANTICOAGULANT (ELIQUIS ANTICOAGULANT) 2.5 MG tablet Take 1 tablet (2.5 mg) by mouth 2 times daily Start 24 hours after surgery  Qty: 28 tablet, Refills: 0    Associated Diagnoses: H/O deep venous thrombosis                  Discharge Instructions:   Follow up at the [facility] next week as scheduled.  Please call   the clinic if you have any questions/concerns or develop a fever.  Follow   up with your PCP in 1-2 weeks.  Continue to ambulate and use your   incentive spirometer at home.  Do not take NSAIDs (such as ibuprofen,   naproxen, or aspirin).  You may hold your vitamins/supplements if you are   having nausea, try to resume your chewable vitamin if you are able to.    You may take a chewable calcium in place of the tablet for 1-2 months. You   may no longer take your extended or control release (XL or CR)   medications.                 After Care Instructions     Activity      Your activity upon discharge: activity as tolerated. No heavy lifting > 20 lbs or strenuous exercise x 2-3 weeks. No driving or alcohol while on pain meds.         Diet      Follow this diet upon discharge: Bariatric full liquid         Wound care and dressings      Instructions to care for your wound at home: keep wound(s) clean and dry. You may  shower, but do not soak incisions x 2 weeks. Leave steri strips in place, they will fall off on their own. Apply dry dressing as needed.               Stacey Kc PA-C, dictating on behalf of Nicanor Puri MD  Office #: [phone]

## 2020-10-08 ENCOUNTER — TELEPHONE (OUTPATIENT)
Dept: SURGERY | Facility: CLINIC | Age: 40
End: 2020-10-08

## 2020-10-08 ENCOUNTER — TELEPHONE (OUTPATIENT)
Dept: FAMILY MEDICINE | Facility: CLINIC | Age: 40
End: 2020-10-08

## 2020-10-08 NOTE — TELEPHONE ENCOUNTER
ED / Discharge Outreach Protocol    Patient Contact    Attempt # 1    Was call answered?  No.  Left message on voicemail with information to call me back.    Jane Marquez RN

## 2020-10-08 NOTE — TELEPHONE ENCOUNTER
Called pt to check on postop status.  Pt responses below:    Surgery Date: 10/5/2020  Surgery Type: bypass  Surgeon: Dr. Puri    Pt did not answer phone. Left VM asking pt to call back if she has any concerns or questions.  Also left after hours number for patient needs.  Gabriela Hennessy RN on 10/8/2020 at 8:49 AM

## 2020-10-08 NOTE — TELEPHONE ENCOUNTER
Inpatient discharge from [facility] on 10/7/2020 Morbidly Obese (H), Morbid Obesity (H)  Alissa Beaver/

## 2020-10-09 NOTE — TELEPHONE ENCOUNTER
ED / Discharge Outreach Protocol    Patient Contact    Attempt # 2    Was call answered?  No.  Left message on voicemail with information to call me back.    Jane Marquez RN

## 2020-10-12 NOTE — TELEPHONE ENCOUNTER
ED / Discharge Outreach Protocol    Patient Contact    Attempt # 3    Was call answered?  No.  Left message on voicemail with information to call me back.    Jane Marquez RN

## 2020-10-13 ENCOUNTER — OFFICE VISIT (OUTPATIENT)
Dept: SURGERY | Facility: CLINIC | Age: 40
End: 2020-10-13
Payer: COMMERCIAL

## 2020-10-13 VITALS
RESPIRATION RATE: 16 BRPM | WEIGHT: 293 LBS | BODY MASS INDEX: 49.32 KG/M2 | OXYGEN SATURATION: 100 % | TEMPERATURE: 98.1 F | HEART RATE: 70 BPM | SYSTOLIC BLOOD PRESSURE: 99 MMHG | DIASTOLIC BLOOD PRESSURE: 64 MMHG

## 2020-10-13 DIAGNOSIS — K91.2 POSTSURGICAL MALABSORPTION: ICD-10-CM

## 2020-10-13 DIAGNOSIS — Z98.84 BARIATRIC SURGERY STATUS: ICD-10-CM

## 2020-10-13 DIAGNOSIS — K91.2 POSTOPERATIVE MALABSORPTION: ICD-10-CM

## 2020-10-13 DIAGNOSIS — E66.01 MORBIDLY OBESE (H): Primary | ICD-10-CM

## 2020-10-13 DIAGNOSIS — Z86.718 HISTORY OF DEEP VENOUS THROMBOSIS: ICD-10-CM

## 2020-10-13 DIAGNOSIS — E66.01 MORBID OBESITY WITH BODY MASS INDEX OF 45.0-49.9 IN ADULT (H): ICD-10-CM

## 2020-10-13 DIAGNOSIS — Z98.84 BARIATRIC SURGERY STATUS: Primary | ICD-10-CM

## 2020-10-13 PROCEDURE — 99207 PR NO CHARGE NURSE ONLY: CPT

## 2020-10-13 PROCEDURE — 99024 POSTOP FOLLOW-UP VISIT: CPT | Performed by: PHYSICIAN ASSISTANT

## 2020-10-13 RX ORDER — ACETAMINOPHEN 500 MG
500-1000 TABLET ORAL EVERY 8 HOURS PRN
COMMUNITY

## 2020-10-13 NOTE — PROGRESS NOTES
"[facility]   1 Week Surgical Follow-Up       PCP:  Glenna Ham    DOS: 10/05/20  Surgeon: PLB  Surgery Type: Bypass     HISTORY OF PRESENT ILLNESS:  Erika Reina returns today for her follow-up appointment status post bariatric surgery. Overall doing well.  She is currently using roxicodone 5mg twice daily for pain medication.  Refill Needed: No.  Patient's Pain Scale: 2. The pain is located left side abdomen  Patient's current daily fluid intake in oz is: 40-48 ounces of water, G Zero and Crystal Light, and Premier Protein .  Patient has started postoperative Vitamins: Yes.  Using CPAP.  Mentally feels good    Activity:   Activity: 10-12\" walks 3-5 times daily  REVIEW OF SYSTEMS:  GI:    Nausea: No  Vomiting: No  Diarrhea: No  Constipation: No  Last BM: last night was soft and more runny and dark brown  Dysphagia: No  GERD: No    CV/Pulmonary:   Chest Pain: No  Dizzy: No  Light Headed: Yes - Only this am.  Not sure if she took 2 eliquis.  Only lasted for 30 minutes  SOB: No  - Using spirometer about 3 times daily getting it up to 1500.    Endo:  Diabetes: No  :    Contraception: hyst  Dysuria: No  Vascular:    LE Edema: No     PHYSICAL EXAMINATION:    BP 99/64 (BP Location: Left arm, Patient Position: Sitting, Cuff Size: Adult Large)   Pulse 70   Temp 98.1  F (36.7  C)   Resp 16   Wt 324 lb 6.4 oz (147.1 kg)   LMP  (LMP Unknown)   SpO2 100%   Breastfeeding No   BMI 49.32 kg/m      GENERAL: No acute distress. Alert and oriented time 3.  HEART: Regular rate and rhythm.  LUNGS:  Clear to auscultation bilaterally.  ABDOMEN: Soft, incisions clean,dry, and intact. Tenderness WNL for post op.  EXTREMITIES: No lower extremity edema bilaterally. No calf swelling or tenderness. Negative daniel's  SKIN: No rashes.  PSYCHOLOGICAL: Stable. Pleasant      ASSESSMENT:    1. S/P bariatric surgery.  2. Post surgical malabsorption  3. History of DVT      PLAN:   Continue with eliquis as " directed by hematology  Continue with recommended antacid medication for the next 3 months.   Strive to drink 64 oz of fluid daily.  Strive to move hourly while awake to decrease risk of developing a blood clot.  Continue to do deep breathing exercises twice daily or use your spirometer.  Follow up with your primary care provider to discuss obesity related conditions by one month post op.   Start recommended postoperative vitamins within in the first 6 weeks.  Advance diet per Dietitian at your 2 week appointment.   Make follow up appointment to meet with psychologist at 1 and 3 months post operatively.   Wear binder to support abdominal muscles and gradually wean off over the next few weeks.   Return to clinic for 2 wk post op appointment and bring post op vitamins along.  Call with questions or concerns at any time.

## 2020-10-13 NOTE — PATIENT INSTRUCTIONS
Continue with eliquis as directed by hematology  Continue with recommended antacid medication for the next 3 months.   Strive to drink 64 oz of fluid daily.  Strive to move hourly while awake to decrease risk of developing a blood clot.  Continue to do deep breathing exercises twice daily or use your spirometer.  Follow up with your primary care provider to discuss obesity related conditions by one month post op.   Start recommended postoperative vitamins within in the first 6 weeks.  Advance diet per Dietitian at your 2 week appointment.   Make follow up appointment to meet with psychologist at 1 and 3 months post operatively.   Wear binder to support abdominal muscles and gradually wean off over the next few weeks.   Return to clinic for 2 wk post op appointment and bring post op vitamins along.  Call with questions or concerns at any time.

## 2020-10-16 NOTE — PROGRESS NOTES
"BARIATRIC PROGRESS NOTE - 2 Week Post Op  DATE OF VISIT: October 15, 2020    Erika Reina  1980  female  [id number]  40 year old    ASSESSMENT:    REASON FOR VISIT:  Erika Reina is a 40 year old year old female presents today for a 2 Week Post Op nutrition follow-up appointment. Patient is accompanied by self      DIAGNOSIS:  Status: post gastric bypass surgery.   Obesity Grade III BMI >40    ANTHROPOMETRICS:  Height:  67.5\"  Initial weight: 353.6 lb  Current Weight:  319.5 lb  BMI: 48.58  kg/(m^2).    VITAMINS AND MINERALS:   2 Multivitamin with Minerals (childrens chewable complete)-HS  500 mg Calcium With Vitamin D TID  5000 International units Vitamin D  5000 mcg Vitamin B-12 sublingual  No Iron needed oper clinic guidelines  Will be starting super vitamin b complex (25 mg thiamine/tablet)    NUTRITION HISTORY:  Tolerating diet: Bariatric full liquid diet  Fluids/water intake: 56-60 ounces/day  Small bites: Yes  Portion size: 1/2 cup or less  20-30 minute meals: Yes  Breakfast: 1/2 cup Greek yogurt  Lunch: 1/2 cup cream of potato soup  Dinner: 1/4 cup tomato soup, 1/4 cup sugar free pudding  Snacks: protein drink with 30 g protein  Nausea: none  Vomiting: none  Constipation: none  Additional Information: Has tolerated all the Full liquids  she has tried. Has a gazelle at home for colder days (will wait until 4 weeks post-op) and has a [employer] membership. Brought vitamin bottles to [facility].      PHYSICAL ACTIVITY:  Type: walking  Frequency: 7 days a week.  Duration: 40 minutes.    DIAGNOSIS:       Current Nutrition Diagnosis: Altered gastrointestinal function related to alternation in gastrointestinal structure as evidenced by history of gastric bypass.     INTERVENTION  Nutrition Prescription: Recommended bariatric puree diet.    Goals:  Start puree diet at day 14 post op.  Reduce vitamin B-12 drops (5000 mcg/ serving) to 1 X per week  Start: super vitamin b complex (25 mg thiamine/ serving) " every other day  Continue MVI, calcium with vitamin D,  vitamin D.  Aim for 60 to 90 grams protein.  Continue 48 to 64 oz of fluid per day.    Implementation:  Discussed transition to puree diet.  Emphasized importance of adequate protein.  Reviewed required vitamins and mineral supplements.  Verbalizes fair-good understanding of surgery diet guidelines.  Assessed learning needs and learning preferences.      NUTRITION MONITORING AND EVALUATION:   Monitor diet tolerance and weight loss.      Anticipated Compliance: fair-good  Follow Up: Continue to monitor patient closely regarding weight loss and diet.  At 4 weeks Post Op    TIME SPENT WITH PATIENT: 24 minutes.  Rancho Isaac RD, LD  [facility], [place]  [phone]

## 2020-10-20 ENCOUNTER — OFFICE VISIT (OUTPATIENT)
Dept: SURGERY | Facility: CLINIC | Age: 40
End: 2020-10-20
Payer: COMMERCIAL

## 2020-10-20 VITALS
OXYGEN SATURATION: 98 % | HEART RATE: 64 BPM | WEIGHT: 293 LBS | RESPIRATION RATE: 18 BRPM | TEMPERATURE: 97.9 F | DIASTOLIC BLOOD PRESSURE: 73 MMHG | SYSTOLIC BLOOD PRESSURE: 114 MMHG | BODY MASS INDEX: 48.58 KG/M2

## 2020-10-20 DIAGNOSIS — E66.01 MORBID OBESITY (H): ICD-10-CM

## 2020-10-20 DIAGNOSIS — R10.9 ABDOMINAL PAIN: ICD-10-CM

## 2020-10-20 DIAGNOSIS — Z98.84 BARIATRIC SURGERY STATUS: Primary | ICD-10-CM

## 2020-10-20 DIAGNOSIS — G89.18 POSTOPERATIVE PAIN: ICD-10-CM

## 2020-10-20 PROCEDURE — 97803 MED NUTRITION INDIV SUBSEQ: CPT | Performed by: DIETITIAN, REGISTERED

## 2020-10-20 PROCEDURE — 99207 PR NO CHARGE NURSE ONLY: CPT

## 2020-10-20 RX ORDER — CYCLOBENZAPRINE HCL 5 MG
5-10 TABLET ORAL 3 TIMES DAILY PRN
Qty: 30 TABLET | Refills: 0 | Status: SHIPPED | OUTPATIENT
Start: 2020-10-20 | End: 2021-01-06

## 2020-10-20 RX ORDER — PEDI MULTIVIT NO.25/FOLIC ACID 300 MCG
2 TABLET,CHEWABLE ORAL DAILY
COMMUNITY

## 2020-10-20 NOTE — PROGRESS NOTES
Unable to addend RD note from 10/6/20 to correct surgical procedure to gastric bypass surgery.  Rancho Isaac RD, [facility], [place]  [phone]

## 2020-10-20 NOTE — PROGRESS NOTES
[facility]  2 Week Surgical Follow-Up     HISTORY OF PRESENT ILLNESS:  The patient returns today for two week follow-up appointment status post gastric bypass  The patient is accompanied by self.   The patient is drinking 56-60 oz of fluid daily, including water and crystal light.    Pain:    The patient is currently using Tylenol for pain medication.  The patient rates pain at a 1/10  on the pain scale. The pain is located inferior to umbilicus, minimal at rest and with binder, worse with movement and with binder off.  It is below her lowest incision but near her hysterectomy incision from before.  Discussed with pt that pain that relieves with rest and medication and worsens with movement is most likely muscle pain.  Pt states pain with movement does bother her quite a bit, and heat/ice don't help too much.  Pt states she had similar pain after her hysterectomy for about 3 months.  Will consult RICK re: this concern and notify pt of plan. Discussed with RICK Mckinnon.  Instructed by PA to tell pt that this can be related to her surgery and her abdominal strain from last week, that it will resolve with time.  TORB for flexeril 5-10 mg PO TIDPRN, #30, no RF.  Called pt and reviewed this; instructed pt to avoid use while driving/working as it can make her drowsy. Pt agreeable to plan.    Activity:  The patient is considered a fall risk:  No. Interventions to secure the patient's safety are in place.  What are you doing for physical activity?  walking  How many days per week?  7  How many minutes per day?  40    Review of Systems:  GI:    Nausea occurs never.            Vomiting occurs never.     GERD occurs never.  Patient is currently taking omeprazole 20 mg daily.           Diarrhea occurs rarely.  Did have loose stool after taking dulcolax for constipation on 10/18/2020.          Constipation occurs occasionally.  Patient's last bowel movement was 10/18/2020.  Patient is taking ducolax to  control their constipation.  Patient is passing gas.             CV/Pulmonary:   Dizziness occurs never.     Shortness of Breath occurs never.  Chest pain occurs never.    Vascular:    Leg swelling none.     Jesus Manuel's Negative     :  Form of birth control is other hysterectomy.     .    PHYSICAL EXAMINATION:    VITALS:  See Epic for VS.  LUNGS:  clear to auscultation  HEART:  Apical pulse strong, regular.  ABDOMEN:  Abdomen soft, non-tender. BS normal.   INCISIONS:  dry and intact.  Steristrips removed. 3 lower incisions had minimal open areas with scab removed during steristrip removal. Covered with bandaids.  Wound care directions given to pt; reviewed s/s of infection and pt agrees to call if present.   Adhesive remover given to patient.    MEDICATIONS/ALLERGIES REVIEWED:  Yes    ASSESSMENT:    1.  2 weeks status post gastric bypass surgery.    PLAN:    Make follow up appointment to meet with psychologist and 1 month post operatively.   Follow up with your primary care provider to obesity related conditions by one month post op--done.  Advance diet per Dietitian at your 2 week appointment.   Start recommended postoperative vitamins within in the first 6 weeks per Dietitian.  Strive to drink 64 oz of fluid daily.  Wear binder to support abdominal muscles and gradually wean off over the next few weeks.   Continue to do deep breathing exercises twice daily or use your spirometer.   Return to clinic postop week 4.  Call with questions or concerns at any time.    INTERVENTIONS:      Symptoms given re: signs and symptoms of infection and when to call clinic. Patient verbalized understanding.    Patient exercise/activity restrictions reviewed; exercise handout given.  Exercise plan postop is walking for now, home exercise, maybe gym.  Reviewed when patient can return to bathing and swimming.  Reviewed patient vitamin timing.  Instructed patient to take calcium citrate in divided doses without meals, to take calcium  separate from multivitamin, and to take both multivitamins together.  Reviewed signs/symptoms of DVT with patient.   Reviewed FMLA.  No further forms needed at this time.    No further needs or questions at this time.  Patient agrees to call clinic with any questions or concerns prior to next appointment.  Gabriela Hennessy RN on 10/20/2020 at 9:44 AM

## 2020-11-05 ENCOUNTER — VIRTUAL VISIT (OUTPATIENT)
Dept: SURGERY | Facility: CLINIC | Age: 40
End: 2020-11-05
Payer: COMMERCIAL

## 2020-11-05 ENCOUNTER — TELEPHONE (OUTPATIENT)
Dept: SURGERY | Facility: CLINIC | Age: 40
End: 2020-11-05

## 2020-11-05 PROCEDURE — 97803 MED NUTRITION INDIV SUBSEQ: CPT | Mod: 95 | Performed by: DIETITIAN, REGISTERED

## 2020-11-05 NOTE — TELEPHONE ENCOUNTER
Patient saw RD virtually today and complained of still having pain near umbilicus.    Had gastric bypass 10/5/2020 and was last seen in clinic 1020/2020.  At that time also complained of pain 1/10  inferior to umbilicus.   Of note had a muscle strain 1 week prior to the visit as noted in the chart.  Flexeril was prescribed.  Patient informed RD this is not being used because it hasn't helped.      Called patient to follow up on pain as described above.  Patient N/A and LM on VM that this writer would call back later in the day.  Jovita Dove, MS, RD, RN

## 2020-11-05 NOTE — TELEPHONE ENCOUNTER
Attempted to reach patient again but had to LM.    Requested patient send My Chart message letting this writer know a good time to reach her via phone or to send a My Chart message detailing her concern.  Jovita Dove MS, RD, RN

## 2020-11-05 NOTE — PROGRESS NOTES
"Erika Reina is a 40 year old female who is being evaluated via a billable video visit.      The patient has been notified of following:     \"This video visit will be conducted via a call between you and your physician/provider. We have found that certain health care needs can be provided without the need for an in-person physical exam.  This service lets us provide the care you need with a video conversation.  If a prescription is necessary we can send it directly to your pharmacy.  If lab work is needed we can place an order for that and you can then stop by our lab to have the test done at a later time.    Video visits are billed at different rates depending on your insurance coverage.  Please reach out to your insurance provider with any questions.    If during the course of the call the physician/provider feels a video visit is not appropriate, you will not be charged for this service.\"    Patient has given verbal consent for Video visit? Yes    Video-Visit Details    Type of service:  Video Visit    Video Start Time: 10:05  Video End Time: 10:30    Originating Location (pt. Location): Other work     Distant Location (provider location):  [facility] -remote     Platform used for Video Visit: [facility]    BARIATRIC PROGRESS NOTE - 4 Week Post Op  DATE OF VISIT: November 5, 2020    Erika Reina  1980  female  [id number]  40 year old    ASSESSMENT:    REASON FOR VISIT:  Erika Reina is a 40 year old year old female presents today for a 4 Week Post Op nutrition follow-up appointment. Patient is accompanied by self      DIAGNOSIS:  Status: post gastric bypass surgery.   Obesity Grade III BMI >40    ANTHROPOMETRICS:  Height:  67.5\"  Initial weight: 353.6 lb  Previous Weight:  319.5 lb  Current Weight: 313 lbs   BMI: 48.58  kg/(m^2).    VITAMINS AND MINERALS:   2 Multivitamin with Minerals (childrens chewable complete)-HS  500 mg Calcium With Vitamin D " TID  5000 International units Vitamin D  5000 mcg Vitamin B-12 sublingual 1 time per week   Super B complex (25 mg thiamine) every other day   No Iron needed oper clinic guidelines    NUTRITION HISTORY:  Tolerating diet: Bariatric pureed diet  Fluids/water intake: 64 ounces/day  Small bites: Yes  Portion size: 1/2 cup   20-30 minute meals: Yes  Fluids and meals  by 30 minutes: Yes  Chew foods thoroughly: Yes  Breakfast: scrambled egg or yogurt (Greek)  Lunch: pureed pork chop or chicken or broccoli cheese soup   Dinner: taco meat and refried beans   Snacks: none   Nausea: B complex and eggs   Vomiting: B complex (foamy)  Constipation: yes (Dulcolax) hard stools every 2-3 days   Additional Information: Patient tolerating diet with exception of eating scrambled eggs which caused nausea.  Patient vomited after taking super B complex in the morning but switch to lunchtime and no longer has nausea. Patient asking about incorporating sweets around the holidays and which would be better options as family bakes during the holidays.  Patient continues to have pain near belly button.  RD will notify RN.    PHYSICAL ACTIVITY:  Type: walking the neighborhood or 1 mile hike   Frequency: most days   Duration: 20-25 minutes    DIAGNOSIS:   Previous Nutrition Diagnosis: Altered gastrointestinal function related to alternation in gastrointestinal structure as evidenced by history of gastric bypass.   No change    Previous Goals:  Start puree diet at day 14 post op-met   Reduce vitamin B-12 drops (5000 mcg/ serving) to 1 X per week-met   Start: super vitamin b complex (25 mg thiamine/ serving) every other day-met   Continue MVI, calcium with vitamin D,  vitamin D.  Aim for 60 to 90 grams protein.  Continue 48 to 64 oz of fluid per day.     Current Nutrition Diagnosis: Altered gastrointestinal function related to alternation in gastrointestinal structure as evidenced by history of gastric bypass.     INTERVENTION  Nutrition  Prescription: Recommended bariatric soft diet.    Goals:  Start soft diet at day 28 post op.  Continue MVI, calcium with vitamin D, vitamin B12, vitamin D and super B complex  Aim for 60 to 90 grams protein.  Continue 48 to 64 oz of fluid per day.    Implementation:  Discussed transition to soft diet.  Emphasized importance of adequate protein.  Reviewed required vitamins and mineral supplements.  Verbalizes good understanding of surgery diet guidelines.  Assessed learning needs and learning preferences.  Sent [employer] message with scheduling number and 4 week diet education materials     NUTRITION MONITORING AND EVALUATION:   Monitor diet tolerance and weight loss.    Anticipated Compliance: good  Follow Up: Continue to monitor patient closely regarding weight loss and diet.  At 3 months Post Op    TIME SPENT WITH PATIENT: 25 minutes.    Marcos Quiroz, RD, LD  [facility] Outpatient Dietitian  [phone] (office phone)

## 2020-11-06 NOTE — TELEPHONE ENCOUNTER
Connected with patient.  Complains of pain 1/10  inferior to umbilicus.   Of note had a muscle strain 1 week po.  Flexeril was prescribed.  Patient  not using because it hasn't helped.     States the area pulls a lot - stings and burns.  No other issues seen with incision.  No fever.    Described what she'd see if it was a hernia.  Given information re: infection and what would be seen.   Suspect muscle and nerve pain that is not unusual at this time po.  Patient to call if signs of hernia or infection.    Also discussed constipation.  It is normal for patients to have BM's every 2-3 days if not uncomfortable.   Make sure she is drinking plenty and moving.   If needs something for BM could try PJ warm on an empty stomach.  Could also try Miralax 1-2 doses daily.  Hold on fiber for now.  To call if above not working.  Jovita Dove, MS, RD, RN

## 2020-12-06 ENCOUNTER — HEALTH MAINTENANCE LETTER (OUTPATIENT)
Age: 40
End: 2020-12-06

## 2020-12-16 NOTE — PROGRESS NOTES
"PRE SURGICAL WEIGHT LOSS NUTRITION APPOINTMENT    Erika Reina  1980  female  [id number]  39 year old    ASSESSMENT    Desired Surgical Procedure: undecided    REASON FOR VISIT:  Erika Reina is a 39 year old year old female presents today for a pre-surgical weight loss follow-up appointment. Patient accompanied by self.    DIAGNOSIS:  Weight Status Obesity Grade III BMI >40    ANTHROPOMETRICS:  Height: 172.7 cm (5' 8\")  Initial Weight: 353.6 lbs     Weight last visit: 342 lbs    Current weight: (!) 156.7 kg (345 lb 6.4 oz)  BMI: 52.52 kg/(m^2).    VITAMINS AND MINERALS:  1 Multivitamin with Minerals ([place] Complete - HS)  600 mg Calcium with Vitamin D (AM, lunch)  5000 International units Vitamin D      NUTRITION HISTORY:  Breakfast: Protein Shake   Lunch: Salad (greens, lunch meat, vegetables, light dressing, +/- eggs)  Supper: lean meat (usually turkey) + potato + vegetables  Snacks: veggies/fruit   Fluids Consumed: Water (64-100oz)  Eating slower: Yes  Chewing foods thoroughly: Yes  Take 20-30 minutes to consume each meal: Yes  Fluids and meals separate by at least 30 minutes: Yes/usually   Carbonation: none  Caffeine: none  Additional Information: Pt returns for check-in visit. Reviewed post-op diet advancement and vitamin/mineral changes.    PHYSICAL ACTIVITY:  None d/t recent illness    DIAGNOSIS:  Previous Nutrition Diagnosis: Obesity related to long history of self- monitoring deficit and excessive energy intake evidenced by BMI of 52 kg/m2  No change, modified below    Previous goals:   Continue following pre surgery diet guidelines - met  Exercise 3 times per week for 30 minutes - not met    Current Nutrition Diagnosis: Obesity related to long history of self-monitoring deficit and excessive energy intake as evidenced by BMI of 52.52 kg/m2.    INTERVENTION:  Nutrition Prescription: Recommended energy/nutrient modification.    GOALS:  Consistently separate fluids and meals by 30 " minutes  Continue to follow all pre-op guidelines    Intervention:  - Discussed progress towards previous goals.  - Reinforced importance of making behavior changes in preparation for bariatric surgery.   - Assessed learning needs and learning preferences       NUTRITION MONITORING AND EVALUATION:  Anticipated compliance: fair-good  Patient demonstrated good understanding.   Patient has met pre bariatric surgery diet requirements    Follow up: Continue to monitor patient closely regarding weight loss and diet.  # of visits needed: 0  Cleared by RD: Yes (previously cleared)    TIME SPENT WITH PATIENT: 20 minutes    Nhi Obrien RD, LD  Clinical Dietitian    none

## 2020-12-23 ENCOUNTER — MYC MEDICAL ADVICE (OUTPATIENT)
Dept: FAMILY MEDICINE | Facility: CLINIC | Age: 40
End: 2020-12-23

## 2021-01-06 ENCOUNTER — VIRTUAL VISIT (OUTPATIENT)
Dept: SURGERY | Facility: CLINIC | Age: 41
End: 2021-01-06
Payer: COMMERCIAL

## 2021-01-06 VITALS — BODY MASS INDEX: 42.59 KG/M2 | WEIGHT: 281 LBS | HEIGHT: 68 IN

## 2021-01-06 DIAGNOSIS — L65.0 TELOGEN EFFLUVIUM: ICD-10-CM

## 2021-01-06 DIAGNOSIS — Z98.84 BARIATRIC SURGERY STATUS: ICD-10-CM

## 2021-01-06 DIAGNOSIS — E66.01 MORBID OBESITY WITH BMI OF 40.0-44.9, ADULT (H): Primary | ICD-10-CM

## 2021-01-06 DIAGNOSIS — K91.2 POSTSURGICAL MALABSORPTION: ICD-10-CM

## 2021-01-06 PROCEDURE — 99215 OFFICE O/P EST HI 40 MIN: CPT | Mod: 95 | Performed by: PHYSICIAN ASSISTANT

## 2021-01-06 ASSESSMENT — MIFFLIN-ST. JEOR: SCORE: 1998.11

## 2021-01-06 NOTE — PATIENT INSTRUCTIONS
Tips for Weight Loss Surgery Success    1. Eat only 3 small meals a day  2. Eat slowly and chew thoroughly  3. Stop eating as soon as you feel full  4. Do not drink while eating  5. Do not eat between meals  6. Eat only good quality food  7. Drink enough fluids during the day  8. Drink only low-calorie liquids  9. Exercise at least 30 minutes a day      Assessment:    Morbid obesity with BMI of 40.0-44.9, adult (H)    Congratulated patient on her weight loss    Bariatric surgery status   3 months SP Gastric Bypass    Postsurgical malabsorption  -     Vitamin A; Future  -     Vitamin D Screen; Future  -     Parathyroid Hormone Intact; Future  -     Iron; Future  -     Iron and Iron Binding Capacity; Future  -     Ferritin; Future  -     Vitamin B12; Future      Telogen effluvium   Discussed this as a possible stressor from the surgery.  Will be checking iron levels.  Briefly discussed protein       Follow up: Return to clinic in 3 months with diet and provider

## 2021-01-06 NOTE — PROGRESS NOTES
Erika Reina is a 40 year old female who is being evaluated via a billable video visit.        If the video visit is dropped, the invitation should be resent by: Text to cell phone: [phone]  Will anyone else be joining your video visit? No          Video-Visit Details    Type of service:  Video Visit    Video Start Time: 9:09  Video End Time: 9:31    Per 2021 guidelines More than 30 minutes was spent in chart preparation and completion in addition to time spent with patient    Originating Location (pt. Location): Home    Distant Location (provider location):  [facility] [employer]     Platform used for Video Visit: [employer]        VIRTUAL BARIATRIC FOLLOW UP          January 6, 2021         HISTORY OF PRESENT ILLNESS: Pt presents today for her follow-up appointment status post laparoscopic gastric bypass.  Overall she is doing well.  Excited about her weight loss.     281 lbs 0 oz    Wt Readings from Last 4 Encounters:   01/06/21 281 lb (127.5 kg)   10/20/20 319 lb 8 oz (144.9 kg)   10/13/20 324 lb 6.4 oz (147.1 kg)   10/05/20 (!) 338 lb 8 oz (153.5 kg)        BARIATRIC METRICS:  Current Weight: 281 lb (127.5 kg)(Patient reports)  Body mass index is 42.73 kg/m .   Wt change since last visit (lbs): -32  Cumulative weight loss (lbs): 72.6       Patient is taking the following bariatric postoperative vitamins:  2 Complete multivitamins with minerals (at different times than calcium)   5000 International Units of Vitamin D daily  1500 mg of Calcium daily in divided doses  5000 mcg of Vitamin B12 sl WEEKLY  No iron per protocol  B complex/Thiamine      Pt is exercising on her at home gazelle 30 minutes twice weekly         OBESITY RELATED CONDITIONS:  PCOS - present  Chronic knee pain - improving. Has had right knee surgery in the past  Fatty Liver - Labs have not been rechecked since surgery         SOCIAL HISTORY:  Pt denies smoking.  Pt denies alcohol use.  Avoids NSAIDS.  No  "caffeine      REVIEW OF SYSTEMS:     GI:  Nausea- No  Vomiting- with certain foods   Diarrhea- No  Constipation- was initially.  Better in the past few weeks.  Having a hard time getting in 60 oz.  Getting in around 50.    Dysphagia- No  Abdominal Pain- No  Heartburn- No. Has about a week of omeprazole left.  Will do a trial off     SKIN:  Intertriginous irritation- No  Hair loss - started in the past few weeks. Started biotin       PSYCH:  Depression- No  Anxiety- No      LABS/IMAGING/MEDICAL RECORDS REVIEW:   Vitamin D Deficiency screening   Date Value Ref Range Status   08/16/2019 44 20 - 75 ug/L Final     Comment:     Season, race, dietary intake, and treatment affect the concentration of   25-hydroxy-Vitamin D. Values may decrease during winter months and increase   during summer months. Values 20-29 ug/L may indicate Vitamin D insufficiency   and values <20 ug/L may indicate Vitamin D deficiency.  Vitamin D determination is routinely performed by an immunoassay specific for   25 hydroxyvitamin D3.  If an individual is on vitamin D2 (ergocalciferol)   supplementation, please specify 25 OH vitamin D2 and D3 level determination by   LCMSMS test VITD23.       Hemoglobin   Date Value Ref Range Status   10/06/2020 12.4 11.7 - 15.7 g/dL Final       PHYSICAL EXAMINATION:  Ht 5' 8\" (1.727 m)   Wt 281 lb (127.5 kg)   LMP  (LMP Unknown)   BMI 42.73 kg/m    GENERAL: Pt sounds in NAD.   NEURO:  Mentation and speech appropriate for age. AxO x3.  PSYCH: affect normal judgement and insight intact        ASSESSMENT AND PLAN:      Morbid obesity with BMI of 40.0-44.9, adult (H)    Congratulated patient on her weight loss    Bariatric surgery status   3 months SP Gastric Bypass    Postsurgical malabsorption  -     Vitamin A; Future  -     Vitamin D Screen; Future  -     Parathyroid Hormone Intact; Future  -     Iron; Future  -     Iron and Iron Binding Capacity; Future  -     Ferritin; Future  -     Vitamin B12; " Future      Telogen effluvium   Discussed this as a possible stressor from the surgery.  Will be checking iron levels.  Briefly discussed protein       Follow up: Return to clinic in 3 months with diet and provider      Swati Hoffman MS, PA-C

## 2021-01-07 ENCOUNTER — VIRTUAL VISIT (OUTPATIENT)
Dept: SURGERY | Facility: CLINIC | Age: 41
End: 2021-01-07
Payer: COMMERCIAL

## 2021-01-07 DIAGNOSIS — Z98.84 BARIATRIC SURGERY STATUS: ICD-10-CM

## 2021-01-07 PROCEDURE — 97803 MED NUTRITION INDIV SUBSEQ: CPT | Mod: 95 | Performed by: DIETITIAN, REGISTERED

## 2021-01-07 NOTE — PROGRESS NOTES
"Video-Visit Details    Type of service:  Video Visit    Video Start Time (time video started): 8:54    Video End Time (time video stopped): 9:24    Originating Location (pt. Location): Home    Distant Location (provider location):  [facility] [place]     Mode of Communication:  Video Conference via [facility]    Physician/provider has received verbal consent for a Video Visit from the patient? Yes    NUTRITION POST OP APPOINTMENT  DATE OF VISIT: January 7, 2021    Erika Reina  1980  female  [id number]  40 year old     ASSESSMENT:    REASON FOR VISIT:  Erika is a 40 year old year old female presents today for 3 month PO nutrition follow-up appointment. Patient is accompanied by self.    DIAGNOSIS:  Status post gastric bypass surgery.  Obesity Grade III BMI >40     ANTHROPOMETRICS:  Height:  67.5\"  Initial weight: 353.6 lb  Previous Weight: 313 lbs   Current Weight: 281   BMI: 48.58  kg/(m^2).    VITAMINS AND MINERALS:  2 Multivitamin with Minerals (childrens chewable complete)-HS  500 mg Calcium With Vitamin D TID (with meals)   5000 International units Vitamin D  5000 mcg Vitamin B-12 sublingual 1 time per week   Super B complex (25 mg thiamine) every other day (HS)  No Iron needed oper clinic guidelines    NUTRITION HISTORY:  Breakfast: egg or chaffel or yogurt (nausea) (before 9:00)  Lunch: 12:00-2:00 leftovers or lunchmeat turkey + peaches   Supper: chicken or turkey or steak + potatoes + veggies (broccoli or carrots-cooked) 6:00-8:30   Snacks: protein shake (chocolate premier protein)   Fluids consumed: Water and Protein Drink; occasional Crystal Light   Consuming liquid calories: Yes  Protein intake: 50+ grams/day  Tolerate regular texture food: Yes  Any foods not tolerated details: Yes  If any food not tolerated: yogurt or steak   Portion size: 1/2-1 cup  Take 20-30 minutes to consume each meal: Yes   Eat protein foods first: Yes  Fluids and meals separate by at " least 30 minutes: Yes  Chew foods thoroughly: Yes  Tolerating diet: Yes  Drinking high protein supplements: Yes  Consuming snacks per day: 0  Additional Information: Patient pleased with weight loss.  Patient states has low energy on occasion.  Encouraged patient to focus on fluids and balanced meals.      PHYSICAL ACTIVITY:  Type: [place]   Frequency (days per week): 2  Duration (min): 30     DIAGNOSIS:  Previous Nutrition Diagnosis: Altered gastrointestinal function related to alteration in gastrointestinal structure as evidenced by history of gastric bypass surgery.- no change    Previous goals:  Start soft diet at day 28 post op-met   Continue MVI, calcium with vitamin D, vitamin B12, vitamin D and super B complex-met  Aim for 60 to 90 grams protein-improving   Continue 48 to 64 oz of fluid per day-improving     Current Nutrition Diagnosis: Altered gastrointestinal function related to alteration in gastrointestinal structure as evidenced by history of gastric bypass surgery.    INTERVENTION:   Nutrition Prescription: Eat 3 meals a day at regular intervals. Consume 60-90 grams of protein daily. Follow post-surgical vitamin and mineral protocol.  Assessed learning needs and learning preferences.    GOALS:  Eat 3 food groups per meal  Eat meals 4-5 hours apart  Continue following post surgery diet guidelines     Implementation: Discussed progress toward previous goals; reinforced importance of following bariatric lifestyle changes.    NUTRITION MONITORING AND EVALUATION:  Expected patient engagement: good  Verbalized good understanding.    Follow up: Patient to follow up in 3 months with RICK and RD.  Patient instructed to contact call center to schedule appointment.     TIME SPENT WITH PATIENT:  30 minutes    Marcos Quiroz, RD, LD  [facility] Outpatient Dietitian  [phone] (office phone)

## 2021-01-26 ENCOUNTER — ONCOLOGY VISIT (OUTPATIENT)
Dept: ONCOLOGY | Facility: CLINIC | Age: 41
End: 2021-01-26
Attending: OBSTETRICS & GYNECOLOGY
Payer: COMMERCIAL

## 2021-01-26 VITALS
BODY MASS INDEX: 42.12 KG/M2 | TEMPERATURE: 97.2 F | OXYGEN SATURATION: 98 % | RESPIRATION RATE: 16 BRPM | WEIGHT: 277.9 LBS | HEART RATE: 62 BPM | SYSTOLIC BLOOD PRESSURE: 114 MMHG | DIASTOLIC BLOOD PRESSURE: 68 MMHG | HEIGHT: 68 IN

## 2021-01-26 DIAGNOSIS — K62.5 RECTAL BLEEDING: ICD-10-CM

## 2021-01-26 DIAGNOSIS — I89.0 LYMPHEDEMA: ICD-10-CM

## 2021-01-26 DIAGNOSIS — C54.1 ENDOMETRIAL CANCER (H): Primary | ICD-10-CM

## 2021-01-26 DIAGNOSIS — G47.33 OBSTRUCTIVE SLEEP APNEA (ADULT) (PEDIATRIC): Primary | ICD-10-CM

## 2021-01-26 PROCEDURE — 99213 OFFICE O/P EST LOW 20 MIN: CPT | Performed by: OBSTETRICS & GYNECOLOGY

## 2021-01-26 PROCEDURE — G0463 HOSPITAL OUTPT CLINIC VISIT: HCPCS

## 2021-01-26 ASSESSMENT — PAIN SCALES - GENERAL: PAINLEVEL: NO PAIN (0)

## 2021-01-26 ASSESSMENT — MIFFLIN-ST. JEOR: SCORE: 1984.05

## 2021-01-26 NOTE — LETTER
"    2021         RE: Erika Reina  [address] [facility] [id number]        Dear Colleague,    Thank you for referring your patient, Erika Reina, to the [facility]. Please see a copy of my visit note below.    Oncology Rooming Note    2021 11:57 AM   Erika Reina is a 40 year old female who presents for:    Chief Complaint   Patient presents with     Oncology Clinic Visit     Endometrial cancer     Initial Vitals: /68   Pulse 62   Temp 97.2  F (36.2  C) (Tympanic)   Resp 16   Ht 1.727 m (5' 8\")   Wt 126.1 kg (277 lb 14.4 oz)   LMP  (LMP Unknown)   SpO2 98%   BMI 42.25 kg/m   Estimated body mass index is 42.25 kg/m  as calculated from the following:    Height as of this encounter: 1.727 m (5' 8\").    Weight as of this encounter: 126.1 kg (277 lb 14.4 oz). Body surface area is 2.46 meters squared.  No Pain (0) Comment: Data Unavailable   No LMP recorded (lmp unknown). Patient has had a hysterectomy.  Allergies reviewed: Yes  Medications reviewed: Yes    Medications: Medication refills not needed today.  Pharmacy name entered into [employer]:    [id number] - [place], [place] - [address]  [address]   [id number] [place], [place] - [address] AT [facility] - [address]          Dulce Garrett CMA                    Consult Notes on Referred Patient        Dr. Alla Wilde MD  [address] [place] [address]       RE: Erika Reina  : 1980  OBEY: 2021     HPI:  Erika Reina is 40 year old with stage 1A, grade 1 endometrial adenocarcinoma. She is unaccompanied today.  She denies any concerns.   She denies vaginal bleeding, abdominal/pelvic pain, changes in her bowel/bladder function.  She has had some rectal bleeding since her weight loss surgery.  She had a successful gastric bypass procedure and has " already lost 60 pounds in 3 months.      Cancer Course:    She notes she has had abnormal bleeding since her very first menses with very irregular cycles.  Recently, however she has noted a major change in the amount of bleeding with a significant increase.  Due to this she was placed on Metformin without much improvement in her bleeding and thus had the following work up.    9/13/18:  US Pelvis:  Uterus measures 7.3 x 4.9 x 5.5 cm, EMS 26.3 mm, normal ovaries    11/20/18:  EMB:  Grade 1 endometrial adenocarcinoma, MMR intact    12/13/18:  Robotic hysterectomy, bilateral salpingo-oophorectomy, bilateral pelvic sentinel lymph node dissection, cystoscopy   Pathology:  Stage 1A, grade 1 endometrial adenocarcinoma, tumor size 4.9 cm, no myometrial invasion, no LVSI, 0/11 sentinel LN    7/19/19:  CT A/P:  1.  Diffuse hepatic steatosis. 2.  Unchanged bilateral renal calyceal prominence which may suggest bilateral ureteropelvic junction obstruction.    Review of Systems:  Systemic           no weight changes; no fever; no chills; no night sweats; no appetite changes; no fatigue  Skin           no rashes, or lesions  Eye           no irritation; no changes in vision  Dilcia-Laryngeal           no dysphagia; no hoarseness   Pulmonary    no cough; no shortness of breath  Cardiovascular    no chest pain; no palpitations  Gastrointestinal    no diarrhea; no constipation; no abdominal pain; no changes in bowel  habits; + blood in stool  Genitourinary   no urinary frequency; no urinary urgency; no dysuria; no pain; no abnormal vaginal discharge; no abnormal vaginal bleeding  Breast    no breast discharge; no breast changes; no breast pain  Musculoskeletal    no myalgias; no arthralgias; no back pain  Psychiatric           no depressed mood; no anxiety    Hematologic            no tender lymph nodes; no noticeable swellings or lumps   Endocrine    no hot flashes; no heat/cold intolerance         Neurological   no tremor; no numbness  and tingling; no headaches; no difficulty sleeping    Obstetrics and Gynecology History:  G0  Had always thought she would have children but this diagnosis has made her consider that in depth.  She currently has no partner.      Past Medical History:  Past Medical History:   Diagnosis Date     DVT of lower extremity (deep venous thrombosis) (H) 04/2020    right ankle     Endometrial cancer (H) 12/19/2018     Endometrial cancer (H)      Hyperlipidemia LDL goal <160 9/15/2011     Sleep apnea        Past Surgical History:  Past Surgical History:   Procedure Laterality Date     ABDOMEN SURGERY  12/18/18    hysterectomy     ARTHROPLASTY KNEE Right      BIOPSY  11 2018    endometrial layer     CYSTOSCOPY N/A 12/13/2018    Procedure: Cystoscopy;  Surgeon: Cleopatra Altamirano MD;  Location: [facility] OR     DAVINCI HYSTERECTOMY TOTAL, BILATERAL SALPINGO-OOPHORECTOMY, NODE DISSECTION, COMBINED N/A 12/13/2018    Procedure: DaVinci Assisted Removal Of Uterus, Cervix, Both Ovaries And Fallopian Tubes, Bilateral Channing Lymph Node Dissection;  Surgeon: Cleopatra Altamirano MD;  Location: [facility] OR     GYN SURGERY  12/18/18    hysterectomy     LAPAROSCOPIC BYPASS GASTRIC N/A 10/5/2020    Procedure: LAPAROSCOPIC LE-EN-Y GASTRIC BYPASS;  Surgeon: Nicanor Puri MD;  Location:  OR       Health Maintenance:  Last Pap Smear: No need for further pap smear exams  She has not had a history of abnormal Pap smears.    Last Mammogram: N/A    Last Colonoscopy: N/A      Current Medications:   has a current medication list which includes the following prescription(s): acetaminophen, biotin, calcium citrate-vitamin d, childrens multivitamin w/iron, cyanocobalamin, melatonin, vitamin b-complex, vitamin d3, omeprazole, and omeprazole.     Allergies:   Asa [aspirin] and Ibuprofen      Social History:  Social History     Socioeconomic History     Marital status: Single     Spouse name: Not on file     Number of children: Not on file      Years of education: Not on file     Highest education level: Not on file   Occupational History     Not on file   Social Needs     Financial resource strain: Not on file     Food insecurity     Worry: Not on file     Inability: Not on file     Transportation needs     Medical: Not on file     Non-medical: Not on file   Tobacco Use     Smoking status: Never Smoker     Smokeless tobacco: Never Used   Substance and Sexual Activity     Alcohol use: Not Currently     Alcohol/week: 0.0 standard drinks     Binge frequency: Never     Comment: rarely.  Once monthly 1-2 drinks     Drug use: No     Sexual activity: Yes     Partners: Male     Birth control/protection: Condom   Lifestyle     Physical activity     Days per week: Not on file     Minutes per session: Not on file     Stress: Not on file   Relationships     Social connections     Talks on phone: Not on file     Gets together: Not on file     Attends Hindu service: Not on file     Active member of club or organization: Not on file     Attends meetings of clubs or organizations: Not on file     Relationship status: Not on file     Intimate partner violence     Fear of current or ex partner: Not on file     Emotionally abused: Not on file     Physically abused: Not on file     Forced sexual activity: Not on file   Other Topics Concern     Parent/sibling w/ CABG, MI or angioplasty before 65F 55M? No   Social History Narrative     Not on file       Lives with mother, father and sister, feels safe at home.  Works as .  Enjoys crafts, spending time with friends.  Does not have an advanced directive on file and would like her mother to be her POA.  Full code.    Family History:   The patient's family history is significant for.  Family History   Problem Relation Age of Onset     Diabetes Mother      Obesity Mother      Hypertension Mother      Diabetes Father      Hypertension Father      Sleep Apnea Father      Cancer - colorectal Maternal Grandmother       "Colon Cancer Maternal Grandmother      Heart Disease Paternal Grandmother      Obesity Brother      Sleep Apnea Brother      Coronary Artery Disease No family hx of      Cerebrovascular Disease No family hx of          Physical Exam:   /68   Pulse 62   Temp 97.2  F (36.2  C) (Tympanic)   Resp 16   Ht 1.727 m (5' 8\")   Wt 126.1 kg (277 lb 14.4 oz)   LMP  (LMP Unknown)   SpO2 98%   BMI 42.25 kg/m    Body mass index is 42.25 kg/m .    General Appearance: healthy and alert, no distress     HEENT:  no thyromegaly, no palpable nodules or masses        Cardiovascular: regular rate and rhythm, no gallops, rubs or murmurs     Respiratory: lungs clear, no rales, rhonchi or wheezes, normal diaphragmatic excursion    Musculoskeletal: extremities non tender and without edema    Skin: no lesions or rashes     Neurological: normal gait, no gross defects     Psychiatric: appropriate mood and affect                               Hematological: normal cervical, supraclavicular and inguinal lymph nodes     Gastrointestinal:       abdomen soft, non-tender, non-distended, no organomegaly or masses    Genitourinary: External genitalia and urethral meatus appears normal.  Vagina is smooth without nodularity or masses.  Cervix surgically absent.  Bimanual exam reveal no masses, nodularity or fullness.  Recto-vaginal exam confirms these findings.     Assessment:    Erika Reina is a 40 year old woman with a diagnosis of stage 1A, grade 1 endometrial adenocarcinoma.     A total of 15 minutes was spent on the patient      Plan:     1.)    Stage 1A, grade 1 endometrial adenocarcinoma.  Reviewed signs and symptoms of recurrence and to call if these should occur.  Reinforced surveillance visits every 6 months for up to 5 years (12/23) then annually thereafter.  She will return in 6 months.  She needs a new prescription for her compression stockings and this was provided today    2.) Menopausal symptoms -She denies any at this " point.       3.) Genetic risk factors were assessed and the patient does not meet the qualifications for a referral.      4.) Labs and/or tests ordered include:  Colonoscopy     5.) Health maintenance issues addressed today include pt is up to date.    6.) Rectal bleeding-Colonoscopy to assess          Thank you for allowing us to participate in the care of your patient.         Sincerely,    Cleopatra Evans MD  Gynecologic Oncology  [facility]       ZULMA CARBAJAL         Again, thank you for allowing me to participate in the care of your patient.        Sincerely,        Chan Evans MD

## 2021-01-26 NOTE — PROGRESS NOTES
"Oncology Rooming Note    2021 11:57 AM   Erika Reina is a 40 year old female who presents for:    Chief Complaint   Patient presents with     Oncology Clinic Visit     Endometrial cancer     Initial Vitals: /68   Pulse 62   Temp 97.2  F (36.2  C) (Tympanic)   Resp 16   Ht 1.727 m (5' 8\")   Wt 126.1 kg (277 lb 14.4 oz)   LMP  (LMP Unknown)   SpO2 98%   BMI 42.25 kg/m   Estimated body mass index is 42.25 kg/m  as calculated from the following:    Height as of this encounter: 1.727 m (5' 8\").    Weight as of this encounter: 126.1 kg (277 lb 14.4 oz). Body surface area is 2.46 meters squared.  No Pain (0) Comment: Data Unavailable   No LMP recorded (lmp unknown). Patient has had a hysterectomy.  Allergies reviewed: Yes  Medications reviewed: Yes    Medications: Medication refills not needed today.  Pharmacy name entered into [facility]:    [id number] [place], [place] - [address]  [id number] [place], [place] - [address] AT [facility] - [address]          Dulce Garrett CMA                    Consult Notes on Referred Patient        Dr. Alla Wilde MD  [facility] [place] [address]       RE: Erika Reina  : 1980  OBEY: 2021     HPI:  Erika Reina is 40 year old with stage 1A, grade 1 endometrial adenocarcinoma. She is unaccompanied today.  She denies any concerns.   She denies vaginal bleeding, abdominal/pelvic pain, changes in her bowel/bladder function.  She has had some rectal bleeding since her weight loss surgery.  She had a successful gastric bypass procedure and has already lost 60 pounds in 3 months.      Cancer Course:    She notes she has had abnormal bleeding since her very first menses with very irregular cycles.  Recently, however she has noted a major change in the amount of bleeding with a significant increase.  Due to this " she was placed on Metformin without much improvement in her bleeding and thus had the following work up.    9/13/18:  US Pelvis:  Uterus measures 7.3 x 4.9 x 5.5 cm, EMS 26.3 mm, normal ovaries    11/20/18:  EMB:  Grade 1 endometrial adenocarcinoma, MMR intact    12/13/18:  Robotic hysterectomy, bilateral salpingo-oophorectomy, bilateral pelvic sentinel lymph node dissection, cystoscopy   Pathology:  Stage 1A, grade 1 endometrial adenocarcinoma, tumor size 4.9 cm, no myometrial invasion, no LVSI, 0/11 sentinel LN    7/19/19:  CT A/P:  1.  Diffuse hepatic steatosis. 2.  Unchanged bilateral renal calyceal prominence which may suggest bilateral ureteropelvic junction obstruction.    Review of Systems:  Systemic           no weight changes; no fever; no chills; no night sweats; no appetite changes; no fatigue  Skin           no rashes, or lesions  Eye           no irritation; no changes in vision  Dilcia-Laryngeal           no dysphagia; no hoarseness   Pulmonary    no cough; no shortness of breath  Cardiovascular    no chest pain; no palpitations  Gastrointestinal    no diarrhea; no constipation; no abdominal pain; no changes in bowel  habits; + blood in stool  Genitourinary   no urinary frequency; no urinary urgency; no dysuria; no pain; no abnormal vaginal discharge; no abnormal vaginal bleeding  Breast    no breast discharge; no breast changes; no breast pain  Musculoskeletal    no myalgias; no arthralgias; no back pain  Psychiatric           no depressed mood; no anxiety    Hematologic            no tender lymph nodes; no noticeable swellings or lumps   Endocrine    no hot flashes; no heat/cold intolerance         Neurological   no tremor; no numbness and tingling; no headaches; no difficulty sleeping    Obstetrics and Gynecology History:  G0  Had always thought she would have children but this diagnosis has made her consider that in depth.  She currently has no partner.      Past Medical History:  Past Medical  History:   Diagnosis Date     DVT of lower extremity (deep venous thrombosis) (H) 04/2020    right ankle     Endometrial cancer (H) 12/19/2018     Endometrial cancer (H)      Hyperlipidemia LDL goal <160 9/15/2011     Sleep apnea        Past Surgical History:  Past Surgical History:   Procedure Laterality Date     ABDOMEN SURGERY  12/18/18    hysterectomy     ARTHROPLASTY KNEE Right      BIOPSY  11 2018    endometrial layer     CYSTOSCOPY N/A 12/13/2018    Procedure: Cystoscopy;  Surgeon: Cleopatra Altamirano MD;  Location: [facility] OR     DAVINCI HYSTERECTOMY TOTAL, BILATERAL SALPINGO-OOPHORECTOMY, NODE DISSECTION, COMBINED N/A 12/13/2018    Procedure: DaVinci Assisted Removal Of Uterus, Cervix, Both Ovaries And Fallopian Tubes, Bilateral [place] Lymph Node Dissection;  Surgeon: Cleopatra Altamirano MD;  Location: [facility] OR     GYN SURGERY  12/18/18    hysterectomy     LAPAROSCOPIC BYPASS GASTRIC N/A 10/5/2020    Procedure: LAPAROSCOPIC LE-EN-Y GASTRIC BYPASS;  Surgeon: Nicanor Puri MD;  Location:  OR       Health Maintenance:  Last Pap Smear: No need for further pap smear exams  She has not had a history of abnormal Pap smears.    Last Mammogram: N/A    Last Colonoscopy: N/A      Current Medications:   has a current medication list which includes the following prescription(s): acetaminophen, biotin, calcium citrate-vitamin d, childrens multivitamin w/iron, cyanocobalamin, melatonin, vitamin b-complex, vitamin d3, omeprazole, and omeprazole.     Allergies:   Asa [aspirin] and Ibuprofen      Social History:  Social History     Socioeconomic History     Marital status: Single     Spouse name: Not on file     Number of children: Not on file     Years of education: Not on file     Highest education level: Not on file   Occupational History     Not on file   Social Needs     Financial resource strain: Not on file     Food insecurity     Worry: Not on file     Inability: Not on file     Transportation needs      Medical: Not on file     Non-medical: Not on file   Tobacco Use     Smoking status: Never Smoker     Smokeless tobacco: Never Used   Substance and Sexual Activity     Alcohol use: Not Currently     Alcohol/week: 0.0 standard drinks     Binge frequency: Never     Comment: rarely.  Once monthly 1-2 drinks     Drug use: No     Sexual activity: Yes     Partners: Male     Birth control/protection: Condom   Lifestyle     Physical activity     Days per week: Not on file     Minutes per session: Not on file     Stress: Not on file   Relationships     Social connections     Talks on phone: Not on file     Gets together: Not on file     Attends Jain service: Not on file     Active member of club or organization: Not on file     Attends meetings of clubs or organizations: Not on file     Relationship status: Not on file     Intimate partner violence     Fear of current or ex partner: Not on file     Emotionally abused: Not on file     Physically abused: Not on file     Forced sexual activity: Not on file   Other Topics Concern     Parent/sibling w/ CABG, MI or angioplasty before 65F 55M? No   Social History Narrative     Not on file       Lives with mother, father and sister, feels safe at home.  Works as .  Enjoys crafts, spending time with friends.  Does not have an advanced directive on file and would like her mother to be her POA.  Full code.    Family History:   The patient's family history is significant for.  Family History   Problem Relation Age of Onset     Diabetes Mother      Obesity Mother      Hypertension Mother      Diabetes Father      Hypertension Father      Sleep Apnea Father      Cancer - colorectal Maternal Grandmother      Colon Cancer Maternal Grandmother      Heart Disease Paternal Grandmother      Obesity Brother      Sleep Apnea Brother      Coronary Artery Disease No family hx of      Cerebrovascular Disease No family hx of          Physical Exam:   /68   Pulse 62   Temp  "97.2  F (36.2  C) (Tympanic)   Resp 16   Ht 1.727 m (5' 8\")   Wt 126.1 kg (277 lb 14.4 oz)   LMP  (LMP Unknown)   SpO2 98%   BMI 42.25 kg/m    Body mass index is 42.25 kg/m .    General Appearance: healthy and alert, no distress     HEENT:  no thyromegaly, no palpable nodules or masses        Cardiovascular: regular rate and rhythm, no gallops, rubs or murmurs     Respiratory: lungs clear, no rales, rhonchi or wheezes, normal diaphragmatic excursion    Musculoskeletal: extremities non tender and without edema    Skin: no lesions or rashes     Neurological: normal gait, no gross defects     Psychiatric: appropriate mood and affect                               Hematological: normal cervical, supraclavicular and inguinal lymph nodes     Gastrointestinal:       abdomen soft, non-tender, non-distended, no organomegaly or masses    Genitourinary: External genitalia and urethral meatus appears normal.  Vagina is smooth without nodularity or masses.  Cervix surgically absent.  Bimanual exam reveal no masses, nodularity or fullness.  Recto-vaginal exam confirms these findings.     Assessment:    Erika Reina is a 40 year old woman with a diagnosis of stage 1A, grade 1 endometrial adenocarcinoma.     A total of 15 minutes was spent on the patient      Plan:     1.)    Stage 1A, grade 1 endometrial adenocarcinoma.  Reviewed signs and symptoms of recurrence and to call if these should occur.  Reinforced surveillance visits every 6 months for up to 5 years (12/23) then annually thereafter.  She will return in 6 months.  She needs a new prescription for her compression stockings and this was provided today    2.) Menopausal symptoms -She denies any at this point.       3.) Genetic risk factors were assessed and the patient does not meet the qualifications for a referral.      4.) Labs and/or tests ordered include:  Colonoscopy     5.) Health maintenance issues addressed today include pt is up to date.    6.) Rectal " bleeding-Colonoscopy to assess          Thank you for allowing us to participate in the care of your patient.         Sincerely,    Cleopatra Evans MD  Gynecologic Oncology  [facility]       ZULMA CARBAJAL

## 2021-02-03 ENCOUNTER — HOSPITAL ENCOUNTER (OUTPATIENT)
Facility: CLINIC | Age: 41
End: 2021-02-03
Attending: INTERNAL MEDICINE | Admitting: INTERNAL MEDICINE
Payer: COMMERCIAL

## 2021-02-04 DIAGNOSIS — Z11.59 ENCOUNTER FOR SCREENING FOR OTHER VIRAL DISEASES: ICD-10-CM

## 2021-02-11 RX ORDER — LIDOCAINE 40 MG/G
CREAM TOPICAL
Status: CANCELLED | OUTPATIENT
Start: 2021-02-11

## 2021-02-11 RX ORDER — ONDANSETRON 2 MG/ML
4 INJECTION INTRAMUSCULAR; INTRAVENOUS
Status: CANCELLED | OUTPATIENT
Start: 2021-02-11

## 2021-03-18 ENCOUNTER — DOCUMENTATION ONLY (OUTPATIENT)
Dept: FAMILY MEDICINE | Facility: CLINIC | Age: 41
End: 2021-03-18

## 2021-03-23 ENCOUNTER — IMMUNIZATION (OUTPATIENT)
Dept: NURSING | Facility: CLINIC | Age: 41
End: 2021-03-23
Payer: COMMERCIAL

## 2021-03-23 PROCEDURE — 91300 PR COVID VAC PFIZER DIL RECON 30 MCG/0.3 ML IM: CPT

## 2021-03-23 PROCEDURE — 0001A PR COVID VAC PFIZER DIL RECON 30 MCG/0.3 ML IM: CPT

## 2021-04-13 ENCOUNTER — IMMUNIZATION (OUTPATIENT)
Dept: NURSING | Facility: CLINIC | Age: 41
End: 2021-04-13
Attending: INTERNAL MEDICINE
Payer: COMMERCIAL

## 2021-04-13 PROCEDURE — 91300 PR COVID VAC PFIZER DIL RECON 30 MCG/0.3 ML IM: CPT

## 2021-04-13 PROCEDURE — 0002A PR COVID VAC PFIZER DIL RECON 30 MCG/0.3 ML IM: CPT

## 2021-07-07 ENCOUNTER — OFFICE VISIT (OUTPATIENT)
Dept: PEDIATRICS | Facility: CLINIC | Age: 41
End: 2021-07-07
Payer: COMMERCIAL

## 2021-07-07 ENCOUNTER — HOSPITAL ENCOUNTER (OUTPATIENT)
Dept: ULTRASOUND IMAGING | Facility: CLINIC | Age: 41
Discharge: HOME | End: 2021-07-07
Attending: NURSE PRACTITIONER | Admitting: NURSE PRACTITIONER
Payer: COMMERCIAL

## 2021-07-07 ENCOUNTER — NURSE TRIAGE (OUTPATIENT)
Dept: FAMILY MEDICINE | Facility: CLINIC | Age: 41
End: 2021-07-07

## 2021-07-07 VITALS
SYSTOLIC BLOOD PRESSURE: 110 MMHG | TEMPERATURE: 97.7 F | BODY MASS INDEX: 38.32 KG/M2 | WEIGHT: 252 LBS | HEART RATE: 68 BPM | OXYGEN SATURATION: 100 % | DIASTOLIC BLOOD PRESSURE: 76 MMHG

## 2021-07-07 DIAGNOSIS — M25.471 RIGHT ANKLE SWELLING: Primary | ICD-10-CM

## 2021-07-07 DIAGNOSIS — I82.4Z1 ACUTE DEEP VEIN THROMBOSIS (DVT) OF DISTAL VEIN OF RIGHT LOWER EXTREMITY (H): ICD-10-CM

## 2021-07-07 DIAGNOSIS — M25.471 RIGHT ANKLE SWELLING: ICD-10-CM

## 2021-07-07 DIAGNOSIS — E66.01 MORBID OBESITY DUE TO EXCESS CALORIES (H): ICD-10-CM

## 2021-07-07 PROCEDURE — 93971 EXTREMITY STUDY: CPT | Mod: RT

## 2021-07-07 PROCEDURE — 99214 OFFICE O/P EST MOD 30 MIN: CPT | Performed by: NURSE PRACTITIONER

## 2021-07-07 PROCEDURE — 99207 REFERRAL TO ACUTE AND DIAGNOSTIC SERVICES: CPT | Performed by: NURSE PRACTITIONER

## 2021-07-07 NOTE — PROGRESS NOTES
"    Assessment & Plan     Right ankle swelling  US negative for DVT  F/u with PCP if swelling persists or worsens.    - Referral to Acute and Diagnostic Services (Day of diagnostic / First order acute)  - US Lower Extremity Venous Duplex Right; Future    Acute deep vein thrombosis (DVT) of distal vein of right lower extremity (H)    - US Lower Extremity Venous Duplex Right; Future    Morbid obesity due to excess calories (H)       BMI:   Estimated body mass index is 38.32 kg/m  as calculated from the following:    Height as of 1/26/21: 1.727 m (5' 8\").    Weight as of this encounter: 114.3 kg (252 lb).   Weight management plan: Patient referred to endocrine and/or weight management specialty - already established in bariatrics        No follow-ups on file.    LEEROY Hollingsworth CNP  [facility] is a 40 year old who presents  urgently to [facility] by referral from Marlee Ham NP  to rule out DVT  for the following health issues     HPI     Musculoskeletal problem/pain   Onset/Duration: Right ankle swelling & pain x 1 day  Description  Location: Ankle - right  Joint Swelling: YES  Redness: no  Pain: YES  Warmth: no  Intensity:  moderate  Progression of Symptoms:  worsening  Accompanying signs and symptoms:   Fevers: no  Numbness/tingling/weakness: no  History  Trauma to the area: no  Recent illness:  no  Previous similar problem: YES- DVT in same area last year  Previous evaluation:  Saw Hematology last year  Precipitating or alleviating factors:  Aggravating factors include: Walking- causes increased pain  Therapies tried and outcome: rest/inactivity, immobilization, support wrap and elevation     Previous DVT right ankle (unprovoked) treated with 3 months of eliquis  Has been off eliquis almost 1 year.    Had bariatric surgery 8 months ago, is down 100#  More active since bariatric surgery.    No recent injury or trauma to right ankle  No recent travel or prolonged car " rides.    Rates pain +2/10    Review of Systems   Constitutional, HEENT, cardiovascular, pulmonary, gi and gu systems are negative, except as otherwise noted.      Objective    /72 (Patient Position: Sitting)   Pulse 71   Temp 97.7  F (36.5  C) (Oral)   Wt 114.3 kg (252 lb)   LMP  (LMP Unknown)   SpO2 100%   BMI 38.32 kg/m    Body mass index is 38.32 kg/m .  Physical Exam   GENERAL: healthy, alert and no distress  NECK: no adenopathy, no asymmetry, masses, or scars and thyroid normal to palpation  RESP: lungs clear to auscultation - no rales, rhonchi or wheezes  CV: regular rate and rhythm, normal S1 S2, no S3 or S4, no murmur, click or rub, no peripheral edema and peripheral pulses strong  MS: 2+ edema to medial right ankle, no surrounding erythema or exudate  SKIN: no suspicious lesions or rashes    Results for orders placed or performed during the hospital encounter of 07/07/21   US Lower Extremity Venous Duplex Right     Status: None    Narrative    [facility]    DATE: 7/7/2021    EXAM: RIGHT LOWER EXTREMITY VENOUS ULTRASOUND    INDICATION: Right ankle swelling    TECHNIQUE: Duplex imaging was performed utilizing gray-scale,  compression, augmentation as appropriate, color-flow, Doppler waveform  analysis, and spectral Doppler imaging.    COMPARISON: 7/16/2020.    FINDINGS:  RIGHT LEG: The common femoral, profunda femoral, femoral, popliteal,  and segmentally visualized calf veins are patent and compressible with  appropriate vascular waveforms. No deep venous thrombus is identified.      Impression    IMPRESSION:   1.  RIGHT LEG: Negative for deep vein thrombosis.    VIKTORIA LOCKETT MD         SYSTEM ID:  [id number]

## 2021-07-07 NOTE — TELEPHONE ENCOUNTER
Called [facility] who agrees to see the pt today.  Orders put in.  Please call pt and advise on location and she should head there now.      Also--she is due for 6 mos follow-up with oncology and I do not see this scheduled--please advise her to make an appt or assist if needed.     Glenna Ham CNP

## 2021-07-07 NOTE — TELEPHONE ENCOUNTER
"Pt calling for KS at , sent to AV,     S-(situation): worried about another blood clot in ankle    B-(background): noticed swelling and redness in inside of right ankle x 1 day, improves if elevates, see triage note, has positive blood clot about 1 year ago in same area, pt worried about recurrence, feels the same, discussed [facility], pt agrees if KS approved    A-(assessment): r/u ankle thrombosis    R-(recommendations): routed HIGH priority to KS and team, please advise if you will refer to [facility]? Or have pt go to ER, pt informed to f/u in 2 hours if has not heard back from PCP office, inform pt of plan    Telephone Information:   Mobile [phone]       Reason for Disposition    Thigh, calf, or ankle swelling in only one leg    Additional Information    Negative: Sounds like a life-threatening emergency to the triager    Negative: Chest pain    Negative: Small area of swelling and followed an insect bite to the area    Negative: Followed a knee injury    Negative: Ankle or foot injury    Negative: Pregnant with leg swelling or edema    Negative: Difficulty breathing at rest    Negative: Entire foot is cool or blue in comparison to other side    Negative: SEVERE swelling (e.g., swelling extends above knee, entire leg is swollen, weeping fluid)    Negative: Thigh or calf pain and only 1 side and present > 1 hour    Answer Assessment - Initial Assessment Questions  1. ONSET: \"When did the swelling start?\" (e.g., minutes, hours, days)      yesterday  2. LOCATION: \"What part of the leg is swollen?\"  \"Are both legs swollen or just one leg?\"      Inner area of right ankle  3. SEVERITY: \"How bad is the swelling?\" (e.g., localized; mild, moderate, severe)   - Localized - small area of swelling localized to one leg   - MILD pedal edema - swelling limited to foot and ankle, pitting edema < 1/4 inch (6 mm) deep, rest and elevation eliminate most or all swelling   4. REDNESS: \"Does the swelling look red or infected?\"      Yes, " "minimal  5. PAIN: \"Is the swelling painful to touch?\" If so, ask: \"How painful is it?\"   (Scale 1-10; mild, moderate or severe)      Yes, mild  6. FEVER: \"Do you have a fever?\" If so, ask: \"What is it, how was it measured, and when did it start?\"       no  7. CAUSE: \"What do you think is causing the leg swelling?\"      Previous hx of blood clot last yeart  8. MEDICAL HISTORY: \"Do you have a history of heart failure, kidney disease, liver failure, or cancer?\"      Endometrial cancer  9. RECURRENT SYMPTOM: \"Have you had leg swelling before?\" If so, ask: \"When was the last time?\" \"What happened that time?\"      Last year, same area, clot confirmed, was no blood thinners for short term  10. OTHER SYMPTOMS: \"Do you have any other symptoms?\" (e.g., chest pain, difficulty breathing)        No  11. PREGNANCY: \"Is there any chance you are pregnant?\" \"When was your last menstrual period?\"        No    Protocols used: LEG SWELLING AND EDEMA-A-OH    May Johnson RN, BSN  Message handled by CLINIC NURSE.    "

## 2021-09-12 ENCOUNTER — HOSPITAL ENCOUNTER (EMERGENCY)
Facility: CLINIC | Age: 41
Discharge: HOME | End: 2021-09-12
Attending: PHYSICIAN ASSISTANT | Admitting: PHYSICIAN ASSISTANT
Payer: COMMERCIAL

## 2021-09-12 ENCOUNTER — APPOINTMENT (OUTPATIENT)
Dept: GENERAL RADIOLOGY | Facility: CLINIC | Age: 41
End: 2021-09-12
Attending: PHYSICIAN ASSISTANT
Payer: COMMERCIAL

## 2021-09-12 VITALS
RESPIRATION RATE: 20 BRPM | HEART RATE: 66 BPM | DIASTOLIC BLOOD PRESSURE: 79 MMHG | OXYGEN SATURATION: 100 % | SYSTOLIC BLOOD PRESSURE: 118 MMHG | TEMPERATURE: 97.9 F

## 2021-09-12 DIAGNOSIS — S89.92XA KNEE INJURY, LEFT, INITIAL ENCOUNTER: ICD-10-CM

## 2021-09-12 PROCEDURE — 99284 EMERGENCY DEPT VISIT MOD MDM: CPT

## 2021-09-12 PROCEDURE — 73562 X-RAY EXAM OF KNEE 3: CPT | Mod: LT

## 2021-09-12 ASSESSMENT — ENCOUNTER SYMPTOMS: ARTHRALGIAS: 1

## 2021-09-12 NOTE — Clinical Note
Erika Reina was seen and treated in our emergency department on 9/12/2021.  She may return to work on 09/13/2021.  She suffered a knee injury requiring orthopedic evaluation. She will require modification of duty until she is evaluated and cleared to return to her normal role.      If you have any questions or concerns, please don't hesitate to call.      Marcus Schilling PA-C

## 2021-09-12 NOTE — ED PROVIDER NOTES
History   Chief Complaint:  Knee Pain       The history is provided by the patient.      Erika Reina is a 40 year old female with history of endometrial cancer who presents for evaluation of left knee pain. Erika stood up from sitting this afternoon and felt a pop in her knee and had sudden pain to the inner left knee and has not been able to move the knee or bear weight without significant pain since the event. She notes she fell onto this knee at work two weeks ago and this was sore for a while but improved before today. No other pain including in the hip, ankle, or foot.     Review of Systems   Musculoskeletal: Positive for arthralgias (left knee).   All other systems reviewed and are negative.    Allergies:  Asa [Aspirin]  Ibuprofen    Medications:  Calcium Citrate-Vitamin D   Cyanocobalamin   Melatonin    vitamin B-Complex  Vitamin D3     Past Medical History:     DVT  Endometrial cancer  Hyperlipidemia  Morbid obesity   Sleep apnea   Telogen effluvium    Past Surgical History:    Hysterectomy  Arthroplasty knee, right  Biopsy, endometrial layer  Cystoscopy  DaVinci hysterectomy total, BSO, node dissection, combined  Laparoscopic bypass gastric     Family History:    Diabetes  Obesity   Hypertension  Sleep apnea    Social History:  Presents with a friend  PCP :Glenna Ham     Physical Exam     Patient Vitals for the past 24 hrs:   BP Temp Temp src Pulse Resp SpO2   09/12/21 1631 118/79 97.9  F (36.6  C) Temporal 66 20 100 %     Physical Exam  Vitals and nursing note reviewed.   Constitutional:       General: She is not in acute distress.     Appearance: She is not diaphoretic.   HENT:      Head: Normocephalic and atraumatic.   Eyes:      General: No scleral icterus.  Cardiovascular:      Rate and Rhythm: Normal rate.      Pulses: Normal pulses.   Pulmonary:      Effort: Pulmonary effort is normal. No respiratory distress.   Musculoskeletal:         General: Swelling and tenderness present.       Left hip: Normal range of motion.      Left knee: Swelling and effusion present. Decreased range of motion. Tenderness present over the medial joint line and lateral joint line. No LCL laxity, MCL laxity, ACL laxity or PCL laxity.     Left ankle: No swelling or deformity. Normal range of motion.        Legs:    Skin:     General: Skin is warm.      Findings: No rash.   Neurological:      Mental Status: She is alert.         Emergency Department Course     Imaging:  XR Knee Left 3 views:  Probable joint effusion. Joint spaces are maintained. No evidence of a fracture.  Per radiology.     Emergency Department Course:    Reviewed:  I reviewed nursing notes, vitals and past medical history    Assessments:  1712 I obtained history and examined the patient as noted above.     Disposition:  The patient was discharged to home.       Impression & Plan     Medical Decision Making:  Straight leg raise intact ruling down quadriceps or patellar tendon rupture. No neurovascular deficits of the extremity or changes to suggest limb threatening pathology. No ligamentous laxity at this time. Imaging demonstrates no osseous abnormality. Consideration for ligamentous injury versus meniscus tear. RICE, crutches, outpatient ortho follow up      Diagnosis:    ICD-10-CM    1. Knee injury, left, initial encounter  S89.92XA        Scribe Disclosure:  Mayra OLIVER, am serving as a scribe at 5:06 PM on 9/12/2021 to document services personally performed by Marcus Schilling PA-C based on my observations and the provider's statements to me.              Marcus Schilling PA-C  09/12/21 [id number]

## 2021-09-12 NOTE — ED TRIAGE NOTES
Pt arrives with c/o left knee pain. Pt reports she fell about 1 week ago onto her knee at work, things had improved, and then today pt stood up and felt a pop and has been unable to put much weight on it since. ABCs intact.

## 2021-09-25 ENCOUNTER — HEALTH MAINTENANCE LETTER (OUTPATIENT)
Age: 41
End: 2021-09-25

## 2021-09-29 ENCOUNTER — OFFICE VISIT (OUTPATIENT)
Dept: FAMILY MEDICINE | Facility: CLINIC | Age: 41
End: 2021-09-29
Payer: COMMERCIAL

## 2021-09-29 VITALS
RESPIRATION RATE: 18 BRPM | DIASTOLIC BLOOD PRESSURE: 70 MMHG | OXYGEN SATURATION: 98 % | HEIGHT: 67 IN | WEIGHT: 237 LBS | SYSTOLIC BLOOD PRESSURE: 100 MMHG | BODY MASS INDEX: 37.2 KG/M2 | TEMPERATURE: 98.4 F | HEART RATE: 74 BPM

## 2021-09-29 DIAGNOSIS — Z01.818 PREOP GENERAL PHYSICAL EXAM: ICD-10-CM

## 2021-09-29 DIAGNOSIS — M54.2 NECK PAIN: Primary | ICD-10-CM

## 2021-09-29 DIAGNOSIS — G47.33 OSA (OBSTRUCTIVE SLEEP APNEA): ICD-10-CM

## 2021-09-29 DIAGNOSIS — C54.1 ENDOMETRIAL CANCER (H): ICD-10-CM

## 2021-09-29 DIAGNOSIS — Z11.59 NEED FOR HEPATITIS C SCREENING TEST: ICD-10-CM

## 2021-09-29 DIAGNOSIS — E66.01 MORBID OBESITY (H): ICD-10-CM

## 2021-09-29 DIAGNOSIS — Z86.718 HISTORY OF DEEP VENOUS THROMBOSIS: ICD-10-CM

## 2021-09-29 DIAGNOSIS — Z98.84 BARIATRIC SURGERY STATUS: ICD-10-CM

## 2021-09-29 DIAGNOSIS — K91.2 POSTSURGICAL MALABSORPTION: ICD-10-CM

## 2021-09-29 DIAGNOSIS — Z11.4 SCREENING FOR HIV (HUMAN IMMUNODEFICIENCY VIRUS): ICD-10-CM

## 2021-09-29 PROBLEM — L65.0 TELOGEN EFFLUVIUM: Status: RESOLVED | Noted: 2021-01-06 | Resolved: 2021-09-29

## 2021-09-29 LAB
PTH-INTACT SERPL-MCNC: 38 PG/ML (ref 18–80)
VIT B12 SERPL-MCNC: 1471 PG/ML (ref 193–986)

## 2021-09-29 PROCEDURE — 90471 IMMUNIZATION ADMIN: CPT | Performed by: NURSE PRACTITIONER

## 2021-09-29 PROCEDURE — 90686 IIV4 VACC NO PRSV 0.5 ML IM: CPT | Performed by: NURSE PRACTITIONER

## 2021-09-29 PROCEDURE — 99000 SPECIMEN HANDLING OFFICE-LAB: CPT | Performed by: NURSE PRACTITIONER

## 2021-09-29 PROCEDURE — 83970 ASSAY OF PARATHORMONE: CPT | Performed by: NURSE PRACTITIONER

## 2021-09-29 PROCEDURE — 82306 VITAMIN D 25 HYDROXY: CPT | Performed by: NURSE PRACTITIONER

## 2021-09-29 PROCEDURE — 99214 OFFICE O/P EST MOD 30 MIN: CPT | Mod: 25 | Performed by: NURSE PRACTITIONER

## 2021-09-29 PROCEDURE — 82607 VITAMIN B-12: CPT | Performed by: NURSE PRACTITIONER

## 2021-09-29 PROCEDURE — 36415 COLL VENOUS BLD VENIPUNCTURE: CPT | Performed by: NURSE PRACTITIONER

## 2021-09-29 PROCEDURE — 83550 IRON BINDING TEST: CPT | Performed by: NURSE PRACTITIONER

## 2021-09-29 PROCEDURE — 87389 HIV-1 AG W/HIV-1&-2 AB AG IA: CPT | Performed by: NURSE PRACTITIONER

## 2021-09-29 PROCEDURE — 84590 ASSAY OF VITAMIN A: CPT | Mod: 90 | Performed by: NURSE PRACTITIONER

## 2021-09-29 PROCEDURE — 86803 HEPATITIS C AB TEST: CPT | Performed by: NURSE PRACTITIONER

## 2021-09-29 PROCEDURE — 82728 ASSAY OF FERRITIN: CPT | Performed by: NURSE PRACTITIONER

## 2021-09-29 ASSESSMENT — MIFFLIN-ST. JEOR: SCORE: 1777.65

## 2021-09-29 NOTE — PROGRESS NOTES
"[facility]  [address] [facility] [id number]  Phone: [phone]  Primary Provider: Glenna Ham  Pre-op Performing Provider: RORY CARRANZA      PREOPERATIVE EVALUATION:  Today's date: 9/29/2021    Erika Reina is a 41 year old female who presents for a preoperative evaluation.    Surgical Information:  Surgery/Procedure: LT Knee Arthoscopy  Surgery Location: [facility]  Surgeon: Dr. Hoang Jordan  Surgery Date: 10/6/2021  Time of Surgery: Unknown  Where patient plans to recover: At home with family  Fax number for surgical facility: [phone]    Type of Anesthesia Anticipated: to be determined    Assessment & Plan     The proposed surgical procedure is considered INTERMEDIATE risk.    Problem List Items Addressed This Visit     Bariatric surgery status     Labs due today per Bariatric surgery will be drawn today.         Endometrial cancer (H)    History of deep venous thrombosis     No recurrence.          Morbid obesity (H)       Estimated body mass index is 37.12 kg/m  as calculated from the following:    Height as of this encounter: 1.702 m (5' 7\").    Weight as of this encounter: 107.5 kg (237 lb).    Patient continues to work on weight loss post Bariatric Surgery.  Reviewed weight and diet and exercise strategies.         Neck pain - Primary     Pain upper shoulder left of base of neck, no radiation into arm, no numbness or weakness,  Worse with exercising and patient is also working exceptionally long hours at work.  Will refer to PT         Relevant Orders    [facility] Referral    ENRICO (obstructive sleep apnea)     Since losing 100 pounds has stopped using CPAP.  Has not used it for 3 months.             Other Visit Diagnoses     Preop general physical exam        Relevant Orders    HC FLU VAC PRESRV FREE QUAD SPLIT VIR > 6 MONTHS IM ([id number]) (Completed)    Screening for HIV (human immunodeficiency virus)        Relevant Orders "    HIV Antigen Antibody Combo    Need for hepatitis C screening test        Relevant Orders    Hepatitis C Screen Reflex to HCV RNA Quant and Genotype    Postsurgical malabsorption              Risks and Recommendations:  The patient has the following additional risks and recommendations for perioperative complications:   - Morbid obesity (BMI >40)   - History of unprovoked DVT x1 in the past.  Monitor and advise on post op prevention  -Sleep Apnea improved since 100 pound weight loss.  Not using CPAP--Please monitor post op    Medication Instructions:  Patient is to take all scheduled medications on the day of surgery    RECOMMENDATION:  APPROVAL GIVEN to proceed with proposed procedure, without further diagnostic evaluation.          Subjective     HPI related to upcoming procedure: History of injury to Left knee on 9/12/2021.  Got up from sitting and felt pop in her knee.  Had sudden pain with inability to bear weight. Was seen at ED.  Films showed probable joint effusion, but no fracture.  Subsequently evaluated by Orthopedics and need for Arthroscopy determined.    Preop Questions 9/27/2021   1. Have you ever had a heart attack or stroke? No   2. Have you ever had surgery on your heart or blood vessels, such as a stent placement, a coronary artery bypass, or surgery on an artery in your head, neck, heart, or legs? No   3. Do you have chest pain with activity? No   4. Do you have a history of  heart failure? No   5. Do you currently have a cold, bronchitis or symptoms of other infection? No   6. Do you have a cough, shortness of breath, or wheezing? No   7. Do you or anyone in your family have previous history of blood clots? YES - unprovoked DVT 2020 without recurrence   8. Do you or does anyone in your family have a serious bleeding problem such as prolonged bleeding following surgeries or cuts? No   9. Have you ever had problems with anemia or been told to take iron pills? YES - Many years ago with  "menorrhagia.  Resolved   10. Have you had any abnormal blood loss such as black, tarry or bloody stools, or abnormal vaginal bleeding? No   11. Have you ever had a blood transfusion? No   12. Are you willing to have a blood transfusion if it is medically needed before, during, or after your surgery? Yes   13. Have you or any of your relatives ever had problems with anesthesia? No   14. Do you have sleep apnea, excessive snoring or daytime drowsiness? YES - Not using CPAP since weight loss   14a. Do you have a CPAP machine? No   15. Do you have any artifical heart valves or other implanted medical devices like a pacemaker, defibrillator, or continuous glucose monitor? No   16. Do you have artificial joints? No   17. Are you allergic to latex? No   18. Is there any chance that you may be pregnant? No       Health Care Directive:  Patient does not have a Health Care Directive or Living Will: Discussed advance care planning with patient; information given to patient to review.    Preoperative Review of :   reviewed - controlled substances prescribed by other outside provider(s).  Tramadol per orthopedics      Status of Chronic Conditions:  ENRICO (obstructive sleep apnea)  Since losing 100 pounds has stopped using CPAP.  Has not used it for 3 months.      History of deep venous thrombosis  No recurrence.     Morbid obesity (H)    Estimated body mass index is 37.12 kg/m  as calculated from the following:    Height as of this encounter: 1.702 m (5' 7\").    Weight as of this encounter: 107.5 kg (237 lb).    Patient continues to work on weight loss post Bariatric Surgery.  Reviewed weight and diet and exercise strategies.    Bariatric surgery status  Labs due today per Bariatric surgery will be drawn today.    Neck pain  Pain upper shoulder left of base of neck, no radiation into arm, no numbness or weakness,  Worse with exercising and patient is also working exceptionally long hours at work.  Will refer to " PT        Review of Systems  CONSTITUTIONAL: NEGATIVE for fever, chills, change in weight  INTEGUMENTARY/SKIN: NEGATIVE for worrisome rashes, moles or lesions  EYES: NEGATIVE for vision changes or irritation  ENT/MOUTH: NEGATIVE for ear, mouth and throat problems  RESP: NEGATIVE for significant cough or SOB  CV: NEGATIVE for chest pain, palpitations or peripheral edema  GI: NEGATIVE for nausea, abdominal pain, heartburn, or change in bowel habits  : NEGATIVE for frequency, dysuria, or hematuria  NEURO: NEGATIVE for weakness, dizziness or paresthesias  ENDOCRINE: NEGATIVE for temperature intolerance, skin/hair changes  HEME: NEGATIVE for bleeding problems  PSYCHIATRIC: NEGATIVE for changes in mood or affect    Patient Active Problem List    Diagnosis Date Noted     Neck pain 09/29/2021     Priority: Medium     Bariatric surgery status 01/06/2021     Priority: Medium     History of deep venous thrombosis 09/28/2020     Priority: Medium     Single occurrence unprovoked DVT       ENRICO (obstructive sleep apnea) 12/03/2019     Priority: Medium     Since losing 100 pounds has stopped using CPAP.       Endometrial cancer (H) 11/20/2018     Priority: Medium     SCP data entered.  Give at 7/11/19 apt.  Hysterectomy 12/2018       Plantar fasciitis 10/18/2016     Priority: Medium     Morbid obesity (H) 06/29/2012     Priority: Medium     Hyperlipidemia LDL goal <160 09/15/2011     Priority: Medium      Past Medical History:   Diagnosis Date     DVT of lower extremity (deep venous thrombosis) (H) 04/2020    right ankle     Endometrial cancer (H) 12/19/2018     Endometrial cancer (H)      Hyperlipidemia LDL goal <160 9/15/2011     Sleep apnea      Past Surgical History:   Procedure Laterality Date     ABDOMEN SURGERY  12/18/18    hysterectomy     ARTHROPLASTY KNEE Right      BIOPSY  11 2018    endometrial layer     CYSTOSCOPY N/A 12/13/2018    Procedure: Cystoscopy;  Surgeon: Cleopatra Altamirano MD;  [facility]      DAVINCI HYSTERECTOMY TOTAL, BILATERAL SALPINGO-OOPHORECTOMY, NODE DISSECTION, COMBINED N/A 12/13/2018    Procedure: DaVinci Assisted Removal Of Uterus, Cervix, Both Ovaries And Fallopian Tubes, Bilateral [place] Lymph Node Dissection;  Surgeon: Cleopatra Altamirano MD;  [facility]     GYN SURGERY  12/18/18    hysterectomy     LAPAROSCOPIC BYPASS GASTRIC N/A 10/5/2020    Procedure: LAPAROSCOPIC LE-EN-Y GASTRIC BYPASS;  Surgeon: Nicanor Puri MD;  Location:  OR     Current Outpatient Medications   Medication Sig Dispense Refill     acetaminophen (TYLENOL) 500 MG tablet Take 500 mg by mouth every 8 hours as needed for mild pain       BIOTIN PO        Calcium Citrate-Vitamin D (CALCIUM CITRATE + D PO) Take 1 chew tab by mouth 3 times daily Bariatric advantage: + 500 international unit(s) vitamin D       childrens multivitamin w/iron (FLINTSTONES COMPLETE) 18 MG chewable tablet Take 2 chew tab by mouth daily       Cyanocobalamin (VITAMIN B 12 PO) Take 5,000 mcg by mouth SL liquid 5000 mcg/mL       MELATONIN PO Take 1 Dose by mouth nightly as needed       omeprazole (PRILOSEC) 20 MG DR capsule Take 20 mg by mouth daily before breakfast       vitamin B-Complex Take 1 tablet by mouth every other day       Vitamin D3 (CHOLECALCIFEROL) 125 MCG (5000 UT) tablet Take 125 mcg by mouth daily         Allergies   Allergen Reactions     Asa [Aspirin] Other (See Comments)     Gastric bypass 10/5/2020     Ibuprofen Swelling     Swelling of legs        Social History     Tobacco Use     Smoking status: Never Smoker     Smokeless tobacco: Never Used   Substance Use Topics     Alcohol use: Not Currently     Alcohol/week: 0.0 standard drinks     Comment: rarely.  Once monthly 1-2 drinks     Family History   Problem Relation Age of Onset     Diabetes Mother      Obesity Mother      Hypertension Mother      Diabetes Father      Hypertension Father      Sleep Apnea Father      Cancer - colorectal Maternal Grandmother       "Colon Cancer Maternal Grandmother      Heart Disease Paternal Grandmother      Obesity Brother      Sleep Apnea Brother      Coronary Artery Disease No family hx of      Cerebrovascular Disease No family hx of      History   Drug Use No         Objective     /70 (BP Location: Right arm, Patient Position: Chair, Cuff Size: Adult Large)   Pulse 74   Temp 98.4  F (36.9  C) (Oral)   Resp 18   Ht 1.702 m (5' 7\")   Wt 107.5 kg (237 lb)   LMP  (LMP Unknown)   SpO2 98%   BMI 37.12 kg/m      Physical Exam    GENERAL APPEARANCE: healthy, alert and no distress     EYES: EOMI, PERRL     HENT: ear canals and TM's normal and nose and mouth without ulcers or lesions     NECK: no adenopathy, no asymmetry, masses, or scars and thyroid normal to palpation     RESP: lungs clear to auscultation - no rales, rhonchi or wheezes     CV: regular rates and rhythm, normal S1 S2, no S3 or S4 and no murmur, click or rub     ABDOMEN:  soft, nontender, no HSM or masses and bowel sounds normal     SKIN: no suspicious lesions or rashes     NEURO: Normal strength and tone, sensory exam grossly normal, mentation intact and speech normal     PSYCH: mentation appears normal. and affect normal/bright     LYMPHATICS: No cervical adenopathy    Recent Labs   Lab Test 10/06/20  0633 09/28/20  1104 04/20/20  1106 04/17/20  1600 04/17/20  1600   HGB 12.4 13.0  --    < > 13.5   PLT  --  304  --   --  311   INR  --   --  1.20*  --  0.99     --   --   --  138   POTASSIUM 4.1  --   --   --  4.4   CR 0.70  --   --   --  0.79    < > = values in this interval not displayed.        Diagnostics:  Hemoglobin   Date Value Ref Range Status   10/06/2020 12.4 11.7 - 15.7 g/dL Final   ]      Revised Cardiac Risk Index (RCRI):  The patient has the following serious cardiovascular risks for perioperative complications:   - No serious cardiac risks = 0 points     RCRI Interpretation: 0 points: Class I (very low risk - 0.4% complication rate)          Signed " Electronically by: LEEROY Mcgrath CNP  Copy of this evaluation report is provided to requesting physician.

## 2021-09-29 NOTE — PATIENT INSTRUCTIONS

## 2021-09-30 LAB
DEPRECATED CALCIDIOL+CALCIFEROL SERPL-MC: 70 UG/L (ref 20–75)
FERRITIN SERPL-MCNC: 168 NG/ML (ref 12–150)
HCV AB SERPL QL IA: NONREACTIVE
HIV 1+2 AB+HIV1 P24 AG SERPL QL IA: NONREACTIVE
IRON SATN MFR SERPL: 38 % (ref 15–46)
IRON SERPL-MCNC: 97 UG/DL (ref 35–180)
TIBC SERPL-MCNC: 253 UG/DL (ref 240–430)

## 2021-09-30 NOTE — ASSESSMENT & PLAN NOTE
Pain upper shoulder left of base of neck, no radiation into arm, no numbness or weakness,  Worse with exercising and patient is also working exceptionally long hours at work.  Will refer to PT

## 2021-09-30 NOTE — ASSESSMENT & PLAN NOTE
"  Estimated body mass index is 37.12 kg/m  as calculated from the following:    Height as of this encounter: 1.702 m (5' 7\").    Weight as of this encounter: 107.5 kg (237 lb).    Patient continues to work on weight loss post Bariatric Surgery.  Reviewed weight and diet and exercise strategies.  "

## 2021-10-02 LAB
ANNOTATION COMMENT IMP: NORMAL
RETINYL PALMITATE SERPL-MCNC: 0.04 MG/L
VIT A SERPL-MCNC: 0.52 MG/L

## 2021-10-06 ENCOUNTER — TELEPHONE (OUTPATIENT)
Dept: SURGERY | Facility: CLINIC | Age: 41
End: 2021-10-06

## 2021-10-06 NOTE — TELEPHONE ENCOUNTER
Surgery: Bypass  Date: 10/5/2020  Surgeon: PLB    Pt called and reported she just had knee surgery this morning and her doctor wants put pt her baby aspirin for hx of blood clot. Reports the doctor prefers baby aspirin but if it is not OK can do Lovenox. This will only be a 7 day rx per pt.     Will ask Swati CABRERA for further advice.    Alissa Escobar RN on 10/6/2021 at 9:11 AM

## 2021-10-06 NOTE — TELEPHONE ENCOUNTER
Per RICK Longoria   Take OTC Omeprazole 20 mg daily while taking baby aspirin and for 2 days after completing. If possible take omeprazole 2 days prior to starting aspirin but OK if pt has to start today.     Called pt to inform her of above. Pt verbalized understanding and agreeable to plan above.   No further questions.  Alissa Escobar RN on 10/6/2021 at 9:45 AM

## 2022-01-08 ENCOUNTER — OFFICE VISIT (OUTPATIENT)
Dept: URGENT CARE | Facility: URGENT CARE | Age: 42
End: 2022-01-08
Payer: COMMERCIAL

## 2022-01-08 VITALS
TEMPERATURE: 97.3 F | OXYGEN SATURATION: 99 % | WEIGHT: 232 LBS | RESPIRATION RATE: 16 BRPM | HEART RATE: 78 BPM | SYSTOLIC BLOOD PRESSURE: 99 MMHG | DIASTOLIC BLOOD PRESSURE: 66 MMHG | HEIGHT: 68 IN | BODY MASS INDEX: 35.16 KG/M2

## 2022-01-08 DIAGNOSIS — R10.9 RIGHT FLANK PAIN: Primary | ICD-10-CM

## 2022-01-08 LAB
ALBUMIN UR-MCNC: NEGATIVE MG/DL
APPEARANCE UR: CLEAR
BACTERIA #/AREA URNS HPF: ABNORMAL /HPF
BILIRUB UR QL STRIP: NEGATIVE
COLOR UR AUTO: YELLOW
GLUCOSE UR STRIP-MCNC: NEGATIVE MG/DL
HGB UR QL STRIP: ABNORMAL
KETONES UR STRIP-MCNC: NEGATIVE MG/DL
LEUKOCYTE ESTERASE UR QL STRIP: NEGATIVE
NITRATE UR QL: NEGATIVE
PH UR STRIP: 5 [PH] (ref 5–7)
RBC #/AREA URNS AUTO: ABNORMAL /HPF
SP GR UR STRIP: 1.02 (ref 1–1.03)
UROBILINOGEN UR STRIP-ACNC: 0.2 E.U./DL
WBC #/AREA URNS AUTO: ABNORMAL /HPF

## 2022-01-08 PROCEDURE — 99203 OFFICE O/P NEW LOW 30 MIN: CPT | Performed by: PHYSICIAN ASSISTANT

## 2022-01-08 PROCEDURE — 81001 URINALYSIS AUTO W/SCOPE: CPT | Performed by: PHYSICIAN ASSISTANT

## 2022-01-08 ASSESSMENT — MIFFLIN-ST. JEOR: SCORE: 1770.85

## 2022-01-08 NOTE — PROGRESS NOTES
"Assessment & Plan     1. Right flank pain  R/O nephrolithiasis.     - UA Macro with Reflex to Micro and Culture - lab collect; Future  - UA Macro with Reflex to Micro and Culture - lab collect  - Urine Microscopic    UA does show some blood in urine.   Currently now no pain. Tylenol as needed for pain. Follow up with PCP in 2 days. To the ED if pain comes back and is unmanageable.               R[facility] [place]    Marco Perez is a 41 year old female who presents to clinic today for the following health issues:  Chief Complaint   Patient presents with     Urgent Care     Back Pain     R side up to my back since thursday, get worst today.     HPI    Back pain for 2 days. Off and on with minimal pain now. Location is right upper flank and back. No UTI symptoms although darker urine 12/17/21. History of kidney stones on the left side x2 last bout 12 years ago. No fever. Always having fatigue.         Review of Systems        Objective    BP 99/66   Pulse 78   Temp 97.3  F (36.3  C) (Temporal)   Resp 16   Ht 1.727 m (5' 8\")   Wt 105.2 kg (232 lb)   LMP  (LMP Unknown)   SpO2 99%   BMI 35.28 kg/m    Physical Exam  Abdominal:      General: Abdomen is flat. Bowel sounds are normal.      Palpations: Abdomen is soft.      Tenderness: There is no abdominal tenderness.                    "

## 2022-01-10 ENCOUNTER — TELEPHONE (OUTPATIENT)
Dept: FAMILY MEDICINE | Facility: CLINIC | Age: 42
End: 2022-01-10
Payer: COMMERCIAL

## 2022-01-10 NOTE — TELEPHONE ENCOUNTER
Pt calls.    She was in UC over the weekend.  She has possible kidney stones.  Pt is to make an appt with pcp - she did that for Wednesday.  She is wondering if she can get the CT ordered ahead of time.  Advised that typically they will want to see her first.  She is not having pain right now.  Advised to keep her appt and to be seen before if pain worsens.

## 2022-01-12 ENCOUNTER — OFFICE VISIT (OUTPATIENT)
Dept: FAMILY MEDICINE | Facility: CLINIC | Age: 42
End: 2022-01-12
Payer: COMMERCIAL

## 2022-01-12 VITALS
RESPIRATION RATE: 16 BRPM | HEIGHT: 68 IN | SYSTOLIC BLOOD PRESSURE: 106 MMHG | BODY MASS INDEX: 36.34 KG/M2 | DIASTOLIC BLOOD PRESSURE: 66 MMHG | HEART RATE: 72 BPM | OXYGEN SATURATION: 99 % | WEIGHT: 239.8 LBS | TEMPERATURE: 97.6 F

## 2022-01-12 DIAGNOSIS — R31.9 HEMATURIA, UNSPECIFIED TYPE: Primary | ICD-10-CM

## 2022-01-12 DIAGNOSIS — C54.1 ENDOMETRIAL CANCER (H): ICD-10-CM

## 2022-01-12 DIAGNOSIS — E66.01 MORBID OBESITY (H): ICD-10-CM

## 2022-01-12 LAB
ALBUMIN UR-MCNC: NEGATIVE MG/DL
APPEARANCE UR: CLEAR
BILIRUB UR QL STRIP: NEGATIVE
COLOR UR AUTO: YELLOW
GLUCOSE UR STRIP-MCNC: NEGATIVE MG/DL
HGB UR QL STRIP: ABNORMAL
KETONES UR STRIP-MCNC: NEGATIVE MG/DL
LEUKOCYTE ESTERASE UR QL STRIP: NEGATIVE
NITRATE UR QL: NEGATIVE
PH UR STRIP: 5.5 [PH] (ref 5–7)
RBC #/AREA URNS AUTO: ABNORMAL /HPF
SP GR UR STRIP: 1.02 (ref 1–1.03)
UROBILINOGEN UR STRIP-ACNC: 0.2 E.U./DL
WBC #/AREA URNS AUTO: ABNORMAL /HPF

## 2022-01-12 PROCEDURE — 81001 URINALYSIS AUTO W/SCOPE: CPT | Performed by: NURSE PRACTITIONER

## 2022-01-12 PROCEDURE — 99214 OFFICE O/P EST MOD 30 MIN: CPT | Performed by: NURSE PRACTITIONER

## 2022-01-12 ASSESSMENT — MIFFLIN-ST. JEOR: SCORE: 1806.23

## 2022-01-12 ASSESSMENT — PAIN SCALES - GENERAL: PAINLEVEL: NO PAIN (0)

## 2022-01-12 NOTE — PROGRESS NOTES
"  Assessment & Plan     Hematuria, unspecified type  Continues to have blood in the urine though improving.  Will obtain a CT for further evaluation.    - UA Macro with Reflex to Micro and Culture - lab collect; Future  - UA Macro with Reflex to Micro and Culture - lab collect  - CT Abdomen Pelvis w Contrast; Future  - Urine Microscopic    Morbid obesity (H)  S/p bariatric surgery.     Endometrial cancer (H)  Continues to see oncology clinic; due for follow-up.                Return in about 3 months (around 4/12/2022) for Preventive Visit, In-Clinic Visit.    LEEROY Pal CNP  [facility] Perez is a 41 year old who presents for the following health issues     HPI     ED/UC Followup:    Facility:  [facility]  Date of visit: 1/8/2022  Reason for visit: Kidney Stone   Current Status: Patient still having dark urine, and some tenderness in lower back     Urine was darker than usual last night.   Hx of kidney stones in the past.   Pain is a little higher than usual.   Not sure if she has passed a stone.   Currently no pain.   Pain is intermittent--mild when occurs.       Review of Systems   Constitutional, HEENT, cardiovascular, pulmonary, gi and gu systems are negative, except as otherwise noted.      Objective    /66 (BP Location: Right arm, Patient Position: Chair, Cuff Size: Adult Large)   Pulse 72   Temp 97.6  F (36.4  C) (Oral)   Resp 16   Ht 1.727 m (5' 8\")   Wt 108.8 kg (239 lb 12.8 oz)   LMP  (LMP Unknown)   SpO2 99%   BMI 36.46 kg/m    Body mass index is 36.46 kg/m .  Physical Exam     Abd: soft, no tenderness, no HSM, no masses, bowel sounds normal   Back: no CVA tenderness              "

## 2022-01-15 ENCOUNTER — HOSPITAL ENCOUNTER (OUTPATIENT)
Dept: CT IMAGING | Facility: CLINIC | Age: 42
Discharge: HOME | End: 2022-01-15
Attending: NURSE PRACTITIONER | Admitting: NURSE PRACTITIONER
Payer: COMMERCIAL

## 2022-01-15 DIAGNOSIS — R31.9 HEMATURIA, UNSPECIFIED TYPE: ICD-10-CM

## 2022-01-15 PROCEDURE — 250N000011 HC RX IP 250 OP 636: Performed by: NURSE PRACTITIONER

## 2022-01-15 PROCEDURE — 74177 CT ABD & PELVIS W/CONTRAST: CPT

## 2022-01-15 PROCEDURE — 250N000009 HC RX 250: Performed by: NURSE PRACTITIONER

## 2022-01-15 RX ORDER — IOPAMIDOL 755 MG/ML
500 INJECTION, SOLUTION INTRAVASCULAR ONCE
Status: COMPLETED | OUTPATIENT
Start: 2022-01-15 | End: 2022-01-15

## 2022-01-15 RX ADMIN — IOPAMIDOL 117 ML: 755 INJECTION, SOLUTION INTRAVENOUS at 09:12

## 2022-01-15 RX ADMIN — SODIUM CHLORIDE 100 ML: 9 INJECTION, SOLUTION INTRAVENOUS at 09:13

## 2022-01-31 ENCOUNTER — MYC MEDICAL ADVICE (OUTPATIENT)
Dept: FAMILY MEDICINE | Facility: CLINIC | Age: 42
End: 2022-01-31
Payer: COMMERCIAL

## 2022-01-31 DIAGNOSIS — R31.9 HEMATURIA, UNSPECIFIED TYPE: Primary | ICD-10-CM

## 2022-02-01 ENCOUNTER — TELEPHONE (OUTPATIENT)
Dept: UROLOGY | Facility: CLINIC | Age: 42
End: 2022-02-01
Payer: COMMERCIAL

## 2022-02-01 NOTE — TELEPHONE ENCOUNTER
[facility] Call Center    Phone Message    May a detailed message be left on voicemail: yes     Reason for Call: Appointment Intake    Referring Provider Name: Glenna Ham APRN CNP   Diagnosis and/or Symptoms: Hematuria, unspecified type    Action Taken: Message routed to:  Other: [facility]    Travel Screening: Not Applicable

## 2022-08-10 ENCOUNTER — OFFICE VISIT (OUTPATIENT)
Dept: URGENT CARE | Facility: URGENT CARE | Age: 42
End: 2022-08-10
Payer: COMMERCIAL

## 2022-08-10 VITALS
BODY MASS INDEX: 36.37 KG/M2 | WEIGHT: 240 LBS | TEMPERATURE: 98.2 F | DIASTOLIC BLOOD PRESSURE: 76 MMHG | SYSTOLIC BLOOD PRESSURE: 124 MMHG | HEART RATE: 81 BPM | OXYGEN SATURATION: 100 % | HEIGHT: 68 IN

## 2022-08-10 DIAGNOSIS — R31.9 URINARY TRACT INFECTION WITH HEMATURIA, SITE UNSPECIFIED: ICD-10-CM

## 2022-08-10 DIAGNOSIS — N39.0 URINARY TRACT INFECTION WITH HEMATURIA, SITE UNSPECIFIED: ICD-10-CM

## 2022-08-10 DIAGNOSIS — R10.9 FLANK PAIN: Primary | ICD-10-CM

## 2022-08-10 LAB
ALBUMIN UR-MCNC: 30 MG/DL
APPEARANCE UR: CLEAR
BACTERIA #/AREA URNS HPF: ABNORMAL /HPF
BILIRUB UR QL STRIP: NEGATIVE
COLOR UR AUTO: YELLOW
GLUCOSE UR STRIP-MCNC: NEGATIVE MG/DL
HGB UR QL STRIP: ABNORMAL
KETONES UR STRIP-MCNC: NEGATIVE MG/DL
LEUKOCYTE ESTERASE UR QL STRIP: ABNORMAL
NITRATE UR QL: NEGATIVE
PH UR STRIP: 6 [PH] (ref 5–7)
RBC #/AREA URNS AUTO: ABNORMAL /HPF
SP GR UR STRIP: 1.01 (ref 1–1.03)
SQUAMOUS #/AREA URNS AUTO: ABNORMAL /LPF
UROBILINOGEN UR STRIP-ACNC: 0.2 E.U./DL
WBC #/AREA URNS AUTO: ABNORMAL /HPF

## 2022-08-10 PROCEDURE — 99213 OFFICE O/P EST LOW 20 MIN: CPT | Performed by: FAMILY MEDICINE

## 2022-08-10 PROCEDURE — 81001 URINALYSIS AUTO W/SCOPE: CPT | Performed by: FAMILY MEDICINE

## 2022-08-10 PROCEDURE — 87086 URINE CULTURE/COLONY COUNT: CPT | Performed by: FAMILY MEDICINE

## 2022-08-10 RX ORDER — SULFAMETHOXAZOLE/TRIMETHOPRIM 800-160 MG
1 TABLET ORAL 2 TIMES DAILY
Qty: 6 TABLET | Refills: 0 | Status: SHIPPED | OUTPATIENT
Start: 2022-08-10 | End: 2022-08-13

## 2022-08-10 NOTE — PROGRESS NOTES
"  Assessment & Plan     Urinary tract infection with hematuria, site unspecified    - sulfamethoxazole-trimethoprim (BACTRIM DS) 800-160 MG tablet; Take 1 tablet by mouth 2 times daily for 3 days    Will treat with Bactrim.  UC pending for sensitivities.    Flank pain    - UA with Microscopic reflex to Culture - Clinic Collect  - Urine Microscopic  - Urine Culture    Discussed possible kidney stone with imagine 1/2022 showing a 5 mm stone intrarenal.    Continue with increased fluids and rest.  Tylenol prn pain.  Close Follow-up for repeat imaging if pain worsens prn.    Tori Bell MD  [facility] 41 year [facility], presenting for the following health issues:  Back Pain (Started right hip area and now up her back x this morning -- )      HPI   Hx of RIGHT flank pain with hematuria noted on UA 1/2022.  Imaging showed 1/15/2022 a small right intrarenal stone 5mm without hydronephrosis  Today she began to have new RIGHT flank pain and generalized unwell feeling.  No fever.  Feels pain is moving up into flank now.  No N/V.      Review of Systems   Constitutional, HEENT, cardiovascular, pulmonary, GI, , musculoskeletal, neuro, skin, endocrine and psych systems are negative, except as otherwise noted.      Objective    /76 (BP Location: Right arm, Patient Position: Chair, Cuff Size: Adult Regular)   Pulse 81   Temp 98.2  F (36.8  C) (Oral)   Ht 1.727 m (5' 8\")   Wt 108.9 kg (240 lb)   LMP  (LMP Unknown)   SpO2 100%   Breastfeeding No   BMI 36.49 kg/m    Body mass index is 36.49 kg/m .  Physical Exam   GENERAL: healthy, alert and no distress  EYES: Eyes grossly normal to inspection, PERRL and conjunctivae and sclerae normal  ABDOMEN: soft, nontender  MS: no gross musculoskeletal defects noted, no edema    Results for orders placed or performed in visit on 08/10/22 (from the past 24 hour(s))   UA with Microscopic reflex to Culture - Clinic Collect    " Specimen: Urine, Midstream   Result Value Ref Range    Color Urine Yellow Colorless, Straw, Light Yellow, Yellow    Appearance Urine Clear Clear    Glucose Urine Negative Negative mg/dL    Bilirubin Urine Negative Negative    Ketones Urine Negative Negative mg/dL    Specific Gravity Urine 1.010 1.003 - 1.035    Blood Urine Large (A) Negative    pH Urine 6.0 5.0 - 7.0    Protein Albumin Urine 30  (A) Negative mg/dL    Urobilinogen Urine 0.2 0.2, 1.0 E.U./dL    Nitrite Urine Negative Negative    Leukocyte Esterase Urine Small (A) Negative   Urine Microscopic   Result Value Ref Range    Bacteria Urine Few (A) None Seen /HPF    RBC Urine 10-25 (A) 0-2 /HPF /HPF    WBC Urine 5-10 (A) 0-5 /HPF /HPF    Squamous Epithelials Urine Few (A) None Seen /LPF                   .  ..

## 2022-08-12 LAB — BACTERIA UR CULT: NO GROWTH

## 2022-09-10 LAB
ALBUMIN UR-MCNC: NEGATIVE MG/DL
APPEARANCE UR: CLEAR
BILIRUB UR QL STRIP: NEGATIVE
COLOR UR AUTO: ABNORMAL
GLUCOSE UR STRIP-MCNC: NEGATIVE MG/DL
HGB UR QL STRIP: NEGATIVE
KETONES UR STRIP-MCNC: ABNORMAL MG/DL
LEUKOCYTE ESTERASE UR QL STRIP: ABNORMAL
NITRATE UR QL: NEGATIVE
PH UR STRIP: 5 [PH] (ref 5–7)
RBC URINE: 2 /HPF
SP GR UR STRIP: 1.01 (ref 1–1.03)
SQUAMOUS EPITHELIAL: 1 /HPF
UROBILINOGEN UR STRIP-MCNC: NORMAL MG/DL
WBC URINE: 5 /HPF

## 2022-09-10 PROCEDURE — 99285 EMERGENCY DEPT VISIT HI MDM: CPT | Mod: 25

## 2022-09-10 PROCEDURE — 96375 TX/PRO/DX INJ NEW DRUG ADDON: CPT

## 2022-09-10 PROCEDURE — 96376 TX/PRO/DX INJ SAME DRUG ADON: CPT

## 2022-09-10 PROCEDURE — 81001 URINALYSIS AUTO W/SCOPE: CPT | Performed by: EMERGENCY MEDICINE

## 2022-09-10 PROCEDURE — 96361 HYDRATE IV INFUSION ADD-ON: CPT

## 2022-09-10 PROCEDURE — 96374 THER/PROPH/DIAG INJ IV PUSH: CPT | Mod: 59

## 2022-09-11 ENCOUNTER — HOSPITAL ENCOUNTER (EMERGENCY)
Facility: CLINIC | Age: 42
Discharge: HOME | End: 2022-09-11
Attending: EMERGENCY MEDICINE | Admitting: EMERGENCY MEDICINE
Payer: COMMERCIAL

## 2022-09-11 ENCOUNTER — APPOINTMENT (OUTPATIENT)
Dept: CT IMAGING | Facility: CLINIC | Age: 42
End: 2022-09-11
Attending: EMERGENCY MEDICINE
Payer: COMMERCIAL

## 2022-09-11 VITALS
TEMPERATURE: 98.4 F | SYSTOLIC BLOOD PRESSURE: 119 MMHG | RESPIRATION RATE: 22 BRPM | BODY MASS INDEX: 37.35 KG/M2 | WEIGHT: 238 LBS | DIASTOLIC BLOOD PRESSURE: 76 MMHG | OXYGEN SATURATION: 96 % | HEIGHT: 67 IN | HEART RATE: 65 BPM

## 2022-09-11 DIAGNOSIS — N20.1 URETERAL CALCULUS, RIGHT: ICD-10-CM

## 2022-09-11 DIAGNOSIS — N20.0 NEPHROLITHIASIS: ICD-10-CM

## 2022-09-11 LAB
ALBUMIN SERPL BCG-MCNC: 4.5 G/DL (ref 3.5–5.2)
ALP SERPL-CCNC: 83 U/L (ref 35–104)
ALT SERPL W P-5'-P-CCNC: 22 U/L (ref 10–35)
ANION GAP SERPL CALCULATED.3IONS-SCNC: 14 MMOL/L (ref 7–15)
AST SERPL W P-5'-P-CCNC: 28 U/L (ref 10–35)
BILIRUB DIRECT SERPL-MCNC: <0.2 MG/DL (ref 0–0.3)
BILIRUB SERPL-MCNC: 0.6 MG/DL
BUN SERPL-MCNC: 13.8 MG/DL (ref 6–20)
CALCIUM SERPL-MCNC: 10.1 MG/DL (ref 8.6–10)
CHLORIDE SERPL-SCNC: 99 MMOL/L (ref 98–107)
CREAT SERPL-MCNC: 1.13 MG/DL (ref 0.51–0.95)
DEPRECATED HCO3 PLAS-SCNC: 24 MMOL/L (ref 22–29)
ERYTHROCYTE [DISTWIDTH] IN BLOOD BY AUTOMATED COUNT: 13.1 % (ref 10–15)
GFR SERPL CREATININE-BSD FRML MDRD: 63 ML/MIN/1.73M2
GLUCOSE SERPL-MCNC: 119 MG/DL (ref 70–99)
HCG INTACT+B SERPL-ACNC: 2 MIU/ML
HCT VFR BLD AUTO: 43 % (ref 35–47)
HGB BLD-MCNC: 13.8 G/DL (ref 11.7–15.7)
LIPASE SERPL-CCNC: 32 U/L (ref 13–60)
MCH RBC QN AUTO: 30.1 PG (ref 26.5–33)
MCHC RBC AUTO-ENTMCNC: 32.1 G/DL (ref 31.5–36.5)
MCV RBC AUTO: 94 FL (ref 78–100)
PLATELET # BLD AUTO: 267 10E3/UL (ref 150–450)
POTASSIUM SERPL-SCNC: 4.6 MMOL/L (ref 3.4–5.3)
PROT SERPL-MCNC: 7.7 G/DL (ref 6.4–8.3)
RBC # BLD AUTO: 4.59 10E6/UL (ref 3.8–5.2)
SODIUM SERPL-SCNC: 137 MMOL/L (ref 136–145)
WBC # BLD AUTO: 12.3 10E3/UL (ref 4–11)

## 2022-09-11 PROCEDURE — 258N000003 HC RX IP 258 OP 636: Performed by: EMERGENCY MEDICINE

## 2022-09-11 PROCEDURE — 250N000009 HC RX 250: Performed by: EMERGENCY MEDICINE

## 2022-09-11 PROCEDURE — 250N000011 HC RX IP 250 OP 636: Performed by: EMERGENCY MEDICINE

## 2022-09-11 PROCEDURE — 74177 CT ABD & PELVIS W/CONTRAST: CPT

## 2022-09-11 PROCEDURE — 80053 COMPREHEN METABOLIC PANEL: CPT | Performed by: EMERGENCY MEDICINE

## 2022-09-11 PROCEDURE — 85027 COMPLETE CBC AUTOMATED: CPT | Performed by: EMERGENCY MEDICINE

## 2022-09-11 PROCEDURE — 36415 COLL VENOUS BLD VENIPUNCTURE: CPT | Performed by: EMERGENCY MEDICINE

## 2022-09-11 PROCEDURE — 84702 CHORIONIC GONADOTROPIN TEST: CPT | Performed by: EMERGENCY MEDICINE

## 2022-09-11 PROCEDURE — 83690 ASSAY OF LIPASE: CPT | Performed by: EMERGENCY MEDICINE

## 2022-09-11 PROCEDURE — 80048 BASIC METABOLIC PNL TOTAL CA: CPT | Performed by: EMERGENCY MEDICINE

## 2022-09-11 PROCEDURE — 250N000013 HC RX MED GY IP 250 OP 250 PS 637: Performed by: EMERGENCY MEDICINE

## 2022-09-11 PROCEDURE — 82248 BILIRUBIN DIRECT: CPT | Performed by: EMERGENCY MEDICINE

## 2022-09-11 RX ORDER — ONDANSETRON 4 MG/1
4 TABLET, ORALLY DISINTEGRATING ORAL EVERY 6 HOURS PRN
Qty: 10 TABLET | Refills: 0 | Status: SHIPPED | OUTPATIENT
Start: 2022-09-11 | End: 2022-09-14

## 2022-09-11 RX ORDER — TAMSULOSIN HYDROCHLORIDE 0.4 MG/1
0.4 CAPSULE ORAL DAILY
Qty: 7 CAPSULE | Refills: 0 | Status: SHIPPED | OUTPATIENT
Start: 2022-09-11 | End: 2022-09-18

## 2022-09-11 RX ORDER — ONDANSETRON 2 MG/ML
4 INJECTION INTRAMUSCULAR; INTRAVENOUS ONCE
Status: COMPLETED | OUTPATIENT
Start: 2022-09-11 | End: 2022-09-11

## 2022-09-11 RX ORDER — HYDROMORPHONE HYDROCHLORIDE 1 MG/ML
0.5 INJECTION, SOLUTION INTRAMUSCULAR; INTRAVENOUS; SUBCUTANEOUS ONCE
Status: COMPLETED | OUTPATIENT
Start: 2022-09-11 | End: 2022-09-11

## 2022-09-11 RX ORDER — IOPAMIDOL 755 MG/ML
500 INJECTION, SOLUTION INTRAVASCULAR ONCE
Status: COMPLETED | OUTPATIENT
Start: 2022-09-11 | End: 2022-09-11

## 2022-09-11 RX ORDER — OXYCODONE HYDROCHLORIDE 5 MG/1
5 TABLET ORAL ONCE
Status: COMPLETED | OUTPATIENT
Start: 2022-09-11 | End: 2022-09-11

## 2022-09-11 RX ORDER — OXYCODONE HYDROCHLORIDE 5 MG/1
5 TABLET ORAL EVERY 6 HOURS PRN
Qty: 12 TABLET | Refills: 0 | Status: ON HOLD | OUTPATIENT
Start: 2022-09-11 | End: 2022-10-03

## 2022-09-11 RX ADMIN — IOPAMIDOL 100 ML: 755 INJECTION, SOLUTION INTRAVENOUS at 03:44

## 2022-09-11 RX ADMIN — OXYCODONE HYDROCHLORIDE 5 MG: 5 TABLET ORAL at 05:46

## 2022-09-11 RX ADMIN — ONDANSETRON 4 MG: 2 INJECTION INTRAMUSCULAR; INTRAVENOUS at 00:59

## 2022-09-11 RX ADMIN — SODIUM CHLORIDE 1000 ML: 9 INJECTION, SOLUTION INTRAVENOUS at 01:00

## 2022-09-11 RX ADMIN — HYDROMORPHONE HYDROCHLORIDE 0.5 MG: 1 INJECTION, SOLUTION INTRAMUSCULAR; INTRAVENOUS; SUBCUTANEOUS at 04:55

## 2022-09-11 RX ADMIN — HYDROMORPHONE HYDROCHLORIDE 0.5 MG: 1 INJECTION, SOLUTION INTRAMUSCULAR; INTRAVENOUS; SUBCUTANEOUS at 02:43

## 2022-09-11 RX ADMIN — ONDANSETRON 4 MG: 2 INJECTION INTRAMUSCULAR; INTRAVENOUS at 04:55

## 2022-09-11 RX ADMIN — SODIUM CHLORIDE, POTASSIUM CHLORIDE, SODIUM LACTATE AND CALCIUM CHLORIDE 1000 ML: 600; 310; 30; 20 INJECTION, SOLUTION INTRAVENOUS at 02:43

## 2022-09-11 RX ADMIN — SODIUM CHLORIDE 65 ML: 9 INJECTION, SOLUTION INTRAVENOUS at 03:44

## 2022-09-11 RX ADMIN — ONDANSETRON 4 MG: 2 INJECTION INTRAMUSCULAR; INTRAVENOUS at 02:43

## 2022-09-11 ASSESSMENT — ACTIVITIES OF DAILY LIVING (ADL)
ADLS_ACUITY_SCORE: 35
ADLS_ACUITY_SCORE: 35

## 2022-09-11 NOTE — ED TRIAGE NOTES
Intermittent Rt flank pain x 1 year.  Pain suddenly worse over last several hours.  Pt reports mild nausea and chills.  Pt had a CT in January that showed an intrarenal stone.

## 2022-09-12 ASSESSMENT — ENCOUNTER SYMPTOMS
DYSURIA: 0
COUGH: 0
SHORTNESS OF BREATH: 0
HEMATURIA: 0
FEVER: 0
ABDOMINAL PAIN: 1
NAUSEA: 1
VOMITING: 0
FLANK PAIN: 1

## 2022-09-13 NOTE — ED PROVIDER NOTES
History     Chief Complaint:  Flank Pain       HPI   Erika Reina is a 41 year old female who presents with chief complaint right-sided flank pain.  She reports intermittent right flank pain for the last year.  It has never been as bad as it is tonight.  Her last episode was about 1 month ago where she was evaluated by an urgent care and diagnosed with UTI.  Tonight, pain became severe.  She is unable to take NSAIDs due to history of gastric bypass.  She denies fevers or chills.  She reports nausea.    Allergies:  Asa [Aspirin]  Ibuprofen  Nsaids     Medications:      acetaminophen (TYLENOL) 500 MG tablet  BIOTIN PO  Calcium Citrate-Vitamin D (CALCIUM CITRATE + D PO)  childrens multivitamin w/iron (FLINTSTONES COMPLETE) 18 MG chewable tablet  Cyanocobalamin (VITAMIN B 12 PO)  MELATONIN PO  omeprazole (PRILOSEC) 20 MG DR capsule  vitamin B-Complex  Vitamin D3 (CHOLECALCIFEROL) 125 MCG (5000 UT) tablet        Past Medical History:    Past Medical History:   Diagnosis Date     DVT of lower extremity (deep venous thrombosis) (H) 04/2020     Endometrial cancer (H) 12/19/2018     Endometrial cancer (H)      Hyperlipidemia LDL goal <160 9/15/2011     Sleep apnea        Patient Active Problem List    Diagnosis Date Noted     Neck pain 09/29/2021     Priority: Medium     Bariatric surgery status 01/06/2021     Priority: Medium     History of deep venous thrombosis 09/28/2020     Priority: Medium     Single occurrence unprovoked DVT       ENRCIO (obstructive sleep apnea) 12/03/2019     Priority: Medium     Since losing 100 pounds has stopped using CPAP.       Endometrial cancer (H) 11/20/2018     Priority: Medium     [facility] data entered.  Give at 7/11/19 apt.  Hysterectomy 12/2018       Plantar fasciitis 10/18/2016     Priority: Medium     Morbid obesity (H) 06/29/2012     Priority: Medium     Hyperlipidemia LDL goal <160 09/15/2011     Priority: Medium        Past Surgical History:    Past Surgical History:   Procedure  Laterality Date     ABDOMEN SURGERY  12/18/18    hysterectomy     ARTHROPLASTY KNEE Right      BIOPSY  11 2018    endometrial layer     CYSTOSCOPY N/A 12/13/2018    Procedure: Cystoscopy;  Surgeon: Cleopatra Altamirano MD;  Location: [facility] OR     DAVINCI HYSTERECTOMY TOTAL, BILATERAL SALPINGO-OOPHORECTOMY, NODE DISSECTION, COMBINED N/A 12/13/2018    Procedure: DaVinci Assisted Removal Of Uterus, Cervix, Both Ovaries And Fallopian Tubes, Bilateral [place] Lymph Node Dissection;  Surgeon: Cleopatra Altamirano MD;  Location: [facility] OR     GYN SURGERY  12/18/18    hysterectomy     LAPAROSCOPIC BYPASS GASTRIC N/A 10/5/2020    Procedure: LAPAROSCOPIC LE-EN-Y GASTRIC BYPASS;  Surgeon: Nicanor Puri MD;  Location:  OR        Family History:    family history includes Cancer - colorectal in her maternal grandmother; Colon Cancer in her maternal grandmother; Diabetes in her father and mother; Heart Disease in her paternal grandmother; Hypertension in her father and mother; Obesity in her brother and mother; Sleep Apnea in her brother and father.    Social History:   reports that she has never smoked. She has never used smokeless tobacco. She reports previous alcohol use. She reports that she does not use drugs.    PCP: Glenna Ham     Review of Systems   Constitutional: Negative for fever.   Respiratory: Negative for cough and shortness of breath.    Cardiovascular: Negative for chest pain.   Gastrointestinal: Positive for abdominal pain and nausea. Negative for vomiting.   Genitourinary: Positive for flank pain. Negative for dysuria and hematuria.   All other systems reviewed and are negative.        Physical Exam     Physical Exam  Nursing note and vitals reviewed.  HENT:   Mouth/Throat: Moist mucous membranes.   Eyes: EOMI, nonicteric sclera  Cardiovascular: Normal rate, regular rhythm, no murmurs, rubs, or gallops  Pulmonary/Chest: Effort normal and breath sounds normal. No respiratory distress. No  wheezes. No rales.   Abdominal: Soft. TTP RLQ, R) CVA tenderness, nondistended, no guarding or rigidity.   Musculoskeletal: Normal range of motion.   Neurological: Alert. Moves all extremities spontaneously.   Skin: Skin is warm and dry. No rash noted.   Psychiatric: Normal mood and affect.       Emergency Department Course         Imaging:    Abd/pelvis CT,  IV  contrast only TRAUMA / AAA   Final Result   IMPRESSION:    1.  4 mm obstructing stone distal right ureter resulting in mild right hydronephrosis.      2.  18 x 9 mm nonobstructing stone in the right renal pelvis with two additional smaller nonobstructing stones lower pole right kidney.      3.  Normal appendix.      4.  Postoperative changes of gastric bypass surgery and hysterectomy.            Imaging independently reviewed and agree with radiologist interpretation.     Laboratory:    Labs Ordered and Resulted from Time of ED Arrival to Time of ED Departure   ROUTINE UA WITH MICROSCOPIC REFLEX TO CULTURE - Abnormal       Result Value    Color Urine Light Yellow      Appearance Urine Clear      Glucose Urine Negative      Bilirubin Urine Negative      Ketones Urine Trace (*)     Specific Gravity Urine 1.012      Blood Urine Negative      pH Urine 5.0      Protein Albumin Urine Negative      Urobilinogen Urine Normal      Nitrite Urine Negative      Leukocyte Esterase Urine Small (*)     RBC Urine 2      WBC Urine 5      Squamous Epithelials Urine 1     CBC WITH PLATELETS - Abnormal    WBC Count 12.3 (*)     RBC Count 4.59      Hemoglobin 13.8      Hematocrit 43.0      MCV 94      MCH 30.1      MCHC 32.1      RDW 13.1      Platelet Count 267     BASIC METABOLIC PANEL - Abnormal    Creatinine 1.13 (*)     Sodium 137      Potassium 4.6      Urea Nitrogen 13.8      Chloride 99      Carbon Dioxide (CO2) 24      Anion Gap 14      Glucose 119 (*)     GFR Estimate 63      Calcium 10.1 (*)    HCG QUANTITATIVE PREGNANCY - Normal    hCG Quantitative 2     HEPATIC  FUNCTION PANEL - Normal    Protein Total 7.7      Albumin 4.5      Bilirubin Total 0.6      Alkaline Phosphatase 83      AST 28      ALT 22      Bilirubin Direct <0.20     LIPASE - Normal    Lipase 32          Interventions:    Medications   ondansetron (ZOFRAN) injection 4 mg (4 mg Intravenous Given 9/11/22 0059)   0.9% sodium chloride BOLUS (0 mLs Intravenous Stopped 9/11/22 0247)   HYDROmorphone (PF) (DILAUDID) injection 0.5 mg (0.5 mg Intravenous Given 9/11/22 0243)   ondansetron (ZOFRAN) injection 4 mg (4 mg Intravenous Given 9/11/22 0243)   lactated ringers BOLUS 1,000 mL (0 mLs Intravenous Stopped 9/11/22 0549)   iopamidol (ISOVUE-370) solution 500 mL (100 mLs Intravenous Given 9/11/22 0344)   Sodium Chloride for CT Scan Flush Use (65 mLs Intravenous Given 9/11/22 0344)   HYDROmorphone (PF) (DILAUDID) injection 0.5 mg (0.5 mg Intravenous Given 9/11/22 0455)   ondansetron (ZOFRAN) injection 4 mg (4 mg Intravenous Given 9/11/22 0455)   oxyCODONE (ROXICODONE) tablet 5 mg (5 mg Oral Given 9/11/22 0546)          Impression & Plan         Medical Decision Making:  The patient presented with unilateral flank and abdominal pain consistent with renal colic. CT confirms a ureteral stone. Pain is controlled with interventions in the Emergency Department. There is no fever or evidence of a urinary tract infection. The patient will be discharged with oxycodone for pain. Flomax will be prescribed daily to attempt to ease stone passage.   Zofran was prescribed for nausea.  I considered other etiologies for these symptoms including AAA and pyelonephritis but these are unlikely given the otherwise normal CT scan and urinalysis.  The patient is instructed to return if increasing pain not controlled with pain meds, vomiting, and fever. Strain urine to look for stone, if detected, submit to primary doctor for lab analysis.  Discussed with patient that she will need to follow-up with urology regardless of if the stone  spontaneously passes as she has a large stone in her right kidney that will require future intervention. She is in stable condition at the time of discharge, indications for return to the ED were discussed as well as follow up. All questions were answered and she is in agreement with the plan.        Diagnosis:    ICD-10-CM    1. Ureteral calculus, right  N20.1    2. Nephrolithiasis  N20.0         Discharge Medications:  Discharge Medication List as of 9/11/2022  5:50 AM      START taking these medications    Details   ondansetron (ZOFRAN ODT) 4 MG ODT tab Take 1 tablet (4 mg) by mouth every 6 hours as needed for nausea or vomiting, Disp-10 tablet, R-0, E-Prescribe      oxyCODONE (ROXICODONE) 5 MG tablet Take 1 tablet (5 mg) by mouth every 6 hours as needed for severe pain, Disp-12 tablet, R-0, E-Prescribe      tamsulosin (FLOMAX) 0.4 MG capsule Take 1 capsule (0.4 mg) by mouth daily for 7 doses, Disp-7 capsule, R-0, E-Prescribe                 Javier Diane MD  09/12/22 2020

## 2022-09-20 ENCOUNTER — OFFICE VISIT (OUTPATIENT)
Dept: UROLOGY | Facility: CLINIC | Age: 42
End: 2022-09-20
Payer: COMMERCIAL

## 2022-09-20 ENCOUNTER — TELEPHONE (OUTPATIENT)
Dept: UROLOGY | Facility: CLINIC | Age: 42
End: 2022-09-20

## 2022-09-20 VITALS
WEIGHT: 238 LBS | HEART RATE: 74 BPM | OXYGEN SATURATION: 97 % | HEIGHT: 67 IN | SYSTOLIC BLOOD PRESSURE: 120 MMHG | BODY MASS INDEX: 37.35 KG/M2 | DIASTOLIC BLOOD PRESSURE: 80 MMHG

## 2022-09-20 DIAGNOSIS — N20.1 URETERAL STONE: Primary | ICD-10-CM

## 2022-09-20 DIAGNOSIS — N20.0 RENAL STONE: ICD-10-CM

## 2022-09-20 LAB
ALBUMIN UR-MCNC: 30 MG/DL
APPEARANCE UR: CLEAR
BACTERIA #/AREA URNS HPF: ABNORMAL /HPF
BILIRUB UR QL STRIP: NEGATIVE
CAOX CRY #/AREA URNS HPF: ABNORMAL /HPF
COLOR UR AUTO: YELLOW
GLUCOSE UR STRIP-MCNC: NEGATIVE MG/DL
HGB UR QL STRIP: ABNORMAL
KETONES UR STRIP-MCNC: NEGATIVE MG/DL
LEUKOCYTE ESTERASE UR QL STRIP: ABNORMAL
NITRATE UR QL: NEGATIVE
PH UR STRIP: 5.5 [PH] (ref 5–7)
RBC #/AREA URNS AUTO: ABNORMAL /HPF
SP GR UR STRIP: 1.02 (ref 1–1.03)
UROBILINOGEN UR STRIP-ACNC: 1 E.U./DL
WBC #/AREA URNS AUTO: ABNORMAL /HPF

## 2022-09-20 PROCEDURE — 99000 SPECIMEN HANDLING OFFICE-LAB: CPT | Performed by: PHYSICIAN ASSISTANT

## 2022-09-20 PROCEDURE — 81001 URINALYSIS AUTO W/SCOPE: CPT | Performed by: PHYSICIAN ASSISTANT

## 2022-09-20 PROCEDURE — 99204 OFFICE O/P NEW MOD 45 MIN: CPT | Performed by: PHYSICIAN ASSISTANT

## 2022-09-20 PROCEDURE — 82365 CALCULUS SPECTROSCOPY: CPT | Mod: 90 | Performed by: PHYSICIAN ASSISTANT

## 2022-09-20 ASSESSMENT — PAIN SCALES - GENERAL: PAINLEVEL: NO PAIN (0)

## 2022-09-20 NOTE — LETTER
"9/20/2022       RE: Erika Reina  [address] [facility] [id number]     Dear Colleague,    Thank you for referring your patient, Erika Reina, to the [facility] at [facility]. Please see a copy of my visit note below.    CC: Kidney stone.    HPI: It is a pleasure to see Ms. James \"Ana\" Nuno, a pleasant 41 year old female seen today in ER follow up for evaluation of the above. This was first detected on CT in the ED on 9/11/22 after the patient presented complaining of acute renal colic symptoms. The CT noted a 4 mm obstructing stone in the distal right ureter just above the UVJ resulting in mild right hydronephrosis and a nonobstructing 18 x 9 mm stone in the right renal pelvis. There were two additional nonobstructing stones in the lower pole right kidney measuring up to 4 mm in diameter. UA in the ED was negative for infection. BMP revealed Cr 1.13 and Ca to be 10.1. With pain under good control, the patient was discharged with a prescription for tamsulosin and instructions to follow up with urology.    Of note, was referred to Urology in Jan for gross hematuria. CT 1/15/22 noted a new right intrarenal stone at the right renal pelvis that is 0.5 cm.      Currently, the patient reports capuring stone.  Denies gross hematuria, dysuria, fevers, chills, N/V. No prior history of kidney stones.    RECENT IMAGING:  EXAM: CT ABDOMEN AND PELVIS WITH CONTRAST  LOCATION: [facility]  DATE/TIME: 09/11/2022, 3:41 AM     INDICATION: Right flank and right lower quadrant pain, evaluate renal pathology vs appendicitis.  COMPARISON: 01/15/2022.  TECHNIQUE: CT scan of the abdomen and pelvis was performed following injection of IV contrast. Multiplanar reformats were obtained. Dose reduction techniques were used.  CONTRAST: 100 mL Isovue 370.     FINDINGS:   LOWER CHEST: Normal.     HEPATOBILIARY: " Normal.     PANCREAS: Normal.     SPLEEN: Normal.     ADRENAL GLANDS: Normal.     KIDNEYS/BLADDER: 4 mm obstructing stone in the distal right ureter just above the UVJ resulting in mild right hydronephrosis. Nonobstructing 18 x 9 mm stone in the right renal pelvis. Two additional nonobstructing stones lower pole right kidney measuring   up to 4 mm in diameter. Benign parapelvic right renal cyst. Benign cortical and parapelvic left renal cysts with no specific follow-up needed. No left-sided hydronephrosis. Bladder is unremarkable.     BOWEL: Postoperative changes of gastric bypass surgery. No evidence for bowel obstruction or acute bowel findings. Appendix is normal.     LYMPH NODES: Normal.     VASCULATURE: Unremarkable.     PELVIC ORGANS: Hysterectomy.     MUSCULOSKELETAL: Normal.                                                                      IMPRESSION:   1.  4 mm obstructing stone distal right ureter resulting in mild right hydronephrosis.     2.  18 x 9 mm nonobstructing stone in the right renal pelvis with two additional smaller nonobstructing stones lower pole right kidney.     3.  Normal appendix.     4.  Postoperative changes of gastric bypass surgery and hysterectomy.     Past Medical History:   Diagnosis Date     DVT of lower extremity (deep venous thrombosis) (H) 04/2020    right ankle     Endometrial cancer (H) 12/19/2018     Endometrial cancer (H)      Hyperlipidemia LDL goal <160 9/15/2011     Sleep apnea        Past Surgical History:   Procedure Laterality Date     ABDOMEN SURGERY  12/18/18    hysterectomy     ARTHROPLASTY KNEE Right      BIOPSY  11 2018    endometrial layer     CYSTOSCOPY N/A 12/13/2018    Procedure: Cystoscopy;  Surgeon: Cleopatra Altamirano MD;  Location: [facility] OR     DAVINCI HYSTERECTOMY TOTAL, BILATERAL SALPINGO-OOPHORECTOMY, NODE DISSECTION, COMBINED N/A 12/13/2018    Procedure: DaVinci Assisted Removal Of Uterus, Cervix, Both Ovaries And Fallopian Tubes, Bilateral  [place] Lymph Node Dissection;  Surgeon: Cleopatra Altamirano MD;  Location: [facility] OR     GYN SURGERY  12/18/18    hysterectomy     LAPAROSCOPIC BYPASS GASTRIC N/A 10/5/2020    Procedure: LAPAROSCOPIC LE-EN-Y GASTRIC BYPASS;  Surgeon: Nicanor Puri MD;  Location:  OR       Current Outpatient Medications   Medication Sig Dispense Refill     acetaminophen (TYLENOL) 500 MG tablet Take 500 mg by mouth every 8 hours as needed for mild pain (Patient not taking: Reported on 8/10/2022)       BIOTIN PO        Calcium Citrate-Vitamin D (CALCIUM CITRATE + D PO) Take 1 chew tab by mouth 3 times daily Bariatric advantage: + 500 international unit(s) vitamin D       childrens multivitamin w/iron (FLINTSTONES COMPLETE) 18 MG chewable tablet Take 2 chew tab by mouth daily       Cyanocobalamin (VITAMIN B 12 PO) Take 5,000 mcg by mouth SL liquid 5000 mcg/mL       MELATONIN PO Take 1 Dose by mouth nightly as needed       omeprazole (PRILOSEC) 20 MG DR capsule Take 20 mg by mouth daily before breakfast       oxyCODONE (ROXICODONE) 5 MG tablet Take 1 tablet (5 mg) by mouth every 6 hours as needed for severe pain 12 tablet 0     vitamin B-Complex Take 1 tablet by mouth every other day       Vitamin D3 (CHOLECALCIFEROL) 125 MCG (5000 UT) tablet Take 125 mcg by mouth daily         Allergies   Allergen Reactions     Asa [Aspirin] Other (See Comments)     Gastric bypass 10/5/2020     Ibuprofen Swelling     Swelling of legs     Nsaids      Bad reaction       FAMILY HISTORY: There is no family h/o  malignancy.  There is no family h/o urolithiasis.     Social History     Socioeconomic History     Marital status: Single     Spouse name: Not on file     Number of children: Not on file     Years of education: Not on file     Highest education level: Not on file   Occupational History     Not on file   Tobacco Use     Smoking status: Never Smoker     Smokeless tobacco: Never Used   Vaping Use     Vaping Use: Never used   Substance and  Sexual Activity     Alcohol use: Not Currently     Alcohol/week: 0.0 standard drinks     Comment: rarely.  Once monthly 1-2 drinks     Drug use: No     Sexual activity: Yes     Partners: Male     Birth control/protection: Condom   Other Topics Concern     Parent/sibling w/ CABG, MI or angioplasty before 65F 55M? No   Social History Narrative     Not on file     Social Determinants of Health     Financial Resource Strain: Not on file   Food Insecurity: Not on file   Transportation Needs: Not on file   Physical Activity: Not on file   Stress: Not on file   Social Connections: Not on file   Intimate Partner Violence: Not on file   Housing Stability: Not on file         GENERAL PHYSICAL EXAM:   LMP  (LMP Unknown)   PSYCH: NAD  NEURO: AAO x3    UA  Mod blood.      ASSESSMENT AND PLAN: 41 year old female with a large right-sided calculus and recently captured right ureteral stone  -Stone analysis  -CT images reviewed with her.   - Warning signs discussed including fevers, chills, N/V, gross hematuria, severe pain that is refractory to medications which should prompt more urgent evaluation. Patient understands to proceed to the ER should these warning signs occur outside of clinic hours.    During counseling for this visit, we covered the natural history of kidney stones, the risk of progression to symptomatic pain/infection, and the possibility of renal failure/kidney damage.  We covered treatment options including ureteroscopy/laser/stent and and percutaneous nephrolithotomy (PCNL).   Risks including need for secondary procedure (50% for URS), incomplete stone clearance, ureteral or bladder injury, hematuria, stent pain and irritation were discussed. Will plan to get her scheduled with Dr. Mark in the operating room soon.     Standard recommendations for kidney stone prevention were discussed.  Neph referral for medical management and prevention.       Lillian Abdullahi PA-C  [facility]      26 minutes spent on the  date of the encounter doing chart review, review of outside records, review of test results, interpretation of tests, patient visit and documentation and review of CT images.

## 2022-09-20 NOTE — PATIENT INSTRUCTIONS
Please make an appt with Nephrology for prevention of kidney stones.   [facility]  [facility]    [address]  [place]  [address]  T:  [phone]  _____________________________________________________    Standard recommendations for kidney stone prevention:  -These include maintaining fluid intake of 3 liters per day or more with a goal of making 2 or more liters of urine per day.  If alcoholic or caffeinated beverages are consumed, you need to drink water along with these beverages to maintain hydration.    -A few ounces of lemon juice concentrate a day diluted in water can help prevent stones (citrate is a stone inhibitor).    -Sodium influences calcium excretion in the urine. Limit intake of red meat, salt, and salty processed foods.    -Uric acid-high levels in the blood can lead to kidney stones and gout, especially if the urine pH is low.  Reduce protein intake, especially red meats.  -Weight loss, if overweight, can reduce the recurrence of kidney stones.  -Diabetes-if diabetic, you are at a greater risk of having kidney stones.  -Maintain calcium intake in diet through continued consumption of dairy products etc.    -Limit foods that are high in oxalate such as spinach, sweet potatoes, dark chocolate, soy products, and some nuts such as peanuts.   -Medications that can increase risk of kidney stones: Ephedrine, Guaifenesin, Triamterene, Lasix, Diamox, Topamax, Zonegran, laxatives.     -Additional/different recommendations pending any stone analysis.

## 2022-09-20 NOTE — PROGRESS NOTES
"CC: Kidney stone.    HPI: It is a pleasure to see Ms. James \"Ana\" Nuno, a pleasant 41 year old female seen today in ER follow up for evaluation of the above. This was first detected on CT in the ED on 9/11/22 after the patient presented complaining of acute renal colic symptoms. The CT noted a 4 mm obstructing stone in the distal right ureter just above the UVJ resulting in mild right hydronephrosis and a nonobstructing 18 x 9 mm stone in the right renal pelvis. There were two additional nonobstructing stones in the lower pole right kidney measuring up to 4 mm in diameter. UA in the ED was negative for infection. BMP revealed Cr 1.13 and Ca to be 10.1. With pain under good control, the patient was discharged with a prescription for tamsulosin and instructions to follow up with urology.    Of note, was referred to Urology in Jan for gross hematuria. CT 1/15/22 noted a new right intrarenal stone at the right renal pelvis that is 0.5 cm.      Currently, the patient reports capuring stone.  Denies gross hematuria, dysuria, fevers, chills, N/V. No prior history of kidney stones.    RECENT IMAGING:  EXAM: CT ABDOMEN AND PELVIS WITH CONTRAST  LOCATION: [facility]  DATE/TIME: 09/11/2022, 3:41 AM     INDICATION: Right flank and right lower quadrant pain, evaluate renal pathology vs appendicitis.  COMPARISON: 01/15/2022.  TECHNIQUE: CT scan of the abdomen and pelvis was performed following injection of IV contrast. Multiplanar reformats were obtained. Dose reduction techniques were used.  CONTRAST: 100 mL Isovue 370.     FINDINGS:   LOWER CHEST: Normal.     HEPATOBILIARY: Normal.     PANCREAS: Normal.     SPLEEN: Normal.     ADRENAL GLANDS: Normal.     KIDNEYS/BLADDER: 4 mm obstructing stone in the distal right ureter just above the UVJ resulting in mild right hydronephrosis. Nonobstructing 18 x 9 mm stone in the right renal pelvis. Two additional nonobstructing stones lower pole right kidney " measuring   up to 4 mm in diameter. Benign parapelvic right renal cyst. Benign cortical and parapelvic left renal cysts with no specific follow-up needed. No left-sided hydronephrosis. Bladder is unremarkable.     BOWEL: Postoperative changes of gastric bypass surgery. No evidence for bowel obstruction or acute bowel findings. Appendix is normal.     LYMPH NODES: Normal.     VASCULATURE: Unremarkable.     PELVIC ORGANS: Hysterectomy.     MUSCULOSKELETAL: Normal.                                                                      IMPRESSION:   1.  4 mm obstructing stone distal right ureter resulting in mild right hydronephrosis.     2.  18 x 9 mm nonobstructing stone in the right renal pelvis with two additional smaller nonobstructing stones lower pole right kidney.     3.  Normal appendix.     4.  Postoperative changes of gastric bypass surgery and hysterectomy.     Past Medical History:   Diagnosis Date     DVT of lower extremity (deep venous thrombosis) (H) 04/2020    right ankle     Endometrial cancer (H) 12/19/2018     Endometrial cancer (H)      Hyperlipidemia LDL goal <160 9/15/2011     Sleep apnea        Past Surgical History:   Procedure Laterality Date     ABDOMEN SURGERY  12/18/18    hysterectomy     ARTHROPLASTY KNEE Right      BIOPSY  11 2018    endometrial layer     CYSTOSCOPY N/A 12/13/2018    Procedure: Cystoscopy;  Surgeon: Cleopatra Altamirano MD;  Location: [facility] OR     DAVINCI HYSTERECTOMY TOTAL, BILATERAL SALPINGO-OOPHORECTOMY, NODE DISSECTION, COMBINED N/A 12/13/2018    Procedure: DaVinci Assisted Removal Of Uterus, Cervix, Both Ovaries And Fallopian Tubes, Bilateral [place] Lymph Node Dissection;  Surgeon: Cleopatar Altamirano MD;  Location: [facility] OR     GYN SURGERY  12/18/18    hysterectomy     LAPAROSCOPIC BYPASS GASTRIC N/A 10/5/2020    Procedure: LAPAROSCOPIC LE-EN-Y GASTRIC BYPASS;  Surgeon: Nicanor Puri MD;  Location:  OR       Current Outpatient Medications   Medication  Sig Dispense Refill     acetaminophen (TYLENOL) 500 MG tablet Take 500 mg by mouth every 8 hours as needed for mild pain (Patient not taking: Reported on 8/10/2022)       BIOTIN PO        Calcium Citrate-Vitamin D (CALCIUM CITRATE + D PO) Take 1 chew tab by mouth 3 times daily Bariatric advantage: + 500 international unit(s) vitamin D       childrens multivitamin w/iron (FLINTSTONES COMPLETE) 18 MG chewable tablet Take 2 chew tab by mouth daily       Cyanocobalamin (VITAMIN B 12 PO) Take 5,000 mcg by mouth SL liquid 5000 mcg/mL       MELATONIN PO Take 1 Dose by mouth nightly as needed       omeprazole (PRILOSEC) 20 MG DR capsule Take 20 mg by mouth daily before breakfast       oxyCODONE (ROXICODONE) 5 MG tablet Take 1 tablet (5 mg) by mouth every 6 hours as needed for severe pain 12 tablet 0     vitamin B-Complex Take 1 tablet by mouth every other day       Vitamin D3 (CHOLECALCIFEROL) 125 MCG (5000 UT) tablet Take 125 mcg by mouth daily         Allergies   Allergen Reactions     Asa [Aspirin] Other (See Comments)     Gastric bypass 10/5/2020     Ibuprofen Swelling     Swelling of legs     Nsaids      Bad reaction       FAMILY HISTORY: There is no family h/o  malignancy.  There is no family h/o urolithiasis.     Social History     Socioeconomic History     Marital status: Single     Spouse name: Not on file     Number of children: Not on file     Years of education: Not on file     Highest education level: Not on file   Occupational History     Not on file   Tobacco Use     Smoking status: Never Smoker     Smokeless tobacco: Never Used   Vaping Use     Vaping Use: Never used   Substance and Sexual Activity     Alcohol use: Not Currently     Alcohol/week: 0.0 standard drinks     Comment: rarely.  Once monthly 1-2 drinks     Drug use: No     Sexual activity: Yes     Partners: Male     Birth control/protection: Condom   Other Topics Concern     Parent/sibling w/ CABG, MI or angioplasty before 65F 55M? No   Social  History Narrative     Not on file     Social Determinants of Health     Financial Resource Strain: Not on file   Food Insecurity: Not on file   Transportation Needs: Not on file   Physical Activity: Not on file   Stress: Not on file   Social Connections: Not on file   Intimate Partner Violence: Not on file   Housing Stability: Not on file         GENERAL PHYSICAL EXAM:   LMP  (LMP Unknown)   PSYCH: NAD  NEURO: AAO x3    UA  Mod blood.      ASSESSMENT AND PLAN: 41 year old female with a large right-sided calculus and recently captured right ureteral stone  -Stone analysis  -CT images reviewed with her.   - Warning signs discussed including fevers, chills, N/V, gross hematuria, severe pain that is refractory to medications which should prompt more urgent evaluation. Patient understands to proceed to the ER should these warning signs occur outside of clinic hours.    During counseling for this visit, we covered the natural history of kidney stones, the risk of progression to symptomatic pain/infection, and the possibility of renal failure/kidney damage.  We covered treatment options including ureteroscopy/laser/stent and and percutaneous nephrolithotomy (PCNL).   Risks including need for secondary procedure (50% for URS), incomplete stone clearance, ureteral or bladder injury, hematuria, stent pain and irritation were discussed. Will plan to get her scheduled with Dr. Mark in the operating room soon.     Standard recommendations for kidney stone prevention were discussed.  Neph referral for medical management and prevention.       Lillian Abdullahi PA-C  [facility]      26 minutes spent on the date of the encounter doing chart review, review of outside records, review of test results, interpretation of tests, patient visit and documentation and review of CT images.

## 2022-09-20 NOTE — H&P (VIEW-ONLY)
"CC: Kidney stone.    HPI: It is a pleasure to see Ms. James \"Ana\" Nuno, a pleasant 41 year old female seen today in ER follow up for evaluation of the above. This was first detected on CT in the ED on 9/11/22 after the patient presented complaining of acute renal colic symptoms. The CT noted a 4 mm obstructing stone in the distal right ureter just above the UVJ resulting in mild right hydronephrosis and a nonobstructing 18 x 9 mm stone in the right renal pelvis. There were two additional nonobstructing stones in the lower pole right kidney measuring up to 4 mm in diameter. UA in the ED was negative for infection. BMP revealed Cr 1.13 and Ca to be 10.1. With pain under good control, the patient was discharged with a prescription for tamsulosin and instructions to follow up with urology.    Of note, was referred to Urology in Jan for gross hematuria. CT 1/15/22 noted a new right intrarenal stone at the right renal pelvis that is 0.5 cm.      Currently, the patient reports capuring stone.  Denies gross hematuria, dysuria, fevers, chills, N/V. No prior history of kidney stones.    RECENT IMAGING:  EXAM: CT ABDOMEN AND PELVIS WITH CONTRAST  LOCATION: [facility]  DATE/TIME: 09/11/2022, 3:41 AM     INDICATION: Right flank and right lower quadrant pain, evaluate renal pathology vs appendicitis.  COMPARISON: 01/15/2022.  TECHNIQUE: CT scan of the abdomen and pelvis was performed following injection of IV contrast. Multiplanar reformats were obtained. Dose reduction techniques were used.  CONTRAST: 100 mL Isovue 370.     FINDINGS:   LOWER CHEST: Normal.     HEPATOBILIARY: Normal.     PANCREAS: Normal.     SPLEEN: Normal.     ADRENAL GLANDS: Normal.     KIDNEYS/BLADDER: 4 mm obstructing stone in the distal right ureter just above the UVJ resulting in mild right hydronephrosis. Nonobstructing 18 x 9 mm stone in the right renal pelvis. Two additional nonobstructing stones lower pole right kidney " measuring   up to 4 mm in diameter. Benign parapelvic right renal cyst. Benign cortical and parapelvic left renal cysts with no specific follow-up needed. No left-sided hydronephrosis. Bladder is unremarkable.     BOWEL: Postoperative changes of gastric bypass surgery. No evidence for bowel obstruction or acute bowel findings. Appendix is normal.     LYMPH NODES: Normal.     VASCULATURE: Unremarkable.     PELVIC ORGANS: Hysterectomy.     MUSCULOSKELETAL: Normal.                                                                      IMPRESSION:   1.  4 mm obstructing stone distal right ureter resulting in mild right hydronephrosis.     2.  18 x 9 mm nonobstructing stone in the right renal pelvis with two additional smaller nonobstructing stones lower pole right kidney.     3.  Normal appendix.     4.  Postoperative changes of gastric bypass surgery and hysterectomy.     Past Medical History:   Diagnosis Date     DVT of lower extremity (deep venous thrombosis) (H) 04/2020    right ankle     Endometrial cancer (H) 12/19/2018     Endometrial cancer (H)      Hyperlipidemia LDL goal <160 9/15/2011     Sleep apnea        Past Surgical History:   Procedure Laterality Date     ABDOMEN SURGERY  12/18/18    hysterectomy     ARTHROPLASTY KNEE Right      BIOPSY  11 2018    endometrial layer     CYSTOSCOPY N/A 12/13/2018    Procedure: Cystoscopy;  Surgeon: Cleopatra Altamirano MD;  Location: [facility] OR     DAVINCI HYSTERECTOMY TOTAL, BILATERAL SALPINGO-OOPHORECTOMY, NODE DISSECTION, COMBINED N/A 12/13/2018    Procedure: DaVinci Assisted Removal Of Uterus, Cervix, Both Ovaries And Fallopian Tubes, Bilateral [place] Lymph Node Dissection;  Surgeon: Cleopatra Altamirano MD;  Location: [facility] OR     GYN SURGERY  12/18/18    hysterectomy     LAPAROSCOPIC BYPASS GASTRIC N/A 10/5/2020    Procedure: LAPAROSCOPIC LE-EN-Y GASTRIC BYPASS;  Surgeon: Nicanor Puri MD;  Location:  OR       Current Outpatient Medications   Medication  Sig Dispense Refill     acetaminophen (TYLENOL) 500 MG tablet Take 500 mg by mouth every 8 hours as needed for mild pain (Patient not taking: Reported on 8/10/2022)       BIOTIN PO        Calcium Citrate-Vitamin D (CALCIUM CITRATE + D PO) Take 1 chew tab by mouth 3 times daily Bariatric advantage: + 500 international unit(s) vitamin D       childrens multivitamin w/iron (FLINTSTONES COMPLETE) 18 MG chewable tablet Take 2 chew tab by mouth daily       Cyanocobalamin (VITAMIN B 12 PO) Take 5,000 mcg by mouth SL liquid 5000 mcg/mL       MELATONIN PO Take 1 Dose by mouth nightly as needed       omeprazole (PRILOSEC) 20 MG DR capsule Take 20 mg by mouth daily before breakfast       oxyCODONE (ROXICODONE) 5 MG tablet Take 1 tablet (5 mg) by mouth every 6 hours as needed for severe pain 12 tablet 0     vitamin B-Complex Take 1 tablet by mouth every other day       Vitamin D3 (CHOLECALCIFEROL) 125 MCG (5000 UT) tablet Take 125 mcg by mouth daily         Allergies   Allergen Reactions     Asa [Aspirin] Other (See Comments)     Gastric bypass 10/5/2020     Ibuprofen Swelling     Swelling of legs     Nsaids      Bad reaction       FAMILY HISTORY: There is no family h/o  malignancy.  There is no family h/o urolithiasis.     Social History     Socioeconomic History     Marital status: Single     Spouse name: Not on file     Number of children: Not on file     Years of education: Not on file     Highest education level: Not on file   Occupational History     Not on file   Tobacco Use     Smoking status: Never Smoker     Smokeless tobacco: Never Used   Vaping Use     Vaping Use: Never used   Substance and Sexual Activity     Alcohol use: Not Currently     Alcohol/week: 0.0 standard drinks     Comment: rarely.  Once monthly 1-2 drinks     Drug use: No     Sexual activity: Yes     Partners: Male     Birth control/protection: Condom   Other Topics Concern     Parent/sibling w/ CABG, MI or angioplasty before 65F 55M? No   Social  History Narrative     Not on file     Social Determinants of Health     Financial Resource Strain: Not on file   Food Insecurity: Not on file   Transportation Needs: Not on file   Physical Activity: Not on file   Stress: Not on file   Social Connections: Not on file   Intimate Partner Violence: Not on file   Housing Stability: Not on file         GENERAL PHYSICAL EXAM:   LMP  (LMP Unknown)   PSYCH: NAD  NEURO: AAO x3    UA  Mod blood.      ASSESSMENT AND PLAN: 41 year old female with a large right-sided calculus and recently captured right ureteral stone  -Stone analysis  -CT images reviewed with her.   - Warning signs discussed including fevers, chills, N/V, gross hematuria, severe pain that is refractory to medications which should prompt more urgent evaluation. Patient understands to proceed to the ER should these warning signs occur outside of clinic hours.    During counseling for this visit, we covered the natural history of kidney stones, the risk of progression to symptomatic pain/infection, and the possibility of renal failure/kidney damage.  We covered treatment options including ureteroscopy/laser/stent and and percutaneous nephrolithotomy (PCNL).   Risks including need for secondary procedure (50% for URS), incomplete stone clearance, ureteral or bladder injury, hematuria, stent pain and irritation were discussed. Will plan to get her scheduled with Dr. Mark in the operating room soon.     Standard recommendations for kidney stone prevention were discussed.  Neph referral for medical management and prevention.       Lillian Abdullahi PA-C  [facility]      26 minutes spent on the date of the encounter doing chart review, review of outside records, review of test results, interpretation of tests, patient visit and documentation and review of CT images.

## 2022-09-20 NOTE — TELEPHONE ENCOUNTER
UROLOGY    Vmail x 2 regarding URS with Dr. Mark for large right renal stone (50% change of needing secondary procedure to rid stone burden). Phone number to the [facility] provided. Asked her to reach out with any questions and to call when ready to schedule.     Lillian Abdullahi PA-C  [facility]rology  [phone]  Securely message with the Vocera Web Console   Monday-Wednesday and Friday

## 2022-09-21 ENCOUNTER — PREP FOR PROCEDURE (OUTPATIENT)
Dept: UROLOGY | Facility: CLINIC | Age: 42
End: 2022-09-21

## 2022-09-21 DIAGNOSIS — N20.0 RIGHT KIDNEY STONE: Primary | ICD-10-CM

## 2022-09-23 LAB
APPEARANCE STONE: NORMAL
COMPN STONE: NORMAL
SPECIMEN WT: 30 MG

## 2022-10-02 ENCOUNTER — ANESTHESIA EVENT (OUTPATIENT)
Dept: SURGERY | Facility: CLINIC | Age: 42
End: 2022-10-02
Payer: COMMERCIAL

## 2022-10-02 NOTE — ANESTHESIA PREPROCEDURE EVALUATION
Anesthesia Pre-Procedure Evaluation    Patient: Erika Reina   MRN: [id number] : 1980        Procedure : Procedure(s):  CYSTOSCOPY WITH RIGHT RETROGRADE PYELOGRAM, RIGHT URETEROSCOPY WITH HOLMIUM  LASER LITHOTRIPSY AND BASKET REMOVAL OF STONE, RIGHT URETERAL STENT PLACEMENT          Past Medical History:   Diagnosis Date     DVT of lower extremity (deep venous thrombosis) (H) 2020    right ankle     Endometrial cancer (H) 2018     Endometrial cancer (H)      Hyperlipidemia LDL goal <160 9/15/2011     Sleep apnea       Past Surgical History:   Procedure Laterality Date     ABDOMEN SURGERY  18    hysterectomy     ARTHROPLASTY KNEE Right      BIOPSY  2018    endometrial layer     CYSTOSCOPY N/A 2018    Procedure: Cystoscopy;  Surgeon: Cleopatra Altamirano MD;  Location: [facility] OR     DAVINCI HYSTERECTOMY TOTAL, BILATERAL SALPINGO-OOPHORECTOMY, NODE DISSECTION, COMBINED N/A 2018    Procedure: DaVinci Assisted Removal Of Uterus, Cervix, Both Ovaries And Fallopian Tubes, Bilateral [place] Lymph Node Dissection;  Surgeon: Cleopatra Altamirano MD;  Location: [facility] OR     GYN SURGERY  18    hysterectomy     LAPAROSCOPIC BYPASS GASTRIC N/A 10/5/2020    Procedure: LAPAROSCOPIC LE-EN-Y GASTRIC BYPASS;  Surgeon: Nicanor Puri MD;  Location: [facility] OR      Allergies   Allergen Reactions     Asa [Aspirin] Other (See Comments)     Gastric bypass 10/5/2020     Ibuprofen Swelling     Swelling of legs     Nsaids      Bad reaction      Social History     Tobacco Use     Smoking status: Never Smoker     Smokeless tobacco: Never Used   Substance Use Topics     Alcohol use: Not Currently     Alcohol/week: 0.0 standard drinks     Comment: rarely.  Once monthly 1-2 drinks      Wt Readings from Last 1 Encounters:   22 108 kg (238 lb)        Anesthesia Evaluation   Pt has had prior anesthetic.     No history of anesthetic complications       ROS/MED HX  ENT/Pulmonary: Comment:  Sleep apnea has resolved.    (+) sleep apnea,     Neurologic:  - neg neurologic ROS     Cardiovascular:     (+) Dyslipidemia -----    METS/Exercise Tolerance:     Hematologic:     (+) History of blood clots,     Musculoskeletal:  - neg musculoskeletal ROS     GI/Hepatic: Comment: S/p bariatric surgery      Renal/Genitourinary:     (+) Nephrolithiasis ,     Endo:     (+) Obesity,     Psychiatric/Substance Use:  - neg psychiatric ROS     Infectious Disease:  - neg infectious disease ROS     Malignancy: Comment: H/o endometrial cancer      Other:            Physical Exam    Airway  airway exam normal           Respiratory Devices and Support         Dental  no notable dental history         Cardiovascular   cardiovascular exam normal       Rhythm and rate: regular and normal     Pulmonary   pulmonary exam normal                OUTSIDE LABS:  CBC:   Lab Results   Component Value Date    WBC 12.3 (H) 09/11/2022    WBC 8.4 09/28/2020    HGB 13.8 09/11/2022    HGB 12.4 10/06/2020    HCT 43.0 09/11/2022    HCT 39.7 09/28/2020     09/11/2022     09/28/2020     BMP:   Lab Results   Component Value Date     09/11/2022     10/06/2020    POTASSIUM 4.6 09/11/2022    POTASSIUM 4.1 10/06/2020    CHLORIDE 99 09/11/2022    CHLORIDE 106 10/06/2020    CO2 24 09/11/2022    CO2 29 10/06/2020    BUN 13.8 09/11/2022    BUN 15 04/17/2020    CR 1.13 (H) 09/11/2022    CR 0.70 10/06/2020     (H) 09/11/2022    GLC 97 10/06/2020     COAGS:   Lab Results   Component Value Date    INR 1.20 (H) 04/20/2020     POC:   Lab Results   Component Value Date    BGM 84 12/13/2018    HCG Negative 12/13/2018     HEPATIC:   Lab Results   Component Value Date    ALBUMIN 4.5 09/11/2022    PROTTOTAL 7.7 09/11/2022    ALT 22 09/11/2022    AST 28 09/11/2022    ALKPHOS 83 09/11/2022    BILITOTAL 0.6 09/11/2022     OTHER:   Lab Results   Component Value Date    A1C 5.0 08/16/2019    JESSE 10.1 (H) 09/11/2022    LIPASE 32 09/11/2022     TSH 2.39 08/16/2019       Anesthesia Plan    ASA Status:  2      Anesthesia Type: General.     - Airway: ETT   Induction: Intravenous.   Maintenance: Inhalation.   Techniques and Equipment:     - Airway: Video-Laryngoscope         Consents    Anesthesia Plan(s) and associated risks, benefits, and realistic alternatives discussed. Questions answered and patient/representative(s) expressed understanding.    - Discussed:     - Discussed with:  Patient         Postoperative Care    Pain management: IV analgesics, Multi-modal analgesia.   PONV prophylaxis: Ondansetron (or other 5HT-3), Dexamethasone or Solumedrol     Comments:                Fran Zelaya MD

## 2022-10-03 ENCOUNTER — ANESTHESIA (OUTPATIENT)
Dept: SURGERY | Facility: CLINIC | Age: 42
End: 2022-10-03
Payer: COMMERCIAL

## 2022-10-03 ENCOUNTER — HOSPITAL ENCOUNTER (OUTPATIENT)
Facility: CLINIC | Age: 42
Discharge: HOME | End: 2022-10-03
Attending: UROLOGY | Admitting: UROLOGY
Payer: COMMERCIAL

## 2022-10-03 ENCOUNTER — APPOINTMENT (OUTPATIENT)
Dept: GENERAL RADIOLOGY | Facility: CLINIC | Age: 42
End: 2022-10-03
Attending: UROLOGY
Payer: COMMERCIAL

## 2022-10-03 VITALS
DIASTOLIC BLOOD PRESSURE: 73 MMHG | RESPIRATION RATE: 12 BRPM | SYSTOLIC BLOOD PRESSURE: 111 MMHG | OXYGEN SATURATION: 97 % | TEMPERATURE: 97.9 F | HEIGHT: 67 IN | BODY MASS INDEX: 38.78 KG/M2 | WEIGHT: 247.1 LBS | HEART RATE: 60 BPM

## 2022-10-03 DIAGNOSIS — N20.1 RIGHT URETERAL STONE: ICD-10-CM

## 2022-10-03 DIAGNOSIS — N20.0 RIGHT KIDNEY STONE: Primary | ICD-10-CM

## 2022-10-03 PROCEDURE — 258N000003 HC RX IP 258 OP 636: Performed by: NURSE ANESTHETIST, CERTIFIED REGISTERED

## 2022-10-03 PROCEDURE — C1769 GUIDE WIRE: HCPCS | Performed by: UROLOGY

## 2022-10-03 PROCEDURE — 74420 UROGRAPHY RTRGR +-KUB: CPT | Mod: 26 | Performed by: UROLOGY

## 2022-10-03 PROCEDURE — 370N000017 HC ANESTHESIA TECHNICAL FEE, PER MIN: Performed by: UROLOGY

## 2022-10-03 PROCEDURE — 52356 CYSTO/URETERO W/LITHOTRIPSY: CPT | Mod: 22 | Performed by: UROLOGY

## 2022-10-03 PROCEDURE — 250N000011 HC RX IP 250 OP 636: Performed by: NURSE ANESTHETIST, CERTIFIED REGISTERED

## 2022-10-03 PROCEDURE — 999N000179 XR SURGERY CARM FLUORO LESS THAN 5 MIN W STILLS

## 2022-10-03 PROCEDURE — 360N000076 HC SURGERY LEVEL 3, PER MIN: Performed by: UROLOGY

## 2022-10-03 PROCEDURE — 82365 CALCULUS SPECTROSCOPY: CPT | Performed by: UROLOGY

## 2022-10-03 PROCEDURE — 250N000009 HC RX 250: Performed by: NURSE ANESTHETIST, CERTIFIED REGISTERED

## 2022-10-03 PROCEDURE — 250N000011 HC RX IP 250 OP 636: Performed by: UROLOGY

## 2022-10-03 PROCEDURE — C2617 STENT, NON-COR, TEM W/O DEL: HCPCS | Performed by: UROLOGY

## 2022-10-03 PROCEDURE — 272N000001 HC OR GENERAL SUPPLY STERILE: Performed by: UROLOGY

## 2022-10-03 PROCEDURE — 999N000141 HC STATISTIC PRE-PROCEDURE NURSING ASSESSMENT: Performed by: UROLOGY

## 2022-10-03 PROCEDURE — 710N000012 HC RECOVERY PHASE 2, PER MINUTE: Performed by: UROLOGY

## 2022-10-03 PROCEDURE — C1894 INTRO/SHEATH, NON-LASER: HCPCS | Performed by: UROLOGY

## 2022-10-03 PROCEDURE — 710N000009 HC RECOVERY PHASE 1, LEVEL 1, PER MIN: Performed by: UROLOGY

## 2022-10-03 PROCEDURE — 250N000025 HC SEVOFLURANE, PER MIN: Performed by: UROLOGY

## 2022-10-03 DEVICE — STENT URETERAL POLARIS ULTRA 6FRX24CM M0061921320: Type: IMPLANTABLE DEVICE | Site: URETER | Status: FUNCTIONAL

## 2022-10-03 RX ORDER — ASPIRIN 81 MG
100 TABLET, DELAYED RELEASE (ENTERIC COATED) ORAL DAILY
Qty: 30 TABLET | Refills: 0 | Status: SHIPPED | OUTPATIENT
Start: 2022-10-03 | End: 2022-11-02

## 2022-10-03 RX ORDER — HYDROMORPHONE HCL IN WATER/PF 6 MG/30 ML
0.2 PATIENT CONTROLLED ANALGESIA SYRINGE INTRAVENOUS EVERY 5 MIN PRN
Status: DISCONTINUED | OUTPATIENT
Start: 2022-10-03 | End: 2022-10-03 | Stop reason: HOSPADM

## 2022-10-03 RX ORDER — TAMSULOSIN HYDROCHLORIDE 0.4 MG/1
0.4 CAPSULE ORAL DAILY
Qty: 10 CAPSULE | Refills: 0 | Status: SHIPPED | OUTPATIENT
Start: 2022-10-03 | End: 2022-11-16

## 2022-10-03 RX ORDER — LIDOCAINE HYDROCHLORIDE 20 MG/ML
INJECTION, SOLUTION INFILTRATION; PERINEURAL PRN
Status: DISCONTINUED | OUTPATIENT
Start: 2022-10-03 | End: 2022-10-03

## 2022-10-03 RX ORDER — SODIUM CHLORIDE, SODIUM LACTATE, POTASSIUM CHLORIDE, CALCIUM CHLORIDE 600; 310; 30; 20 MG/100ML; MG/100ML; MG/100ML; MG/100ML
INJECTION, SOLUTION INTRAVENOUS CONTINUOUS
Status: DISCONTINUED | OUTPATIENT
Start: 2022-10-03 | End: 2022-10-03 | Stop reason: HOSPADM

## 2022-10-03 RX ORDER — FENTANYL CITRATE 0.05 MG/ML
25 INJECTION, SOLUTION INTRAMUSCULAR; INTRAVENOUS
Status: DISCONTINUED | OUTPATIENT
Start: 2022-10-03 | End: 2022-10-03 | Stop reason: HOSPADM

## 2022-10-03 RX ORDER — ONDANSETRON 2 MG/ML
4 INJECTION INTRAMUSCULAR; INTRAVENOUS EVERY 30 MIN PRN
Status: DISCONTINUED | OUTPATIENT
Start: 2022-10-03 | End: 2022-10-03 | Stop reason: HOSPADM

## 2022-10-03 RX ORDER — OXYCODONE HYDROCHLORIDE 5 MG/1
5 TABLET ORAL EVERY 6 HOURS PRN
Qty: 12 TABLET | Refills: 0 | Status: SHIPPED | OUTPATIENT
Start: 2022-10-03 | End: 2022-11-16

## 2022-10-03 RX ORDER — OXYBUTYNIN CHLORIDE 5 MG/1
5 TABLET, EXTENDED RELEASE ORAL DAILY
Qty: 10 TABLET | Refills: 0 | Status: SHIPPED | OUTPATIENT
Start: 2022-10-03 | End: 2022-10-13

## 2022-10-03 RX ORDER — IOPAMIDOL 612 MG/ML
INJECTION, SOLUTION INTRATHECAL PRN
Status: DISCONTINUED | OUTPATIENT
Start: 2022-10-03 | End: 2022-10-03 | Stop reason: HOSPADM

## 2022-10-03 RX ORDER — ONDANSETRON 4 MG/1
4 TABLET, ORALLY DISINTEGRATING ORAL EVERY 30 MIN PRN
Status: DISCONTINUED | OUTPATIENT
Start: 2022-10-03 | End: 2022-10-03 | Stop reason: HOSPADM

## 2022-10-03 RX ORDER — FENTANYL CITRATE 0.05 MG/ML
25 INJECTION, SOLUTION INTRAMUSCULAR; INTRAVENOUS EVERY 5 MIN PRN
Status: DISCONTINUED | OUTPATIENT
Start: 2022-10-03 | End: 2022-10-03 | Stop reason: HOSPADM

## 2022-10-03 RX ORDER — DEXMEDETOMIDINE HYDROCHLORIDE 4 UG/ML
INJECTION, SOLUTION INTRAVENOUS PRN
Status: DISCONTINUED | OUTPATIENT
Start: 2022-10-03 | End: 2022-10-03

## 2022-10-03 RX ORDER — ONDANSETRON 2 MG/ML
INJECTION INTRAMUSCULAR; INTRAVENOUS PRN
Status: DISCONTINUED | OUTPATIENT
Start: 2022-10-03 | End: 2022-10-03

## 2022-10-03 RX ORDER — NEOSTIGMINE METHYLSULFATE 1 MG/ML
VIAL (ML) INJECTION PRN
Status: DISCONTINUED | OUTPATIENT
Start: 2022-10-03 | End: 2022-10-03

## 2022-10-03 RX ORDER — EPHEDRINE SULFATE 50 MG/ML
INJECTION, SOLUTION INTRAMUSCULAR; INTRAVENOUS; SUBCUTANEOUS PRN
Status: DISCONTINUED | OUTPATIENT
Start: 2022-10-03 | End: 2022-10-03

## 2022-10-03 RX ORDER — DEXAMETHASONE SODIUM PHOSPHATE 4 MG/ML
INJECTION, SOLUTION INTRA-ARTICULAR; INTRALESIONAL; INTRAMUSCULAR; INTRAVENOUS; SOFT TISSUE PRN
Status: DISCONTINUED | OUTPATIENT
Start: 2022-10-03 | End: 2022-10-03

## 2022-10-03 RX ORDER — PROPOFOL 10 MG/ML
INJECTION, EMULSION INTRAVENOUS PRN
Status: DISCONTINUED | OUTPATIENT
Start: 2022-10-03 | End: 2022-10-03

## 2022-10-03 RX ORDER — CEFAZOLIN SODIUM/WATER 2 G/20 ML
2 SYRINGE (ML) INTRAVENOUS SEE ADMIN INSTRUCTIONS
Status: DISCONTINUED | OUTPATIENT
Start: 2022-10-03 | End: 2022-10-03 | Stop reason: HOSPADM

## 2022-10-03 RX ORDER — CIPROFLOXACIN 500 MG/1
500 TABLET, FILM COATED ORAL ONCE
Qty: 1 TABLET | Refills: 0 | Status: SHIPPED | OUTPATIENT
Start: 2022-10-03 | End: 2022-10-03

## 2022-10-03 RX ORDER — FENTANYL CITRATE 50 UG/ML
INJECTION, SOLUTION INTRAMUSCULAR; INTRAVENOUS PRN
Status: DISCONTINUED | OUTPATIENT
Start: 2022-10-03 | End: 2022-10-03

## 2022-10-03 RX ORDER — GLYCOPYRROLATE 0.2 MG/ML
INJECTION, SOLUTION INTRAMUSCULAR; INTRAVENOUS PRN
Status: DISCONTINUED | OUTPATIENT
Start: 2022-10-03 | End: 2022-10-03

## 2022-10-03 RX ORDER — CEFAZOLIN SODIUM/WATER 2 G/20 ML
2 SYRINGE (ML) INTRAVENOUS
Status: COMPLETED | OUTPATIENT
Start: 2022-10-03 | End: 2022-10-03

## 2022-10-03 RX ORDER — FUROSEMIDE 10 MG/ML
INJECTION INTRAMUSCULAR; INTRAVENOUS PRN
Status: DISCONTINUED | OUTPATIENT
Start: 2022-10-03 | End: 2022-10-03

## 2022-10-03 RX ORDER — SODIUM CHLORIDE, SODIUM LACTATE, POTASSIUM CHLORIDE, CALCIUM CHLORIDE 600; 310; 30; 20 MG/100ML; MG/100ML; MG/100ML; MG/100ML
INJECTION, SOLUTION INTRAVENOUS CONTINUOUS PRN
Status: DISCONTINUED | OUTPATIENT
Start: 2022-10-03 | End: 2022-10-03

## 2022-10-03 RX ADMIN — GLYCOPYRROLATE 0.8 MG: 0.2 INJECTION, SOLUTION INTRAMUSCULAR; INTRAVENOUS at 13:35

## 2022-10-03 RX ADMIN — FUROSEMIDE 20 MG: 10 INJECTION, SOLUTION INTRAVENOUS at 13:34

## 2022-10-03 RX ADMIN — DEXAMETHASONE SODIUM PHOSPHATE 4 MG: 4 INJECTION, SOLUTION INTRA-ARTICULAR; INTRALESIONAL; INTRAMUSCULAR; INTRAVENOUS; SOFT TISSUE at 12:20

## 2022-10-03 RX ADMIN — PHENYLEPHRINE HYDROCHLORIDE 50 MCG: 10 INJECTION INTRAVENOUS at 12:28

## 2022-10-03 RX ADMIN — DEXMEDETOMIDINE HYDROCHLORIDE 4 MCG: 200 INJECTION INTRAVENOUS at 13:09

## 2022-10-03 RX ADMIN — ROCURONIUM BROMIDE 10 MG: 50 INJECTION, SOLUTION INTRAVENOUS at 12:48

## 2022-10-03 RX ADMIN — MIDAZOLAM 2 MG: 1 INJECTION INTRAMUSCULAR; INTRAVENOUS at 12:06

## 2022-10-03 RX ADMIN — NEOSTIGMINE METHYLSULFATE 5 MG: 1 INJECTION, SOLUTION INTRAVENOUS at 13:35

## 2022-10-03 RX ADMIN — DEXMEDETOMIDINE HYDROCHLORIDE 4 MCG: 200 INJECTION INTRAVENOUS at 13:18

## 2022-10-03 RX ADMIN — SODIUM CHLORIDE, POTASSIUM CHLORIDE, SODIUM LACTATE AND CALCIUM CHLORIDE: 600; 310; 30; 20 INJECTION, SOLUTION INTRAVENOUS at 12:05

## 2022-10-03 RX ADMIN — LIDOCAINE HYDROCHLORIDE 100 MG: 20 INJECTION, SOLUTION INFILTRATION; PERINEURAL at 12:11

## 2022-10-03 RX ADMIN — PROPOFOL 200 MG: 10 INJECTION, EMULSION INTRAVENOUS at 12:11

## 2022-10-03 RX ADMIN — Medication 5 MG: at 12:30

## 2022-10-03 RX ADMIN — Medication 2 G: at 12:09

## 2022-10-03 RX ADMIN — ROCURONIUM BROMIDE 10 MG: 50 INJECTION, SOLUTION INTRAVENOUS at 12:28

## 2022-10-03 RX ADMIN — FENTANYL CITRATE 75 MCG: 50 INJECTION, SOLUTION INTRAMUSCULAR; INTRAVENOUS at 12:11

## 2022-10-03 RX ADMIN — FENTANYL CITRATE 25 MCG: 50 INJECTION, SOLUTION INTRAMUSCULAR; INTRAVENOUS at 12:08

## 2022-10-03 RX ADMIN — ROCURONIUM BROMIDE 30 MG: 50 INJECTION, SOLUTION INTRAVENOUS at 12:12

## 2022-10-03 RX ADMIN — ONDANSETRON 4 MG: 2 INJECTION INTRAMUSCULAR; INTRAVENOUS at 13:31

## 2022-10-03 RX ADMIN — DEXMEDETOMIDINE HYDROCHLORIDE 12 MCG: 200 INJECTION INTRAVENOUS at 12:18

## 2022-10-03 ASSESSMENT — ACTIVITIES OF DAILY LIVING (ADL)
ADLS_ACUITY_SCORE: 35

## 2022-10-03 NOTE — ANESTHESIA POSTPROCEDURE EVALUATION
Patient: Erika Reina    Procedure: Procedure(s):  CYSTOSCOPY WITH RIGHT RETROGRADE PYELOGRAM, RIGHT URETEROSCOPY WITH HOLMIUM  LASER LITHOTRIPSY AND BASKET REMOVAL OF STONE, RIGHT URETERAL STENT PLACEMENT       Anesthesia Type:  General    Note:  Disposition: Outpatient   Postop Pain Control: Uneventful            Sign Out: Well controlled pain   PONV: No   Neuro/Psych: Uneventful            Sign Out: Acceptable/Baseline neuro status   Airway/Respiratory: Uneventful            Sign Out: Acceptable/Baseline resp. status   CV/Hemodynamics: Uneventful            Sign Out: Acceptable CV status   Other NRE: NONE   DID A NON-ROUTINE EVENT OCCUR? No           Last vitals:  Vitals Value Taken Time   /75 10/03/22 1430   Temp 36.6  C (97.9  F) 10/03/22 1430   Pulse 61 10/03/22 1444   Resp 14 10/03/22 1444   SpO2 97 % 10/03/22 1444   Vitals shown include unvalidated device data.    Electronically Signed By: Fran Zelaya MD  October 3, 2022  4:00 PM

## 2022-10-03 NOTE — DISCHARGE INSTRUCTIONS
Same Day Surgery Discharge Instructions for  Sedation and General Anesthesia     It's not unusual to feel dizzy, light-headed or faint for up to 24 hours after surgery or while taking pain medication.  If you have these symptoms: sit for a few minutes before standing and have someone assist you when you get up to walk or use the bathroom.    You should rest and relax for the next 24 hours. We recommend you make arrangements to have an adult stay with you for at least 24 hours after your discharge.  Avoid hazardous and strenuous activity.    DO NOT DRIVE any vehicle or operate mechanical equipment for 24 hours following the end of your surgery.  Even though you may feel normal, your reactions may be affected by the medication you have received.    Do not drink alcoholic beverages for 24 hours following surgery.     Slowly progress to your regular diet as you feel able. It's not unusual to feel nauseated and/or vomit after receiving anesthesia.  If you develop these symptoms, drink clear liquids (apple juice, ginger ale, broth, 7-up, etc. ) until you feel better.  If your nausea and vomiting persists for 24 hours, please notify your surgeon.      All narcotic pain medications, along with inactivity and anesthesia, can cause constipation. Drinking plenty of liquids and increasing fiber intake will help.    For any questions of a medical nature, call your surgeon.    Do not make important decisions for 24 hours.    If you had general anesthesia, you may have a sore throat for a couple of days related to the breathing tube used during surgery.  You may use Cepacol lozenges to help with this discomfort.  If it worsens or if you develop a fever, contact your surgeon.     If you feel your pain is not well managed with the pain medications prescribed by your surgeon, please contact your surgeon's office to let them know so they can address your concerns.          POSTOPERATIVE  INSTRUCTIONS    Diagnosis-------------------------------   RIGHT kidney and ureteral stone    Procedure-------------------------------  Procedure(s) (LRB):  CYSTOSCOPY WITH RIGHT RETROGRADE PYELOGRAM, RIGHT URETEROSCOPY WITH HOLMIUM  LASER LITHOTRIPSY AND BASKET REMOVAL OF STONE, RIGHT URETERAL STENT PLACEMENT (Right)      Findings--------------------------------  Cystoscopy with a small stone at the RIGHT ureteral orifice basketed and removed. RIGHT Retrograde Pyelogram with no further stone in the ureter. Flexible Ureteroscopy with large renal pelvis stone fragmented with laser lithotripsy and removed.     Home-going instructions-----------------         Activity Limitation:     - No driving or operating heavy machinery while on narcotic pain medication.     FOLLOW THESE INSTRUCTIONS AS INDICATED BELOW:  - Observe operative area for signs of excessive bleeding.  - You may shower.  - Increase fluid intake to promote clear urine.  - Resume usual diet as tolerated    What to expect while recovering-----------  - You may experience some intermittent bleeding that makes your urine pink or cherry colored. This is normal.  - However, if you are unable to urinate, passing large amount of clots, have awa blood in your urine, or have a temperature >101 degrees, call the urology nurse on call, or present to your nearest emergency department.  - You are encouraged to walk daily, and have no activity restrictions.   - A URETERAL STENT has been placed that allows urine to flow unobstructed from your kidney into your bladder.  The stent has a curl in the kidney and a curl in the bladder.  The curl in the bladder can cause some urgency and frequency of urination as well as some mild blood in the urine.  The curl in the kidney can cause some mild flank discomfort.  This may be more noticeable when you urinate.  A URETERAL STENT is meant to be left in temporarily.  It must be removed or changed no later than 3 months after it's  insertion.  If it's not removed it can result in stone overgrowth on the stent that can cause pain, infection, and can be very difficult to remove.      Discharge Medications/instructions:   - Flomax (tamsulosin) to be taken daily until stent is removed  - Oxybutynin 5mg XL (Ditropan XL) to be taken daily until ureteral stent is removed  - Take Tylenol 1000mg every 6 hours for pain  - Take Oxycodone 5mg every 4-6 hours only for break through pain  - Take Colace while taking Oxycodone to prevent constipation      Questions/concerns------------------------  [facility]: [phone]    Future appointments  You are scheduled for follow up on 10/11/22.      Vito Mark MD

## 2022-10-03 NOTE — OR NURSING
Patient brought a picture of home covid antigen test on phone as directed.  I personally saw this result and it was time stamped 10/2/22 AT 12:30AM.  Result was NEGATIVE.

## 2022-10-03 NOTE — OP NOTE
OPERATIVE REPORT  DATE OF SURGERY: 10/03/22  LOCATION OF SURGERY: [facility]  PREOPERATIVE DIAGNOSIS:  (N20.0) Right kidney stone  (primary encounter diagnosis)  (N20.1) Right ureteral stone  POSTOPERATIVE DIAGNOSIS: (N20.0) Right kidney stone  (primary encounter diagnosis)  (N20.1) Right ureteral stone     START TIME: 12:32 PM  END TIME: 1:41 PM    PROCEDURE PERFORMED:   1. Cystoscopy  2. RIGHT retrograde pyelogram  3. RIGHT ureteroscopy with laser lithotripsy  4. RIGHT ureteroscopy with basketing of stones - Modifier 22  5. RIGHT JJ stent placement  6. >1hr physician fluoroscopy time      STAFF SURGEON: Vito Mark MD  ANESTHESIA: General.   ESTIMATED BLOOD LOSS: 2 mL.   DRAINS AND TUBES: RIGHT 6fr x 24 cm ureteral stent, 16fr catheter  COMPLICATIONS: None.   DISPOSITION: PACU.   SPECIMENS OBTAINED:   ID Type Source Tests Collected by Time Destination   A : RIGHT URETERAL STONE Calculus/Stone Ureter, Right STONE ANALYSIS Vito Mark MD 10/3/2022 12:34 PM    B : RIGHT KIDNEY STONES Calculus/Stone Kidney, Right STONE ANALYSIS Vito Mark MD 10/3/2022  1:38 PM      SIGNIFICANT FINDINGS: Cystoscopy with a small stone at the RIGHT ureteral orifice basketed and removed. RIGHT Retrograde Pyelogram with no further stone in the ureter. Flexible Ureteroscopy with large renal pelvis stone fragmented with laser lithotripsy and removed.     MODIFIER 22 JUSTIFICATION: This was a significantly large stone measuring 18 x 9 mm.  Stone fragmented well, however required numerous basket retrieval's, greater than 30 basket retrieval's and additional surgeon time and effort to remove all significant fragments.     HISTORY OF PRESENT ILLNESS: Ana Reina is a 42 year old female who presented to the emergency room on 9/11/2022 with right-sided flank pain.  CT scan was obtained which demonstrated a 4 mm obstructing stone at the right distal ureter with mild hydroureteronephrosis.  She also was noted to have an 18 x 9 mm  right renal pelvis stone.  She ultimately passed a stone fragment with subsiding and resolution of her pain.  She was counseled on treatment options for the kidney stone and elects to proceed with the above surgery.    OPERATION PERFORMED:   Informed consent was obtained and the patient was brought to the operating room where general anesthesia was induced. The patient was given appropriate preoperative antibiotics and positioned supine. The patient was then repositioned in dorsal lithotomy with all pressure points padded. We then performed a timeout, verifying the correct patient's site and procedure to be performed.    A 22 Croatian cystoscope was inserted atraumatically into the bladder.  Cystoscopy was performed with evidence of a small stone at the right UVJ partially within the bladder.  Halo basket was used to basket this stone fragment and this was removed.  A 0.035 sensor wire was then used to cannulate the right ureteral orifice and advanced up to the renal pelvis under fluoroscopic guidance and the cystoscope was removed.  A semirigid ureteroscope was assembled and inserted atraumatically into the bladder.  Using Amplatz Super Stiff wire the ureteral orifice was cannulated using a railroad technique and the ureteroscope was advanced up to the proximal ureter with no further evidence of stone in the ureter.  A gentle retrograde pyelogram was performed with no further evidence of stone in the ureter.  The Super Stiff wire was advanced up to the renal pelvis and the semirigid ureteroscope was removed.  An 11-13 x 36 cm ureteral access sheath was advanced over the wire to the proximal ureter/UPJ and the inner stylette and wire were removed.  Flexible ureteroscope was advanced into the renal pelvis where the large stone was encountered.  Using holmium laser lithotripsy the stone was dusted and fragmented very well.  The halo basket was used to basket all significant stone fragments greater than 1 to 2 mm.  This  was quite a arduous task given the volume of stone and required greater than 30 basket retrieval's-modifier 22.  Following removal of all significant stone fragments complete pyeloscopy was performed with evidence of only residual 1 mm or less fragments.  The ureteroscope and access sheath were removed en bloc with complete visualization of the ureter with no evidence of ureteral injury.  A 6 Central African by 24 cm JJ ureteral stent was advanced over the wire with good curl noted in the renal pelvis fluoroscopically and in the bladder on direct vision.  A 16 Central African Barraza catheter was placed.  She received 20 mg IV Lasix.  She was emerged anesthesia and taken to the PACU in stable condition.    Vito Mark MD   Urology  [facility]  Clinic Phone [phone]

## 2022-10-03 NOTE — ANESTHESIA CARE TRANSFER NOTE
Patient: Erika Reina    Procedure: Procedure(s):  CYSTOSCOPY WITH RIGHT RETROGRADE PYELOGRAM, RIGHT URETEROSCOPY WITH HOLMIUM  LASER LITHOTRIPSY AND BASKET REMOVAL OF STONE, RIGHT URETERAL STENT PLACEMENT       Diagnosis: Right kidney stone [N20.0]  Diagnosis Additional Information: No value filed.    Anesthesia Type:   General     Note:    Oropharynx: oropharynx clear of all foreign objects and spontaneously breathing  Level of Consciousness: drowsy  Oxygen Supplementation: face mask  Level of Supplemental Oxygen (L/min / FiO2): 6  Independent Airway: airway patency satisfactory and stable  Dentition: dentition unchanged  Vital Signs Stable: post-procedure vital signs reviewed and stable  Report to RN Given: handoff report given  Patient transferred to: PACU    Handoff Report: Identifed the Patient, Identified the Reponsible Provider, Reviewed the pertinent medical history, Discussed the surgical course, Reviewed Intra-OP anesthesia mangement and issues during anesthesia, Set expectations for post-procedure period and Allowed opportunity for questions and acknowledgement of understanding      Vitals:  Vitals Value Taken Time   /72 10/03/22 1354   Temp     Pulse 61 10/03/22 1356   Resp 13 10/03/22 1356   SpO2 98 % 10/03/22 1356       Electronically Signed By: LEEROY Leyva CRNA  October 3, 2022  1:56 PM

## 2022-10-06 LAB
APPEARANCE STONE: NORMAL
APPEARANCE STONE: NORMAL
COMPN STONE: NORMAL
COMPN STONE: NORMAL
SPECIMEN WT: 3 MG
SPECIMEN WT: 452 MG

## 2022-10-11 ENCOUNTER — OFFICE VISIT (OUTPATIENT)
Dept: UROLOGY | Facility: CLINIC | Age: 42
End: 2022-10-11
Payer: COMMERCIAL

## 2022-10-11 VITALS
SYSTOLIC BLOOD PRESSURE: 115 MMHG | BODY MASS INDEX: 38.77 KG/M2 | DIASTOLIC BLOOD PRESSURE: 70 MMHG | HEIGHT: 67 IN | WEIGHT: 247 LBS

## 2022-10-11 DIAGNOSIS — N20.0 KIDNEY STONE: Primary | ICD-10-CM

## 2022-10-11 PROCEDURE — 52310 CYSTOSCOPY AND TREATMENT: CPT | Performed by: UROLOGY

## 2022-10-11 ASSESSMENT — PAIN SCALES - GENERAL: PAINLEVEL: NO PAIN (0)

## 2022-10-11 NOTE — NURSING NOTE
Chief Complaint   Patient presents with     Right ureteral      Patient here for a in office cystoscopy to get stent removed     Prior to the start of the procedure and with procedural staff participation, I verbally confirmed the patient s identity using two indicators, relevant allergies, that the procedure was appropriate and matched the consent or emergent situation, and that the correct equipment/implants were available. Immediately prior to starting the procedure I conducted the Time Out with the procedural staff and re-confirmed the patient s name, procedure, and site/side. I have wiped the patient off with the povidone-Iodine solution, draped them,  used Lidocaine hydrochloride jelly, and instilled sterile water into the bladder. (The Joint Commission universal protocol was followed.)  Yes    Sedation (Moderate or Deep): None    Candy Espinoza

## 2022-10-11 NOTE — LETTER
10/11/2022       RE: Erika Reina  [address] [facility] [id number]     Dear Colleague,    Thank you for referring your patient, Erika Reina, to the [facility] [place] at [facility]. Please see a copy of my visit note below.    PRE-PROCEDURE DIAGNOSIS: right indwelling ureteral stent s/p right stone surgery: Ureteroscopy  POST-PROCEDURE DIAGNOSIS: same  PROCEDURE: Cystoscopy with right ureteral stent removal  HISTORY: Ms. Reina is a 42 year old female who is s/p right stone surgery: Ureteroscopy on 10/3/2022 by Dr. Mark.  Indweling ureteral stent was placed and they are here for office removal of the stent.  DESCRIPTION OF PROCEDURE: After informed consent was obtained, the patient was brought to the procedure room where she was placed in the lithotomy position with all pressure points well padded.  The genitals were prepped and draped in a sterile fashion. A flexible cystoscope was introduced through a well-lubricated urethra. Anterior urethra strictures were absent.      The indwelling right stent was visualized, grasped and removed entirely confirmed by presence of two curls.   The bladder was not extensively visualized as this was done during the primary procedure.    The flexible cystoscope was removed and the findings were described to the patient.     ASSESSMENT AND PLAN: 42 year old female status post right stone surgery: Ureteroscopy whose stent was successfully removed today.  Plan for follow-up per Dr. Mark.        J Carlos Mclain MD

## 2022-10-11 NOTE — PATIENT INSTRUCTIONS
"AFTER YOUR CYSTOSCOPY  ?  ?  You have just completed a cystoscopy, or \"cysto\", which allowed your physician to learn more about your bladder (or to remove a stent placed after surgery). We suggest that you continue to avoid caffeine, fruit juice, and alcohol for the next 24 hours, however, you are encouraged to return to your normal activities.  ?  ?  A few things that are considered normal after your cystoscopy:  ?  * small amount of bleeding (or spotting) that clears within the next 24 hours  ?  * slight burning sensation with urination  ?  * sensation of needing to void (urinate) more frequently  ?  * the feeling of \"air\" in your urine  ?  * mild discomfort that is relieved with Tylenol    * bladder spasms  ?  ?  ?  Please contact our office promptly if you:  ?  * develop a fever above 101 degrees  ?  * are unable to urinate  ?  * develop bright red blood that does not stop  ?  * experience severe pain or swelling  ?  ?  ?  And of course, please contact our office with any concerns or questions [phone].  ?    AFTER YOUR CYSTOSCOPY        You have just completed a cystoscopy, or \"cysto\", which allowed your physician to learn more about your bladder (or to remove a stent placed after surgery). We suggest that you continue to avoid caffeine, fruit juice, and alcohol for the next 24 hours, however, you are encouraged to return to your normal activities.         A few things that are considered normal after your cystoscopy:     * Small amount of bleeding (or spotting) that clears within the next 24 hours     * Slight burning sensation with urination     * Sensation to of needing to avoid more frequently     * The feeling of \"air\" in your urine     * Mild discomfort that is relieved with Tylenol        Please contact our office promptly if you:     * Develop a fever above 101 degrees     * Are unable to urinate     * Develop bright red blood that does not stop     * Severe pain or swelling         Please contact " our office with any concerns or questions @[facility].

## 2022-10-11 NOTE — PROGRESS NOTES
PRE-PROCEDURE DIAGNOSIS: right indwelling ureteral stent s/p right stone surgery: Ureteroscopy  POST-PROCEDURE DIAGNOSIS: same  PROCEDURE: Cystoscopy with right ureteral stent removal  HISTORY: Ms. Reina is a 42 year old female who is s/p right stone surgery: Ureteroscopy on 10/3/2022 by Dr. Mark.  Indweling ureteral stent was placed and they are here for office removal of the stent.  DESCRIPTION OF PROCEDURE: After informed consent was obtained, the patient was brought to the procedure room where she was placed in the lithotomy position with all pressure points well padded.  The genitals were prepped and draped in a sterile fashion. A flexible cystoscope was introduced through a well-lubricated urethra. Anterior urethra strictures were absent.      The indwelling right stent was visualized, grasped and removed entirely confirmed by presence of two curls.   The bladder was not extensively visualized as this was done during the primary procedure.    The flexible cystoscope was removed and the findings were described to the patient.     ASSESSMENT AND PLAN: 42 year old female status post right stone surgery: Ureteroscopy whose stent was successfully removed today.  Plan for follow-up per Dr. Mark.

## 2022-11-16 ENCOUNTER — OFFICE VISIT (OUTPATIENT)
Dept: PEDIATRICS | Facility: CLINIC | Age: 42
End: 2022-11-16
Attending: PHYSICIAN ASSISTANT
Payer: COMMERCIAL

## 2022-11-16 ENCOUNTER — NURSE TRIAGE (OUTPATIENT)
Dept: NURSING | Facility: CLINIC | Age: 42
End: 2022-11-16

## 2022-11-16 VITALS
WEIGHT: 247 LBS | HEART RATE: 69 BPM | SYSTOLIC BLOOD PRESSURE: 125 MMHG | OXYGEN SATURATION: 100 % | BODY MASS INDEX: 38.69 KG/M2 | DIASTOLIC BLOOD PRESSURE: 83 MMHG | RESPIRATION RATE: 16 BRPM | TEMPERATURE: 97.8 F

## 2022-11-16 DIAGNOSIS — Z86.718 HISTORY OF DEEP VENOUS THROMBOSIS: Primary | ICD-10-CM

## 2022-11-16 DIAGNOSIS — M79.662 PAIN IN BOTH LOWER LEGS: ICD-10-CM

## 2022-11-16 DIAGNOSIS — M79.661 PAIN IN BOTH LOWER LEGS: ICD-10-CM

## 2022-11-16 DIAGNOSIS — Z86.718 HISTORY OF DEEP VENOUS THROMBOSIS: ICD-10-CM

## 2022-11-16 DIAGNOSIS — Z98.84 STATUS POST BARIATRIC SURGERY: ICD-10-CM

## 2022-11-16 DIAGNOSIS — R25.2 BILATERAL LEG CRAMPS: Primary | ICD-10-CM

## 2022-11-16 LAB
ALBUMIN SERPL BCG-MCNC: 4.2 G/DL (ref 3.5–5.2)
ALP SERPL-CCNC: 84 U/L (ref 35–104)
ALT SERPL W P-5'-P-CCNC: 23 U/L (ref 10–35)
ANION GAP SERPL CALCULATED.3IONS-SCNC: 7 MMOL/L (ref 7–15)
AST SERPL W P-5'-P-CCNC: 29 U/L (ref 10–35)
BASOPHILS # BLD AUTO: 0.1 10E3/UL (ref 0–0.2)
BASOPHILS NFR BLD AUTO: 1 %
BILIRUB SERPL-MCNC: 0.3 MG/DL
BUN SERPL-MCNC: 15 MG/DL (ref 6–20)
CALCIUM SERPL-MCNC: 10 MG/DL (ref 8.6–10)
CHLORIDE SERPL-SCNC: 104 MMOL/L (ref 98–107)
CREAT SERPL-MCNC: 0.64 MG/DL (ref 0.51–0.95)
D DIMER PPP FEU-MCNC: 0.32 UG/ML FEU (ref 0–0.5)
DEPRECATED HCO3 PLAS-SCNC: 30 MMOL/L (ref 22–29)
EOSINOPHIL # BLD AUTO: 0.2 10E3/UL (ref 0–0.7)
EOSINOPHIL NFR BLD AUTO: 3 %
ERYTHROCYTE [DISTWIDTH] IN BLOOD BY AUTOMATED COUNT: 13.8 % (ref 10–15)
GFR SERPL CREATININE-BSD FRML MDRD: >90 ML/MIN/1.73M2
GLUCOSE SERPL-MCNC: 87 MG/DL (ref 70–99)
HCT VFR BLD AUTO: 40.3 % (ref 35–47)
HGB BLD-MCNC: 12.7 G/DL (ref 11.7–15.7)
IMM GRANULOCYTES # BLD: 0 10E3/UL
IMM GRANULOCYTES NFR BLD: 0 %
IRON BINDING CAPACITY (ROCHE): 284 UG/DL (ref 240–430)
IRON SATN MFR SERPL: 25 % (ref 15–46)
IRON SERPL-MCNC: 72 UG/DL (ref 37–145)
LYMPHOCYTES # BLD AUTO: 2.2 10E3/UL (ref 0.8–5.3)
LYMPHOCYTES NFR BLD AUTO: 31 %
MAGNESIUM SERPL-MCNC: 2 MG/DL (ref 1.7–2.3)
MCH RBC QN AUTO: 30.5 PG (ref 26.5–33)
MCHC RBC AUTO-ENTMCNC: 31.5 G/DL (ref 31.5–36.5)
MCV RBC AUTO: 97 FL (ref 78–100)
MONOCYTES # BLD AUTO: 0.5 10E3/UL (ref 0–1.3)
MONOCYTES NFR BLD AUTO: 7 %
NEUTROPHILS # BLD AUTO: 4 10E3/UL (ref 1.6–8.3)
NEUTROPHILS NFR BLD AUTO: 58 %
NRBC # BLD AUTO: 0 10E3/UL
NRBC BLD AUTO-RTO: 0 /100
PLATELET # BLD AUTO: 282 10E3/UL (ref 150–450)
POTASSIUM SERPL-SCNC: 4.8 MMOL/L (ref 3.4–5.3)
PROT SERPL-MCNC: 6.8 G/DL (ref 6.4–8.3)
PTH-INTACT SERPL-MCNC: 25 PG/ML (ref 15–65)
RBC # BLD AUTO: 4.16 10E6/UL (ref 3.8–5.2)
SODIUM SERPL-SCNC: 141 MMOL/L (ref 136–145)
WBC # BLD AUTO: 7 10E3/UL (ref 4–11)

## 2022-11-16 PROCEDURE — 36415 COLL VENOUS BLD VENIPUNCTURE: CPT | Performed by: INTERNAL MEDICINE

## 2022-11-16 PROCEDURE — 83540 ASSAY OF IRON: CPT | Performed by: INTERNAL MEDICINE

## 2022-11-16 PROCEDURE — 82525 ASSAY OF COPPER: CPT | Mod: 90 | Performed by: INTERNAL MEDICINE

## 2022-11-16 PROCEDURE — 83550 IRON BINDING TEST: CPT | Performed by: INTERNAL MEDICINE

## 2022-11-16 PROCEDURE — 83735 ASSAY OF MAGNESIUM: CPT | Performed by: INTERNAL MEDICINE

## 2022-11-16 PROCEDURE — 83970 ASSAY OF PARATHORMONE: CPT | Performed by: INTERNAL MEDICINE

## 2022-11-16 PROCEDURE — 80053 COMPREHEN METABOLIC PANEL: CPT | Performed by: INTERNAL MEDICINE

## 2022-11-16 PROCEDURE — 85025 COMPLETE CBC W/AUTO DIFF WBC: CPT | Performed by: INTERNAL MEDICINE

## 2022-11-16 PROCEDURE — 85379 FIBRIN DEGRADATION QUANT: CPT | Performed by: INTERNAL MEDICINE

## 2022-11-16 PROCEDURE — 99000 SPECIMEN HANDLING OFFICE-LAB: CPT | Performed by: INTERNAL MEDICINE

## 2022-11-16 PROCEDURE — 99214 OFFICE O/P EST MOD 30 MIN: CPT | Performed by: INTERNAL MEDICINE

## 2022-11-16 NOTE — TELEPHONE ENCOUNTER
LVM for pt to return call to the clinic to give provider recommendations.    Appointments in Next Year    Nov 16, 2022 12:00 PM  Diagnostic Visit with Trung Zayas MD  [facility] ([facility] ) [phone]        Gerda FREY RN

## 2022-11-16 NOTE — PATIENT INSTRUCTIONS
PLAN:  1.  Await other lab results  2.  Increase calcium supplement to twice daily.   Decrease vitamin D supplement to weekly.  3.  Leg stretches, jiggling as much as possible  4.  If symptoms persist, contact clinic

## 2022-11-16 NOTE — TELEPHONE ENCOUNTER
"  Nurse Triage SBAR    Is this a 2nd Level Triage? YES, LICENSED PRACTITIONER REVIEW IS REQUIRED    Situation: Patient having cramping to BLEs intermittently since last Thursday. Right hamstring also feels tight, like she might get a eri horse.    Background: Started last Thursday and took 15 minutes for patient to \"uncramp\" both her shins. Reports the cramping to shins only appears to happen when she is driving. Legs seem fine while walking, still feel tight and sore however. Patient reports during cramping, if she took her weight off of her foot, her toes would automatically flex. Reports she has been drinking extra fluids and eating bananas.    Assessment: Patient denies recent leg injury or fever. 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 Reports she has been sent to a specialty clinic in [place] previously for emergent imaging in which her DVT was caught.    Protocol Recommended Disposition:   Go To ED/UCC Now (Or To Office With PCP Approval)    Recommendation: Go to ED/UCC now (or to office with PCP approval). Care advice and disposition given to patient who verbalizes agreement and understanding.  Requesting PCP recommendation regarding treatment options but will consider ED/UC if symptoms worsen before hearing back.    Routed to provider     Kathrine Knight RN on 11/16/2022 at 9:41 AM      Does the patient meet one of the following criteria for [facility] visit consideration? 16+ years old, with an [facility] PCP     TIP  Providers, please consider if this condition is appropriate for management at one of our Acute and Diagnostic Services sites.     If patient is a good candidate, please use dotphrase <dot>triageresponse and select Refer to [facility] to document.          Reason for Disposition    History of prior 'blood clot' in leg or lungs (i.e., deep vein thrombosis, pulmonary embolism)    Additional Information    Negative: Looks like a broken bone or dislocated joint (e.g., crooked or deformed)    " Negative: Sounds like a life-threatening emergency to the triager    Negative: Followed a hip injury    Negative: Followed an ankle or foot injury    Negative: Followed a knee injury    Negative: Back pain radiating (shooting) into leg(s)    Negative: Foot pain is main symptom    Negative: Ankle pain is main symptom    Negative: Knee pain is main symptom    Negative: Leg swelling is main symptom    Negative: Chest pain    Negative: Difficulty breathing    Negative: Entire foot is cool or blue in comparison to other side    Negative: Unable to walk    Negative: Fever and red area (or area very tender to touch)    Negative: Swollen joint and fever    Negative: Thigh or calf pain in only one leg and present > 1 hour    Negative: Thigh, calf, or ankle swelling in only one leg    Negative: Thigh, calf, or ankle swelling in both legs, but one side is definitely more swollen    Protocols used: LEG PAIN-A-OH

## 2022-11-16 NOTE — TELEPHONE ENCOUNTER
Called [facility].  She can go there at noon for evaluation.  Referral placed.    Thanks!  ajp      Provider Response to 2nd Level Triage Request    I have reviewed the RN documentation. My recommendation is:  Refer to [facility] and Diagnostic Services ([facility]) clinic.     Referral to Acute and Diagnostic Services          Patient qualifies for First Order Acute services at the [facility] clinic.    Patient diagnoses/treatments needed:  r/o DVT, fluids, eval legs  [facility] Referral placed: Yes    Nursing staff to contact patient and confirm willingness to receive next level of care at the [facility] site in [place] ([phone], internal use only) or [place] ([phone], internal use only). Confirm the following are true:      Patient has transportation to travel to [facility] without delay    Patient understands that evaluation/treatment at [facility] typically takes significantly longer than in clinic/urgent care (>2 hours)    If patient is in agreement, contact the [facility] to confirm patient acceptance and provide necessary information to [facility] provider.       For patients going to the [facility], instruct patients to enter the east entrance of the building ([address]) and go to Check In C    For patients going to the [facility], have them enter building ([address] attached to [facility]) and go to the 2nd floor, [address]

## 2022-11-16 NOTE — PROGRESS NOTES
"  ASSESSMENT:     1.  Leg cramps, without definitive etiology.  No DVT without exam findings and negative D dimer. Patient has a history of bariatric surgery.   No recent lab follow-up labs done for this condition.   Rule out possible correlation.  Overall, suspect recent lowering of oral calcium supplementation (due to kidney stones) may be contributing.  Note that labs show nothing significant, normal iron, magnesium, etc.  2.  Status post bariatric surgery  3.  Obesity, possibly contributing to above      PLAN:  1.  Await other lab results  2.  Increase calcium supplement to twice daily.   Decrease vitamin D supplement to weekly at this time.  3.  Leg stretches, jiggling as much as possible  4.  If symptoms persist, contact PCP/clinic for further possible interventions.    Subjective   Ana is a 42 year old, presenting for the following health issues:  Musculoskeletal Problem (Cramping in Bilateral shin)      HPI           DVT Rule Out    Onset/Duration: Bilateral shin cramping since 11.10.2022.   Happened driving.  Description  Location: leg - bilateral, more in front of shins.   Had to keep pressure down on her feet to help lessen.  Swelling: no   Redness: no   Pain: \"soreness\" and achy in both legs.   Has some knee DJD, unsure how much is from this.   Legs feel tired.   Tender area inner R thigh, as well.  Warmth: no   Progression of Symptoms:  worse, more frequent  Accompanying signs and symptoms:   Fevers: no   Numbness/tingling/weakness: no   History  Trauma to the area: no   Previous blood clots: YES- 1.5 years ago DVT.   Previous history of Gout: no    Recent illness:  YES- surgery for kidney stone stint placed a month ago.  Recent travel: no   Previous similar problem:YES- since weight loss surgery has noticed more camping in the back of right thigh or right shin. But now it is both shins.  Previous evaluation:  no   Family history of clotting problems: no   Currently taking any blood thinners: no "     Aggravating factors include: Lifting foot when driving. Walking is alright.  Therapies tried and outcome:  Spray foam for cramping- not effective    History of weight loss surgery ~ 2019.   Since then having some shin and leg cramps intermittently.   Nothing so often as now.   No paresthesias, weakness.  Possible spasm R lower thigh today  Prior history of DVT RLE, also superficial  History of kidney stones, stenting.    No change in diet.  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   JLW    Physical Exam       Results for orders placed or performed in visit on 11/16/22 (from the past 24 hour(s))   Comprehensive metabolic panel   Result Value Ref Range    Sodium 141 136 - 145 mmol/L    Potassium 4.8 3.4 - 5.3 mmol/L    Chloride 104 98 - 107 mmol/L    Carbon Dioxide (CO2) 30 (H) 22 - 29 mmol/L    Anion Gap 7 7 - 15 mmol/L    Urea Nitrogen 15.0 6.0 - 20.0 mg/dL    Creatinine 0.64 0.51 - 0.95 mg/dL    Calcium 10.0 8.6 - 10.0 mg/dL    Glucose 87 70 - 99 mg/dL    Alkaline Phosphatase 84 35 - 104 U/L    AST 29 10 - 35 U/L    ALT 23 10 - 35 U/L    Protein Total 6.8 6.4 - 8.3 g/dL    Albumin 4.2 3.5 - 5.2 g/dL    Bilirubin Total 0.3 <=1.2 mg/dL    GFR Estimate >90 >60 mL/min/1.73m2   D dimer quantitative   Result Value Ref Range    D-Dimer Quantitative 0.32 0.00 - 0.50 ug/mL FEU    Narrative    This D-dimer assay is intended for use in conjunction with a clinical pretest probability assessment model to exclude pulmonary embolism (PE) and deep venous thrombosis (DVT) in outpatients suspected of PE or DVT. The cut-off value is 0.50 ug/mL FEU.   CBC with platelets differential    Narrative    The following  orders were created for panel order CBC with platelets differential.  Procedure                               Abnormality         Status                     ---------                               -----------         ------                     CBC with platelets and d...[[id number]]                      Final result                 Please view results for these tests on the individual orders.   Iron and iron binding capacity   Result Value Ref Range    Iron 72 37 - 145 ug/dL    Iron Sat Index 25 15 - 46 %    Iron Binding Capacity 284 240 - 430 ug/dL   Magnesium   Result Value Ref Range    Magnesium 2.0 1.7 - 2.3 mg/dL   CBC with platelets and differential   Result Value Ref Range    WBC Count 7.0 4.0 - 11.0 10e3/uL    RBC Count 4.16 3.80 - 5.20 10e6/uL    Hemoglobin 12.7 11.7 - 15.7 g/dL    Hematocrit 40.3 35.0 - 47.0 %    MCV 97 78 - 100 fL    MCH 30.5 26.5 - 33.0 pg    MCHC 31.5 31.5 - 36.5 g/dL    RDW 13.8 10.0 - 15.0 %    Platelet Count 282 150 - 450 10e3/uL    % Neutrophils 58 %    % Lymphocytes 31 %    % Monocytes 7 %    % Eosinophils 3 %    % Basophils 1 %    % Immature Granulocytes 0 %    NRBCs per 100 WBC 0 <1 /100    Absolute Neutrophils 4.0 1.6 - 8.3 10e3/uL    Absolute Lymphocytes 2.2 0.8 - 5.3 10e3/uL    Absolute Monocytes 0.5 0.0 - 1.3 10e3/uL    Absolute Eosinophils 0.2 0.0 - 0.7 10e3/uL    Absolute Basophils 0.1 0.0 - 0.2 10e3/uL    Absolute Immature Granulocytes 0.0 <=0.4 10e3/uL    Absolute NRBCs 0.0 10e3/uL

## 2022-11-16 NOTE — TELEPHONE ENCOUNTER
Per chart review pt is currently at appointment with [facility].    Closing encounter.    Gerda FREY RN

## 2022-11-18 LAB — COPPER SERPL-MCNC: 103.5 UG/DL

## 2023-08-17 ENCOUNTER — ONCOLOGY VISIT (OUTPATIENT)
Dept: ONCOLOGY | Facility: CLINIC | Age: 43
End: 2023-08-17
Attending: NURSE PRACTITIONER
Payer: COMMERCIAL

## 2023-08-17 VITALS
BODY MASS INDEX: 40.18 KG/M2 | DIASTOLIC BLOOD PRESSURE: 72 MMHG | OXYGEN SATURATION: 98 % | TEMPERATURE: 98.4 F | WEIGHT: 256 LBS | SYSTOLIC BLOOD PRESSURE: 107 MMHG | HEIGHT: 67 IN | RESPIRATION RATE: 16 BRPM | HEART RATE: 88 BPM

## 2023-08-17 DIAGNOSIS — C54.1 ENDOMETRIAL CANCER (H): Primary | ICD-10-CM

## 2023-08-17 DIAGNOSIS — Z71.1 CONCERN ABOUT STD IN FEMALE WITHOUT DIAGNOSIS: ICD-10-CM

## 2023-08-17 PROCEDURE — G0463 HOSPITAL OUTPT CLINIC VISIT: HCPCS | Performed by: NURSE PRACTITIONER

## 2023-08-17 PROCEDURE — 99213 OFFICE O/P EST LOW 20 MIN: CPT | Performed by: NURSE PRACTITIONER

## 2023-08-17 ASSESSMENT — PAIN SCALES - GENERAL: PAINLEVEL: NO PAIN (0)

## 2023-08-17 NOTE — PROGRESS NOTES
Follow Up Notes on Referred Patient    Date: 2023      RE: Erika Reina  : 1980  OBEY: 2023        Erika Reina is a 42 year old woman with a history of stage 1A, grade 1 endometrial adenocarcinoma    She is here today for follow up. She was last seen 2021.    Oncology history:     18:  US Pelvis:  Uterus measures 7.3 x 4.9 x 5.5 cm, EMS 26.3 mm, normal ovaries     18:  EMB:  Grade 1 endometrial adenocarcinoma, MMR intact     18:  Robotic hysterectomy, bilateral salpingo-oophorectomy, bilateral pelvic sentinel lymph node dissection, cystoscopy                 Pathology:  Stage 1A, grade 1 endometrial adenocarcinoma, tumor size 4.9 cm, no myometrial invasion, no LVSI, 0/11 sentinel LN     19:  CT A/P:  1.  Diffuse hepatic steatosis. 2.  Unchanged bilateral renal calyceal prominence which may suggest bilateral ureteropelvic junction obstruction.    1/15/22: CT ap IMPRESSION:  1.  There is a new small right intrarenal stone. No hydronephrosis.  2.  No acute abnormality.  3.  Bilateral parapelvic renal cysts again noted.    22: CT ap IMPRESSION:   1.  4 mm obstructing stone distal right ureter resulting in mild right hydronephrosis.  2.  18 x 9 mm nonobstructing stone in the right renal pelvis with two additional smaller nonobstructing stones lower pole right kidney.  3.  Normal appendix.  4.  Postoperative changes of gastric bypass surgery and hysterectomy.      Today she comes to clinic and states she has not had a pelvic exam since she was last here. She denies any vaginal bleeding, no changes in her bowel or bladder habits, no nausea/emesis, no lower extremity edema, and no difficulties eating or sleeping. She denies any abdominal discomfort/bloating, no fevers or chills, and no chest pain or shortness of breath. She is overdue for her annual exam and has never had any breast cancer screening. She had been seen by Urology last year for kidney  stones;  denies  any concerns today. She is in the middle of a breakup; she is wondering about STD  testing but does not have any specific symptoms.       Review of Systems:    Systemic           no weight changes; no fever; no chills; no night sweats; no appetite changes  Skin           no rashes, or lesions  Eye           no irritation; no changes in vision  Dilcia-Laryngeal           no dysphagia; no hoarseness   Pulmonary    no cough; no shortness of breath  Cardiovascular    no chest pain; no palpitations  Gastrointestinal    no diarrhea; no constipation; no abdominal pain; no changes in bowel habits; no blood in stool  Genitourinary   no urinary frequency; no urinary urgency; no dysuria; no pain; no abnormal vaginal discharge; no abnormal vaginal bleeding  Breast   no breast discharge; no breast changes; no breast pain  Musculoskeletal    no myalgias; no arthralgias; no back pain  Psychiatric           no depressed mood; no anxiety    Hematologic               no tender lymph nodes; no noticeable swellings or lumps   Endocrine    no hot flashes; no heat/cold intolerance         Neurological   no tremor; no numbness and tingling; no headaches; no difficulty sleeping      Past Medical History:    Past Medical History:   Diagnosis Date    DVT of lower extremity (deep venous thrombosis) (H) 04/2020    right ankle    Endometrial cancer (H) 12/19/2018    Hyperlipidemia LDL goal <160 09/15/2011    Obese     Sleep apnea     NO CPAP NEEDED         Past Surgical History:    Past Surgical History:   Procedure Laterality Date    ABDOMEN SURGERY  12/18/2018    hysterectomy    BIOPSY  11/2018    endometrial layer    COMBINED CYSTOSCOPY, RETROGRADES, URETEROSCOPY, LASER HOLMIUM LITHOTRIPSY URETER(S), INSERT STENT Right 10/3/2022    Procedure: CYSTOSCOPY WITH RIGHT RETROGRADE PYELOGRAM, RIGHT URETEROSCOPY WITH HOLMIUM  LASER LITHOTRIPSY AND BASKET REMOVAL OF STONE, RIGHT URETERAL STENT PLACEMENT;  Surgeon: Vito Mark MD;   Location:  OR    CYSTOSCOPY N/A 12/13/2018    Procedure: Cystoscopy;  Surgeon: Cleopatra Altamirano MD;  Location: [facility] OR    DAVINCI HYSTERECTOMY TOTAL, BILATERAL SALPINGO-OOPHORECTOMY, NODE DISSECTION, COMBINED N/A 12/13/2018    Procedure: DaVinci Assisted Removal Of Uterus, Cervix, Both Ovaries And Fallopian Tubes, Bilateral [place] Lymph Node Dissection;  Surgeon: Cleopatra Altamirano MD;  Location: [facility] OR    GYN SURGERY  12/18/2018    hysterectomy    HC KNEE SCOPE,MED/LAT MENISCUS REPAIR Bilateral     2005 AND 2021    LAPAROSCOPIC BYPASS GASTRIC N/A 10/05/2020    Procedure: LAPAROSCOPIC LE-EN-Y GASTRIC BYPASS;  Surgeon: Nicanor Puri MD;  Location:  OR         Health Maintenance Due   Topic Date Due    ADVANCE CARE PLANNING  Never done    HEPATITIS B IMMUNIZATION (1 of 3 - 3-dose series) Never done    YEARLY PREVENTIVE VISIT  08/07/2019    PHQ-2 (once per calendar year)  01/01/2023       Current Medications:     Current Outpatient Medications   Medication Sig Dispense Refill    acetaminophen (TYLENOL) 500 MG tablet Take 500-1,000 mg by mouth every 8 hours as needed for mild pain      BIOTIN PO       calcium gluconate 500 MG tablet Take 1 tablet (500 mg) by mouth 2 times daily      childrens multivitamin w/iron (FLINTSTONES COMPLETE) 18 MG chewable tablet Take 2 chew tab by mouth daily      Cyanocobalamin (VITAMIN B 12 PO) Take 5,000 mcg by mouth SL liquid 5000 mcg/mL      MELATONIN PO Take 1 Dose by mouth nightly as needed      omeprazole (PRILOSEC) 20 MG DR capsule Take 20 mg by mouth daily before breakfast      vitamin B-Complex Take 1 tablet by mouth every other day      Vitamin D3 (CHOLECALCIFEROL) 125 MCG (5000 UT) tablet Take 1 tablet (125 mcg) by mouth once a week           Allergies:        Allergies   Allergen Reactions    Asa [Aspirin] Other (See Comments)     Gastric bypass 10/5/2020    Ibuprofen Swelling     Swelling of legs    Nsaids      LEG SWELLING        Social History:    "  Social History     Tobacco Use    Smoking status: Never    Smokeless tobacco: Never   Substance Use Topics    Alcohol use: Not Currently     Comment: rarely.  Once monthly 0-2 drinks       History   Drug Use No         Family History:     The patient's family history is notable for:.    Family History   Problem Relation Age of Onset    Diabetes Mother     Obesity Mother     Hypertension Mother     Diabetes Father     Hypertension Father     Sleep Apnea Father     Cancer - colorectal Maternal Grandmother     Colon Cancer Maternal Grandmother     Heart Disease Paternal Grandmother     Obesity Brother     Sleep Apnea Brother     Coronary Artery Disease No family hx of     Cerebrovascular Disease No family hx of          Physical Exam:     /72 (Cuff Size: Adult Large)   Pulse 88   Temp 98.4  F (36.9  C) (Oral)   Resp 16   Ht 1.702 m (5' 7\")   Wt 116.1 kg (256 lb)   LMP 08/13/2018   SpO2 98%   BMI 40.10 kg/m    Body mass index is 40.1 kg/m .    General Appearance: healthy and alert, no distress     HEENT: no thyromegaly, no palpable nodules or masses        Cardiovascular: regular rate and rhythm, no gallops, rubs or murmurs     Respiratory: lungs clear, no rales, rhonchi or wheezes, normal diaphragmatic excursion    Musculoskeletal: extremities non tender and without edema    Skin: no lesions or rashes     Neurological: normal gait, no gross defects     Psychiatric: appropriate mood and affect                               Hematological: normal cervical, supraclavicular and inguinal lymph nodes     Gastrointestinal:       abdomen soft, non-tender, non-distended, no organomegaly or masses    Genitourinary: External genitalia and urethral meatus appears normal.  Vagina is smooth without nodularity or masses.  Cervix surgically absent.  Bimanual exam reveal no masses, nodularity or fullness.  Recto-vaginal exam confirms these findings.      Assessment:    Erika Reina is a 42 year old woman with a " history of stage 1A, grade 1 endometrial adenocarcinoma    She is here today for follow up. She was last seen 1/2021.    30 minutes spent on the date of the encounter doing chart review, history and exam, documentation and further activities as noted above      Plan:     1.)        she will need annual pelvic exams; this can be done here or with her local provider. Reviewed recommendations from [facility] not to perform surveillance pap smears in women diagnosed with endometrial cancer as this does not improve detection of local recurrence. Reviewed no routine imaging or lab work to be done as part of her surveillance. She will call to schedule a lab appointment for STD testing if she decides to pursue this. Reviewed signs and symptoms for when she should contact the clinic or seek additional care. Patient to contact the clinic with any questions or concerns in the interim.  Answered all of her questions to the best of my ability.     2.) Genetic risk factors were assessed and her MMR proteins were intact/normal.    3.) Labs and/or tests ordered include:  GC/CT urine. .     4.) Health maintenance issues addressed today include annual health maintenance and non-gynecologic issues with PCP.    LEEROY Mena, WHNP-BC, ANP-BC  Women's Health Nurse Practitioner  Adult Nurse Practitioner  Division of Gynecologic Oncology  .      CC  Patient Care Team:  Glenna Ham APRN CNP as PCP - General (Nurse Practitioner - Family)  Mary Henley, RN as Registered Nurse  Edna Manzo APRN CNP as Referring Physician (Nurse Practitioner)  Vito Mark MD as MD (Urology)  Glenna Ham APRN CNP as Assigned PCP  Lillian Abdullahi PA-C as Physician Assistant (Urology)  Lillian Abdullahi PA-C as Assigned OBGYN Provider  J Carlos Mclain MD as Assigned Surgical Provider

## 2023-08-17 NOTE — LETTER
2023         RE: Erika Reina  [address]  [facility] [address]        Dear Colleague,    Thank you for referring your patient, Erika Reina, to the [facility]. Please see a copy of my visit note below.                Follow Up Notes on Referred Patient    Date: 2023      RE: Erika Reina  : 1980  OBEY: 2023        Erika Reina is a 42 year old woman with a history of stage 1A, grade 1 endometrial adenocarcinoma    She is here today for follow up. She was last seen 2021.    Oncology history:     18:  US Pelvis:  Uterus measures 7.3 x 4.9 x 5.5 cm, EMS 26.3 mm, normal ovaries     18:  EMB:  Grade 1 endometrial adenocarcinoma, MMR intact     18:  Robotic hysterectomy, bilateral salpingo-oophorectomy, bilateral pelvic sentinel lymph node dissection, cystoscopy                 Pathology:  Stage 1A, grade 1 endometrial adenocarcinoma, tumor size 4.9 cm, no myometrial invasion, no LVSI, 0/11 sentinel LN     19:  CT A/P:  1.  Diffuse hepatic steatosis. 2.  Unchanged bilateral renal calyceal prominence which may suggest bilateral ureteropelvic junction obstruction.    1/15/22: CT ap IMPRESSION:  1.  There is a new small right intrarenal stone. No hydronephrosis.  2.  No acute abnormality.  3.  Bilateral parapelvic renal cysts again noted.    22: CT ap IMPRESSION:   1.  4 mm obstructing stone distal right ureter resulting in mild right hydronephrosis.  2.  18 x 9 mm nonobstructing stone in the right renal pelvis with two additional smaller nonobstructing stones lower pole right kidney.  3.  Normal appendix.  4.  Postoperative changes of gastric bypass surgery and hysterectomy.      Today she comes to clinic and states she has not had a pelvic exam since she was last here. She denies any vaginal bleeding, no changes in her bowel or bladder habits, no nausea/emesis, no lower extremity edema, and no  difficulties eating or sleeping. She denies any abdominal discomfort/bloating, no fevers or chills, and no chest pain or shortness of breath. She is overdue for her annual exam and has never had any breast cancer screening. She had been seen by Urology last year for kidney stones;  denies  any concerns today. She is in the middle of a breakup; she is wondering about STD  testing but does not have any specific symptoms.       Review of Systems:    Systemic           no weight changes; no fever; no chills; no night sweats; no appetite changes  Skin           no rashes, or lesions  Eye           no irritation; no changes in vision  Dilcia-Laryngeal           no dysphagia; no hoarseness   Pulmonary    no cough; no shortness of breath  Cardiovascular    no chest pain; no palpitations  Gastrointestinal    no diarrhea; no constipation; no abdominal pain; no changes in bowel habits; no blood in stool  Genitourinary   no urinary frequency; no urinary urgency; no dysuria; no pain; no abnormal vaginal discharge; no abnormal vaginal bleeding  Breast   no breast discharge; no breast changes; no breast pain  Musculoskeletal    no myalgias; no arthralgias; no back pain  Psychiatric           no depressed mood; no anxiety    Hematologic               no tender lymph nodes; no noticeable swellings or lumps   Endocrine    no hot flashes; no heat/cold intolerance         Neurological   no tremor; no numbness and tingling; no headaches; no difficulty sleeping      Past Medical History:    Past Medical History:   Diagnosis Date     DVT of lower extremity (deep venous thrombosis) (H) 04/2020    right ankle     Endometrial cancer (H) 12/19/2018     Hyperlipidemia LDL goal <160 09/15/2011     Obese      Sleep apnea     NO CPAP NEEDED         Past Surgical History:    Past Surgical History:   Procedure Laterality Date     ABDOMEN SURGERY  12/18/2018    hysterectomy     BIOPSY  11/2018    endometrial layer     COMBINED CYSTOSCOPY, RETROGRADES,  URETEROSCOPY, LASER HOLMIUM LITHOTRIPSY URETER(S), INSERT STENT Right 10/3/2022    Procedure: CYSTOSCOPY WITH RIGHT RETROGRADE PYELOGRAM, RIGHT URETEROSCOPY WITH HOLMIUM  LASER LITHOTRIPSY AND BASKET REMOVAL OF STONE, RIGHT URETERAL STENT PLACEMENT;  Surgeon: Vito Mark MD;  Location:  OR     CYSTOSCOPY N/A 12/13/2018    Procedure: Cystoscopy;  Surgeon: Cleopatra Altamirano MD;  Location: [facility] OR     DAVINCI HYSTERECTOMY TOTAL, BILATERAL SALPINGO-OOPHORECTOMY, NODE DISSECTION, COMBINED N/A 12/13/2018    Procedure: DaVinci Assisted Removal Of Uterus, Cervix, Both Ovaries And Fallopian Tubes, Bilateral [place]erton Lymph Node Dissection;  Surgeon: Cleopatra Altamirano MD;  Location: [facility] OR     GYN SURGERY  12/18/2018    hysterectomy     HC KNEE SCOPE,MED/LAT MENISCUS REPAIR Bilateral     2005 AND 2021     LAPAROSCOPIC BYPASS GASTRIC N/A 10/05/2020    Procedure: LAPAROSCOPIC LE-EN-Y GASTRIC BYPASS;  Surgeon: Nicanor Puri MD;  Location:  OR         Health Maintenance Due   Topic Date Due     ADVANCE CARE PLANNING  Never done     HEPATITIS B IMMUNIZATION (1 of 3 - 3-dose series) Never done     YEARLY PREVENTIVE VISIT  08/07/2019     PHQ-2 (once per calendar year)  01/01/2023       Current Medications:     Current Outpatient Medications   Medication Sig Dispense Refill     acetaminophen (TYLENOL) 500 MG tablet Take 500-1,000 mg by mouth every 8 hours as needed for mild pain       BIOTIN PO        calcium gluconate 500 MG tablet Take 1 tablet (500 mg) by mouth 2 times daily       childrens multivitamin w/iron (FLINTSTONES COMPLETE) 18 MG chewable tablet Take 2 chew tab by mouth daily       Cyanocobalamin (VITAMIN B 12 PO) Take 5,000 mcg by mouth SL liquid 5000 mcg/mL       MELATONIN PO Take 1 Dose by mouth nightly as needed       omeprazole (PRILOSEC) 20 MG DR capsule Take 20 mg by mouth daily before breakfast       vitamin B-Complex Take 1 tablet by mouth every other day       Vitamin D3  "(CHOLECALCIFEROL) 125 MCG (5000 UT) tablet Take 1 tablet (125 mcg) by mouth once a week           Allergies:        Allergies   Allergen Reactions     Asa [Aspirin] Other (See Comments)     Gastric bypass 10/5/2020     Ibuprofen Swelling     Swelling of legs     Nsaids      LEG SWELLING        Social History:     Social History     Tobacco Use     Smoking status: Never     Smokeless tobacco: Never   Substance Use Topics     Alcohol use: Not Currently     Comment: rarely.  Once monthly 0-2 drinks       History   Drug Use No         Family History:     The patient's family history is notable for:.    Family History   Problem Relation Age of Onset     Diabetes Mother      Obesity Mother      Hypertension Mother      Diabetes Father      Hypertension Father      Sleep Apnea Father      Cancer - colorectal Maternal Grandmother      Colon Cancer Maternal Grandmother      Heart Disease Paternal Grandmother      Obesity Brother      Sleep Apnea Brother      Coronary Artery Disease No family hx of      Cerebrovascular Disease No family hx of          Physical Exam:     /72 (Cuff Size: Adult Large)   Pulse 88   Temp 98.4  F (36.9  C) (Oral)   Resp 16   Ht 1.702 m (5' 7\")   Wt 116.1 kg (256 lb)   LMP 08/13/2018   SpO2 98%   BMI 40.10 kg/m    Body mass index is 40.1 kg/m .    General Appearance: healthy and alert, no distress     HEENT: no thyromegaly, no palpable nodules or masses        Cardiovascular: regular rate and rhythm, no gallops, rubs or murmurs     Respiratory: lungs clear, no rales, rhonchi or wheezes, normal diaphragmatic excursion    Musculoskeletal: extremities non tender and without edema    Skin: no lesions or rashes     Neurological: normal gait, no gross defects     Psychiatric: appropriate mood and affect                               Hematological: normal cervical, supraclavicular and inguinal lymph nodes     Gastrointestinal:       abdomen soft, non-tender, non-distended, no organomegaly or " masses    Genitourinary: External genitalia and urethral meatus appears normal.  Vagina is smooth without nodularity or masses.  Cervix surgically absent.  Bimanual exam reveal no masses, nodularity or fullness.  Recto-vaginal exam confirms these findings.      Assessment:    Erika Reina is a 42 year old woman with a history of stage 1A, grade 1 endometrial adenocarcinoma    She is here today for follow up. She was last seen 1/2021.    30 minutes spent on the date of the encounter doing chart review, history and exam, documentation and further activities as noted above      Plan:     1.)        she will need annual pelvic exams; this can be done here or with her local provider. Reviewed recommendations from [facility] not to perform surveillance pap smears in women diagnosed with endometrial cancer as this does not improve detection of local recurrence. Reviewed no routine imaging or lab work to be done as part of her surveillance. She will call to schedule a lab appointment for STD testing if she decides to pursue this. Reviewed signs and symptoms for when she should contact the clinic or seek additional care. Patient to contact the clinic with any questions or concerns in the interim.  Answered all of her questions to the best of my ability.     2.) Genetic risk factors were assessed and her MMR proteins were intact/normal.    3.) Labs and/or tests ordered include:  GC/CT urine. .     4.) Health maintenance issues addressed today include annual health maintenance and non-gynecologic issues with PCP.    LEEROY Mena, NP-BC, ANP-BC  Women's Health Nurse Practitioner  Adult Nurse Practitioner  Division of Gynecologic Oncology  .      CC  Patient Care Team:  Glenna Ham APRN CNP as PCP - General (Nurse Practitioner - Family)  Mary Henley RN as Registered Nurse  Edna Manzo APRN CNP as Referring Physician (Nurse Practitioner)  Vito Mark MD as MD (Urology)  Glenna Ham,  APRN CNP as Assigned PCP  Lillian Abdullahi PA-C as Physician Assistant (Urology)  Lillian Abdullahi PA-C as Assigned OBGYN Provider  J Carlos Mclain MD as Assigned Surgical Provider        Again, thank you for allowing me to participate in the care of your patient.        Sincerely,        LEEROY Silverman CNP

## 2023-08-17 NOTE — NURSING NOTE
"Oncology Rooming Note    August 17, 2023 8:04 AM   Erika Reina is a 42 year old female who presents for:    Chief Complaint   Patient presents with    Oncology Clinic Visit     Endometrial cancer     Initial Vitals: /72 (Cuff Size: Adult Large)   Pulse 88   Temp 98.4  F (36.9  C) (Oral)   Resp 16   Ht 1.702 m (5' 7\")   Wt 116.1 kg (256 lb)   LMP 08/13/2018   SpO2 98%   BMI 40.10 kg/m   Estimated body mass index is 40.1 kg/m  as calculated from the following:    Height as of this encounter: 1.702 m (5' 7\").    Weight as of this encounter: 116.1 kg (256 lb). Body surface area is 2.34 meters squared.  No Pain (0) Comment: Data Unavailable   Patient's last menstrual period was 08/13/2018.  Allergies reviewed: Yes  Medications reviewed: Yes    Medications: Medication refills not needed today.  Pharmacy name entered into [employer]:    [id number] - [place], [place] - [address]  [facility] - [place], [place] - [address] [place] [address] [address]    Clinical concerns: f/u       Cleopatra Sheikh, REMBERTO              "

## 2023-10-02 ENCOUNTER — ANCILLARY PROCEDURE (OUTPATIENT)
Dept: GENERAL RADIOLOGY | Facility: CLINIC | Age: 43
End: 2023-10-02
Attending: NURSE PRACTITIONER
Payer: COMMERCIAL

## 2023-10-02 ENCOUNTER — OFFICE VISIT (OUTPATIENT)
Dept: FAMILY MEDICINE | Facility: CLINIC | Age: 43
End: 2023-10-02
Payer: COMMERCIAL

## 2023-10-02 VITALS
DIASTOLIC BLOOD PRESSURE: 65 MMHG | HEART RATE: 89 BPM | BODY MASS INDEX: 41.33 KG/M2 | RESPIRATION RATE: 23 BRPM | OXYGEN SATURATION: 97 % | SYSTOLIC BLOOD PRESSURE: 115 MMHG | WEIGHT: 263.3 LBS | HEIGHT: 67 IN | TEMPERATURE: 98.1 F

## 2023-10-02 DIAGNOSIS — G89.29 CHRONIC BILATERAL LOW BACK PAIN WITHOUT SCIATICA: ICD-10-CM

## 2023-10-02 DIAGNOSIS — Z11.3 SCREEN FOR STD (SEXUALLY TRANSMITTED DISEASE): ICD-10-CM

## 2023-10-02 DIAGNOSIS — C54.1 ENDOMETRIAL CANCER (H): ICD-10-CM

## 2023-10-02 DIAGNOSIS — E66.01 MORBID OBESITY (H): ICD-10-CM

## 2023-10-02 DIAGNOSIS — M72.2 PLANTAR FASCIITIS: ICD-10-CM

## 2023-10-02 DIAGNOSIS — Z98.84 BARIATRIC SURGERY STATUS: ICD-10-CM

## 2023-10-02 DIAGNOSIS — M54.50 CHRONIC BILATERAL LOW BACK PAIN WITHOUT SCIATICA: ICD-10-CM

## 2023-10-02 DIAGNOSIS — Z00.00 ROUTINE GENERAL MEDICAL EXAMINATION AT A HEALTH CARE FACILITY: Primary | ICD-10-CM

## 2023-10-02 DIAGNOSIS — S99.921A FOOT INJURY, RIGHT, INITIAL ENCOUNTER: ICD-10-CM

## 2023-10-02 PROBLEM — M54.2 NECK PAIN: Status: RESOLVED | Noted: 2021-09-29 | Resolved: 2023-10-02

## 2023-10-02 PROBLEM — G47.33 OSA (OBSTRUCTIVE SLEEP APNEA): Status: RESOLVED | Noted: 2019-12-03 | Resolved: 2023-10-02

## 2023-10-02 LAB
BASO+EOS+MONOS # BLD AUTO: NORMAL 10*3/UL
BASO+EOS+MONOS NFR BLD AUTO: NORMAL %
BASOPHILS # BLD AUTO: 0.1 10E3/UL (ref 0–0.2)
BASOPHILS NFR BLD AUTO: 1 %
EOSINOPHIL # BLD AUTO: 0.3 10E3/UL (ref 0–0.7)
EOSINOPHIL NFR BLD AUTO: 3 %
ERYTHROCYTE [DISTWIDTH] IN BLOOD BY AUTOMATED COUNT: 13.3 % (ref 10–15)
ERYTHROCYTE [SEDIMENTATION RATE] IN BLOOD BY WESTERGREN METHOD: 23 MM/HR (ref 0–20)
HCT VFR BLD AUTO: 38.1 % (ref 35–47)
HGB BLD-MCNC: 12.7 G/DL (ref 11.7–15.7)
IMM GRANULOCYTES # BLD: 0 10E3/UL
IMM GRANULOCYTES NFR BLD: 0 %
LYMPHOCYTES # BLD AUTO: 2 10E3/UL (ref 0.8–5.3)
LYMPHOCYTES NFR BLD AUTO: 27 %
MCH RBC QN AUTO: 30.4 PG (ref 26.5–33)
MCHC RBC AUTO-ENTMCNC: 33.3 G/DL (ref 31.5–36.5)
MCV RBC AUTO: 91 FL (ref 78–100)
MONOCYTES # BLD AUTO: 0.5 10E3/UL (ref 0–1.3)
MONOCYTES NFR BLD AUTO: 7 %
NEUTROPHILS # BLD AUTO: 4.6 10E3/UL (ref 1.6–8.3)
NEUTROPHILS NFR BLD AUTO: 62 %
PLATELET # BLD AUTO: 297 10E3/UL (ref 150–450)
RBC # BLD AUTO: 4.18 10E6/UL (ref 3.8–5.2)
WBC # BLD AUTO: 7.4 10E3/UL (ref 4–11)

## 2023-10-02 PROCEDURE — 91320 SARSCV2 VAC 30MCG TRS-SUC IM: CPT | Performed by: NURSE PRACTITIONER

## 2023-10-02 PROCEDURE — 87340 HEPATITIS B SURFACE AG IA: CPT | Performed by: NURSE PRACTITIONER

## 2023-10-02 PROCEDURE — 99396 PREV VISIT EST AGE 40-64: CPT | Mod: 25 | Performed by: NURSE PRACTITIONER

## 2023-10-02 PROCEDURE — 86780 TREPONEMA PALLIDUM: CPT | Performed by: NURSE PRACTITIONER

## 2023-10-02 PROCEDURE — 85652 RBC SED RATE AUTOMATED: CPT | Performed by: NURSE PRACTITIONER

## 2023-10-02 PROCEDURE — 90746 HEPB VACCINE 3 DOSE ADULT IM: CPT | Performed by: NURSE PRACTITIONER

## 2023-10-02 PROCEDURE — 90472 IMMUNIZATION ADMIN EACH ADD: CPT | Performed by: NURSE PRACTITIONER

## 2023-10-02 PROCEDURE — 86803 HEPATITIS C AB TEST: CPT | Performed by: NURSE PRACTITIONER

## 2023-10-02 PROCEDURE — 82728 ASSAY OF FERRITIN: CPT | Performed by: NURSE PRACTITIONER

## 2023-10-02 PROCEDURE — 87389 HIV-1 AG W/HIV-1&-2 AB AG IA: CPT | Performed by: NURSE PRACTITIONER

## 2023-10-02 PROCEDURE — 87591 N.GONORRHOEAE DNA AMP PROB: CPT | Performed by: NURSE PRACTITIONER

## 2023-10-02 PROCEDURE — 73630 X-RAY EXAM OF FOOT: CPT | Mod: TC | Performed by: RADIOLOGY

## 2023-10-02 PROCEDURE — 87491 CHLMYD TRACH DNA AMP PROBE: CPT | Performed by: NURSE PRACTITIONER

## 2023-10-02 PROCEDURE — 82607 VITAMIN B-12: CPT | Performed by: NURSE PRACTITIONER

## 2023-10-02 PROCEDURE — 90686 IIV4 VACC NO PRSV 0.5 ML IM: CPT | Performed by: NURSE PRACTITIONER

## 2023-10-02 PROCEDURE — 90480 ADMN SARSCOV2 VAC 1/ONLY CMP: CPT | Performed by: NURSE PRACTITIONER

## 2023-10-02 PROCEDURE — 36415 COLL VENOUS BLD VENIPUNCTURE: CPT | Performed by: NURSE PRACTITIONER

## 2023-10-02 PROCEDURE — 99214 OFFICE O/P EST MOD 30 MIN: CPT | Mod: 25 | Performed by: NURSE PRACTITIONER

## 2023-10-02 PROCEDURE — 80053 COMPREHEN METABOLIC PANEL: CPT | Performed by: NURSE PRACTITIONER

## 2023-10-02 PROCEDURE — 90471 IMMUNIZATION ADMIN: CPT | Performed by: NURSE PRACTITIONER

## 2023-10-02 PROCEDURE — 85025 COMPLETE CBC W/AUTO DIFF WBC: CPT | Performed by: NURSE PRACTITIONER

## 2023-10-02 ASSESSMENT — ENCOUNTER SYMPTOMS
DIARRHEA: 0
SORE THROAT: 0
COUGH: 0
FEVER: 0
CONSTIPATION: 0
SHORTNESS OF BREATH: 0
ABDOMINAL PAIN: 0
HEADACHES: 0
JOINT SWELLING: 0
NERVOUS/ANXIOUS: 0
WEAKNESS: 0
FREQUENCY: 0
EYE PAIN: 0
ARTHRALGIAS: 1
DIZZINESS: 0
PARESTHESIAS: 0
HEMATOCHEZIA: 0
HEMATURIA: 0
NAUSEA: 0
MYALGIAS: 1
PALPITATIONS: 0
HEARTBURN: 0
DYSURIA: 0
BREAST MASS: 0
CHILLS: 0

## 2023-10-02 ASSESSMENT — PAIN SCALES - GENERAL: PAINLEVEL: MILD PAIN (3)

## 2023-10-02 NOTE — PROGRESS NOTES
SUBJECTIVE:   CC: Ana is an 43 year old who presents for preventive health visit.       10/2/2023     4:30 PM   Additional Questions   Roomed by Merline BUENO   Accompanied by Self         10/2/2023     4:30 PM   Patient Reported Additional Medications   Patient reports taking the following new medications None       Healthy Habits:     Getting at least 3 servings of Calcium per day:  Yes    Bi-annual eye exam:  Yes    Dental care twice a year:  Yes    Sleep apnea or symptoms of sleep apnea:  None    Diet:  Regular (no restrictions)    Frequency of exercise:  1 day/week    Duration of exercise:  15-30 minutes    Taking medications regularly:  Yes    Medication side effects:  None    Additional concerns today:  Yes    Plantar fasciitis  Comes and goes.  No symptoms for a while.  Does self care/stretching    Morbid obesity (H)  Reviewed diet and exercise. Pt states she knows what to do about this and is planning to work on food choices.  Has current ortho concern preventing exercise.  Wt Readings from Last 4 Encounters:   10/02/23 119.4 kg (263 lb 4.8 oz)   08/17/23 116.1 kg (256 lb)   11/16/22 112 kg (247 lb)   10/11/22 112 kg (247 lb)         Foot injury, right, initial encounter  On July 3rd, Hit a chair with right foot while chasing a friend.Pain in 4th toe, which has improved, but now has pain in right 4th metatarsal area and its better but swollen and   Hurts to go up stairs    Chronic bilateral low back pain without sciatica  Has months of bilateral low back pain without sciatica, and bilateral pain in greater trochanteric bursa area that is dull and generalized.  Pain waxes and wanes.  Patient feels it is difficult to be active.  No recent or remote history of injury to explain symptoms. Will get labs to screen for inflammatory conditions.        Today's PHQ-2 Score:       10/2/2023     4:20 PM   PHQ-2 ( 1999 [employer])   Q1: Little interest or pleasure in doing things 0   Q2: Feeling down, depressed or  hopeless 0   PHQ-2 Score 0   Q1: Little interest or pleasure in doing things Not at all   Q2: Feeling down, depressed or hopeless Not at all   PHQ-2 Score 0      No, advance care planning information given to patient to review.  Patient declined advance care planning discussion at this time.    Social History     Tobacco Use    Smoking status: Never    Smokeless tobacco: Never   Substance Use Topics    Alcohol use: Not Currently     Comment: rarely.  Once monthly 0-2 drinks             10/2/2023     4:20 PM   Alcohol Use   Prescreen: >3 drinks/day or >7 drinks/week? No     Reviewed orders with patient.  Reviewed health maintenance and updated orders accordingly - Yes      Breast Cancer Screening:    FHS-7:        No data to display                  Pertinent mammograms are reviewed under the imaging tab.    History of abnormal Pap smear: YES and is post hysterectomy- updated in Problem List and Health Maintenance accordingly      Latest Ref Rng & Units 8/16/2018     3:30 PM 10/18/2016    12:19 PM 10/18/2016    12:00 AM   PAP / HPV   PAP (Historical)  NIL   LSIL    HPV 16 DNA NEG^Negative Negative  Negative     HPV 18 DNA NEG^Negative Negative  Negative     Other HR HPV NEG^Negative Negative  Positive       Reviewed and updated as needed this visit by clinical staff   Tobacco  Allergies  Meds  Problems  Med Hx  Surg Hx  Fam Hx          Reviewed and updated as needed this visit by Provider   Tobacco  Allergies  Meds  Problems  Med Hx  Surg Hx  Fam Hx             Review of Systems   Constitutional:  Negative for chills and fever.   HENT:  Negative for congestion, ear pain, hearing loss and sore throat.    Eyes:  Negative for pain and visual disturbance.   Respiratory:  Negative for cough and shortness of breath.    Cardiovascular:  Positive for peripheral edema. Negative for chest pain and palpitations.   Gastrointestinal:  Negative for abdominal pain, constipation, diarrhea, heartburn, hematochezia and  "nausea.   Breasts:  Negative for tenderness, breast mass and discharge.   Genitourinary:  Negative for dysuria, frequency, genital sores, hematuria, pelvic pain, urgency, vaginal bleeding and vaginal discharge.   Musculoskeletal:  Positive for arthralgias and myalgias. Negative for joint swelling.   Skin:  Negative for rash.   Neurological:  Negative for dizziness, weakness, headaches and paresthesias.   Psychiatric/Behavioral:  Negative for mood changes. The patient is not nervous/anxious.      ROS comments reviewed and reflect stable conditions and or concerns addressed in HPI       OBJECTIVE:   /65 (BP Location: Right arm, Patient Position: Sitting, Cuff Size: Adult Large)   Pulse 89   Temp 98.1  F (36.7  C) (Oral)   Resp 23   Ht 1.702 m (5' 7.01\")   Wt 119.4 kg (263 lb 4.8 oz)   LMP 08/13/2018   SpO2 97%   BMI 41.23 kg/m    Physical Exam  Constitutional:       General: She is not in acute distress.     Appearance: Normal appearance. She is well-developed.   HENT:      Head: Normocephalic.      Right Ear: Tympanic membrane normal.      Left Ear: Tympanic membrane normal.      Mouth/Throat:      Mouth: Mucous membranes are moist.   Eyes:      Conjunctiva/sclera: Conjunctivae normal.   Cardiovascular:      Rate and Rhythm: Normal rate and regular rhythm.      Heart sounds: Normal heart sounds.   Pulmonary:      Effort: Pulmonary effort is normal.      Breath sounds: Normal breath sounds.   Abdominal:      General: Bowel sounds are normal.      Palpations: Abdomen is soft. There is no mass.      Tenderness: There is no abdominal tenderness.   Musculoskeletal:      Cervical back: Neck supple.      Comments: Right foot exam reveals full ROM and minimal swelling around distal 4th metatarsal and toe. No bruising.  Not significant point tenderness at this time.  X-ray shows:  \"Acute oblique nondisplaced fracture of the proximal phalanx of the fourth toe. Mild degenerative changes throughout the midfoot.\" "   Lymphadenopathy:      Cervical: No cervical adenopathy.   Skin:     General: Skin is warm and dry.      Findings: No rash.   Neurological:      General: No focal deficit present.      Mental Status: She is alert and oriented to person, place, and time.   Psychiatric:         Mood and Affect: Mood normal.         Thought Content: Thought content normal.         Judgment: Judgment normal.               ASSESSMENT/PLAN:       ICD-10-CM    1. Routine general medical examination at a health care facility  Z00.00       2. Endometrial cancer (H)  C54.1       3. Plantar fasciitis  M72.2       4. Screen for STD (sexually transmitted disease)  Z11.3 NEISSERIA GONORRHOEA PCR     CHLAMYDIA TRACHOMATIS PCR     Hepatitis C Screen Reflex to HCV RNA Quant and Genotype     Hepatitis B surface antigen     Treponema Abs w Reflex to RPR and Titer     HIV Antigen Antibody Combo     Hepatitis C Screen Reflex to HCV RNA Quant and Genotype     Hepatitis B surface antigen     Treponema Abs w Reflex to RPR and Titer     HIV Antigen Antibody Combo      5. Chronic bilateral low back pain without sciatica  M54.50 Comprehensive metabolic panel    G89.29 CBC with Platelets & Differential     ESR: Erythrocyte sedimentation rate     Comprehensive metabolic panel     CBC with Platelets & Differential     ESR: Erythrocyte sedimentation rate      6. Foot injury, right, initial encounter  S99.921A XR Foot Right G/E 3 Views     Orthopedic  Referral      7. Morbid obesity (H)  E66.01       8. Bariatric surgery status  Z98.84 Vitamin B12     Folate     Ferritin        Wellness exam-- continues to follow with GYN ONC, no need for pap screening for this pt.  Obesity-post Bariatric Surgery, reviewed diet and exercise.  Ongoing lab monitoring  Fracture proximal 4th toe 3 months ago, non-healed.  Refer to ortho/podiatry  Ongoing low back and hip pain--no red flags.  Screen for inflammatory issues, if negative plan referral to PT      Patient has  "been advised of split billing requirements and indicates understanding: Yes      COUNSELING:  Reviewed preventive health counseling, as reflected in patient instructions      BMI:   Estimated body mass index is 41.23 kg/m  as calculated from the following:    Height as of this encounter: 1.702 m (5' 7.01\").    Weight as of this encounter: 119.4 kg (263 lb 4.8 oz).   Weight management plan: Discussed healthy diet and exercise guidelines      She reports that she has never smoked. She has never used smokeless tobacco.          LEEROY Mcgrath CNP  [facility]  "

## 2023-10-02 NOTE — PROGRESS NOTES
Prior to immunization administration, verified patients identity using patient s name and date of birth. Please see Immunization Activity for additional information.     Screening Questionnaire for Adult Immunization    Are you sick today?   No   Do you have allergies to medications, food, a vaccine component or latex?   No   Have you ever had a serious reaction after receiving a vaccination?   No   Do you have a long-term health problem with heart, lung, kidney, or metabolic disease (e.g., diabetes), asthma, a blood disorder, no spleen, complement component deficiency, a cochlear implant, or a spinal fluid leak?  Are you on long-term aspirin therapy?   No   Do you have cancer, leukemia, HIV/AIDS, or any other immune system problem?   No   Do you have a parent, brother, or sister with an immune system problem?   No   In the past 3 months, have you taken medications that affect  your immune system, such as prednisone, other steroids, or anticancer drugs; drugs for the treatment of rheumatoid arthritis, Crohn s disease, or psoriasis; or have you had radiation treatments?   No   Have you had a seizure, or a brain or other nervous system problem?   No   During the past year, have you received a transfusion of blood or blood    products, or been given immune (gamma) globulin or antiviral drug?   No   For women: Are you pregnant or is there a chance you could become       pregnant during the next month?   No   Have you received any vaccinations in the past 4 weeks?   No     Immunization questionnaire answers were all negative.      Patient instructed to remain in clinic for 15 minutes afterwards, and to report any adverse reactions.     Screening performed by Reddy Torres MA on 10/2/2023 at 5:25 PM.

## 2023-10-03 PROBLEM — M54.50 CHRONIC BILATERAL LOW BACK PAIN WITHOUT SCIATICA: Status: ACTIVE | Noted: 2023-10-03

## 2023-10-03 PROBLEM — G89.29 CHRONIC BILATERAL LOW BACK PAIN WITHOUT SCIATICA: Status: ACTIVE | Noted: 2023-10-03

## 2023-10-03 PROBLEM — S99.921A FOOT INJURY, RIGHT, INITIAL ENCOUNTER: Status: ACTIVE | Noted: 2023-10-03

## 2023-10-03 LAB
ALBUMIN SERPL BCG-MCNC: 4.2 G/DL (ref 3.5–5.2)
ALP SERPL-CCNC: 73 U/L (ref 35–104)
ALT SERPL W P-5'-P-CCNC: 20 U/L (ref 0–50)
ANION GAP SERPL CALCULATED.3IONS-SCNC: 12 MMOL/L (ref 7–15)
AST SERPL W P-5'-P-CCNC: 24 U/L (ref 0–45)
BILIRUB SERPL-MCNC: 0.4 MG/DL
BUN SERPL-MCNC: 15.2 MG/DL (ref 6–20)
C TRACH DNA SPEC QL NAA+PROBE: NEGATIVE
CALCIUM SERPL-MCNC: 9.4 MG/DL (ref 8.6–10)
CHLORIDE SERPL-SCNC: 106 MMOL/L (ref 98–107)
CREAT SERPL-MCNC: 0.73 MG/DL (ref 0.51–0.95)
DEPRECATED HCO3 PLAS-SCNC: 25 MMOL/L (ref 22–29)
EGFRCR SERPLBLD CKD-EPI 2021: >90 ML/MIN/1.73M2
FERRITIN SERPL-MCNC: 153 NG/ML (ref 6–175)
GLUCOSE SERPL-MCNC: 87 MG/DL (ref 70–99)
HBV SURFACE AG SERPL QL IA: NONREACTIVE
HCV AB SERPL QL IA: NONREACTIVE
HIV 1+2 AB+HIV1 P24 AG SERPL QL IA: NONREACTIVE
N GONORRHOEA DNA SPEC QL NAA+PROBE: NEGATIVE
POTASSIUM SERPL-SCNC: 4.3 MMOL/L (ref 3.4–5.3)
PROT SERPL-MCNC: 6.8 G/DL (ref 6.4–8.3)
SODIUM SERPL-SCNC: 143 MMOL/L (ref 135–145)
T PALLIDUM AB SER QL: NONREACTIVE
VIT B12 SERPL-MCNC: 2284 PG/ML (ref 232–1245)

## 2023-10-03 NOTE — ASSESSMENT & PLAN NOTE
Reviewed diet and exercise. Pt states she knows what to do about this and is planning to work on food choices.  Has current ortho concern preventing exercise.  Wt Readings from Last 4 Encounters:   10/02/23 119.4 kg (263 lb 4.8 oz)   08/17/23 116.1 kg (256 lb)   11/16/22 112 kg (247 lb)   10/11/22 112 kg (247 lb)

## 2023-10-03 NOTE — ASSESSMENT & PLAN NOTE
Has months of bilateral low back pain without sciatica, and bilateral pain in greater trochanteric bursa area that is dull and generalized.  Pain waxes and wanes.  Patient feels it is difficult to be active.  No recent or remote history of injury to explain symptoms. Will get labs to screen for inflammatory conditions.

## 2023-10-03 NOTE — ASSESSMENT & PLAN NOTE
On July 3rd, Hit a chair with right foot while chasing a friend.Pain in 4th toe, which has improved, but now has pain in right 4th metatarsal area and its better but swollen and   Hurts to go up stairs

## 2023-10-10 ENCOUNTER — OFFICE VISIT (OUTPATIENT)
Dept: PODIATRY | Facility: CLINIC | Age: 43
End: 2023-10-10
Attending: NURSE PRACTITIONER
Payer: COMMERCIAL

## 2023-10-10 VITALS — SYSTOLIC BLOOD PRESSURE: 124 MMHG | WEIGHT: 256 LBS | BODY MASS INDEX: 40.09 KG/M2 | DIASTOLIC BLOOD PRESSURE: 68 MMHG

## 2023-10-10 DIAGNOSIS — S92.514G: ICD-10-CM

## 2023-10-10 DIAGNOSIS — S99.921A FOOT INJURY, RIGHT, INITIAL ENCOUNTER: ICD-10-CM

## 2023-10-10 DIAGNOSIS — M79.671 RIGHT FOOT PAIN: Primary | ICD-10-CM

## 2023-10-10 PROCEDURE — 99203 OFFICE O/P NEW LOW 30 MIN: CPT | Performed by: PODIATRIST

## 2023-10-10 NOTE — PATIENT INSTRUCTIONS
Thank you for choosing [facility] Podiatry / Foot & Ankle Surgery!    DR EDWARDS'S CLINIC:  [facility]   [id number]   [place] [address]      TRIAGE LINE: [phone]  APPOINTMENTS: [phone]  RADIOLOGY: [phone]  SET UP SURGERY: [phone]  PHYSICAL THERAPY: [phone]   FAX NUMBER: [phone]  BILLING QUESTIONS: [phone]       Follow up: Will call with CT results      Please call to schedule your MRI/CT/Ultrasound/Arthrogram appointment.  The number is [phone].    TOE & METATARSAL FRACTURE   The structure of the foot is complex, consisting of bones, muscles, tendons, and other soft tissues. Of the 26 bones in the foot, 19 are toe bones (phalanges) and metatarsal bones (the long bones in the midfoot). Fractures of the toe and metatarsal bones are common and require evaluation by a specialist. A foot and ankle surgeon should be seen for proper diagnosis and treatment, even if initial treatment has been received in an emergency room.  A fracture is a break in the bone. Fractures can be divided into two categories: traumatic fractures and stress fractures.  Traumatic fractures (also called acute fractures) are caused by a direct blow or impact, such as seriously stubbing your toe. Traumatic fractures can be displaced or non-displaced. If the fracture is displaced, the bone is broken in such a way that it has changed in position (dislocated).  Signs and symptoms of a traumatic fracture include:  You may hear a sound at the time of the break.    Pinpoint pain  (pain at the place of impact) at the time the fracture occurs and perhaps for a few hours later, but often the pain goes away after several hours.   Crooked or abnormal appearance of the toe.   Bruising and swelling the next day.   It is not true that  if you can walk on it, it s not broken.  Evaluation by a foot and ankle surgeon is always recommended.   Stress fractures are tiny, hairline breaks that are  usually caused by repetitive stress. Stress fractures often afflict athletes who, for example, too rapidly increase their running mileage. They can also be caused by an abnormal foot structure, deformities, or osteoporosis. Improper footwear may also lead to stress fractures. Stress fractures should not be ignored. They require proper medical attention to heal correctly.  Symptoms of stress fractures include:  Pain with or after normal activity   Pain that goes away when resting and then returns when standing or during activity    Pinpoint pain  (pain at the site of the fracture) when touched   Swelling, but no bruising   Consequences of Improper Treatment  Some people say that  the doctor can t do anything for a broken bone in the foot.  This is usually not true. In fact, if a fractured toe or metatarsal bone is not treated correctly, serious complications may develop. For example:  A deformity in the bony architecture which may limit the ability to move the foot or cause difficulty in fitting shoes   Arthritis, which may be caused by a fracture in a joint (the juncture where two bones meet), or may be a result of angular deformities that develop when a displaced fracture is severe or hasn t been properly corrected   Chronic pain and deformity   Non-union, or failure to heal, can lead to subsequent surgery or chronic pain.   TREATMENT: Toe Fractures  Fractures of the toe bones are almost always traumatic fractures. Treatment for traumatic fractures depends on the break itself and may include these options:  Rest. Sometimes rest is all that is needed to treat a traumatic fracture of the toe.   Splinting. The toe may be fitted with a splint to keep it in a fixed position.   Rigid or stiff-soled shoe. Wearing a stiff-soled shoe protects the toe and helps keep it properly positioned.    Vivek taping  the fractured toe to another toe is sometimes appropriate, but in other cases it may be harmful.   Surgery. If the break  is badly displaced or if the joint is affected, surgery may be necessary. Surgery often involves the use of fixation devices, such as pins.   TREATMENT: Metatarsal Fractures  Breaks in the metatarsal bones may be either stress or traumatic fractures. Certain kinds of fractures of the metatarsal bones present unique challenges.  For example, sometimes a fracture of the first metatarsal bone (behind the big toe) can lead to arthritis. Since the big toe is used so frequently and bears more weight than other toes, arthritis in that area can make it painful to walk, bend, or even stand.  Another type of break, called a Landry fracture, occurs at the base of the fifth metatarsal bone (behind the little toe). It is often misdiagnosed as an ankle sprain, and misdiagnosis can have serious consequences since sprains and fractures require different treatments. Your foot and ankle surgeon is an expert in correctly identifying these conditions as well as other problems of the foot.  Treatment of metatarsal fractures depends on the type and extent of the fracture, and may include:  Rest. Sometimes rest is the only treatment needed to promote healing of a stress or traumatic fracture of a metatarsal bone.   Avoid the offending activity. Because stress fractures result from repetitive stress, it is important to avoid the activity that led to the fracture. Crutches or a wheelchair are sometimes required to offload weight from the foot to give it time to heal.   Immobilization, casting, or rigid shoe. A stiff-soled shoe or other form of immobilization may be used to protect the fractured bone while it is healing.   Surgery. Some traumatic fractures of the metatarsal bones require surgery, especially if the break is badly displaced.   Follow-up care. Your foot and ankle surgeon will provide instructions for care following surgical or non-surgical treatment. Physical therapy, exercises and rehabilitation may be included in a schedule  for return to normal activities.

## 2023-10-10 NOTE — PROGRESS NOTES
PATIENT HISTORY:  Mary UMAÑA requested I see this patient for their foot issue.  Erika Reina is a 43 year old female who presents to clinic for continued pain to right fourth toe.  Patient states that she injured it back in July of this year.  Initially she did get it looked at and thought that the pain would go away but it continues to be there and is getting a little bit more sore.  Pain can be 5 out of 10 at its worst.  It is an aching pain.  She is tried taping it.  Worse with increased activity or significant bending of the toe.  Wondering if it is healed or what can be done for the toe.    Review of Systems:  Patient denies fever, chills, rash, wound, stiffness, numbness, weakness, heart burn, blood in stool, chest pain with activity, calf pain when walking, shortness of breath with activity, chronic cough, easy bleeding/bruising, swelling of ankles, excessive thirst, fatigue, depression, anxiety.  Patient admits to limping at times.     PAST MEDICAL HISTORY:   Past Medical History:   Diagnosis Date    DVT of lower extremity (deep venous thrombosis) (H) 04/2020    right ankle    Endometrial cancer (H) 12/19/2018    Hyperlipidemia LDL goal <160 09/15/2011    Obese     ENRICO (obstructive sleep apnea) 12/03/2019    Since losing 100 pounds has stopped using CPAP.    Sleep apnea     NO CPAP NEEDED        PAST SURGICAL HISTORY:   Past Surgical History:   Procedure Laterality Date    ABDOMEN SURGERY  12/18/2018    hysterectomy    BIOPSY  11/2018    endometrial layer    COMBINED CYSTOSCOPY, RETROGRADES, URETEROSCOPY, LASER HOLMIUM LITHOTRIPSY URETER(S), INSERT STENT Right 10/3/2022    Procedure: CYSTOSCOPY WITH RIGHT RETROGRADE PYELOGRAM, RIGHT URETEROSCOPY WITH HOLMIUM  LASER LITHOTRIPSY AND BASKET REMOVAL OF STONE, RIGHT URETERAL STENT PLACEMENT;  Surgeon: Vito Mark MD;  Location:  OR    CYSTOSCOPY N/A 12/13/2018    Procedure: Cystoscopy;  Surgeon: Cleopatra Altamirano MD;  Location:   OR    DAVINCI HYSTERECTOMY TOTAL, BILATERAL SALPINGO-OOPHORECTOMY, NODE DISSECTION, COMBINED N/A 12/13/2018    Procedure: DaVinci Assisted Removal Of Uterus, Cervix, Both Ovaries And Fallopian Tubes, Bilateral [place] Lymph Node Dissection;  Surgeon: Cleopatra Altamirano MD;  Location: [facility]    GYN SURGERY  12/18/2018    hysterectomy    HC KNEE SCOPE,MED/LAT MENISCUS REPAIR Bilateral     2005 AND 2021    LAPAROSCOPIC BYPASS GASTRIC N/A 10/05/2020    Procedure: LAPAROSCOPIC LE-EN-Y GASTRIC BYPASS;  Surgeon: Nicanor Puri MD;  Location:  OR        MEDICATIONS:   Current Outpatient Medications:     acetaminophen (TYLENOL) 500 MG tablet, Take 500-1,000 mg by mouth every 8 hours as needed for mild pain, Disp: , Rfl:     BIOTIN PO, , Disp: , Rfl:     calcium gluconate 500 MG tablet, Take 1 tablet (500 mg) by mouth 2 times daily, Disp: , Rfl:     childrens multivitamin w/iron (FLINTSTONES COMPLETE) 18 MG chewable tablet, Take 2 chew tab by mouth daily, Disp: , Rfl:     Cyanocobalamin (VITAMIN B 12 PO), Take 5,000 mcg by mouth SL liquid 5000 mcg/mL, Disp: , Rfl:     MELATONIN PO, Take 1 Dose by mouth nightly as needed, Disp: , Rfl:     omeprazole (PRILOSEC) 20 MG DR capsule, Take 20 mg by mouth daily before breakfast, Disp: , Rfl:     vitamin B-Complex, Take 1 tablet by mouth every other day, Disp: , Rfl:     Vitamin D3 (CHOLECALCIFEROL) 125 MCG (5000 UT) tablet, Take 1 tablet (125 mcg) by mouth once a week, Disp: , Rfl:      ALLERGIES:    Allergies   Allergen Reactions    Asa [Aspirin] Other (See Comments)     Gastric bypass 10/5/2020    Ibuprofen Swelling     Swelling of legs    Nsaids      LEG SWELLING        SOCIAL HISTORY:   Social History     Socioeconomic History    Marital status: Single     Spouse name: Not on file    Number of children: Not on file    Years of education: Not on file    Highest education level: Not on file   Occupational History    Not on file   Tobacco Use    Smoking status: Never     Smokeless tobacco: Never   Vaping Use    Vaping Use: Never used   Substance and Sexual Activity    Alcohol use: Not Currently     Comment: rarely.  Once monthly 0-2 drinks    Drug use: No    Sexual activity: Yes     Partners: Male     Birth control/protection: Condom   Other Topics Concern    Parent/sibling w/ CABG, MI or angioplasty before 65F 55M? No   Social History Narrative    Not on file     Social Determinants of Health     Financial Resource Strain: Low Risk  (10/2/2023)    Financial Resource Strain     Within the past 12 months, have you or your family members you live with been unable to get utilities (heat, electricity) when it was really needed?: No   Food Insecurity: Low Risk  (10/2/2023)    Food Insecurity     Within the past 12 months, did you worry that your food would run out before you got money to buy more?: No     Within the past 12 months, did the food you bought just not last and you didn t have money to get more?: No   Transportation Needs: Low Risk  (10/2/2023)    Transportation Needs     Within the past 12 months, has lack of transportation kept you from medical appointments, getting your medicines, non-medical meetings or appointments, work, or from getting things that you need?: No   Physical Activity: Not on file   Stress: Not on file   Social Connections: Not on file   Interpersonal Safety: Low Risk  (10/2/2023)    Interpersonal Safety     Do you feel physically and emotionally safe where you currently live?: Yes     Within the past 12 months, have you been hit, slapped, kicked or otherwise physically hurt by someone?: No     Within the past 12 months, have you been humiliated or emotionally abused in other ways by your partner or ex-partner?: No   Housing Stability: Low Risk  (10/2/2023)    Housing Stability     Do you have housing? : Yes     Are you worried about losing your housing?: No        FAMILY HISTORY:   Family History   Problem Relation Age of Onset    Diabetes Mother      Obesity Mother     Hypertension Mother     Diabetes Father     Hypertension Father     Sleep Apnea Father     Cancer - colorectal Maternal Grandmother     Colon Cancer Maternal Grandmother     Heart Disease Paternal Grandmother     Obesity Brother     Sleep Apnea Brother     Coronary Artery Disease No family hx of     Cerebrovascular Disease No family hx of         EXAM:Vitals: Wt 116.1 kg (256 lb)   LMP 08/13/2018   BMI 40.09 kg/m    BMI= Body mass index is 40.09 kg/m .    General appearance: Patient is alert and fully cooperative with history & exam.  No sign of distress is noted during the visit.     Psychiatric: Affect is pleasant & appropriate.  Patient appears motivated to improve health.     Respiratory: Breathing is regular & unlabored while sitting.     HEENT: Hearing is intact to spoken word.  Speech is clear.  No gross evidence of visual impairment that would impact ambulation.     Dermatologic: Skin is intact to both lower extremities without significant lesions, rash or abrasion.  No paronychia or evidence of soft tissue infection is noted.     Vascular: DP & PT pulses are intact & regular bilaterally.  No significant edema or varicosities noted.  CFT and skin temperature is normal to both lower extremities.     Neurologic: Lower extremity sensation is intact to light touch.  No evidence of weakness or contracture in the lower extremities.  No evidence of neuropathy.     Musculoskeletal: Patient is ambulatory without assistive device or brace.  No gross ankle deformity noted.  No foot or ankle joint effusion is noted.    Radiographs: right foot xray -  I personally reviewed the xrays - Acute oblique nondisplaced fracture of the proximal phalanx of the fourth toe. Mild degenerative changes throughout the midfoot.      ASSESSMENT:    Foot injury, right, initial encounter  Right foot pain  Closed nondisplaced fracture of proximal phalanx of lesser toe of right foot with delayed healing       Medical  Decision Making/Plan:  Reviewed patient's chart in epic.  Viewed and discussed x-rays. Talked about fractures. Discussed that healing can take 6-10 weeks. Risk that the fracture will not heal and we may need to do surgery. Risk is increased 10-15% if you smoke.     Given that it is been 3 months since the injury I would recommend a CT scan at this time to see if there is any bony bridging or healing across this area.  If there is not would likely recommend a bone scan to try to help with healing of the toe fracture.  If there is bony bridging across this area sometimes we discussed possibly a cortisone injection to help with possibly arthritic pain.  She did have some pain in the third intermetatarsal space which could be referred neuroma pain as well we talked about injection of that area.  We will call or MyChart her with the results.  Recommend stiff soled shoes at this time.    All questions were answered to patient satisfaction and she will call further questions or concerns.    Patient risk factor: Patient is at low risk for flexion.        Maggi White DPM, Podiatry/Foot and Ankle Surgery

## 2023-10-10 NOTE — LETTER
10/10/2023         RE: Erika Reina  [address] [facility] [id number]        Dear Colleague,    Thank you for referring your patient, Erika Reina, to the [facility]. Please see a copy of my visit note below.    PATIENT HISTORY:  Mary UMAÑA requested I see this patient for their foot issue.  Erika Reina is a 43 year old female who presents to clinic for continued pain to right fourth toe.  Patient states that she injured it back in July of this year.  Initially she did get it looked at and thought that the pain would go away but it continues to be there and is getting a little bit more sore.  Pain can be 5 out of 10 at its worst.  It is an aching pain.  She is tried taping it.  Worse with increased activity or significant bending of the toe.  Wondering if it is healed or what can be done for the toe.    Review of Systems:  Patient denies fever, chills, rash, wound, stiffness, numbness, weakness, heart burn, blood in stool, chest pain with activity, calf pain when walking, shortness of breath with activity, chronic cough, easy bleeding/bruising, swelling of ankles, excessive thirst, fatigue, depression, anxiety.  Patient admits to limping at times.     PAST MEDICAL HISTORY:   Past Medical History:   Diagnosis Date     DVT of lower extremity (deep venous thrombosis) (H) 04/2020    right ankle     Endometrial cancer (H) 12/19/2018     Hyperlipidemia LDL goal <160 09/15/2011     Obese      ENRICO (obstructive sleep apnea) 12/03/2019    Since losing 100 pounds has stopped using CPAP.     Sleep apnea     NO CPAP NEEDED        PAST SURGICAL HISTORY:   Past Surgical History:   Procedure Laterality Date     ABDOMEN SURGERY  12/18/2018    hysterectomy     BIOPSY  11/2018    endometrial layer     COMBINED CYSTOSCOPY, RETROGRADES, URETEROSCOPY, LASER HOLMIUM LITHOTRIPSY URETER(S), INSERT STENT Right 10/3/2022    Procedure: CYSTOSCOPY WITH RIGHT RETROGRADE  PYELOGRAM, RIGHT URETEROSCOPY WITH HOLMIUM  LASER LITHOTRIPSY AND BASKET REMOVAL OF STONE, RIGHT URETERAL STENT PLACEMENT;  Surgeon: Vito Mark MD;  Location:  OR     CYSTOSCOPY N/A 12/13/2018    Procedure: Cystoscopy;  Surgeon: Cleopatra Altamirano MD;  Location: [facility] OR     DAVINCI HYSTERECTOMY TOTAL, BILATERAL SALPINGO-OOPHORECTOMY, NODE DISSECTION, COMBINED N/A 12/13/2018    Procedure: DaVinci Assisted Removal Of Uterus, Cervix, Both Ovaries And Fallopian Tubes, Bilateral [place] Lymph Node Dissection;  Surgeon: Cleopatra Altamirano MD;  Location: [facility] OR     GYN SURGERY  12/18/2018    hysterectomy     HC KNEE SCOPE,MED/LAT MENISCUS REPAIR Bilateral     2005 AND 2021     LAPAROSCOPIC BYPASS GASTRIC N/A 10/05/2020    Procedure: LAPAROSCOPIC LE-EN-Y GASTRIC BYPASS;  Surgeon: Nicanor Puri MD;  Location:  OR        MEDICATIONS:   Current Outpatient Medications:      acetaminophen (TYLENOL) 500 MG tablet, Take 500-1,000 mg by mouth every 8 hours as needed for mild pain, Disp: , Rfl:      BIOTIN PO, , Disp: , Rfl:      calcium gluconate 500 MG tablet, Take 1 tablet (500 mg) by mouth 2 times daily, Disp: , Rfl:      childrens multivitamin w/iron (FLINTSTONES COMPLETE) 18 MG chewable tablet, Take 2 chew tab by mouth daily, Disp: , Rfl:      Cyanocobalamin (VITAMIN B 12 PO), Take 5,000 mcg by mouth SL liquid 5000 mcg/mL, Disp: , Rfl:      MELATONIN PO, Take 1 Dose by mouth nightly as needed, Disp: , Rfl:      omeprazole (PRILOSEC) 20 MG DR capsule, Take 20 mg by mouth daily before breakfast, Disp: , Rfl:      vitamin B-Complex, Take 1 tablet by mouth every other day, Disp: , Rfl:      Vitamin D3 (CHOLECALCIFEROL) 125 MCG (5000 UT) tablet, Take 1 tablet (125 mcg) by mouth once a week, Disp: , Rfl:      ALLERGIES:    Allergies   Allergen Reactions     Asa [Aspirin] Other (See Comments)     Gastric bypass 10/5/2020     Ibuprofen Swelling     Swelling of legs     Nsaids      LEG SWELLING        SOCIAL  HISTORY:   Social History     Socioeconomic History     Marital status: Single     Spouse name: Not on file     Number of children: Not on file     Years of education: Not on file     Highest education level: Not on file   Occupational History     Not on file   Tobacco Use     Smoking status: Never     Smokeless tobacco: Never   Vaping Use     Vaping Use: Never used   Substance and Sexual Activity     Alcohol use: Not Currently     Comment: rarely.  Once monthly 0-2 drinks     Drug use: No     Sexual activity: Yes     Partners: Male     Birth control/protection: Condom   Other Topics Concern     Parent/sibling w/ CABG, MI or angioplasty before 65F 55M? No   Social History Narrative     Not on file     Social Determinants of Health     Financial Resource Strain: Low Risk  (10/2/2023)    Financial Resource Strain      Within the past 12 months, have you or your family members you live with been unable to get utilities (heat, electricity) when it was really needed?: No   Food Insecurity: Low Risk  (10/2/2023)    Food Insecurity      Within the past 12 months, did you worry that your food would run out before you got money to buy more?: No      Within the past 12 months, did the food you bought just not last and you didn t have money to get more?: No   Transportation Needs: Low Risk  (10/2/2023)    Transportation Needs      Within the past 12 months, has lack of transportation kept you from medical appointments, getting your medicines, non-medical meetings or appointments, work, or from getting things that you need?: No   Physical Activity: Not on file   Stress: Not on file   Social Connections: Not on file   Interpersonal Safety: Low Risk  (10/2/2023)    Interpersonal Safety      Do you feel physically and emotionally safe where you currently live?: Yes      Within the past 12 months, have you been hit, slapped, kicked or otherwise physically hurt by someone?: No      Within the past 12 months, have you been humiliated  or emotionally abused in other ways by your partner or ex-partner?: No   Housing Stability: Low Risk  (10/2/2023)    Housing Stability      Do you have housing? : Yes      Are you worried about losing your housing?: No        FAMILY HISTORY:   Family History   Problem Relation Age of Onset     Diabetes Mother      Obesity Mother      Hypertension Mother      Diabetes Father      Hypertension Father      Sleep Apnea Father      Cancer - colorectal Maternal Grandmother      Colon Cancer Maternal Grandmother      Heart Disease Paternal Grandmother      Obesity Brother      Sleep Apnea Brother      Coronary Artery Disease No family hx of      Cerebrovascular Disease No family hx of         EXAM:Vitals: Wt 116.1 kg (256 lb)   LMP 08/13/2018   BMI 40.09 kg/m    BMI= Body mass index is 40.09 kg/m .    General appearance: Patient is alert and fully cooperative with history & exam.  No sign of distress is noted during the visit.     Psychiatric: Affect is pleasant & appropriate.  Patient appears motivated to improve health.     Respiratory: Breathing is regular & unlabored while sitting.     HEENT: Hearing is intact to spoken word.  Speech is clear.  No gross evidence of visual impairment that would impact ambulation.     Dermatologic: Skin is intact to both lower extremities without significant lesions, rash or abrasion.  No paronychia or evidence of soft tissue infection is noted.     Vascular: DP & PT pulses are intact & regular bilaterally.  No significant edema or varicosities noted.  CFT and skin temperature is normal to both lower extremities.     Neurologic: Lower extremity sensation is intact to light touch.  No evidence of weakness or contracture in the lower extremities.  No evidence of neuropathy.     Musculoskeletal: Patient is ambulatory without assistive device or brace.  No gross ankle deformity noted.  No foot or ankle joint effusion is noted.    Radiographs: right foot xray -  I personally reviewed the  xrays - Acute oblique nondisplaced fracture of the proximal phalanx of the fourth toe. Mild degenerative changes throughout the midfoot.      ASSESSMENT:    Foot injury, right, initial encounter  Right foot pain  Closed nondisplaced fracture of proximal phalanx of lesser toe of right foot with delayed healing       Medical Decision Making/Plan:  Reviewed patient's chart in epic.  Viewed and discussed x-rays. Talked about fractures. Discussed that healing can take 6-10 weeks. Risk that the fracture will not heal and we may need to do surgery. Risk is increased 10-15% if you smoke.     Given that it is been 3 months since the injury I would recommend a CT scan at this time to see if there is any bony bridging or healing across this area.  If there is not would likely recommend a bone scan to try to help with healing of the toe fracture.  If there is bony bridging across this area sometimes we discussed possibly a cortisone injection to help with possibly arthritic pain.  She did have some pain in the third intermetatarsal space which could be referred neuroma pain as well we talked about injection of that area.  We will call or MyChart her with the results.  Recommend stiff soled shoes at this time.    All questions were answered to patient satisfaction and she will call further questions or concerns.    Patient risk factor: Patient is at low risk for flexion.        Maggi White DPM, Podiatry/Foot and Ankle Surgery      Again, thank you for allowing me to participate in the care of your patient.        Sincerely,        Maggi White DPM, Podiatry/Foot and Ankle Surgery

## 2024-05-06 ENCOUNTER — NURSE TRIAGE (OUTPATIENT)
Dept: FAMILY MEDICINE | Facility: CLINIC | Age: 44
End: 2024-05-06

## 2024-05-06 ENCOUNTER — TELEPHONE (OUTPATIENT)
Dept: OTHER | Facility: CLINIC | Age: 44
End: 2024-05-06

## 2024-05-06 ENCOUNTER — OFFICE VISIT (OUTPATIENT)
Dept: PEDIATRICS | Facility: CLINIC | Age: 44
End: 2024-05-06
Payer: COMMERCIAL

## 2024-05-06 VITALS
OXYGEN SATURATION: 98 % | SYSTOLIC BLOOD PRESSURE: 124 MMHG | RESPIRATION RATE: 18 BRPM | DIASTOLIC BLOOD PRESSURE: 80 MMHG | BODY MASS INDEX: 41.34 KG/M2 | WEIGHT: 264 LBS | HEART RATE: 73 BPM | TEMPERATURE: 98 F

## 2024-05-06 DIAGNOSIS — I83.93 VARICOSE VEINS OF BOTH LOWER EXTREMITIES, UNSPECIFIED WHETHER COMPLICATED: ICD-10-CM

## 2024-05-06 DIAGNOSIS — R25.2 LEG CRAMPING: Primary | ICD-10-CM

## 2024-05-06 DIAGNOSIS — Z86.718 PERSONAL HISTORY OF DVT (DEEP VEIN THROMBOSIS): ICD-10-CM

## 2024-05-06 LAB
ALBUMIN SERPL BCG-MCNC: 4 G/DL (ref 3.5–5.2)
ALP SERPL-CCNC: 77 U/L (ref 40–150)
ALT SERPL W P-5'-P-CCNC: 18 U/L (ref 0–50)
ANION GAP SERPL CALCULATED.3IONS-SCNC: 7 MMOL/L (ref 7–15)
AST SERPL W P-5'-P-CCNC: 22 U/L (ref 0–45)
BASOPHILS # BLD AUTO: 0.1 10E3/UL (ref 0–0.2)
BASOPHILS NFR BLD AUTO: 1 %
BILIRUB SERPL-MCNC: 0.4 MG/DL
BUN SERPL-MCNC: 12.3 MG/DL (ref 6–20)
CALCIUM SERPL-MCNC: 9.5 MG/DL (ref 8.6–10)
CHLORIDE SERPL-SCNC: 105 MMOL/L (ref 98–107)
CK SERPL-CCNC: 48 U/L (ref 26–192)
CREAT SERPL-MCNC: 0.76 MG/DL (ref 0.51–0.95)
D DIMER PPP FEU-MCNC: 0.44 UG/ML FEU (ref 0–0.5)
DEPRECATED HCO3 PLAS-SCNC: 27 MMOL/L (ref 22–29)
EGFRCR SERPLBLD CKD-EPI 2021: >90 ML/MIN/1.73M2
EOSINOPHIL # BLD AUTO: 0.2 10E3/UL (ref 0–0.7)
EOSINOPHIL NFR BLD AUTO: 3 %
ERYTHROCYTE [DISTWIDTH] IN BLOOD BY AUTOMATED COUNT: 13.4 % (ref 10–15)
FERRITIN SERPL-MCNC: 124 NG/ML (ref 6–175)
GLUCOSE SERPL-MCNC: 92 MG/DL (ref 70–99)
HCT VFR BLD AUTO: 40.2 % (ref 35–47)
HGB BLD-MCNC: 13.2 G/DL (ref 11.7–15.7)
IMM GRANULOCYTES # BLD: 0 10E3/UL
IMM GRANULOCYTES NFR BLD: 0 %
LYMPHOCYTES # BLD AUTO: 1.8 10E3/UL (ref 0.8–5.3)
LYMPHOCYTES NFR BLD AUTO: 25 %
MAGNESIUM SERPL-MCNC: 1.9 MG/DL (ref 1.7–2.3)
MCH RBC QN AUTO: 30.1 PG (ref 26.5–33)
MCHC RBC AUTO-ENTMCNC: 32.8 G/DL (ref 31.5–36.5)
MCV RBC AUTO: 92 FL (ref 78–100)
MONOCYTES # BLD AUTO: 0.5 10E3/UL (ref 0–1.3)
MONOCYTES NFR BLD AUTO: 8 %
NEUTROPHILS # BLD AUTO: 4.4 10E3/UL (ref 1.6–8.3)
NEUTROPHILS NFR BLD AUTO: 63 %
NRBC # BLD AUTO: 0 10E3/UL
NRBC BLD AUTO-RTO: 0 /100
PLATELET # BLD AUTO: 264 10E3/UL (ref 150–450)
POTASSIUM SERPL-SCNC: 4 MMOL/L (ref 3.4–5.3)
PROT SERPL-MCNC: 7 G/DL (ref 6.4–8.3)
RBC # BLD AUTO: 4.38 10E6/UL (ref 3.8–5.2)
SODIUM SERPL-SCNC: 139 MMOL/L (ref 135–145)
TSH SERPL DL<=0.005 MIU/L-ACNC: 1.88 UIU/ML (ref 0.3–4.2)
WBC # BLD AUTO: 7 10E3/UL (ref 4–11)

## 2024-05-06 PROCEDURE — 83735 ASSAY OF MAGNESIUM: CPT | Performed by: PHYSICIAN ASSISTANT

## 2024-05-06 PROCEDURE — 82550 ASSAY OF CK (CPK): CPT | Performed by: PHYSICIAN ASSISTANT

## 2024-05-06 PROCEDURE — 85379 FIBRIN DEGRADATION QUANT: CPT | Performed by: PHYSICIAN ASSISTANT

## 2024-05-06 PROCEDURE — 80053 COMPREHEN METABOLIC PANEL: CPT | Performed by: PHYSICIAN ASSISTANT

## 2024-05-06 PROCEDURE — 82728 ASSAY OF FERRITIN: CPT | Performed by: PHYSICIAN ASSISTANT

## 2024-05-06 PROCEDURE — 85025 COMPLETE CBC W/AUTO DIFF WBC: CPT | Performed by: PHYSICIAN ASSISTANT

## 2024-05-06 PROCEDURE — 36415 COLL VENOUS BLD VENIPUNCTURE: CPT | Performed by: PHYSICIAN ASSISTANT

## 2024-05-06 PROCEDURE — 84443 ASSAY THYROID STIM HORMONE: CPT | Performed by: PHYSICIAN ASSISTANT

## 2024-05-06 PROCEDURE — 99214 OFFICE O/P EST MOD 30 MIN: CPT | Performed by: PHYSICIAN ASSISTANT

## 2024-05-06 SDOH — HEALTH STABILITY: PHYSICAL HEALTH: ON AVERAGE, HOW MANY MINUTES DO YOU ENGAGE IN EXERCISE AT THIS LEVEL?: 30 MIN

## 2024-05-06 SDOH — HEALTH STABILITY: PHYSICAL HEALTH: ON AVERAGE, HOW MANY DAYS PER WEEK DO YOU ENGAGE IN MODERATE TO STRENUOUS EXERCISE (LIKE A BRISK WALK)?: 1 DAY

## 2024-05-06 ASSESSMENT — SOCIAL DETERMINANTS OF HEALTH (SDOH)
ARE YOU MARRIED, WIDOWED, DIVORCED, SEPARATED, NEVER MARRIED, OR LIVING WITH A PARTNER?: NEVER MARRIED
HOW OFTEN DO YOU ATTENT MEETINGS OF THE CLUB OR ORGANIZATION YOU BELONG TO?: 1 TO 4 TIMES PER YEAR
HOW OFTEN DO YOU GET TOGETHER WITH FRIENDS OR RELATIVES?: ONCE A WEEK
HOW OFTEN DO YOU ATTEND CHURCH OR RELIGIOUS SERVICES?: 1 TO 4 TIMES PER YEAR
DO YOU BELONG TO ANY CLUBS OR ORGANIZATIONS SUCH AS CHURCH GROUPS UNIONS, FRATERNAL OR ATHLETIC GROUPS, OR SCHOOL GROUPS?: NO
IN A TYPICAL WEEK, HOW MANY TIMES DO YOU TALK ON THE PHONE WITH FAMILY, FRIENDS, OR NEIGHBORS?: THREE TIMES A WEEK

## 2024-05-06 ASSESSMENT — LIFESTYLE VARIABLES
HOW OFTEN DO YOU HAVE A DRINK CONTAINING ALCOHOL: MONTHLY OR LESS
HOW MANY STANDARD DRINKS CONTAINING ALCOHOL DO YOU HAVE ON A TYPICAL DAY: 1 OR 2
SKIP TO QUESTIONS 9-10: 1
AUDIT-C TOTAL SCORE: 1
HOW OFTEN DO YOU HAVE SIX OR MORE DRINKS ON ONE OCCASION: NEVER

## 2024-05-06 NOTE — COMMUNITY RESOURCES LIST (ENGLISH)
May 6, 2024           YOUR PERSONALIZED LIST OF SERVICES & PROGRAMS           & SHELTER    Housing      [address] [place] [address] - Violence Prevention Services - Domestic Violence Shelter  [place] [address] (Distance: 9.4 miles)  Phone: [phone]  Website: https://[address] [place], [place] [email or web address]/  Language: English      [facility]) [facility] - Shelter for individuals  [address] [place] [address] (Distance: 5.2 miles)  Language: English, Honduran  Fee: Free      [facility] Health [facility]  Phone: [phone]  Website: [email or web address]  Language: English, Hmong, Oromo, Andorran, Honduran  Hours: Mon 9:00 AM - 5:00 PM Tue 9:00 AM - 5:00 PM Wed 9:00 AM - 5:00 PM Thu 9:00 AM - 5:00 PM Fri 9:00 AM - 5:00 PM  Fee: Insurance  Accessibility: Blind accommodation, Deaf or hard of hearing, Translation services  Transportation Options: Free transportation    Case Management      [facility] search assistance  [address] [place] [address] (Distance: 14.9 miles)  Phone: [phone]  Language: English  Fee: Free, Sliding scale, Insurance  Accessibility: Translation services, [place] accessible, Blind accommodation, Deaf or hard of hearing      [facility] - Housing search assistance  [address] [place] [address] (Distance: 8.1 miles)  Phone: [phone] [phone]  Language: English  Fee: Sliding scale, Self pay      [facility] - Housing Stabilization Services  Phone: (128) [phone]  Website: [email or web address]://Birdi.weave energy/  Language: English  Hours: Mon 8:00 AM - 4:00 PM Tue 8:00 AM - 4:00 PM Wed 8:00 AM - 4:00 PM Thu 8:00 AM - 4:00 PM Fri 8:00 AM - 4:00 PM  Fee: Free  Accessibility: Blind accommodation, Deaf or hard of hearing  Transportation Options: Free transportation    Drop-In Services      [facility] [place] - Warming or cooling center - [facility]  [address] [place] [address]  (Distance: 3.9 miles)  Language: English, Telugu, American  Fee: Free      [employer] - [facility] [employer]  [address] [facility] [address] [place] [address] (Distance: 20.4 miles)  Phone: [phone]  Language: English  Fee: Free      [employer]  Website: http://www.laundrylove.org               IMPORTANT NUMBERS & WEBSITES        Emergency Services  911  .   [facility]  211 http://211unitedway.org  .   Poison Control  (552) 268-4833 http://[place]poison.org http://wisconsinpoison.org  .     Suicide and Crisis Lifeline  988 http://988depictline.org  .   Childhelp [place] Child Abuse Hotline  427.691.4549 http://Childhelphotline.org   .   [place] Sexual Assault Hotline  (569) 476-1257 (HOPE) http://Park.com.org   .     [place] Runaway Safeline  (951) 337-2215 (RUNAWAY) http://LiveNinjaruWabi Sabi Ecofashionconcept.org  .   Pregnancy & Postpartum Support  Call/text 918-787-4031  [place]: http://ppsupportmn.org  [place]: http://Tetherball.com/wi  .   Substance Abuse National Helpline ([facility])  876-705-HELP (7756) http://Findtreatment.gov   .                DISCLAIMER: These resources have been generated via the [employer] Platform. [employer] does not endorse any service providers mentioned in this resource list. [employer] does not guarantee that the services mentioned in this resource list will be available to you or will improve your health or wellness.    [facility]

## 2024-05-06 NOTE — RESULT ENCOUNTER NOTE
Results discussed directly with patient while patient was present. Any further details documented in the note.   Chuyita Ye PA-C

## 2024-05-06 NOTE — TELEPHONE ENCOUNTER
Several episodes of severe cramping in legs past few days.      History of DVT and a couple different superficial blood clots in past four years.  Broken toe on right foot since last summer that has not healed.    Mostly right leg but also one in left leg as well.    5/5/24 had cramping on right side from ankle to thigh, lasted about 5 minutes and this one did not feel like a eri horse, felt different.  5/4/24 had back of left calf, lasted about 5 minutes.  Felt like a eri horse.  5/3/24: Right shin cramping woke pt up around 4 times.     Denies pain or other symptoms currently.  Cramping pain is severe when it comes on.  On her feet for long hours for work, legs feel tight and sore.  Baseline mild bilateral ankle swelling, denies changes/increase in swelling.  Reports right is slightly more swollen than left.  Denies chest pain, difficulty breathing.    Discussed ADS option w/ pt.  Advised pt to call back with new or worsening symptoms.  Called ADS.  They will see pt at 2:30 PM    Called pt and pt agreed to ADS at 2:30 PM.  Address provided.    Maite Johnson RN, BSN  [facility]      Reason for Disposition   Patient wants to be seen    Additional Information   Negative: Sounds like a life-threatening emergency to the triager   Negative: Chest pain   Negative: Followed an insect bite and has localized swelling (e.g., small area of puffy or swollen skin)   Negative: Followed a knee injury   Negative: Ankle or foot injury   Negative: Pregnant with leg swelling or edema   Negative: Difficulty breathing at rest   Negative: Entire foot is cool or blue in comparison to other side   Negative: SEVERE swelling (e.g., swelling extends above knee, entire leg is swollen, weeping fluid)   Negative: Cast on leg or ankle and has increasing pain   Negative: Can't walk or can barely stand (new-onset)   Negative: Fever and red area (or area very tender to touch)   Negative: Patient sounds very sick or  "weak to the triager   Negative: Swelling of face, arm or hands  (Exception: Slight puffiness of fingers during hot weather.)   Negative: Pregnant 20 or more weeks and sudden weight gain (i.e., > 2 lbs, 1 kg in one week)   Negative: Thigh or calf pain and only 1 side and present > 1 hour   Negative: Thigh, calf, or ankle swelling in only one leg   Negative: Thigh, calf, or ankle swelling in both legs, but one side is definitely more swollen (Exception: Longstanding difference between legs.)   Negative: MODERATE swelling of both ankles (e.g., swelling extends up to the knees) AND new-onset or worsening   Negative: Difficulty breathing with exertion AND worsening or new-onset   Negative: Looks like a boil, infected sore, deep ulcer, or other infected rash (spreading redness, pus)    Answer Assessment - Initial Assessment Questions  1. ONSET: \"When did the swelling start?\" (e.g., minutes, hours, days)      Baseline mild swelling in both ankles for past couple years.  Denies any increase in swelling   2. LOCATION: \"What part of the leg is swollen?\"  \"Are both legs swollen or just one leg?\"      Both ankles  3. SEVERITY: \"How bad is the swelling?\" (e.g., localized; mild, moderate, severe)    - Localized: Small area of swelling localized to one leg.    - MILD pedal edema: Swelling limited to foot and ankle, pitting edema < 1/4 inch (6 mm) deep, rest and elevation eliminate most or all swelling.    - MODERATE edema: Swelling of lower leg to knee, pitting edema > 1/4 inch (6 mm) deep, rest and elevation only partially reduce swelling.    - SEVERE edema: Swelling extends above knee, facial or hand swelling present.       mild  4. REDNESS: \"Does the swelling look red or infected?\"      denies  5. PAIN: \"Is the swelling painful to touch?\" If Yes, ask: \"How painful is it?\"   (Scale 1-10; mild, moderate or severe)      Mild soreness, significant pain when cramping is occuring  6. FEVER: \"Do you have a fever?\" If Yes, ask: \"What " "is it, how was it measured, and when did it start?\"       denies  7. CAUSE: \"What do you think is causing the leg swelling?\"      Wants to make sure she doesn't have a blood clot  8. MEDICAL HISTORY: \"Do you have a history of blood clots (e.g., DVT), cancer, heart failure, kidney disease, or liver failure?\"      History of DVT 2021 (one in ankle and one behind knee) and couple different superficial clots in past four years.  Denies any recent injury, surgery or travel.  9. RECURRENT SYMPTOM: \"Have you had leg swelling before?\" If Yes, ask: \"When was the last time?\" \"What happened that time?\"      Yes, past couple years.  10. OTHER SYMPTOMS: \"Do you have any other symptoms?\" (e.g., chest pain, difficulty breathing)        denies  11. PREGNANCY: \"Is there any chance you are pregnant?\" \"When was your last menstrual period?\"        N/a    Protocols used: Leg Swelling and Edema-A-[place]    "

## 2024-05-06 NOTE — PROGRESS NOTES
"Acute and Diagnostic Services Clinic Visit    Assessment & Plan     Leg cramping  Personal history of DVT (deep vein thrombosis)  Varicose veins of both lower extremities, unspecified whether complicated  Stat labs reassuring.  No evidence of metabolic etiology for leg discomfort.  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  Patient does have obese body habitus and certainly could be contributing to discomfort as she is on her feet a lot for work.  Compression stockings advised especially on workdays.  Vascular medicine evaluation recommended due to concerns of possible venous or arterial insufficiency contributing.  Gentle stretching encouraged.  Electrolyte drink once daily recommended as well.  All questions answered to the patient's satisfaction.  - D dimer quantitative  - TSH with free T4 reflex  - Comprehensive metabolic panel  - CBC with platelets differential  - CK total  - Ferritin  - Magnesium  - Vascular Medicine Referral    33  minutes were spent doing chart review, history and exam, documentation and further activities per the note.      BMI  Estimated body mass index is 41.34 kg/m  as calculated from the following:    Height as of 10/2/23: 1.702 m (5' 7.01\").    Weight as of this encounter: 119.7 kg (264 lb).   Weight management plan: Patient was referred to their PCP to discuss a diet and exercise plan.        Return in about 1 week (around 5/13/2024) for vascular medicine.      Chuyita Ye MBA, MS, PA-C  [facility]        Marco Perez is a 43 year old, presenting for the following health issues:  Leg Pain (Bilateral leg pain R>L X 3 days, most pain in shin area)        10/2/2023     4:30 PM   Additional Questions   Roomed by Merline BUENO   Accompanied by Self     HPI     Evaluation for possible DVT  Onset/Duration: X 3 days  Description:       Location: Bilateral lower extremities "        Redness: no        Pain: 1/10       Warmth: no        Joint swelling no   Progression of symptoms same  Accompanying signs and symptoms:       Fevers: no        Numbness/tingling/weakness: no        Chest pain/pleurisy: no        Shortness of breath: no   History        Trauma: no         Recent travel/when: YES- cruise noted X 1.5 months ago        Previous history of DVT: YES        Family history of DVT: no         Recent surgery: no   Aggravating factors include: none, no pain while walking or standing, notes random bouts of pain.  Notes she is on her feet 13 hours a day for work, work is also busier now.  Therapies tried and outcome: Topical analgesic spray, not sure if this works or not, Tylenol  Prior surgery on arteries of veins in this area: No    History of DVT April 2020 in the right ankle -suspected that this was provoked by infection.    Did see Dr. Block of hematology 8/3/2020.  Note below:         PROBLEM LIST:  1. RLE DVT and saphenous vein thrombus 4/17/20-likely provoked by upper respiratory infection.  2. Morbid obesity     Interim history: Ms. Reina is here for follow-up of DVT.  She is completed 3 months of apixaban.  No bleeding issues.  She has mild swelling of both legs, which is been chronic.  She wears knee-high compression stockings while at work and says they feel okay to wear and help the swelling.  She does note that she has some mild varicose veins which is longstanding.  She ran out of apixaban 3 days ago and wonders if she needs to go back on it.     She is hoping to be able to undergo laparoscopic Sandrine-en-Y gastric bypass surgery.  She was going to have it in March, then COVID-19 hit, and got postponed.  She then had an upper respiratory infection (not checked for COVID), and developed a DVT.  At this point she says she feels fine.  She would like to be able to go forward with a gastric bypass surgery.  She is also concerned about possible insurance changes if she does not  have the surgery before the end of the year.      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  Her company is just filed for bankruptcy, so she is a little bit concerned about insurance issues.     She complains of occasional numbness and tingling in the first 3 fingers of both hands, is more prominent on the right.  No discoloration of the fingers.     She is wondering about COVID antibody testing.          Review of Systems  Constitutional, HEENT, cardiovascular, pulmonary, GI, , musculoskeletal, neuro, skin, endocrine and psych systems are negative, except as otherwise noted.      Objective    /80 (BP Location: Left arm, Patient Position: Chair, Cuff Size: Adult Large)   Pulse 73   Temp 98  F (36.7  C) (Oral)   Resp 18   Wt 119.7 kg (264 lb)   LMP 08/13/2018   SpO2 98%   BMI 41.34 kg/m    Body mass index is 41.34 kg/m .  Physical Exam   GENERAL: alert and no distress  EYES: Eyes grossly normal to inspection, PERRL and conjunctivae and sclerae normal  RESP: lungs clear to auscultation - no rales, rhonchi or wheezes  CV: regular rate and rhythm, normal S1 S2, no S3 or S4, no murmur, click or rub,   MS: no gross musculoskeletal defects noted, prominent varicosities appreciated of bilateral lower extremities - No tenderness warmth appreciated  SKIN: no suspicious lesions or rashes  NEURO: Normal strength and tone, mentation intact and speech normal  PSYCH: mentation appears normal, affect normal/bright    Results for orders placed or performed in visit on 05/06/24   D dimer quantitative     Status: Normal   Result Value Ref Range    D-Dimer Quantitative 0.44 0.00 - 0.50 ug/mL FEU    Narrative    This D-dimer assay is intended for use in conjunction with a clinical pretest probability assessment model to exclude pulmonary embolism (PE) and deep venous thrombosis (DVT) in outpatients suspected of PE or DVT. The cut-off value is 0.50 ug/mL FEU.    TSH with free T4 reflex     Status: Normal   Result Value Ref Range    TSH 1.88 0.30 - 4.20 uIU/mL   Comprehensive metabolic panel     Status: Normal   Result Value Ref Range    Sodium 139 135 - 145 mmol/L    Potassium 4.0 3.4 - 5.3 mmol/L    Carbon Dioxide (CO2) 27 22 - 29 mmol/L    Anion Gap 7 7 - 15 mmol/L    Urea Nitrogen 12.3 6.0 - 20.0 mg/dL    Creatinine 0.76 0.51 - 0.95 mg/dL    GFR Estimate >90 >60 mL/min/1.73m2    Calcium 9.5 8.6 - 10.0 mg/dL    Chloride 105 98 - 107 mmol/L    Glucose 92 70 - 99 mg/dL    Alkaline Phosphatase 77 40 - 150 U/L    AST 22 0 - 45 U/L    ALT 18 0 - 50 U/L    Protein Total 7.0 6.4 - 8.3 g/dL    Albumin 4.0 3.5 - 5.2 g/dL    Bilirubin Total 0.4 <=1.2 mg/dL   CK total     Status: Normal   Result Value Ref Range    CK 48 26 - 192 U/L   Magnesium     Status: Normal   Result Value Ref Range    Magnesium 1.9 1.7 - 2.3 mg/dL   CBC with platelets and differential     Status: None   Result Value Ref Range    WBC Count 7.0 4.0 - 11.0 10e3/uL    RBC Count 4.38 3.80 - 5.20 10e6/uL    Hemoglobin 13.2 11.7 - 15.7 g/dL    Hematocrit 40.2 35.0 - 47.0 %    MCV 92 78 - 100 fL    MCH 30.1 26.5 - 33.0 pg    MCHC 32.8 31.5 - 36.5 g/dL    RDW 13.4 10.0 - 15.0 %    Platelet Count 264 150 - 450 10e3/uL    % Neutrophils 63 %    % Lymphocytes 25 %    % Monocytes 8 %    % Eosinophils 3 %    % Basophils 1 %    % Immature Granulocytes 0 %    NRBCs per 100 WBC 0 <1 /100    Absolute Neutrophils 4.4 1.6 - 8.3 10e3/uL    Absolute Lymphocytes 1.8 0.8 - 5.3 10e3/uL    Absolute Monocytes 0.5 0.0 - 1.3 10e3/uL    Absolute Eosinophils 0.2 0.0 - 0.7 10e3/uL    Absolute Basophils 0.1 0.0 - 0.2 10e3/uL    Absolute Immature Granulocytes 0.0 <=0.4 10e3/uL    Absolute NRBCs 0.0 10e3/uL   CBC with platelets differential     Status: None    Narrative    The following orders were created for panel order CBC with platelets differential.  Procedure                               Abnormality         Status                      ---------                               -----------         ------                     CBC with platelets and d...[[id number]]                      Final result                 Please view results for these tests on the individual orders.           Signed Electronically by: Chuyita Ye PA-C

## 2024-05-06 NOTE — TELEPHONE ENCOUNTER
Referral received via [employer] on 05/06/24.    Pt referred to [facility] by Chuyita Ye PA-C for leg cramping, Hx DVT, varicose veins    D-dimer 05/06/24 WNL      Routing to scheduling to coordinate the following:    NEW VASCULAR PATIENT consult with Vascular Medicine  Please schedule this at next available      Appt note:    Ref by CONNOR Solorzano for leg cramping ,hx DVT, varicose veins.

## 2024-05-10 NOTE — TELEPHONE ENCOUNTER
LM for patient to call us back to schedule:    NEW VASCULAR PATIENT consult with Vascular Medicine  Please schedule this at next available     This is our 2nd and final attempt to schedule this appointment.

## 2024-08-20 NOTE — PROGRESS NOTES
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 She completed treatment with hysterectomy and BSO on 18. She presents today for a six month surveillance visit.    HPI: Erika comes to the clinic unaccompanied. She has been feeling fair without gynecologic concerns. She is sexually active without concern. She continues to have LLQ discomfort inferior to her LLQ port site since her surgery 2018- most noticeable when she touches it or puts pressure on it, feels like a bruised area. Stable over time. No fevers, chills, nausea/vomiting, changes in bowels/bladder, vaginal bleeding, dyspareunia, edema, masses in groin. Continues with fatigue but states she thinks this is due to her weight. Able to work 12+ hour days on her feet, fatigue resolves with rest. Plans to see the weight management clinic, has last six pounds in the past three weeks on purpose. Denies hot flashes, night sweats, or vaginal dryness. She is up to date on visits with her PCP.    Oncology History:  She notes she has had abnormal bleeding since her very first menses with very irregular cycles.  In  she noted a major change in the amount of bleeding with a significant increase.  Due to this she was placed on metformin without much improvement in her bleeding and thus had the following work up:     18:  US Pelvis:  Uterus measures 7.3 x 4.9 x 5.5 cm, EMS 26.3 mm, normal ovaries     18:  EMB:  Grade 1 endometrial adenocarcinoma, MMR intact     18:  Robotic hysterectomy, bilateral salpingo-oophorectomy, bilateral pelvic sentinel lymph node dissection, cystoscopy                 Pathology:  Stage 1A, grade 1 endometrial adenocarcinoma, tumor size 4.9 cm, no myometrial invasion, no LVSI, 0/11 sentinel LN       Past Medical  History:   Diagnosis Date     Endometrial cancer (H) 12/19/2018     Hyperlipidemia LDL goal <160 9/15/2011       Past Surgical History:   Procedure Laterality Date     ARTHROPLASTY KNEE       CYSTOSCOPY N/A 12/13/2018    Procedure: Cystoscopy;  Surgeon: Cleopatra Altamirano MD;  Location:  OR     DAVINCI HYSTERECTOMY TOTAL, BILATERAL SALPINGO-OOPHORECTOMY, NODE DISSECTION, COMBINED N/A 12/13/2018    Procedure: DaVinci Assisted Removal Of Uterus, Cervix, Both Ovaries And Fallopian Tubes, Bilateral [place] Lymph Node Dissection;  Surgeon: Cleopatra Altamirano MD;  Location:  OR       Social History     Socioeconomic History     Marital status: Single     Spouse name: Not on file     Number of children: Not on file     Years of education: Not on file     Highest education level: Not on file   Occupational History     Not on file   Social Needs     Financial resource strain: Not on file     Food insecurity:     Worry: Not on file     Inability: Not on file     Transportation needs:     Medical: Not on file     Non-medical: Not on file   Tobacco Use     Smoking status: Never Smoker     Smokeless tobacco: Never Used   Substance and Sexual Activity     Alcohol use: Yes     Alcohol/week: 0.0 oz     Comment: rarely     Drug use: No     Sexual activity: Yes     Partners: Male     Birth control/protection: Condom   Lifestyle     Physical activity:     Days per week: Not on file     Minutes per session: Not on file     Stress: Not on file   Relationships     Social connections:     Talks on phone: Not on file     Gets together: Not on file     Attends Alevism service: Not on file     Active member of club or organization: Not on file     Attends meetings of clubs or organizations: Not on file     Relationship status: Not on file     Intimate partner violence:     Fear of current or ex partner: Not on file     Emotionally abused: Not on file     Physically abused: Not on file     Forced sexual activity: Not on  "file   Other Topics Concern     Parent/sibling w/ CABG, MI or angioplasty before 65F 55M? No   Social History Narrative     Not on file       Family History   Problem Relation Age of Onset     Diabetes Mother      Diabetes Father      Cancer - colorectal Maternal Grandmother      Heart Disease Paternal Grandmother        Current Outpatient Medications   Medication     multivitamin, therapeutic with minerals (MULTI-VITAMIN) TABS tablet     VITAMIN D, CHOLECALCIFEROL, PO     No current facility-administered medications for this visit.           Allergies   Allergen Reactions     Ibuprofen Swelling     Swelling of legs       Review of Systems  10 point ROS negative other than the symptoms noted above in the HPI.    OBJECTIVE:    Physical Exam:    BP (!) 141/91   Pulse 79   Temp 98.7  F (37.1  C) (Tympanic)   Resp 16   Ht 1.753 m (5' 9\")   Wt (!) 161.6 kg (356 lb 3.2 oz)   SpO2 96%   BMI 52.60 kg/m      CONSTITUTIONAL: Alert non-toxic appearing female in no acute distress  HEAD: Normocephalic, atraumatic  NECK: Neck supple without lymphadenopathy  RESPIRATORY: Lungs clear to auscultation, no increased work of breathing noted  CV: Regular rate and rhythm, S1S2, no clicks, murmurs, rubs, or gallops; bilateral lower extremities without edema, dorsalis pedis pulses 2+ bilaterally  GASTROINTESTINAL: Normoactive bowel sounds x4 quadrants, abdomen obese, soft, non-distended, no organomegaly noted; tenderness to palpation with a small 1-2cm fluctuant and mobile mass roughly 3-4cm inferior to her LLQ port scar  GENITOURINARY: External genitalia and urethral meatus pink without lesions, masses, or excoriation. Vagina pink and well rugated without masses or lesions. Vaginal cuff without nodularity, masses, or lesions. Cervix surgically absent. Bimanual exam reveals no masses or fullness- somewhat limited by body habitus. Rectovaginal exam confirms these findings.  LYMPHATIC: Cervical, supraclavicular, and inguinal nodes " without lymphadenopathy  MUSCULOSKELETAL: Moves all extremities, no obvious muscle wasting  NEUROLOGIC: No gross deficits, normal gait  SKIN: Appropriate color for race, warm and dry, no rashes or lesions to unclothed skin  PSYCHIATRIC: Pleasant and interactive, affect bright, makes appropriate eye contact, thought process linear    Assessment/Plan:  1) Stage IA grade 1 endometrial endometrioid adenocarcinoma: Completed treatment with surgery. Continues to have LLQ pain inferior to her port site, palpable mobile mass- I have a very low suspicion for disease recurrence but question fluid collection vs scar tissue vs lipoma. Obtain CT abdomen/pelvis for further evaluation as this continues to bothersome on a daily basis for her. Pending CT results, will continue surveillance every six months x2 years (through 12/2020) then annually thereafter with pelvic/rectal exam per recommendations by Kat et al. (2017). Reviewed signs and symptoms  of recurrence including but not limited to bleeding from vagina, bladder, or rectum, bloating, early satiety, swelling in the lower extremities, abdominal or lower back pain, changes in bowel or bladder patterns, shortness of breath, increased fatigue, unexplained weight loss, and night sweats. Reviewed recommendations from [facility] not to perform surveillance pap smears in women diagnosed with endometrial cancer as this does not improve detection of local recurrence. Reviewed signs and symptoms for when she should contact the clinic or seek additional care. Patient to contact the clinic with any questions or concerns in the interim. Survivorship care plan/treatment summary printed off and reviewed with patient.  2) Genetic testing: Risk factors have been assessed and the patient does not meet qualifications for genetic counseling based on NCCN guidelines- MMR intact.   3) Data: CT AP with contrast due to LLQ discomfort and small palpable mass  4) Health maintenance issues discussed today  include to follow up with PCP for co-morbid conditions and non-gynecologic issues. Commended on weight loss and for plans to see the weight management clinic.  5) Patient verbalized understanding of and agreement with plan.    A total of 20 minutes of face to face time spent with patient, over 50% of which was spent in counseling and coordination of care.    LEEROY Cason, FNP-C  Division of Gynecologic Oncology  [facility]  Pager: [phone]             Previously Declined (within the last year)

## 2024-11-18 ENCOUNTER — OFFICE VISIT (OUTPATIENT)
Dept: FAMILY MEDICINE | Facility: CLINIC | Age: 44
End: 2024-11-18
Attending: NURSE PRACTITIONER
Payer: COMMERCIAL

## 2024-11-18 VITALS
HEART RATE: 68 BPM | TEMPERATURE: 97.9 F | WEIGHT: 275 LBS | DIASTOLIC BLOOD PRESSURE: 84 MMHG | SYSTOLIC BLOOD PRESSURE: 122 MMHG | HEIGHT: 67 IN | OXYGEN SATURATION: 100 % | BODY MASS INDEX: 43.16 KG/M2 | RESPIRATION RATE: 18 BRPM

## 2024-11-18 DIAGNOSIS — R53.83 FATIGUE, UNSPECIFIED TYPE: ICD-10-CM

## 2024-11-18 DIAGNOSIS — Z00.00 ROUTINE GENERAL MEDICAL EXAMINATION AT A HEALTH CARE FACILITY: Primary | ICD-10-CM

## 2024-11-18 DIAGNOSIS — E66.813 CLASS 3 SEVERE OBESITY DUE TO EXCESS CALORIES WITHOUT SERIOUS COMORBIDITY WITH BODY MASS INDEX (BMI) OF 40.0 TO 44.9 IN ADULT (H): ICD-10-CM

## 2024-11-18 DIAGNOSIS — E78.5 HYPERLIPIDEMIA LDL GOAL <160: ICD-10-CM

## 2024-11-18 DIAGNOSIS — E66.01 CLASS 3 SEVERE OBESITY DUE TO EXCESS CALORIES WITHOUT SERIOUS COMORBIDITY WITH BODY MASS INDEX (BMI) OF 40.0 TO 44.9 IN ADULT (H): ICD-10-CM

## 2024-11-18 DIAGNOSIS — Z98.84 BARIATRIC SURGERY STATUS: ICD-10-CM

## 2024-11-18 DIAGNOSIS — M25.50 MULTIPLE JOINT PAIN: ICD-10-CM

## 2024-11-18 DIAGNOSIS — Z12.31 VISIT FOR SCREENING MAMMOGRAM: ICD-10-CM

## 2024-11-18 PROBLEM — Z85.42 HISTORY OF ENDOMETRIAL CANCER: Status: ACTIVE | Noted: 2018-11-20

## 2024-11-18 LAB
ALBUMIN SERPL BCG-MCNC: 4.1 G/DL (ref 3.5–5.2)
ALP SERPL-CCNC: 80 U/L (ref 40–150)
ALT SERPL W P-5'-P-CCNC: 21 U/L (ref 0–50)
ANION GAP SERPL CALCULATED.3IONS-SCNC: 11 MMOL/L (ref 7–15)
AST SERPL W P-5'-P-CCNC: 29 U/L (ref 0–45)
BILIRUB SERPL-MCNC: 0.5 MG/DL
BUN SERPL-MCNC: 11.4 MG/DL (ref 6–20)
CALCIUM SERPL-MCNC: 9.8 MG/DL (ref 8.8–10.4)
CHLORIDE SERPL-SCNC: 104 MMOL/L (ref 98–107)
CHOLEST SERPL-MCNC: 169 MG/DL
CREAT SERPL-MCNC: 0.67 MG/DL (ref 0.51–0.95)
CRP SERPL-MCNC: 9.02 MG/L
EGFRCR SERPLBLD CKD-EPI 2021: >90 ML/MIN/1.73M2
ERYTHROCYTE [DISTWIDTH] IN BLOOD BY AUTOMATED COUNT: 13.3 % (ref 10–15)
ERYTHROCYTE [SEDIMENTATION RATE] IN BLOOD BY WESTERGREN METHOD: 18 MM/HR (ref 0–20)
FASTING STATUS PATIENT QL REPORTED: YES
FASTING STATUS PATIENT QL REPORTED: YES
FERRITIN SERPL-MCNC: 132 NG/ML (ref 6–175)
FOLATE SERPL-MCNC: 17.3 NG/ML (ref 4.6–34.8)
GLUCOSE SERPL-MCNC: 84 MG/DL (ref 70–99)
HCO3 SERPL-SCNC: 26 MMOL/L (ref 22–29)
HCT VFR BLD AUTO: 39.4 % (ref 35–47)
HDLC SERPL-MCNC: 90 MG/DL
HGB BLD-MCNC: 13 G/DL (ref 11.7–15.7)
LDLC SERPL CALC-MCNC: 65 MG/DL
MCH RBC QN AUTO: 30.4 PG (ref 26.5–33)
MCHC RBC AUTO-ENTMCNC: 33 G/DL (ref 31.5–36.5)
MCV RBC AUTO: 92 FL (ref 78–100)
NONHDLC SERPL-MCNC: 79 MG/DL
PLATELET # BLD AUTO: 277 10E3/UL (ref 150–450)
POTASSIUM SERPL-SCNC: 4 MMOL/L (ref 3.4–5.3)
PROT SERPL-MCNC: 7.1 G/DL (ref 6.4–8.3)
RBC # BLD AUTO: 4.27 10E6/UL (ref 3.8–5.2)
RHEUMATOID FACT SERPL-ACNC: <10 IU/ML
SODIUM SERPL-SCNC: 141 MMOL/L (ref 135–145)
TRIGL SERPL-MCNC: 72 MG/DL
TSH SERPL DL<=0.005 MIU/L-ACNC: 2.47 UIU/ML (ref 0.3–4.2)
VIT B12 SERPL-MCNC: 1920 PG/ML (ref 232–1245)
VIT D+METAB SERPL-MCNC: 51 NG/ML (ref 20–50)
WBC # BLD AUTO: 5.5 10E3/UL (ref 4–11)

## 2024-11-18 PROCEDURE — 91320 SARSCV2 VAC 30MCG TRS-SUC IM: CPT | Performed by: NURSE PRACTITIONER

## 2024-11-18 PROCEDURE — 80053 COMPREHEN METABOLIC PANEL: CPT | Performed by: NURSE PRACTITIONER

## 2024-11-18 PROCEDURE — 82306 VITAMIN D 25 HYDROXY: CPT | Performed by: NURSE PRACTITIONER

## 2024-11-18 PROCEDURE — 82746 ASSAY OF FOLIC ACID SERUM: CPT | Performed by: NURSE PRACTITIONER

## 2024-11-18 PROCEDURE — 80061 LIPID PANEL: CPT | Performed by: NURSE PRACTITIONER

## 2024-11-18 PROCEDURE — 85027 COMPLETE CBC AUTOMATED: CPT | Performed by: NURSE PRACTITIONER

## 2024-11-18 PROCEDURE — 86431 RHEUMATOID FACTOR QUANT: CPT | Performed by: NURSE PRACTITIONER

## 2024-11-18 PROCEDURE — 90656 IIV3 VACC NO PRSV 0.5 ML IM: CPT | Performed by: NURSE PRACTITIONER

## 2024-11-18 PROCEDURE — 84443 ASSAY THYROID STIM HORMONE: CPT | Performed by: NURSE PRACTITIONER

## 2024-11-18 PROCEDURE — 82728 ASSAY OF FERRITIN: CPT | Performed by: NURSE PRACTITIONER

## 2024-11-18 PROCEDURE — 86200 CCP ANTIBODY: CPT | Performed by: NURSE PRACTITIONER

## 2024-11-18 PROCEDURE — 99396 PREV VISIT EST AGE 40-64: CPT | Mod: 25 | Performed by: NURSE PRACTITIONER

## 2024-11-18 PROCEDURE — 99214 OFFICE O/P EST MOD 30 MIN: CPT | Mod: 25 | Performed by: NURSE PRACTITIONER

## 2024-11-18 PROCEDURE — 86140 C-REACTIVE PROTEIN: CPT | Performed by: NURSE PRACTITIONER

## 2024-11-18 PROCEDURE — 36415 COLL VENOUS BLD VENIPUNCTURE: CPT | Performed by: NURSE PRACTITIONER

## 2024-11-18 PROCEDURE — 90480 ADMN SARSCOV2 VAC 1/ONLY CMP: CPT | Performed by: NURSE PRACTITIONER

## 2024-11-18 PROCEDURE — 82607 VITAMIN B-12: CPT | Performed by: NURSE PRACTITIONER

## 2024-11-18 PROCEDURE — 86038 ANTINUCLEAR ANTIBODIES: CPT | Performed by: NURSE PRACTITIONER

## 2024-11-18 PROCEDURE — 85652 RBC SED RATE AUTOMATED: CPT | Performed by: NURSE PRACTITIONER

## 2024-11-18 PROCEDURE — 90471 IMMUNIZATION ADMIN: CPT | Performed by: NURSE PRACTITIONER

## 2024-11-18 SDOH — HEALTH STABILITY: PHYSICAL HEALTH: ON AVERAGE, HOW MANY DAYS PER WEEK DO YOU ENGAGE IN MODERATE TO STRENUOUS EXERCISE (LIKE A BRISK WALK)?: 0 DAYS

## 2024-11-18 SDOH — HEALTH STABILITY: PHYSICAL HEALTH: ON AVERAGE, HOW MANY MINUTES DO YOU ENGAGE IN EXERCISE AT THIS LEVEL?: 0 MIN

## 2024-11-18 ASSESSMENT — PAIN SCALES - GENERAL: PAINLEVEL_OUTOF10: MODERATE PAIN (4)

## 2024-11-18 ASSESSMENT — SOCIAL DETERMINANTS OF HEALTH (SDOH): HOW OFTEN DO YOU GET TOGETHER WITH FRIENDS OR RELATIVES?: TWICE A WEEK

## 2024-11-18 NOTE — PROGRESS NOTES
Preventive Care Visit  [facility] LEEROY Hager CNP, Family Medicine  Nov 18, 2024      Assessment & Plan     Routine general medical examination at a health care facility  Reviewed age appropriate screenings and immunizations.     Hyperlipidemia LDL goal <160  Fasting, recheck.   - Lipid panel reflex to direct LDL Non-fasting; Future  - Lipid panel reflex to direct LDL Non-fasting    Visit for screening mammogram  due  - MA Screening Bilateral w/ Remberto; Future    Multiple joint pain  Seems to be more joint pain than muscular pain.  Will start with lab work up given multiple joints involved.  Consider ortho if labs are normal.  Advised increasing exercise and weight loss can be beneficial.   - CBC with platelets; Future  - Comprehensive metabolic panel (BMP + Alb, Alk Phos, ALT, AST, Total. Bili, TP); Future  - TSH with free T4 reflex; Future  - ESR: Erythrocyte sedimentation rate; Future  - CRP, inflammation; Future  - Anti Nuclear Vanessa IgG by IFA with Reflex; Future  - Rheumatoid factor; Future  - Cyclic Citrullinated Peptide Antibody IgG; Future  - CBC with platelets  - Comprehensive metabolic panel (BMP + Alb, Alk Phos, ALT, AST, Total. Bili, TP)  - TSH with free T4 reflex  - ESR: Erythrocyte sedimentation rate  - CRP, inflammation  - Anti Nuclear Vanessa IgG by IFA with Reflex  - Rheumatoid factor  - Cyclic Citrullinated Peptide Antibody IgG    Fatigue, unspecified type  Start with labs today.  Discussed lifestyle management--increase sleep to 7-9 hours nightly, increase activity level, healthy diet (avoid heavily processed foods), limit/avoid caffeine notably after noon, reduce stress with stress relieving activities.    - CBC with platelets; Future  - Comprehensive metabolic panel (BMP + Alb, Alk Phos, ALT, AST, Total. Bili, TP); Future  - TSH with free T4 reflex; Future  - CBC with platelets  - Comprehensive metabolic panel (BMP + Alb, Alk Phos, ALT, AST, Total. Bili, TP)  - TSH  "with free T4 reflex    Bariatric surgery status  Checking labs.  Taking vitamin D and B12 supplement.   - CBC with platelets; Future  - Ferritin; Future  - Folate; Future  - Vitamin B12; Future  - Vitamin D Deficiency; Future  - CBC with platelets  - Ferritin  - Folate  - Vitamin B12  - Vitamin D Deficiency    Class 3 severe obesity due to excess calories without serious comorbidity with body mass index (BMI) of 40.0 to 44.9 in adult (H)  Discussed GLP1 medications that can be used for weight loss.  We reviewed risks, benefits, side effects, possible cost and supply challenges.  Medications are to be used as an adjunct to diet and exercise.  She has a hx of bariatric surgery in 2020.  She will check with insurance regarding coverage and let me know if medication is covered.  If she has coverage would recommend zepbound.  Consider weight management clinic if GLP1s are not covered.             BMI  Estimated body mass index is 43.07 kg/m  as calculated from the following:    Height as of this encounter: 1.702 m (5' 7\").    Weight as of this encounter: 124.7 kg (275 lb).   Weight management plan: Discussed healthy diet and exercise guidelines    Counseling  Appropriate preventive services were addressed with this patient via screening, questionnaire, or discussion as appropriate for fall prevention, nutrition, physical activity, Tobacco-use cessation, social engagement, weight loss and cognition.  Checklist reviewing preventive services available has been given to the patient.  Reviewed patient's diet, addressing concerns and/or questions.           Marco Perez is a 44 year old, presenting for the following:  Physical and Blood Draw (LABS: patient IS fasting)        11/18/2024    10:14 AM   Additional Questions   Roomed by Grace         11/18/2024    10:14 AM   Patient Reported Additional Medications   Patient reports taking the following new medications none          HPI    Feels tired all the time.   New job 3 " months ago.   Bilat hip pain, shoulder pain.   Joints ache.   Sleeping 6 hours a night.   No energy, desire to work out due to aches/pains.   Time constraints also impact ability to have time to work out.   Diet could be better.   Hx of bariatric surgery 2020, down 75 pounds.   Wondering about weight loss meds.       Health Care Directive  Patient does not have a Health Care Directive: Discussed advance care planning with patient; however, patient declined at this time.      11/18/2024   General Health   How would you rate your overall physical health? (!) POOR   Feel stress (tense, anxious, or unable to sleep) Not at all            11/18/2024   Nutrition   Three or more servings of calcium each day? Yes   Diet: Regular (no restrictions)   How many servings of fruit and vegetables per day? (!) 2-3   How many sweetened beverages each day? 0-1            11/18/2024   Exercise   Days per week of moderate/strenous exercise 0 days   Average minutes spent exercising at this level 0 min      (!) EXERCISE CONCERN      11/18/2024   Social Factors   Frequency of gathering with friends or relatives Twice a week   Worry food won't last until get money to buy more No   Food not last or not have enough money for food? No   Do you have housing? (Housing is defined as stable permanent housing and does not include staying ouside in a car, in a tent, in an abandoned building, in an overnight shelter, or couch-surfing.) yes   Are you worried about losing your housing? No   Lack of transportation? No   Unable to get utilities (heat,electricity)? No   Want help with housing or utility concern? No            11/18/2024   Dental   Dentist two times every year? Yes            11/18/2024   TB Screening   Were you born outside of the [place]? No            Today's PHQ-2 Score:       11/18/2024    12:55 AM   PHQ-2 ( 1999 [employer])   Q1: Little interest or pleasure in doing things 1    Q2: Feeling down, depressed or hopeless 1    PHQ-2 Score 2    Q1:  Little interest or pleasure in doing things Several days   Q2: Feeling down, depressed or hopeless Several days   PHQ-2 Score 2       Patient-reported           11/18/2024   Substance Use   Alcohol more than 3/day or more than 7/wk No   Do you use any other substances recreationally? No        Social History     Tobacco Use    Smoking status: Never     Passive exposure: Never    Smokeless tobacco: Never   Vaping Use    Vaping status: Never Used   Substance Use Topics    Alcohol use: Not Currently     Comment: rarely.  Once monthly 0-2 drinks    Drug use: No          Mammogram Screening - Mammogram every 1-2 years updated in Health Maintenance based on mutual decision making        11/18/2024   STI Screening   New sexual partner(s) since last STI/HIV test? No        History of abnormal Pap smear: Status post hysterectomy with removal of cervix and no history of CIN2 or greater or cervical cancer. Health Maintenance and Surgical History updated.        Latest Ref Rng & Units 8/16/2018     3:30 PM 10/18/2016    12:19 PM 10/18/2016    12:00 AM   PAP / HPV   PAP (Historical)  NIL   LSIL    HPV 16 DNA NEG^Negative Negative  Negative     HPV 18 DNA NEG^Negative Negative  Negative     Other HR HPV NEG^Negative Negative  Positive       ASCVD Risk   The ASCVD Risk score (Fina SWAIN, et al., 2019) failed to calculate for the following reasons:    Cannot find a previous HDL lab    Cannot find a previous total cholesterol lab       Reviewed and updated as needed this visit by Provider                    Past Medical History:   Diagnosis Date    DVT of lower extremity (deep venous thrombosis) (H) 04/2020    right ankle    Endometrial cancer (H) 12/19/2018    Hyperlipidemia LDL goal <160 09/15/2011    Obese     ENRICO (obstructive sleep apnea) 12/03/2019    Since losing 100 pounds has stopped using CPAP.    Sleep apnea     NO CPAP NEEDED     Past Surgical History:   Procedure Laterality Date    ABDOMEN SURGERY  12/18/2018     hysterectomy    BIOPSY  11/2018    endometrial layer    COMBINED CYSTOSCOPY, RETROGRADES, URETEROSCOPY, LASER HOLMIUM LITHOTRIPSY URETER(S), INSERT STENT Right 10/3/2022    Procedure: CYSTOSCOPY WITH RIGHT RETROGRADE PYELOGRAM, RIGHT URETEROSCOPY WITH HOLMIUM  LASER LITHOTRIPSY AND BASKET REMOVAL OF STONE, RIGHT URETERAL STENT PLACEMENT;  Surgeon: Vito Mark MD;  Location:  OR    CYSTOSCOPY N/A 12/13/2018    Procedure: Cystoscopy;  Surgeon: Cleopatra Altamirano MD;  Location: [facility] OR    DAVINCI HYSTERECTOMY TOTAL, BILATERAL SALPINGO-OOPHORECTOMY, NODE DISSECTION, COMBINED N/A 12/13/2018    Procedure: DaVinci Assisted Removal Of Uterus, Cervix, Both Ovaries And Fallopian Tubes, Bilateral Bruni Lymph Node Dissection;  Surgeon: Cleopatra Altamirano MD;  Location: [facility] OR    GYN SURGERY  12/18/2018    hysterectomy    HC KNEE SCOPE,MED/LAT MENISCUS REPAIR Bilateral     2005 AND 2021    LAPAROSCOPIC BYPASS GASTRIC N/A 10/05/2020    Procedure: LAPAROSCOPIC LE-EN-Y GASTRIC BYPASS;  Surgeon: Nicanor Puri MD;  Location:  OR     Labs reviewed in EPIC  BP Readings from Last 3 Encounters:   11/18/24 122/84   05/06/24 124/80   10/10/23 124/68    Wt Readings from Last 3 Encounters:   11/18/24 124.7 kg (275 lb)   05/06/24 119.7 kg (264 lb)   10/10/23 116.1 kg (256 lb)                  Current Outpatient Medications   Medication Sig Dispense Refill    acetaminophen (TYLENOL) 500 MG tablet Take 500-1,000 mg by mouth every 8 hours as needed for mild pain      BIOTIN PO       calcium gluconate 500 MG tablet Take 1 tablet (500 mg) by mouth 2 times daily      childrens multivitamin w/iron (FLINTSTONES COMPLETE) 18 MG chewable tablet Take 2 chew tab by mouth daily      Cyanocobalamin (VITAMIN B 12 PO) Take 5,000 mcg by mouth SL liquid 5000 mcg/mL      MELATONIN PO Take 1 Dose by mouth nightly as needed      omeprazole (PRILOSEC) 20 MG DR capsule Take 20 mg by mouth daily before breakfast      vitamin B-Complex  "Take 1 tablet by mouth every other day      Vitamin D3 (CHOLECALCIFEROL) 125 MCG (5000 UT) tablet Take 1 tablet (125 mcg) by mouth once a week       Allergies   Allergen Reactions    Asa [Aspirin] Other (See Comments)     Gastric bypass 10/5/2020    Ibuprofen Swelling     Swelling of legs    Nsaids      LEG SWELLING            Objective    Exam  /84 (BP Location: Right arm, Patient Position: Sitting, Cuff Size: Adult Large)   Pulse 68   Temp 97.9  F (36.6  C) (Oral)   Resp 18   Ht 1.702 m (5' 7\")   Wt 124.7 kg (275 lb)   LMP 08/13/2018   SpO2 100%   BMI 43.07 kg/m     Estimated body mass index is 43.07 kg/m  as calculated from the following:    Height as of this encounter: 1.702 m (5' 7\").    Weight as of this encounter: 124.7 kg (275 lb).    Physical Exam  GENERAL: alert and no distress  EYES: Eyes grossly normal to inspection, PERRL and conjunctivae and sclerae normal  HENT: ear canals and TM's normal, nose and mouth without ulcers or lesions  NECK: no adenopathy, no asymmetry, masses, or scars  RESP: lungs clear to auscultation - no rales, rhonchi or wheezes  BREAST: normal without masses, tenderness or nipple discharge and no palpable axillary masses or adenopathy  CV: regular rate and rhythm, normal S1 S2, no S3 or S4, no murmur, click or rub, no peripheral edema  ABDOMEN: soft, nontender, no hepatosplenomegaly, no masses and bowel sounds normal   (female) w/bimanual: normal female external genitalia, normal urethral meatus, normal vaginal mucosa without masses or discharge, and cervix absent  MS: no gross musculoskeletal defects noted, no edema  SKIN: no suspicious lesions or rashes  NEURO: Normal strength and tone, mentation intact and speech normal  PSYCH: mentation appears normal, affect normal/bright        Signed Electronically by: LEEROY Pal CNP    "

## 2024-11-18 NOTE — PATIENT INSTRUCTIONS
Patient Education   Preventive Care Advice   This is general advice given by our system to help you stay healthy. However, your care team may have specific advice just for you. Please talk to your care team about your preventive care needs.  Nutrition  Eat 5 or more servings of fruits and vegetables each day.  Try wheat bread, brown rice and whole grain pasta (instead of white bread, rice, and pasta).  Get enough calcium and vitamin D. Check the label on foods and aim for 100% of the RDA (recommended daily allowance).  Lifestyle  Exercise at least 150 minutes each week  (30 minutes a day, 5 days a week).  Do muscle strengthening activities 2 days a week. These help control your weight and prevent disease.  No smoking.  Wear sunscreen to prevent skin cancer.  Have a dental exam and cleaning every 6 months.  Yearly exams  See your health care team every year to talk about:  Any changes in your health.  Any medicines your care team has prescribed.  Preventive care, family planning, and ways to prevent chronic diseases.  Shots (vaccines)   HPV shots (up to age 26), if you've never had them before.  Hepatitis B shots (up to age 59), if you've never had them before.  COVID-19 shot: Get this shot when it's due.  Flu shot: Get a flu shot every year.  Tetanus shot: Get a tetanus shot every 10 years.  Pneumococcal, hepatitis A, and RSV shots: Ask your care team if you need these based on your risk.  Shingles shot (for age 50 and up)  General health tests  Diabetes screening:  Starting at age 35, Get screened for diabetes at least every 3 years.  If you are younger than age 35, ask your care team if you should be screened for diabetes.  Cholesterol test: At age 39, start having a cholesterol test every 5 years, or more often if advised.  Bone density scan (DEXA): At age 50, ask your care team if you should have this scan for osteoporosis (brittle bones).  Hepatitis C: Get tested at least once in your life.  STIs (sexually  transmitted infections)  Before age 24: Ask your care team if you should be screened for STIs.  After age 24: Get screened for STIs if you're at risk. You are at risk for STIs (including HIV) if:  You are sexually active with more than one person.  You don't use condoms every time.  You or a partner was diagnosed with a sexually transmitted infection.  If you are at risk for HIV, ask about PrEP medicine to prevent HIV.  Get tested for HIV at least once in your life, whether you are at risk for HIV or not.  Cancer screening tests  Cervical cancer screening: If you have a cervix, begin getting regular cervical cancer screening tests starting at age 21.  Breast cancer scan (mammogram): If you've ever had breasts, begin having regular mammograms starting at age 40. This is a scan to check for breast cancer.  Colon cancer screening: It is important to start screening for colon cancer at age 45.  Have a colonoscopy test every 10 years (or more often if you're at risk) Or, ask your provider about stool tests like a FIT test every year or Cologuard test every 3 years.  To learn more about your testing options, visit:   .  For help making a decision, visit:   https://bit.ly/ho78689.  Prostate cancer screening test: If you have a prostate, ask your care team if a prostate cancer screening test (PSA) at age 55 is right for you.  Lung cancer screening: If you are a current or former smoker ages 50 to 80, ask your care team if ongoing lung cancer screenings are right for you.  For informational purposes only. Not to replace the advice of your health care provider. Copyright   2023 [employer]. All rights reserved. Clinically reviewed by the [facility] Transitions Program. [employer] [id number] - REV 01/24.

## 2024-11-19 ENCOUNTER — PATIENT OUTREACH (OUTPATIENT)
Dept: CARE COORDINATION | Facility: CLINIC | Age: 44
End: 2024-11-19
Payer: COMMERCIAL

## 2024-11-19 ENCOUNTER — MYC MEDICAL ADVICE (OUTPATIENT)
Dept: FAMILY MEDICINE | Facility: CLINIC | Age: 44
End: 2024-11-19
Payer: COMMERCIAL

## 2024-11-19 DIAGNOSIS — E66.813 CLASS 3 SEVERE OBESITY DUE TO EXCESS CALORIES WITHOUT SERIOUS COMORBIDITY WITH BODY MASS INDEX (BMI) OF 40.0 TO 44.9 IN ADULT (H): Primary | ICD-10-CM

## 2024-11-19 DIAGNOSIS — E66.01 CLASS 3 SEVERE OBESITY DUE TO EXCESS CALORIES WITHOUT SERIOUS COMORBIDITY WITH BODY MASS INDEX (BMI) OF 40.0 TO 44.9 IN ADULT (H): Primary | ICD-10-CM

## 2024-11-19 DIAGNOSIS — Z98.84 BARIATRIC SURGERY STATUS: ICD-10-CM

## 2024-11-19 LAB
ANA SER QL IF: NEGATIVE
CCP AB SER IA-ACNC: 1 U/ML

## 2024-11-19 NOTE — TELEPHONE ENCOUNTER
"Routing to Glenna. Please see pt's MCM and advise. Is this referring to starting a PA, weight management clinic, etc? Please clarify. Thanks!    Per OV notes 11/18:    \"Consider weight management clinic if GLP1s are not covered.\"    BOB PerazaN, RN     [facility]    11/19/2024 at 10:38 AM    "

## 2024-11-21 ENCOUNTER — PATIENT OUTREACH (OUTPATIENT)
Dept: CARE COORDINATION | Facility: CLINIC | Age: 44
End: 2024-11-21
Payer: COMMERCIAL

## (undated) DEVICE — DRSG GAUZE 4X4" 3033

## (undated) DEVICE — BNDG ABDOMINAL BINDER 9X62-84" 79-89210

## (undated) DEVICE — BASKET NITINOL TIPLESS HALO  1.5FRX120CM 554120

## (undated) DEVICE — DAVINCI HOT SHEARS TIP COVER  400180

## (undated) DEVICE — PREP CHLORAPREP 26ML TINTED ORANGE  260815

## (undated) DEVICE — GUIDEWIRE SENSOR DUAL FLEX STR 0.035"X150CM M0066703080

## (undated) DEVICE — SU WND CLOSURE VLOC 180 ABS 0 9" GS-21 VLOCL0346

## (undated) DEVICE — SU VICRYL 4-0 PS-2 18" UND J496H

## (undated) DEVICE — GLOVE PROTEXIS MICRO 7.5  2D73PM75

## (undated) DEVICE — SOL WATER IRRIG 1000ML BOTTLE 2F7114

## (undated) DEVICE — STPL POWERED ECHELON 60MM PSEE60A

## (undated) DEVICE — SOL NACL 0.9% IRRIG 3000ML BAG 2B7477

## (undated) DEVICE — TUBING IRRIG CYSTO/BLADDER SET 81" LF 2C4040

## (undated) DEVICE — DAVINCI OBTURATOR 8MM BLUNT LONG 470009

## (undated) DEVICE — ENDO TROCAR FIRST ENTRY KII FIOS Z-THRD 05X100MM CTF03

## (undated) DEVICE — SPECIMEN TRAP MUCOUS 40ML LUKI C30200A

## (undated) DEVICE — DAVINCI XI DRAPE COLUMN 470341

## (undated) DEVICE — SU VICRYL 0 TIE 12X18" J906G

## (undated) DEVICE — SU VICRYL 0 TIE 54" J608H

## (undated) DEVICE — LINEN TOWEL PACK X30 5481

## (undated) DEVICE — LASER FIBER HOLMIUM MOSES 200 D/F/L AC-10030100

## (undated) DEVICE — DAVINCI XI SEAL UNIVERSAL 5-8MM 470361

## (undated) DEVICE — ESU GROUND PAD UNIVERSAL W/O CORD

## (undated) DEVICE — ENDO TROCAR FIRST ENTRY KII FIOS Z-THRD 12X100MM CTF73

## (undated) DEVICE — ENDO POUCH UNIVERSAL RETRIEVAL SYSTEM INZII 5MM CD003

## (undated) DEVICE — DRSG STERI STRIP 1/2X4" R1547

## (undated) DEVICE — SU SILK 2-0 SH 30" K833H

## (undated) DEVICE — DRAIN PENROSE 0.50"X18" LATEX FREE GR203

## (undated) DEVICE — ENDO TROCAR SLEEVE KII ADV FIXATION 05X100MM CFS02

## (undated) DEVICE — PACK CYSTOSCOPY SBA15CYFSI

## (undated) DEVICE — ENDO SCOPE WARMER LF TM500

## (undated) DEVICE — DRSG BANDAID 1X3" FABRIC CURITY LATEX FREE KC44101

## (undated) DEVICE — ESU HARMONIC ACE LAP SHEARS STRYKER ACE+ 7 5MMX36CM HARH36

## (undated) DEVICE — CATH TRAY FOLEY COUDE SURESTEP 16FR W/URNE MTR STLK A304716A

## (undated) DEVICE — DRAPE SHEET REV FOLD 3/4 9349

## (undated) DEVICE — PAD CHUX UNDERPAD 23X24" 7136

## (undated) DEVICE — Device

## (undated) DEVICE — SU MONOCRYL 4-0 PS-2 18" UND Y496G

## (undated) DEVICE — KIT PATIENT POSITIONING PIGAZZI LATEX FREE 40580

## (undated) DEVICE — CATH URETERAL OPEN END 6FR AXXCESS

## (undated) DEVICE — DRAPE MAYO STAND 23X54 8337

## (undated) DEVICE — SU DERMABOND ADVANCED .7ML DNX12

## (undated) DEVICE — LINEN TOWEL PACK X6 WHITE 5487

## (undated) DEVICE — SUCTION IRR STRYKERFLOW II W/TIP 250-070-520

## (undated) DEVICE — ESU HOLDER LAP INST DISP PURPLE LONG 330MM H-PRO-330

## (undated) DEVICE — STPL RELOAD REG TISSUE ECHELON 60 X 3.6MM BLUE GST60B

## (undated) DEVICE — INTRODUCER EEA STPL TRANS ANAL/ABD 21MM EEATAID21D

## (undated) DEVICE — RETR ELEV / UTERINE MANIPULATOR V-CARE MED CUP 60-6085-201A

## (undated) DEVICE — ENDO TROCAR SLEEVE KII Z-THREADED 12X100MM CTS22

## (undated) DEVICE — SOL NACL 0.9% INJ 1000ML BAG 2B1324X

## (undated) DEVICE — DAVINCI XI OBTURATOR BLADELESS 8MM 470359

## (undated) DEVICE — RAD RX ISOVUE 300 (50ML) 61% IOPAMIDOL CHARGE PER ML

## (undated) DEVICE — SHEATH URETERAL ACCESS NAVIGATOR HD 11/13FRX36CM M0062502220

## (undated) DEVICE — NDL INSUFFLATION 13GA 120MM C2201

## (undated) DEVICE — CLIP APPLIER ENDO ROTATING 10MM MED/LG ER320

## (undated) DEVICE — SU VICRYL 3-0 SH 27" J316H

## (undated) DEVICE — TUBING SMOKE EVAC .635CMX3M SEA3705

## (undated) DEVICE — NDL SPINAL 22GA 3.5" QUINCKE 405181

## (undated) DEVICE — ENDO TROCAR FIRST ENTRY KII FIOS Z-THRD 11X100MM CTF33

## (undated) DEVICE — SU VICRYL 2-0 SH 27" J317H

## (undated) DEVICE — KOH COLPOTOMIZER OCCLUDER  CPO-6

## (undated) DEVICE — ESU GROUND PAD ADULT REM W/15' CORD E7507DB

## (undated) DEVICE — DAVINCI XI DRAPE ARM 470015

## (undated) DEVICE — LINEN TOWEL PACK X5 5464

## (undated) DEVICE — TUBE GASTRIC EVACUATOR STOMACH 32FR LATEX

## (undated) DEVICE — NDL BLUNT 18GA 1" W/O FILTER 305181

## (undated) DEVICE — WIRE GUIDE AMPLATZ SUPER STIFF 0.035"X145CM 46-524

## (undated) DEVICE — GLOVE GAMMEX DERMAPRENE ULTRA SZ 8.5 LF 8517

## (undated) DEVICE — ENDO SEAL BX PORT BPS-A

## (undated) DEVICE — STPL CIRCULAR XL 21MM W/TILT TIP EEAXL21

## (undated) DEVICE — SUCTION MANIFOLD DORNOCH ULTRA CART UL-CL500

## (undated) DEVICE — BAG DRAIN URO FOR SIEMENS 8MM ADAPTER NS CC164NS-A

## (undated) DEVICE — PACK LAP CHOLE SLC15LCFSD

## (undated) DEVICE — SOL NACL 0.9% IRRIG 1000ML BOTTLE 2F7124

## (undated) DEVICE — EVAC SYSTEM CLEAR FLOW SC082500

## (undated) DEVICE — JELLY LUBRICATING SURGILUBE 2OZ TUBE

## (undated) DEVICE — SYSTEM CLEARIFY VISUALIZATION 21-345

## (undated) DEVICE — SYR 10ML LL W/O NDL 302995

## (undated) RX ORDER — ONDANSETRON 2 MG/ML
INJECTION INTRAMUSCULAR; INTRAVENOUS
Status: DISPENSED
Start: 2018-12-13

## (undated) RX ORDER — LIDOCAINE HYDROCHLORIDE 20 MG/ML
INJECTION, SOLUTION EPIDURAL; INFILTRATION; INTRACAUDAL; PERINEURAL
Status: DISPENSED
Start: 2022-10-03

## (undated) RX ORDER — OXYCODONE HYDROCHLORIDE 5 MG/1
TABLET ORAL
Status: DISPENSED
Start: 2018-12-13

## (undated) RX ORDER — CEFAZOLIN SODIUM IN 0.9 % NACL 3 G/100 ML
INTRAVENOUS SOLUTION, PIGGYBACK (ML) INTRAVENOUS
Status: DISPENSED
Start: 2018-12-13

## (undated) RX ORDER — HYDROMORPHONE HYDROCHLORIDE 1 MG/ML
INJECTION, SOLUTION INTRAMUSCULAR; INTRAVENOUS; SUBCUTANEOUS
Status: DISPENSED
Start: 2020-10-05

## (undated) RX ORDER — NEOSTIGMINE METHYLSULFATE 1 MG/ML
VIAL (ML) INJECTION
Status: DISPENSED
Start: 2022-10-03

## (undated) RX ORDER — HEPARIN SODIUM 5000 [USP'U]/.5ML
INJECTION, SOLUTION INTRAVENOUS; SUBCUTANEOUS
Status: DISPENSED
Start: 2018-12-13

## (undated) RX ORDER — PHENAZOPYRIDINE HYDROCHLORIDE 200 MG/1
TABLET, FILM COATED ORAL
Status: DISPENSED
Start: 2018-12-13

## (undated) RX ORDER — CEFAZOLIN SODIUM 1 G/3ML
INJECTION, POWDER, FOR SOLUTION INTRAMUSCULAR; INTRAVENOUS
Status: DISPENSED
Start: 2020-10-05

## (undated) RX ORDER — GLYCOPYRROLATE 0.2 MG/ML
INJECTION, SOLUTION INTRAMUSCULAR; INTRAVENOUS
Status: DISPENSED
Start: 2022-10-03

## (undated) RX ORDER — HEPARIN SODIUM 5000 [USP'U]/.5ML
INJECTION, SOLUTION INTRAVENOUS; SUBCUTANEOUS
Status: DISPENSED
Start: 2020-10-05

## (undated) RX ORDER — ONDANSETRON 2 MG/ML
INJECTION INTRAMUSCULAR; INTRAVENOUS
Status: DISPENSED
Start: 2020-10-05

## (undated) RX ORDER — ACETAMINOPHEN 325 MG/1
TABLET ORAL
Status: DISPENSED
Start: 2018-12-13

## (undated) RX ORDER — GLYCOPYRROLATE 0.2 MG/ML
INJECTION, SOLUTION INTRAMUSCULAR; INTRAVENOUS
Status: DISPENSED
Start: 2020-10-05

## (undated) RX ORDER — NEOSTIGMINE METHYLSULFATE 1 MG/ML
VIAL (ML) INJECTION
Status: DISPENSED
Start: 2020-10-05

## (undated) RX ORDER — FUROSEMIDE 10 MG/ML
INJECTION INTRAMUSCULAR; INTRAVENOUS
Status: DISPENSED
Start: 2022-10-03

## (undated) RX ORDER — FENTANYL CITRATE 50 UG/ML
INJECTION, SOLUTION INTRAMUSCULAR; INTRAVENOUS
Status: DISPENSED
Start: 2020-10-05

## (undated) RX ORDER — PROPOFOL 10 MG/ML
INJECTION, EMULSION INTRAVENOUS
Status: DISPENSED
Start: 2018-12-13

## (undated) RX ORDER — HYDROMORPHONE HYDROCHLORIDE 1 MG/ML
INJECTION, SOLUTION INTRAMUSCULAR; INTRAVENOUS; SUBCUTANEOUS
Status: DISPENSED
Start: 2018-12-13

## (undated) RX ORDER — FENTANYL CITRATE 50 UG/ML
INJECTION, SOLUTION INTRAMUSCULAR; INTRAVENOUS
Status: DISPENSED
Start: 2022-10-03

## (undated) RX ORDER — HYDROMORPHONE HCL/0.9% NACL/PF 0.2MG/0.2
SYRINGE (ML) INTRAVENOUS
Status: DISPENSED
Start: 2018-12-13

## (undated) RX ORDER — CEFAZOLIN SODIUM/WATER 2 G/20 ML
SYRINGE (ML) INTRAVENOUS
Status: DISPENSED
Start: 2022-10-03

## (undated) RX ORDER — FENTANYL CITRATE 0.05 MG/ML
INJECTION, SOLUTION INTRAMUSCULAR; INTRAVENOUS
Status: DISPENSED
Start: 2020-10-05

## (undated) RX ORDER — DEXAMETHASONE SODIUM PHOSPHATE 4 MG/ML
INJECTION, SOLUTION INTRA-ARTICULAR; INTRALESIONAL; INTRAMUSCULAR; INTRAVENOUS; SOFT TISSUE
Status: DISPENSED
Start: 2022-10-03

## (undated) RX ORDER — INDOCYANINE GREEN AND WATER 25 MG
KIT INJECTION
Status: DISPENSED
Start: 2018-12-13

## (undated) RX ORDER — FENTANYL CITRATE 50 UG/ML
INJECTION, SOLUTION INTRAMUSCULAR; INTRAVENOUS
Status: DISPENSED
Start: 2018-12-13

## (undated) RX ORDER — BUPIVACAINE HYDROCHLORIDE AND EPINEPHRINE 2.5; 5 MG/ML; UG/ML
INJECTION, SOLUTION EPIDURAL; INFILTRATION; INTRACAUDAL; PERINEURAL
Status: DISPENSED
Start: 2020-10-05

## (undated) RX ORDER — GLYCOPYRROLATE 0.2 MG/ML
INJECTION, SOLUTION INTRAMUSCULAR; INTRAVENOUS
Status: DISPENSED
Start: 2018-12-13

## (undated) RX ORDER — LIDOCAINE HYDROCHLORIDE 20 MG/ML
INJECTION, SOLUTION EPIDURAL; INFILTRATION; INTRACAUDAL; PERINEURAL
Status: DISPENSED
Start: 2020-10-05

## (undated) RX ORDER — PROPOFOL 10 MG/ML
INJECTION, EMULSION INTRAVENOUS
Status: DISPENSED
Start: 2020-10-05

## (undated) RX ORDER — DEXAMETHASONE SODIUM PHOSPHATE 4 MG/ML
INJECTION, SOLUTION INTRA-ARTICULAR; INTRALESIONAL; INTRAMUSCULAR; INTRAVENOUS; SOFT TISSUE
Status: DISPENSED
Start: 2020-10-05

## (undated) RX ORDER — ONDANSETRON 2 MG/ML
INJECTION INTRAMUSCULAR; INTRAVENOUS
Status: DISPENSED
Start: 2022-10-03

## (undated) RX ORDER — LIDOCAINE HYDROCHLORIDE 20 MG/ML
INJECTION, SOLUTION EPIDURAL; INFILTRATION; INTRACAUDAL; PERINEURAL
Status: DISPENSED
Start: 2018-12-13

## (undated) RX ORDER — CEFAZOLIN SODIUM IN 0.9 % NACL 3 G/100 ML
INTRAVENOUS SOLUTION, PIGGYBACK (ML) INTRAVENOUS
Status: DISPENSED
Start: 2020-10-05